# Patient Record
Sex: MALE | Race: WHITE | NOT HISPANIC OR LATINO | Employment: OTHER | URBAN - METROPOLITAN AREA
[De-identification: names, ages, dates, MRNs, and addresses within clinical notes are randomized per-mention and may not be internally consistent; named-entity substitution may affect disease eponyms.]

---

## 2017-01-16 ENCOUNTER — ALLSCRIPTS OFFICE VISIT (OUTPATIENT)
Dept: OTHER | Facility: OTHER | Age: 67
End: 2017-01-16

## 2017-01-20 ENCOUNTER — ALLSCRIPTS OFFICE VISIT (OUTPATIENT)
Dept: OTHER | Facility: OTHER | Age: 67
End: 2017-01-20

## 2017-02-16 DIAGNOSIS — E20.9 HYPOPARATHYROIDISM (HCC): ICD-10-CM

## 2017-02-16 DIAGNOSIS — E83.51 HYPOCALCEMIA: ICD-10-CM

## 2017-02-16 DIAGNOSIS — K63.5 POLYP OF COLON: ICD-10-CM

## 2017-02-16 DIAGNOSIS — E04.2 NONTOXIC MULTINODULAR GOITER: ICD-10-CM

## 2017-02-24 ENCOUNTER — APPOINTMENT (OUTPATIENT)
Dept: LAB | Facility: CLINIC | Age: 67
End: 2017-02-24
Payer: MEDICARE

## 2017-02-24 ENCOUNTER — TRANSCRIBE ORDERS (OUTPATIENT)
Dept: LAB | Facility: CLINIC | Age: 67
End: 2017-02-24

## 2017-02-24 DIAGNOSIS — E20.9 HYPOPARATHYROIDISM, UNSPECIFIED HYPOPARATHYROIDISM TYPE (HCC): ICD-10-CM

## 2017-02-24 DIAGNOSIS — E20.9 HYPOPARATHYROIDISM (HCC): ICD-10-CM

## 2017-02-24 DIAGNOSIS — E83.51 HYPOCALCEMIA: Primary | ICD-10-CM

## 2017-02-24 DIAGNOSIS — E83.51 HYPOCALCEMIA: ICD-10-CM

## 2017-02-24 DIAGNOSIS — K63.5 POLYP OF COLON: ICD-10-CM

## 2017-02-24 DIAGNOSIS — E04.2 NONTOXIC MULTINODULAR GOITER: ICD-10-CM

## 2017-02-24 LAB
25(OH)D3 SERPL-MCNC: 39.2 NG/ML (ref 30–100)
CALCIUM SERPL-MCNC: 8.5 MG/DL (ref 8.3–10.1)
FERRITIN SERPL-MCNC: 151 NG/ML (ref 8–388)
PHOSPHATE SERPL-MCNC: 3.6 MG/DL (ref 2.3–4.1)
PTH-INTACT SERPL-MCNC: <5.5 PG/ML (ref 14–72)
T4 FREE SERPL-MCNC: 1.06 NG/DL (ref 0.76–1.46)
TSH SERPL DL<=0.05 MIU/L-ACNC: 1.05 UIU/ML (ref 0.36–3.74)

## 2017-02-24 PROCEDURE — 84439 ASSAY OF FREE THYROXINE: CPT

## 2017-02-24 PROCEDURE — 82310 ASSAY OF CALCIUM: CPT

## 2017-02-24 PROCEDURE — 82570 ASSAY OF URINE CREATININE: CPT

## 2017-02-24 PROCEDURE — 82728 ASSAY OF FERRITIN: CPT

## 2017-02-24 PROCEDURE — 83970 ASSAY OF PARATHORMONE: CPT

## 2017-02-24 PROCEDURE — 82306 VITAMIN D 25 HYDROXY: CPT

## 2017-02-24 PROCEDURE — 84100 ASSAY OF PHOSPHORUS: CPT

## 2017-02-24 PROCEDURE — 82340 ASSAY OF CALCIUM IN URINE: CPT

## 2017-02-24 PROCEDURE — 36415 COLL VENOUS BLD VENIPUNCTURE: CPT

## 2017-02-24 PROCEDURE — 84443 ASSAY THYROID STIM HORMONE: CPT

## 2017-02-25 LAB
CALCIUM 24H UR-MCNC: 224.4 MG/24 HRS (ref 42–353)
CREAT 24H UR-MRATE: 1.5 G/24HR (ref 0.8–1.8)
SPECIMEN VOL UR: 2550 ML
SPECIMEN VOL UR: 2550 ML

## 2017-02-26 ENCOUNTER — GENERIC CONVERSION - ENCOUNTER (OUTPATIENT)
Dept: OTHER | Facility: OTHER | Age: 67
End: 2017-02-26

## 2017-04-01 ENCOUNTER — OFFICE VISIT (OUTPATIENT)
Dept: URGENT CARE | Facility: CLINIC | Age: 67
End: 2017-04-01
Payer: MEDICARE

## 2017-04-01 PROCEDURE — 99203 OFFICE O/P NEW LOW 30 MIN: CPT

## 2017-04-01 PROCEDURE — G0463 HOSPITAL OUTPT CLINIC VISIT: HCPCS

## 2017-04-19 ENCOUNTER — TRANSCRIBE ORDERS (OUTPATIENT)
Dept: LAB | Facility: CLINIC | Age: 67
End: 2017-04-19

## 2017-04-19 ENCOUNTER — APPOINTMENT (OUTPATIENT)
Dept: LAB | Facility: CLINIC | Age: 67
End: 2017-04-19
Payer: MEDICARE

## 2017-04-19 ENCOUNTER — ALLSCRIPTS OFFICE VISIT (OUTPATIENT)
Dept: OTHER | Facility: OTHER | Age: 67
End: 2017-04-19

## 2017-04-19 DIAGNOSIS — R10.84 GENERALIZED ABDOMINAL PAIN: ICD-10-CM

## 2017-04-19 DIAGNOSIS — R53.83 OTHER FATIGUE: ICD-10-CM

## 2017-04-19 DIAGNOSIS — R53.81 OTHER MALAISE: ICD-10-CM

## 2017-04-19 DIAGNOSIS — R11.0 NAUSEA: ICD-10-CM

## 2017-04-19 DIAGNOSIS — R11.2 NAUSEA WITH VOMITING: ICD-10-CM

## 2017-04-19 DIAGNOSIS — R19.7 DIARRHEA: ICD-10-CM

## 2017-04-19 LAB
ALBUMIN SERPL BCP-MCNC: 3.1 G/DL (ref 3.5–5)
ALP SERPL-CCNC: 69 U/L (ref 46–116)
ALT SERPL W P-5'-P-CCNC: 23 U/L (ref 12–78)
AMYLASE SERPL-CCNC: 88 IU/L (ref 25–115)
ANION GAP SERPL CALCULATED.3IONS-SCNC: 7 MMOL/L (ref 4–13)
AST SERPL W P-5'-P-CCNC: 17 U/L (ref 5–45)
BACTERIA UR QL AUTO: ABNORMAL /HPF
BASOPHILS # BLD AUTO: 0.01 THOUSANDS/ΜL (ref 0–0.1)
BASOPHILS NFR BLD AUTO: 0 % (ref 0–1)
BILIRUB SERPL-MCNC: 0.38 MG/DL (ref 0.2–1)
BILIRUB UR QL STRIP: NEGATIVE
BUN SERPL-MCNC: 17 MG/DL (ref 5–25)
CALCIUM SERPL-MCNC: 7.4 MG/DL (ref 8.3–10.1)
CHLORIDE SERPL-SCNC: 100 MMOL/L (ref 100–108)
CLARITY UR: ABNORMAL
CO2 SERPL-SCNC: 29 MMOL/L (ref 21–32)
COLOR UR: YELLOW
CREAT SERPL-MCNC: 1.11 MG/DL (ref 0.6–1.3)
EOSINOPHIL # BLD AUTO: 0.13 THOUSAND/ΜL (ref 0–0.61)
EOSINOPHIL NFR BLD AUTO: 2 % (ref 0–6)
ERYTHROCYTE [DISTWIDTH] IN BLOOD BY AUTOMATED COUNT: 13.7 % (ref 11.6–15.1)
ERYTHROCYTE [SEDIMENTATION RATE] IN BLOOD: 7 MM/HOUR (ref 0–10)
GFR SERPL CREATININE-BSD FRML MDRD: >60 ML/MIN/1.73SQ M
GLUCOSE SERPL-MCNC: 96 MG/DL (ref 65–140)
GLUCOSE UR STRIP-MCNC: NEGATIVE MG/DL
HCT VFR BLD AUTO: 48.5 % (ref 36.5–49.3)
HGB BLD-MCNC: 16.4 G/DL (ref 12–17)
HGB UR QL STRIP.AUTO: NEGATIVE
HYALINE CASTS #/AREA URNS LPF: ABNORMAL /LPF
KETONES UR STRIP-MCNC: NEGATIVE MG/DL
LEUKOCYTE ESTERASE UR QL STRIP: NEGATIVE
LIPASE SERPL-CCNC: 132 U/L (ref 73–393)
LYMPHOCYTES # BLD AUTO: 0.94 THOUSANDS/ΜL (ref 0.6–4.47)
LYMPHOCYTES NFR BLD AUTO: 14 % (ref 14–44)
MCH RBC QN AUTO: 30 PG (ref 26.8–34.3)
MCHC RBC AUTO-ENTMCNC: 33.8 G/DL (ref 31.4–37.4)
MCV RBC AUTO: 89 FL (ref 82–98)
MONOCYTES # BLD AUTO: 0.53 THOUSAND/ΜL (ref 0.17–1.22)
MONOCYTES NFR BLD AUTO: 8 % (ref 4–12)
NEUTROPHILS # BLD AUTO: 5.27 THOUSANDS/ΜL (ref 1.85–7.62)
NEUTS SEG NFR BLD AUTO: 76 % (ref 43–75)
NITRITE UR QL STRIP: NEGATIVE
NON-SQ EPI CELLS URNS QL MICRO: ABNORMAL /HPF
NRBC BLD AUTO-RTO: 0 /100 WBCS
PH UR STRIP.AUTO: 6 [PH] (ref 4.5–8)
PLATELET # BLD AUTO: 225 THOUSANDS/UL (ref 149–390)
PMV BLD AUTO: 10.1 FL (ref 8.9–12.7)
POTASSIUM SERPL-SCNC: 4.1 MMOL/L (ref 3.5–5.3)
PROT SERPL-MCNC: 7.1 G/DL (ref 6.4–8.2)
PROT UR STRIP-MCNC: ABNORMAL MG/DL
RBC # BLD AUTO: 5.46 MILLION/UL (ref 3.88–5.62)
RBC #/AREA URNS AUTO: ABNORMAL /HPF
SODIUM SERPL-SCNC: 136 MMOL/L (ref 136–145)
SP GR UR STRIP.AUTO: 1.02 (ref 1–1.03)
UROBILINOGEN UR QL STRIP.AUTO: 0.2 E.U./DL
WBC # BLD AUTO: 6.89 THOUSAND/UL (ref 4.31–10.16)
WBC #/AREA URNS AUTO: ABNORMAL /HPF

## 2017-04-19 PROCEDURE — 80053 COMPREHEN METABOLIC PANEL: CPT

## 2017-04-19 PROCEDURE — 36415 COLL VENOUS BLD VENIPUNCTURE: CPT

## 2017-04-19 PROCEDURE — 82150 ASSAY OF AMYLASE: CPT

## 2017-04-19 PROCEDURE — 85025 COMPLETE CBC W/AUTO DIFF WBC: CPT

## 2017-04-19 PROCEDURE — 81001 URINALYSIS AUTO W/SCOPE: CPT

## 2017-04-19 PROCEDURE — 85652 RBC SED RATE AUTOMATED: CPT

## 2017-04-19 PROCEDURE — 83690 ASSAY OF LIPASE: CPT

## 2017-04-20 ENCOUNTER — GENERIC CONVERSION - ENCOUNTER (OUTPATIENT)
Dept: OTHER | Facility: OTHER | Age: 67
End: 2017-04-20

## 2017-04-21 ENCOUNTER — ALLSCRIPTS OFFICE VISIT (OUTPATIENT)
Dept: OTHER | Facility: OTHER | Age: 67
End: 2017-04-21

## 2017-05-02 ENCOUNTER — APPOINTMENT (OUTPATIENT)
Dept: LAB | Facility: CLINIC | Age: 67
End: 2017-05-02
Payer: MEDICARE

## 2017-05-02 ENCOUNTER — TRANSCRIBE ORDERS (OUTPATIENT)
Dept: LAB | Facility: CLINIC | Age: 67
End: 2017-05-02

## 2017-05-02 DIAGNOSIS — R10.84 ABDOMINAL PAIN, GENERALIZED: ICD-10-CM

## 2017-05-02 DIAGNOSIS — R80.9 PROTEINURIA, UNSPECIFIED TYPE: ICD-10-CM

## 2017-05-02 DIAGNOSIS — E87.1 HYPOSMOLALITY AND/OR HYPONATREMIA: ICD-10-CM

## 2017-05-02 DIAGNOSIS — R19.7 DIARRHEA, UNSPECIFIED TYPE: ICD-10-CM

## 2017-05-02 DIAGNOSIS — N18.30 CHRONIC KIDNEY DISEASE, STAGE III (MODERATE) (HCC): ICD-10-CM

## 2017-05-02 DIAGNOSIS — R10.84 ABDOMINAL PAIN, GENERALIZED: Primary | ICD-10-CM

## 2017-05-02 DIAGNOSIS — E83.51 HYPOCALCEMIA: ICD-10-CM

## 2017-05-02 LAB
ALBUMIN SERPL BCP-MCNC: 3.2 G/DL (ref 3.5–5)
ANION GAP SERPL CALCULATED.3IONS-SCNC: 8 MMOL/L (ref 4–13)
BUN SERPL-MCNC: 17 MG/DL (ref 5–25)
CALCIUM SERPL-MCNC: 8 MG/DL (ref 8.3–10.1)
CHLORIDE SERPL-SCNC: 102 MMOL/L (ref 100–108)
CO2 SERPL-SCNC: 29 MMOL/L (ref 21–32)
CREAT SERPL-MCNC: 1.17 MG/DL (ref 0.6–1.3)
GFR SERPL CREATININE-BSD FRML MDRD: >60 ML/MIN/1.73SQ M
GLUCOSE SERPL-MCNC: 109 MG/DL (ref 65–140)
PHOSPHATE SERPL-MCNC: 3.1 MG/DL (ref 2.3–4.1)
POTASSIUM SERPL-SCNC: 4 MMOL/L (ref 3.5–5.3)
SODIUM SERPL-SCNC: 139 MMOL/L (ref 136–145)

## 2017-05-02 PROCEDURE — 80069 RENAL FUNCTION PANEL: CPT

## 2017-05-02 PROCEDURE — 36415 COLL VENOUS BLD VENIPUNCTURE: CPT

## 2017-05-03 ENCOUNTER — GENERIC CONVERSION - ENCOUNTER (OUTPATIENT)
Dept: OTHER | Facility: OTHER | Age: 67
End: 2017-05-03

## 2017-05-08 ENCOUNTER — APPOINTMENT (OUTPATIENT)
Dept: LAB | Facility: CLINIC | Age: 67
End: 2017-05-08
Payer: MEDICARE

## 2017-05-08 DIAGNOSIS — R19.7 DIARRHEA, UNSPECIFIED TYPE: ICD-10-CM

## 2017-05-08 DIAGNOSIS — E87.1 HYPOSMOLALITY AND/OR HYPONATREMIA: ICD-10-CM

## 2017-05-08 DIAGNOSIS — N18.30 CHRONIC KIDNEY DISEASE, STAGE III (MODERATE) (HCC): ICD-10-CM

## 2017-05-08 DIAGNOSIS — R80.9 PROTEINURIA, UNSPECIFIED TYPE: ICD-10-CM

## 2017-05-08 DIAGNOSIS — E83.51 HYPOCALCEMIA: ICD-10-CM

## 2017-05-08 DIAGNOSIS — R10.84 ABDOMINAL PAIN, GENERALIZED: ICD-10-CM

## 2017-05-08 LAB
ANION GAP SERPL CALCULATED.3IONS-SCNC: 4 MMOL/L (ref 4–13)
BUN SERPL-MCNC: 18 MG/DL (ref 5–25)
CALCIUM SERPL-MCNC: 8.7 MG/DL (ref 8.3–10.1)
CHLORIDE SERPL-SCNC: 101 MMOL/L (ref 100–108)
CO2 SERPL-SCNC: 32 MMOL/L (ref 21–32)
CREAT SERPL-MCNC: 1.25 MG/DL (ref 0.6–1.3)
CREAT UR-MCNC: 120 MG/DL
GFR SERPL CREATININE-BSD FRML MDRD: 57.6 ML/MIN/1.73SQ M
GLUCOSE SERPL-MCNC: 88 MG/DL (ref 65–140)
MAGNESIUM SERPL-MCNC: 2 MG/DL (ref 1.6–2.6)
MICROALBUMIN UR-MCNC: 184 MG/L (ref 0–20)
MICROALBUMIN/CREAT 24H UR: 153 MG/G CREATININE (ref 0–30)
OSMOLALITY UR: 506 MMOL/KG
POTASSIUM SERPL-SCNC: 4.7 MMOL/L (ref 3.5–5.3)
SODIUM SERPL-SCNC: 137 MMOL/L (ref 136–145)

## 2017-05-08 PROCEDURE — 36415 COLL VENOUS BLD VENIPUNCTURE: CPT

## 2017-05-08 PROCEDURE — 80048 BASIC METABOLIC PNL TOTAL CA: CPT

## 2017-05-08 PROCEDURE — 82570 ASSAY OF URINE CREATININE: CPT

## 2017-05-08 PROCEDURE — 83735 ASSAY OF MAGNESIUM: CPT

## 2017-05-08 PROCEDURE — 83935 ASSAY OF URINE OSMOLALITY: CPT

## 2017-05-08 PROCEDURE — 82043 UR ALBUMIN QUANTITATIVE: CPT

## 2017-05-31 ENCOUNTER — ALLSCRIPTS OFFICE VISIT (OUTPATIENT)
Dept: OTHER | Facility: OTHER | Age: 67
End: 2017-05-31

## 2017-05-31 DIAGNOSIS — E78.5 HYPERLIPIDEMIA: ICD-10-CM

## 2017-06-06 ENCOUNTER — TRANSCRIBE ORDERS (OUTPATIENT)
Dept: LAB | Facility: CLINIC | Age: 67
End: 2017-06-06

## 2017-06-06 ENCOUNTER — GENERIC CONVERSION - ENCOUNTER (OUTPATIENT)
Dept: OTHER | Facility: OTHER | Age: 67
End: 2017-06-06

## 2017-06-06 ENCOUNTER — APPOINTMENT (OUTPATIENT)
Dept: LAB | Facility: CLINIC | Age: 67
End: 2017-06-06
Payer: MEDICARE

## 2017-06-06 DIAGNOSIS — E78.5 HYPERLIPIDEMIA: ICD-10-CM

## 2017-06-06 LAB
LDLC SERPL DIRECT ASSAY-MCNC: 117 MG/DL (ref 0–100)
TRIGL SERPL-MCNC: 107 MG/DL

## 2017-06-06 PROCEDURE — 83721 ASSAY OF BLOOD LIPOPROTEIN: CPT

## 2017-06-06 PROCEDURE — 84478 ASSAY OF TRIGLYCERIDES: CPT

## 2017-06-06 PROCEDURE — 36415 COLL VENOUS BLD VENIPUNCTURE: CPT

## 2017-08-18 ENCOUNTER — ALLSCRIPTS OFFICE VISIT (OUTPATIENT)
Dept: OTHER | Facility: OTHER | Age: 67
End: 2017-08-18

## 2017-08-18 DIAGNOSIS — E20.9 HYPOPARATHYROIDISM (HCC): ICD-10-CM

## 2017-08-18 DIAGNOSIS — E04.2 NONTOXIC MULTINODULAR GOITER: ICD-10-CM

## 2017-10-04 ENCOUNTER — ALLSCRIPTS OFFICE VISIT (OUTPATIENT)
Dept: OTHER | Facility: OTHER | Age: 67
End: 2017-10-04

## 2017-10-04 DIAGNOSIS — I10 ESSENTIAL (PRIMARY) HYPERTENSION: ICD-10-CM

## 2017-10-04 DIAGNOSIS — E05.90 THYROTOXICOSIS WITHOUT THYROID STORM: ICD-10-CM

## 2017-10-04 DIAGNOSIS — R79.82 ELEVATED C-REACTIVE PROTEIN (CRP): ICD-10-CM

## 2017-10-04 DIAGNOSIS — Z12.5 ENCOUNTER FOR SCREENING FOR MALIGNANT NEOPLASM OF PROSTATE: ICD-10-CM

## 2017-10-04 DIAGNOSIS — Z11.59 ENCOUNTER FOR SCREENING FOR OTHER VIRAL DISEASES (CODE): ICD-10-CM

## 2017-10-05 ENCOUNTER — APPOINTMENT (OUTPATIENT)
Dept: LAB | Facility: CLINIC | Age: 67
End: 2017-10-05
Payer: MEDICARE

## 2017-10-05 ENCOUNTER — TRANSCRIBE ORDERS (OUTPATIENT)
Dept: LAB | Facility: CLINIC | Age: 67
End: 2017-10-05

## 2017-10-05 DIAGNOSIS — Z11.59 ENCOUNTER FOR SCREENING FOR OTHER VIRAL DISEASES (CODE): ICD-10-CM

## 2017-10-05 DIAGNOSIS — I10 ESSENTIAL (PRIMARY) HYPERTENSION: ICD-10-CM

## 2017-10-05 DIAGNOSIS — E04.2 NONTOXIC MULTINODULAR GOITER: ICD-10-CM

## 2017-10-05 DIAGNOSIS — E20.9 HYPOPARATHYROIDISM (HCC): ICD-10-CM

## 2017-10-05 DIAGNOSIS — Z12.5 ENCOUNTER FOR SCREENING FOR MALIGNANT NEOPLASM OF PROSTATE: ICD-10-CM

## 2017-10-05 DIAGNOSIS — N18.30 CHRONIC KIDNEY DISEASE, STAGE III (MODERATE) (HCC): ICD-10-CM

## 2017-10-05 DIAGNOSIS — E83.51 HYPOCALCEMIA: ICD-10-CM

## 2017-10-05 DIAGNOSIS — N18.30 CHRONIC KIDNEY DISEASE, STAGE III (MODERATE) (HCC): Primary | ICD-10-CM

## 2017-10-05 DIAGNOSIS — E87.1 HYPOSMOLALITY SYNDROME: ICD-10-CM

## 2017-10-05 DIAGNOSIS — E05.90 THYROTOXICOSIS WITHOUT THYROID STORM: ICD-10-CM

## 2017-10-05 DIAGNOSIS — R79.82 ELEVATED C-REACTIVE PROTEIN (CRP): ICD-10-CM

## 2017-10-05 LAB
25(OH)D3 SERPL-MCNC: 55.8 NG/ML (ref 30–100)
ALBUMIN SERPL BCP-MCNC: 3.4 G/DL (ref 3.5–5)
ALP SERPL-CCNC: 75 U/L (ref 46–116)
ALT SERPL W P-5'-P-CCNC: 20 U/L (ref 12–78)
ANION GAP SERPL CALCULATED.3IONS-SCNC: 6 MMOL/L (ref 4–13)
AST SERPL W P-5'-P-CCNC: 23 U/L (ref 5–45)
BACTERIA UR QL AUTO: ABNORMAL /HPF
BILIRUB SERPL-MCNC: 0.47 MG/DL (ref 0.2–1)
BILIRUB UR QL STRIP: NEGATIVE
BUN SERPL-MCNC: 15 MG/DL (ref 5–25)
CALCIUM SERPL-MCNC: 8.4 MG/DL (ref 8.3–10.1)
CHLORIDE SERPL-SCNC: 102 MMOL/L (ref 100–108)
CLARITY UR: CLEAR
CO2 SERPL-SCNC: 29 MMOL/L (ref 21–32)
COLOR UR: YELLOW
CREAT SERPL-MCNC: 1.14 MG/DL (ref 0.6–1.3)
CREAT UR-MCNC: 192 MG/DL
CRP SERPL HS-MCNC: 0.96 MG/L
GFR SERPL CREATININE-BSD FRML MDRD: 66 ML/MIN/1.73SQ M
GLUCOSE P FAST SERPL-MCNC: 87 MG/DL (ref 65–99)
GLUCOSE UR STRIP-MCNC: NEGATIVE MG/DL
HGB UR QL STRIP.AUTO: NEGATIVE
HYALINE CASTS #/AREA URNS LPF: ABNORMAL /LPF
KETONES UR STRIP-MCNC: NEGATIVE MG/DL
LEUKOCYTE ESTERASE UR QL STRIP: NEGATIVE
MICROALBUMIN UR-MCNC: 466 MG/L (ref 0–20)
MICROALBUMIN/CREAT 24H UR: 243 MG/G CREATININE (ref 0–30)
NITRITE UR QL STRIP: NEGATIVE
NON-SQ EPI CELLS URNS QL MICRO: ABNORMAL /HPF
PH UR STRIP.AUTO: 6 [PH] (ref 4.5–8)
PHOSPHATE SERPL-MCNC: 3.7 MG/DL (ref 2.3–4.1)
POTASSIUM SERPL-SCNC: 4.3 MMOL/L (ref 3.5–5.3)
PROT SERPL-MCNC: 7.6 G/DL (ref 6.4–8.2)
PROT UR STRIP-MCNC: ABNORMAL MG/DL
PSA SERPL-MCNC: 1.5 NG/ML (ref 0–4)
PTH-INTACT SERPL-MCNC: <5.5 PG/ML (ref 14–72)
RBC #/AREA URNS AUTO: ABNORMAL /HPF
SODIUM SERPL-SCNC: 137 MMOL/L (ref 136–145)
SP GR UR STRIP.AUTO: 1.02 (ref 1–1.03)
T3 SERPL-MCNC: 1 NG/ML (ref 0.6–1.8)
T4 FREE SERPL-MCNC: 1.09 NG/DL (ref 0.76–1.46)
TSH SERPL DL<=0.05 MIU/L-ACNC: 1.18 UIU/ML (ref 0.36–3.74)
UROBILINOGEN UR QL STRIP.AUTO: 0.2 E.U./DL
WBC #/AREA URNS AUTO: ABNORMAL /HPF

## 2017-10-05 PROCEDURE — 86141 C-REACTIVE PROTEIN HS: CPT

## 2017-10-05 PROCEDURE — 84480 ASSAY TRIIODOTHYRONINE (T3): CPT

## 2017-10-05 PROCEDURE — 83970 ASSAY OF PARATHORMONE: CPT

## 2017-10-05 PROCEDURE — 84443 ASSAY THYROID STIM HORMONE: CPT

## 2017-10-05 PROCEDURE — 86803 HEPATITIS C AB TEST: CPT

## 2017-10-05 PROCEDURE — 80053 COMPREHEN METABOLIC PANEL: CPT

## 2017-10-05 PROCEDURE — 82306 VITAMIN D 25 HYDROXY: CPT

## 2017-10-05 PROCEDURE — 82570 ASSAY OF URINE CREATININE: CPT | Performed by: INTERNAL MEDICINE

## 2017-10-05 PROCEDURE — 84100 ASSAY OF PHOSPHORUS: CPT

## 2017-10-05 PROCEDURE — 82043 UR ALBUMIN QUANTITATIVE: CPT | Performed by: INTERNAL MEDICINE

## 2017-10-05 PROCEDURE — 84439 ASSAY OF FREE THYROXINE: CPT

## 2017-10-05 PROCEDURE — 36415 COLL VENOUS BLD VENIPUNCTURE: CPT

## 2017-10-05 PROCEDURE — 81001 URINALYSIS AUTO W/SCOPE: CPT | Performed by: INTERNAL MEDICINE

## 2017-10-05 PROCEDURE — G0103 PSA SCREENING: HCPCS

## 2017-10-05 NOTE — PROGRESS NOTES
Assessment  1  Hypertension (401 9) (I10)   2  Generalized anxiety disorder (300 02) (F41 1)   3  GERD without esophagitis (530 81) (K21 9)   4  Hyperlipidemia (272 4) (E78 5)   5  Hyperthyroidism (242 90) (E05 90)   6  Hypocalcemia (275 41) (E83 51)   7  Hypoparathyroidism (252 1) (E20 9)   8  Need for hepatitis C screening test (V73 89) (Z11 59)   9  Elevated C-reactive protein (CRP) (790 95) (R79 82)    Plan  Elevated C-reactive protein (CRP)    · (1) C-REACTIVE PROTEIN, HIGH SENSITIVITY; Status:Active; Requested ABU:44MXG2495;   Hypertension    · (1) COMPREHENSIVE METABOLIC PANEL; Status:Active; Requested for:04Oct2017;    · (1) MICROALBUMIN CREATININE RATIO, RANDOM URINE; Status:Active; Requested for:04Oct2017;    · Follow-up visit in 4 Months Evaluation and Treatment  Follow-up  Status: Hold For - Scheduling   Requested for: 59FNC1335   · A diet low in sodium and high in potassium, magnesium, and calcium can help your blood pressure ;  Status:Complete;   Done: 27AWU9479   · Begin a limited exercise program ; Status:Complete;   Done: 97SYX6511   · Begin or continue regular aerobic exercise  Gradually work up to at least {count1} sessions of  {dur1} of exercise a week ; Status:Complete;   Done: 21CCO4059  Hyperthyroidism    · (1) T3 TOTAL; Status:Active; Requested for:04Oct2017;    · (1) T4, FREE; Status:Active; Requested for:04Oct2017;    · (1) TSH; Status:Active; Requested for:04Oct2017;   Need for hepatitis C screening test    · (1) HEP C ANTIBODY; Status:Active; Requested SONALI:10CEN5355;   Special screening examination for neoplasm of prostate    · (1) PSA (SCREEN) (Dx V76 44 Screen for Prostate Cancer); Status:Active; Requested  BZT:50SNQ4679;     Discussion/Summary  Discussion Summary:   Doing well and will check labs  Continue current treatment  Declines the flu vaccine  Discussed the elevated CRP and will recheck, but discussed it being acute phase reactant and causes   In regards to cardiac, treatment continues to modify risks  Keep f/u with endo and renal    Medication SE Review and Pt Understands Tx: Possible side effects of new medications were reviewed with the patient/guardian today  The treatment plan was reviewed with the patient/guardian  The patient/guardian understands and agrees with the treatment plan      Chief Complaint  Chief Complaint Free Text Note Form: RTN - Hypercholesterol, HTN  Sherre Soulier Sherre Soulier No new complaints  History of Present Illness  HPI: WM RTC with his wife for f/u htn, hyperlipidemia, etc  Doing well and no c/o's, but had a recent Life Screen and results were good except for slightly elevated CRP  Due for labs  Just seen by endo and things are going good and needs labs and a thyroid US beginning of next year  Anxiety Disorder (Follow-Up): The patient is being seen for follow-up of generalized anxiety disorder  The patient reports doing well  He has no comorbid illnesses  He has had no significant interval events  Interval symptoms:  improved anxiety,-denies sleep disruption-and-denies depression  Medication(s): beta blockers  Medications:  the patient is adherent to his medication regimen, but-he denies medication side effects  Disease management:  the patient is doing well with his goals  Gastroesophageal Reflux Disease (Follow-Up): The patient is being seen for follow-up of gastroesophageal reflux disease  The patient reports doing well  There are no comorbid illnesses  He has had no significant interval events  The patient is currently asymptomatic  No associated symptoms are reported  The patient is not currently on medication for this problem  Disease management:  the patient is doing well with his goals  Electrolyte Imbalance (Brief): The patient is being seen for a routine clinic follow-up of electrolyte imbalance  The patient has hypocalcemia  The patient is currently asymptomatic  Current treatment includes oral calcium supplements   By report, there is good compliance with treatment, good tolerance of treatment and good symptom control  Hyperlipidemia (Follow-Up): The patient states his hyperlipidemia has been under good control since the last visit  Comorbid Illnesses: hypertension  He has no significant interval events  Symptoms: The patient is currently asymptomatic  Associated symptoms include no focal neurologic deficits-and-no memory loss  Medications: The patient is not currently on any medications for his hyperlipidemia  The patient is doing well with his hyperlipidemia goals  The patient is due for a lipid panel  Hypertension (Follow-Up): The patient presents for follow-up of essential hypertension  The patient states he has been doing well with his blood pressure control since the last visit  He has no comorbid illnesses  He has no significant interval events  Symptoms: The patient is currently asymptomatic  Associated symptoms include no headache-and-no focal neurologic deficits  Home monitoring: The patient checks his blood pressure sporadically  Blood pressure control has been good  Medications: the patient is adherent with his medication regimen -He denies medication side effects  Medication(s): a beta blocking agent-and-an angiotensin receptor blocker  Disease Management: the patient is doing well with his blood pressure goals  The patient is due for a lipid panel,-a serum creatinine-and-a urine microalbumin  Review of Systems  Complete-Male:   Constitutional: no fever,-not feeling poorly,-no chills-and-not feeling tired  Cardiovascular: no chest pain,-no intermittent leg claudication,-no palpitations-and-no extremity edema  Respiratory: no shortness of breath,-no cough,-no orthopnea,-no wheezing,-no shortness of breath during exertion-and-no PND  Gastrointestinal: no abdominal pain,-no nausea,-no constipation-and-no diarrhea     Genitourinary: No complaints of dysuria, no incontinence, no hesitancy, no nocturia, no genital lesion, no testicular pain  Musculoskeletal: No complaints of arthralgia, no myalgias, no joint swelling or stiffness, no limb pain or swelling  Integumentary: No complaints of skin rash or skin lesions, no itching, no skin wound, no dry skin  Neurological: no headache,-no confusion-and-no convulsions  Psychiatric: anxiety, but-no depression  Endocrine: No complaints of proptosis, no hot flashes, no muscle weakness, no erectile dysfunction, no deepening of the voice, no feelings of weakness  Hematologic/Lymphatic: No complaints of swollen glands, no swollen glands in the neck, does not bleed easily, no easy bruising  Active Problems  1  Allergic rhinitis (477 9) (J30 9)   2  Asymptomatic proteinuria (791 0) (R80 9)   3  Basal cell tumor (238 2) (D48 5)   4  Benign colon polyp (211 3) (K63 5)   5  Chronic cough (786 2) (R05)   6  Generalized anxiety disorder (300 02) (F41 1)   7  GERD without esophagitis (530 81) (K21 9)   8  Hyperlipidemia (272 4) (E78 5)   9  Hypertension (401 9) (I10)   10  Hyperthyroidism (242 90) (E05 90)   11  Hypocalcemia (275 41) (E83 51)   12  Hypoparathyroidism (252 1) (E20 9)   13  Medicare annual wellness visit, subsequent (V70 0) (Z00 00)   14  Migraine, unspecified, not intractable, without status migrainosus (346 90) (G43 909)   15  Multiple thyroid nodules (241 1) (E04 2)   16  Need for hepatitis C screening test (V73 89) (Z11 59)   17  Need for influenza vaccination (V04 81) (Z23)   18  Palpitations (785 1) (R00 2)   19  Screening for genitourinary condition (V81 6) (Z13 89)   20  Screening for neurological condition (V80 09) (Z13 89)   21  Special screening examination for neoplasm of prostate (V76 44) (Z12 5)    Past Medical History  1  History of Abdominal pain, acute, generalized (789 07,338 19) (R10 84)   2  History of Acute frontal sinusitis (461 1) (J01 10)   3  History of Acute serous otitis media of left ear (381 01) (H65 02)   4   Benign colon polyp (211 3) (K63 5) 5  History of Dysfunction of both eustachian tubes (381 81) (H69 83)   6  History of Encounter for therapeutic drug monitoring (V58 83) (Z51 81)   7  History of chest pain (V13 89) (Z87 898)   8  History of diarrhea (V12 79) (Z87 898)   9  History of fatigue (V13 89) (Z87 898)   10  History of hematuria (V13 09) (Z87 448)   11  History of nausea (V12 79) (Z87 898)   12  History of nausea (V12 79) (Z87 898)   13  History of nausea and vomiting (V12 79) (Z87 898)   14  History of shortness of breath (V13 89) (Z87 898)   15  History of tinea corporis (V12 09) (Z86 19)   16  History of Hoarseness (784 42)   17  History of Lightheadedness (780 4) (R42)   18  History of Lump in neck (784 2) (R22 1)   19  History of Malaise and fatigue (780 79) (R53 81,R53 83)   20  History of Medicare annual wellness visit, initial (V70 0) (Z00 00)   21  History of Microscopic hematuria (599 72) (R31 29)   22  History of Nephropathy (583 9) (N28 9)   23  Personal history of actinic keratosis (V13 3) (Z87 2)   24  History of Skin lesion (709 9) (L98 9)   25  History of Tick bite of knee (916 4,E906 4) (Q47 516B,S54  Dayanara Jerome)  Active Problems And Past Medical History Reviewed: The active problems and past medical history were reviewed and updated today  Surgical History  1  History of Biopsy Skin Each Additional Lesion   2  History of Biopsy Thyroid Using Percutaneous Core Needle   3  History of Complete Colonoscopy For Polyp Removal   4  History of Excision Of Lesion Scalp Malignant   5  History of Tonsillectomy  Surgical History Reviewed: The surgical history was reviewed and updated today  Family History  Mother    1  Family history of Hypertension (V17 49)  Father    2  Family history of Hypertension (V17 49)  Brother    3  Family history of Schwannomatosis  Family History Reviewed: The family history was reviewed and updated today         Social History   · Marital History - Currently    · Never A Smoker  Social History Reviewed: The social history was reviewed and updated today  The social history was reviewed and is unchanged  Current Meds   1  Aspirin EC Lo-Dose 81 MG TBEC; Take 1 tablet daily; Therapy: 19Tfj7304 to (Last Rx:25Qze3803)  Requested for: 89Zud3794 Ordered   2  BL Vitamin C 500 MG TABS; Take 1 tablet daily Recorded   3  Calcitriol 0 25 MCG Oral Capsule; TAKE ONE CAPSULE BY MOUTH ONCE DAILY; Therapy: 82FES9906 to (SXPUWYTK:89XLB1117)  Requested for: 00Gld6940; Last Rx:38Xpv1908   Ordered   4  Calcium 600 MG Oral Tablet; 1 tab twice a day with meals; Therapy: 68EBY0635 to (Last Rx:16Jan2017) Ordered   5  Fish Oil 1200 MG Oral Capsule; TAKE 2 CAPSULE Daily; Therapy: (Recorded:34Kae3649) to Recorded   6  Losartan Potassium 50 MG Oral Tablet; TAKE 1 TABLET BY MOUTH ONCE DAILY; Therapy: 52LMV6699 to (Piotr Wills)  Requested for: 67Ezx5030; Last Rx:13Cgl5153;   Status: 1554 Surgeons Dr gilda Regalado Verification Ordered   7  Metoprolol Tartrate 25 MG Oral Tablet; TAKE 1 TABLET DAILY; Therapy: 75Ljn4043 to (Evaluate:27Jan2018)  Requested for: 41Ztt1001; Last Rx:32Vtp2656   Ordered   8  Multi-Vitamin Oral Tablet; Take 1 daily Recorded   9  Vitamin D 1000 UNIT CAPS; Therapy: (Recorded:14Apr2015) to Recorded  Medication List Reviewed: The medication list was reviewed and updated today  Allergies  1  No Known Drug Allergies    Vitals  Vital Signs    Recorded: 07AMB0214 09:35AM   Temperature 98 F   Heart Rate 80   Respiration 18   Systolic 770   Diastolic 64   Height 6 ft 3 in   Weight 211 lb    BMI Calculated 26 37   BSA Calculated 2 24     Physical Exam    Constitutional   General appearance: No acute distress, well appearing and well nourished  Eyes   Conjunctiva and lids: No swelling, erythema, or discharge  Pupils and irises: Equal, round and reactive to light  Ears, Nose, Mouth, and Throat   Oropharynx: Normal with no erythema, edema, exudate or lesions  Pulmonary   Respiratory effort: No increased work of breathing or signs of respiratory distress  Auscultation of lungs: Clear to auscultation, equal breath sounds bilaterally, no wheezes, no rales, no rhonci  Cardiovascular   Auscultation of heart: Normal rate and rhythm, normal S1 and S2, without murmurs  Examination of extremities for edema and/or varicosities: Normal     Carotid pulses: Normal     Abdomen   Abdomen: Non-tender, no masses  Musculoskeletal   Gait and station: Normal     Psychiatric   Orientation to person, place and time: Normal     Mood and affect: Normal          Health Management  Special screening examination for neoplasm of prostate   (1) PSA (SCREEN) (Dx V76 44 Screen for Prostate Cancer); every 1 year; Last 43CHL2456; Next  Due: 00HXT2012; Overdue  Health Maintenance   (1) PSA (SCREEN) (Dx V76 44 Screen for Prostate Cancer); every 1 year; Last 39PHP4451; Next  Due: 91IJL9054; Overdue    Future Appointments    Date/Time Provider Specialty Site   02/22/2018 10:20 AM Pricilla Sandifer, M D  Endocrinology [facility] ENDOCRINOLOGY   02/19/2018 09:00 AM JOSEP Gonzáles   Internal Medicine [facility] [id number]     Signatures   Electronically signed by : JOSEP Hussein ; Oct  4 2017 10:03AM EST                       (Author)

## 2017-10-06 ENCOUNTER — GENERIC CONVERSION - ENCOUNTER (OUTPATIENT)
Dept: OTHER | Facility: OTHER | Age: 67
End: 2017-10-06

## 2017-10-06 LAB — HCV AB SER QL: NORMAL

## 2017-10-07 ENCOUNTER — GENERIC CONVERSION - ENCOUNTER (OUTPATIENT)
Dept: OTHER | Facility: OTHER | Age: 67
End: 2017-10-07

## 2017-10-12 ENCOUNTER — OFFICE VISIT (OUTPATIENT)
Dept: URGENT CARE | Facility: CLINIC | Age: 67
End: 2017-10-12
Payer: MEDICARE

## 2017-10-12 PROCEDURE — 12032 INTMD RPR S/A/T/EXT 2.6-7.5: CPT

## 2017-10-12 PROCEDURE — 99213 OFFICE O/P EST LOW 20 MIN: CPT

## 2017-10-12 PROCEDURE — G0463 HOSPITAL OUTPT CLINIC VISIT: HCPCS

## 2017-10-14 NOTE — PROGRESS NOTES
Assessment  1  Laceration of forearm, left (881 00) (S56 812A)    Plan  Laceration of forearm, left    · Cephalexin 500 MG Oral Capsule; TAKE 1 CAPSULE 3 TIMES DAILY    Discussion/Summary  Discussion Summary:   Watch for signs of infection  Return here if any occur despite taking Keflex  Sutures need to be taken out in 7-10 days  Medication Side Effects Reviewed: Possible side effects of new medications were reviewed with the patient/guardian today  Understands and agrees with treatment plan: The treatment plan was reviewed with the patient/guardian  The patient/guardian understands and agrees with the treatment plan   Counseling Documentation With Imm: The patient was counseled regarding instructions for management  Chief Complaint  1  Skin Wound  Chief Complaint Free Text Note Form: Pt cut his left forearm on a piece of steel earlier today  History of Present Illness  HPI: Sustained a laceration to left forearm, just prior to arrival, on a piece of corazon metal  Bleeding controlled   Skin Wound: 2057 JDLab presents with complaints of skin wound  Review of Systems  Focused-Male:   Constitutional: no fever-- and-- no chills  ENT: no sore throat,-- no nasal discharge-- and-- no hoarseness  Cardiovascular: no chest pain  Respiratory: no cough  Gastrointestinal: no nausea-- and-- no vomiting  Musculoskeletal: no arthralgias-- and-- no myalgias  Integumentary: no rashes  Neurological: no numbness,-- no tingling-- and-- no dizziness  ROS Reviewed:   ROS reviewed  Active Problems  1  Allergic rhinitis (477 9) (J30 9)   2  Asymptomatic proteinuria (791 0) (R80 9)   3  Basal cell tumor (238 2) (D48 5)   4  Benign colon polyp (211 3) (K63 5)   5  Chronic cough (786 2) (R05)   6  Elevated C-reactive protein (CRP) (790 95) (R79 82)   7  Generalized anxiety disorder (300 02) (F41 1)   8  GERD without esophagitis (530 81) (K21 9)   9  Hyperlipidemia (272 4) (E78 5)   10   Hypertension (401  9) (I10)   11  Hyperthyroidism (242 90) (E05 90)   12  Hypocalcemia (275 41) (E83 51)   13  Hypoparathyroidism (252 1) (E20 9)   14  Medicare annual wellness visit, subsequent (V70 0) (Z00 00)   15  Migraine, unspecified, not intractable, without status migrainosus (346 90) (G43 909)   16  Multiple thyroid nodules (241 1) (E04 2)   17  Need for hepatitis C screening test (V73 89) (Z11 59)   18  Need for influenza vaccination (V04 81) (Z23)   19  Palpitations (785 1) (R00 2)   20  Screening for genitourinary condition (V81 6) (Z13 89)   21  Screening for neurological condition (V80 09) (Z13 89)   22  Special screening examination for neoplasm of prostate (V76 44) (Z12 5)    Past Medical History  1  History of Abdominal pain, acute, generalized (789 07,338 19) (R10 84)   2  History of Acute frontal sinusitis (461 1) (J01 10)   3  History of Acute serous otitis media of left ear (381 01) (H65 02)   4  Benign colon polyp (211 3) (K63 5)   5  History of Dysfunction of both eustachian tubes (381 81) (H69 83)   6  History of Encounter for therapeutic drug monitoring (V58 83) (Z51 81)   7  History of chest pain (V13 89) (Z87 898)   8  History of diarrhea (V12 79) (Z87 898)   9  History of fatigue (V13 89) (Z87 898)   10  History of hematuria (V13 09) (Z87 448)   11  History of nausea (V12 79) (Z87 898)   12  History of nausea (V12 79) (Z87 898)   13  History of nausea and vomiting (V12 79) (Z87 898)   14  History of shortness of breath (V13 89) (Z87 898)   15  History of tinea corporis (V12 09) (Z86 19)   16  History of Hoarseness (784 42)   17  History of Lightheadedness (780 4) (R42)   18  History of Lump in neck (784 2) (R22 1)   19  History of Malaise and fatigue (780 79) (R53 81,R53 83)   20  History of Medicare annual wellness visit, initial (V70 0) (Z00 00)   21  History of Microscopic hematuria (599 72) (R31 29)   22  History of Nephropathy (583 9) (N28 9)   23   Personal history of actinic keratosis (V13 3) (Z87 2)   24  History of Skin lesion (709 9) (L98 9)   25  History of Tick bite of knee (916 4,E906 4) (X66 849Q,M96  Lilian Yepez)  Active Problems And Past Medical History Reviewed: The active problems and past medical history were reviewed and updated today  Family History  Mother    1  Family history of Hypertension (V17 49)  Father    2  Family history of Hypertension (V17 49)  Brother    3  Family history of Schwannomatosis  Family History Reviewed: The family history was reviewed and updated today  Social History   · Marital History - Currently    · Never A Smoker  Social History Reviewed: The social history was reviewed and updated today  Surgical History  1  History of Biopsy Skin Each Additional Lesion   2  History of Biopsy Thyroid Using Percutaneous Core Needle   3  History of Complete Colonoscopy For Polyp Removal   4  History of Excision Of Lesion Scalp Malignant   5  History of Tonsillectomy  Surgical History Reviewed: The surgical history was reviewed and updated today  Current Meds   1  Aspirin EC Lo-Dose 81 MG TBEC; Take 1 tablet daily; Therapy: 21Apr2011 to (Last Rx:92Tek7314)  Requested for: 68Zrt1951 Ordered   2  BL Vitamin C 500 MG TABS; Take 1 tablet daily Recorded   3  Calcitriol 0 25 MCG Oral Capsule; TAKE ONE CAPSULE BY MOUTH ONCE DAILY; Therapy: 00MLC3403 to (QRGSORDP:87EGC0540)  Requested for: 53Hkb3559; Last   Rx:75Vsl5671 Ordered   4  Calcium 600 MG Oral Tablet; 1 tab twice a day with meals; Therapy: 25LUV4177 to (Last Rx:16Jan2017) Ordered   5  Fish Oil 1200 MG Oral Capsule; TAKE 2 CAPSULE Daily; Therapy: (Recorded:09Yqo9275) to Recorded   6  Losartan Potassium 50 MG Oral Tablet; TAKE 1 TABLET BY MOUTH ONCE DAILY; Therapy: 23KML3746 to (Felicitas Mohamud)  Requested for: 69Ney2433; Last   Rx:37Ura5569; Status: 1554 Surgeons Dr gilda Aponte Ordered   7  Metoprolol Tartrate 25 MG Oral Tablet; TAKE 1 TABLET DAILY;    Therapy: 41Pkj0068 to (Evaluate:27Jan2018)  Requested for: 27Jqi9343; Last   Rx:14Hzq6260 Ordered   8  Multi-Vitamin Oral Tablet; Take 1 daily Recorded   9  Vitamin D 1000 UNIT CAPS; Therapy: (Recorded:14Apr2015) to Recorded  Medication List Reviewed: The medication list was reviewed and updated today  Allergies  1  No Known Drug Allergies    Vitals  Signs   Recorded: 99ZRA8726 02:40PM   Temperature: 98 F  Heart Rate: 74  Respiration: 18  Systolic: 586  Diastolic: 76  Height: 6 ft 3 in  Weight: 213 lb 9 98 oz  BMI Calculated: 26 7  BSA Calculated: 2 26  O2 Saturation: 98    Physical Exam    Constitutional   General appearance: No acute distress, well appearing and well nourished  Eyes   Conjunctiva and lids: No swelling, erythema, or discharge  Ears, Nose, Mouth, and Throat   External inspection of ears and nose: Normal     Pulmonary   Respiratory effort: No increased work of breathing or signs of respiratory distress  Musculoskeletal   Gait and station: Normal     Skin left forearm laceration on volar aspect; neuro/vasc intact  Psychiatric   Mood and affect: Normal        Procedure    Procedure: wound repair  The wound was located on the left forearm,-- and was 3 cm in length  The wound was simple  The wound involved the subcutaneous tissue-- and-- was irregular  The wound was clean  the neurovascular exam was normal  there was no tendon injury  The site was prepped with Betadine, Shur-Clens and irrigated extensively  Anesthesia: Local anesthetic was administered  Lidocaine 2 ml, 1%, without epinephrine was injected  Closure: The cutaneous layer was closed with 6 sutures of 5-0 nylon--   simple interrupted sutures were used for the skin closure  Dressing: a sterile dressing was placed-- and-- an antibiotic ointment was applied  Patient Status:  the patient tolerated the procedure well  Complications:  excessive bleeding was noted        Future Appointments    Date/Time Provider Specialty Site 02/22/2018 10:20 AM JOSEP Naylor  Endocrinology [facility] ENDOCRINOLOGY   02/19/2018 09:00 AM JOSEP Herbert   Internal Medicine [address] [facility]   10/13/2017 09:00 AM Kaykay Benito, Nurse Schedule  [facility]     Signatures   Electronically signed by : VIDYA Gould; Oct 12 2017  3:13PM EST                       (Author)    Electronically signed by : HEATH Ramos ; Oct 13 2017  1:22PM EST                       (Co-author)

## 2017-10-20 ENCOUNTER — GENERIC CONVERSION - ENCOUNTER (OUTPATIENT)
Dept: OTHER | Facility: OTHER | Age: 67
End: 2017-10-20

## 2017-12-15 DIAGNOSIS — E04.2 NONTOXIC MULTINODULAR GOITER: ICD-10-CM

## 2018-01-09 NOTE — RESULT NOTES
Message   thyroid function tests within normal range      Verified Results  (1) TSH 85Hgj9499 10:08AM Stephanie Aguayo Order Number: [id number]     Test Name Result Flag Reference   TSH 1 110 uIU/mL  0 358-3 740   - Patient Instructions: This is a fasting blood test  Water, black tea or black coffee only after 9:00pm the night before test Drink 2 glasses of water the morning of test   Patients undergoing fluorescein dye angiography may retain small amounts of fluorescein in the body for 48-72 hours post procedure  Samples containing fluorescein can produce falsely depressed TSH values  If the patient had this procedure,a specimen should be resubmitted post fluorescein clearance  (1) T4, FREE 89OEA2147 10:08AM Siddhartha [facility] Order Number: [id number]     Test Name Result Flag Reference   T4,FREE 1 07 ng/dL  0 76-1 46   - Patient Instructions:  This is a fasting blood test  Water, black tea or black coffee only after 9:00pm the night before test Drink 2 glasses of water the morning of test

## 2018-01-09 NOTE — RESULT NOTES
Message   thyorid function test ok, calium , vitamin d and urine calcium ok, continue calcium , vitamin d and calcitriol at current dose      Verified Results  (1) PHOSPHORUS [id number] 09:00AM Stephanie Jewel Order Number: [id number]     Test Name Result Flag Reference   PHOSPHORUS 3 6 mg/dL  2 3-4 1   - Patient Instructions: This bloodwork is non-fasting  Please drink two glasses of water morning of bloodwork  (1) VITAMIN D 25-HYDROXY [id number] 09:00AM [facility] Order Number: [id number]     Test Name Result Flag Reference   VIT D 25-HYDROX 39 2 ng/mL  30 0-100 0   This assay is a certified procedure of the CDC Vitamin D Standardization Certification Program (VDSCP)     Deficiency <20ng/ml   Insufficiency 20-30ng/ml   Sufficient  ng/ml     *Patients undergoing fluorescein dye angiography may retain small amounts of fluorescein in the body for 48-72 hours post procedure  Samples containing fluorescein can produce falsely elevated Vitamin D values  If the patient had this procedure, a specimen should be resubmitted post fluorescein clearance  (1) TSH [id number] 09:00AM Stephanie Jewel Order Number: [id number]     Test Name Result Flag Reference   TSH 1 050 uIU/mL  0 358-3 740   - Patient Instructions: This bloodwork is non-fasting  Please drink two glasses of water morning of bloodwork  - Patient Instructions: This bloodwork is non-fasting  Please drink two glasses of water morning of bloodwork  Patients undergoing fluorescein dye angiography may retain small amounts of fluorescein in the body for 48-72 hours post procedure  Samples containing fluorescein can produce falsely depressed TSH values  If the patient had this procedure,a specimen should be resubmitted post fluorescein clearance       (1) T4, FREE [id number] 09:00AM [facility] Order Number: [id number]     Test Name Result Flag Reference   T4,FREE 1 06 ng/dL  0 76-1 46   - Patient Instructions: This bloodwork is non-fasting  Please drink two glasses of water morning of bloodwork  (1) FERRITIN [id number] 09:00AM Pasha Calix Order Number: [id number]     Test Name Result Flag Reference   FERRITIN 151 ng/mL  8-388   - Patient Instructions: This bloodwork is non-fasting  Please drink two glasses of water morning of bloodwork       (1) CALCIUM [id number] 09:00AM Rajan [place]     Test Name Result Flag Reference   CALCIUM 8 5 mg/dL  8 3-10 1     (1) PTH N-TERMINAL (INTACT) [id number] 09:00AM Rajan [place]     Test Name Result Flag Reference   PARATHYROID HORMONE INTACT <5 5 pg/mL L 14 0-72 0     (1) CALCIUM, URINE 24HR [id number] 09:00AM Rajan [place]     Test Name Result Flag Reference   24 HR URINE VOLUME 2550 mL     CALCIUM 24 HOUR URINE 224 4 mg/24 hrs       (1) CREATININE, URINE 24HR [id number] 09:00AM Rajan [place]     Test Name Result Flag Reference   CREATININE 24 HOUR UR 1 5 g/24Hr  0 8-1 8   TOTAL URINE VOLUME 2550 ml

## 2018-01-10 NOTE — RESULT NOTES
Message   calcium improved, continue calcium, calcitriol and vitamin d - he is also supposed to do 24 hours urine collection for calcium , please check      Verified Results  (1) CALCIUM [id number] 08:57AM [employer] [facility] Order Number: [id number]     Test Name Result Flag Reference   CALCIUM 8 0 mg/dL L 8 3-10 1     (1) ALBUMIN [id number] 08:57AM [employer] Order Number: [id number]     Test Name Result Flag Reference   ALBUMIN 3 4 g/dL L 3 5-5 0     (1) VITAMIN D 25-HYDROXY [id number] 08:57AM [employer] [facility] Order Number: [id number]     Order Number: [id number]     Test Name Result Flag Reference   VIT D 25-HYDROX 35 8 ng/mL  30 0-100 0

## 2018-01-12 VITALS
RESPIRATION RATE: 16 BRPM | WEIGHT: 213 LBS | HEART RATE: 84 BPM | HEIGHT: 75 IN | SYSTOLIC BLOOD PRESSURE: 122 MMHG | DIASTOLIC BLOOD PRESSURE: 76 MMHG | TEMPERATURE: 100.5 F | BODY MASS INDEX: 26.49 KG/M2

## 2018-01-12 VITALS
DIASTOLIC BLOOD PRESSURE: 76 MMHG | HEIGHT: 75 IN | BODY MASS INDEX: 26.43 KG/M2 | WEIGHT: 212.56 LBS | HEART RATE: 84 BPM | SYSTOLIC BLOOD PRESSURE: 134 MMHG | TEMPERATURE: 98.1 F

## 2018-01-12 VITALS
BODY MASS INDEX: 27.42 KG/M2 | HEIGHT: 75 IN | WEIGHT: 220.56 LBS | DIASTOLIC BLOOD PRESSURE: 72 MMHG | HEART RATE: 74 BPM | SYSTOLIC BLOOD PRESSURE: 128 MMHG

## 2018-01-12 VITALS
WEIGHT: 210 LBS | RESPIRATION RATE: 18 BRPM | TEMPERATURE: 97.3 F | HEART RATE: 76 BPM | DIASTOLIC BLOOD PRESSURE: 90 MMHG | SYSTOLIC BLOOD PRESSURE: 144 MMHG | HEIGHT: 75 IN | BODY MASS INDEX: 26.11 KG/M2

## 2018-01-12 VITALS
RESPIRATION RATE: 20 BRPM | DIASTOLIC BLOOD PRESSURE: 98 MMHG | WEIGHT: 216 LBS | HEIGHT: 75 IN | HEART RATE: 86 BPM | TEMPERATURE: 98.7 F | SYSTOLIC BLOOD PRESSURE: 150 MMHG | BODY MASS INDEX: 26.86 KG/M2

## 2018-01-12 NOTE — RESULT NOTES
Verified Results  (1) LDL,DIRECT [id number] 09:16AM Art Zhanna Order Number: [id number]     Test Name Result Flag Reference   LDL, DIRECT 117 mg/dl H 0-100   LDL Cholesterol:        Optimal          <100 mg/dl        Near Optimal     100-129 mg/dl        Above Optimal          Borderline High   130-159 mg/dl          High              160-189 mg/dl          Very High        >189 mg/dl     (1) TRIGLYCERIDE [id number] 09:16AM Art Zhanna Order Number: [id number]     Test Name Result Flag Reference   TRIGLYCERIDES 107 mg/dL  <=150   Specimen collection should occur prior to N-Acetylcysteine or Metamizole administration due to the potential for falsely depressed results        Triglyceride:         Normal              <150 mg/dl       Borderline High    150-199 mg/dl       High               200-499 mg/dl       Very High          >499 mg/dl

## 2018-01-12 NOTE — RESULT NOTES
Verified Results  (1) COMPREHENSIVE METABOLIC PANEL [id number] 26:99[facility] Number: [id number]      National Kidney Disease Education Program recommendations are as follows:  GFR calculation is accurate only with a steady state creatinine  Chronic Kidney disease less than 60 ml/min/1 73 sq  meters  Kidney failure less than 15 ml/min/1 73 sq  meters  Test Name Result Flag Reference   GLUCOSE,RANDM 89 mg/dL     If the patient is fasting, the ADA then defines impaired fasting glucose as > 100 mg/dL and diabetes as > or equal to 123 mg/dL     SODIUM 141 mmol/L  136-145   POTASSIUM 4 3 mmol/L  3 5-5 3   CHLORIDE 102 mmol/L  100-108   CARBON DIOXIDE 30 mmol/L  21-32   ANION GAP (CALC) 9 mmol/L  4-13   BLOOD UREA NITROGEN 20 mg/dL  5-25   CREATININE 1 28 mg/dL  0 60-1 30   Standardized to IDMS reference method   CALCIUM 8 0 mg/dL L 8 3-10 1   BILI, TOTAL 0 62 mg/dL  0 20-1 00   ALK PHOSPHATAS 67 U/L     ALT (SGPT) 22 U/L  12-78   AST(SGOT) 18 U/L  5-45   ALBUMIN 3 4 g/dL L 3 5-5 0   TOTAL PROTEIN 7 0 g/dL  6 4-8 2   eGFR Non-African American 56 2 ml/min/1 73sq m       (1) LDL,DIRECT [id number] 08:57AM Rounds Margie Order Number: [id number]      LDL Cholesterol:        Optimal          <100 mg/dl         Near Optimal     100-129 mg/dl        Above Optimal          Borderline High   130-159 mg/dl          High              160-189 mg/dl          Very High        >189 mg/dl     Test Name Result Flag Reference   LDL, DIRECT 125 mg/dl H 0-100     (1) TRIGLYCERIDE [id number] 08:57AM [facility] Number: [id number]      Triglyceride:         Normal              <150 mg/dl       Borderline High    150-199 mg/dl       High               200-499 mg/dl       Very High          >499 mg/dl     Test Name Result Flag Reference   TRIGLYCERIDES 155 mg/dL H <=150

## 2018-01-13 VITALS
RESPIRATION RATE: 18 BRPM | WEIGHT: 211 LBS | TEMPERATURE: 98 F | HEIGHT: 75 IN | SYSTOLIC BLOOD PRESSURE: 122 MMHG | DIASTOLIC BLOOD PRESSURE: 64 MMHG | BODY MASS INDEX: 26.24 KG/M2 | HEART RATE: 80 BPM

## 2018-01-13 NOTE — RESULT NOTES
Verified Results  (1) PTH N-TERMINAL (INTACT) [id number] 08:37AM Natalie Lizama Order Number: [id number]     Test Name Result Flag Reference   PARATHYROID HORMONE INTACT <5 5 pg/mL L 14 0-72 0     (1) PHOSPHORUS [id number] 08:37AM Natalie Lizama Order Number: [id number]     Test Name Result Flag Reference   PHOSPHORUS 3 7 mg/dL  2 3-4 1     (1) COMPREHENSIVE METABOLIC PANEL [id number] 34:12NZ Natalie Lizama Order Number: [id number]     Test Name Result Flag Reference   SODIUM 137 mmol/L  136-145   POTASSIUM 4 3 mmol/L  3 5-5 3   CHLORIDE 102 mmol/L  100-108   CARBON DIOXIDE 29 mmol/L  21-32   ANION GAP (CALC) 6 mmol/L  4-13   BLOOD UREA NITROGEN 15 mg/dL  5-25   CREATININE 1 14 mg/dL  0 60-1 30   Standardized to IDMS reference method   CALCIUM 8 4 mg/dL  8 3-10 1   BILI, TOTAL 0 47 mg/dL  0 20-1 00   ALK PHOSPHATAS 75 U/L     ALT (SGPT) 20 U/L  12-78   Specimen collection should occur prior to Sulfasalazine and/or Sulfapyridine administration due to the potential for falsely depressed results  AST(SGOT) 23 U/L  5-45   Specimen collection should occur prior to Sulfasalazine administration due to the potential for falsely depressed results  ALBUMIN 3 4 g/dL L 3 5-5 0   TOTAL PROTEIN 7 6 g/dL  6 4-8 2   eGFR 66 ml/min/1 73sq m     National Kidney Disease Education Program recommendations are as follows:  GFR calculation is accurate only with a steady state creatinine  Chronic Kidney disease less than 60 ml/min/1 73 sq  meters  Kidney failure less than 15 ml/min/1 73 sq  meters  GLUCOSE FASTING 87 mg/dL  65-99   Specimen collection should occur prior to Sulfasalazine administration due to the potential for falsely depressed results  Specimen collection should occur prior to Sulfapyridine administration due to the potential for falsely elevated results       (1) VITAMIN D 25-HYDROXY [id number] 08:37AM Natalie Lizama Order Number: [id number]     Test Name Result Flag Reference   VIT D 25-HYDROX 55 8 ng/mL  30 0-100 0   This assay is a certified procedure of the CDC Vitamin D Standardization Certification Program (VDSCP)     Deficiency <20ng/ml   Insufficiency 20-30ng/ml   Sufficient  ng/ml     *Patients undergoing fluorescein dye angiography may retain small amounts of fluorescein in the body for 48-72 hours post procedure  Samples containing fluorescein can produce falsely elevated Vitamin D values  If the patient had this procedure, a specimen should be resubmitted post fluorescein clearance  (1) TSH 05Oct2017 08:37AM Soren Lee Order Number: [id number]     Test Name Result Flag Reference   TSH 1 180 uIU/mL  0 358-3 740   Patients undergoing fluorescein dye angiography may retain small amounts of fluorescein in the body for 48-72 hours post procedure  Samples containing fluorescein can produce falsely depressed TSH values  If the patient had this procedure,a specimen should be resubmitted post fluorescein clearance  (1) T4, FREE [id number] 08:37AM Soren Lee Order Number: [id number]     Test Name Result Flag Reference   T4,FREE 1 09 ng/dL  0 76-1 46   Specimen collection should occur prior to Sulfasalazine administration due to the potential for falsely elevated results

## 2018-01-13 NOTE — RESULT NOTES
Verified Results  (1) T3 TOTAL [id number] 08:37AM WHILL     Test Name Result Flag Reference   T3 1 00 ng/mL  0 60-1 80     (1) PSA (SCREEN) (Dx V76 44 Screen for Prostate Cancer) [id number] 08:37AM [facility]     Test Name Result Flag Reference   PROSTATE SPECIFIC ANTIGEN 1 5 ng/mL  0 0-4 0   American Urological Association Guidelines define biochemical recurrence of prostate cancer as a detectable or rising PSA value post-radical prostatectomy that is greater than or equal to 0 2 ng/mL with a second confirmatory level of greater than or equal to 0 2 ng/mL  (1) HEP C ANTIBODY [id number] 08:37AM [facility]     Test Name Result Flag Reference   HEPATITIS C ANTIBODY Non-reactive  Non-reactive     (1) C-REACTIVE PROTEIN, HIGH SENSITIVITY [id number] 08:37AM [facility]     Test Name Result Flag Reference   C-REACTIVE PROTEIN, HIGH SENSITIV 0 96 mg/L     HsCRP Level       Relative Risk           <1 0 mg/L          Low           1 0 to 3 0 mg/L    Average           >3 0 mg/L          High       Plan  Health Maintenance, Special screening examination for neoplasm of prostate    · (1) PSA (SCREEN) (Dx V76 44 Screen for Prostate Cancer) ; every 1 year;  Last  [id number]; Status:Active

## 2018-01-13 NOTE — RESULT NOTES
Verified Results  (1) CBC/PLT/DIFF [id number] 10:08AM Penningtonleodan Higuera Order Number: [id number]     Test Name Result Flag Reference   WBC COUNT 7 71 Thousand/uL  4 31-10 16   RBC COUNT 5 25 Million/uL  3 88-5 62   HEMOGLOBIN 15 9 g/dL  12 0-17 0   HEMATOCRIT 46 2 %  36 5-49 3   MCV 88 fL  82-98   MCH 30 3 pg  26 8-34 3   MCHC 34 4 g/dL  31 4-37 4   RDW 13 2 %  11 6-15 1   MPV 9 9 fL  8 9-12 7   PLATELET COUNT 822 Thousands/uL  149-390   nRBC AUTOMATED 0 /100 WBCs     NEUTROPHILS RELATIVE PERCENT 57 %  43-75   LYMPHOCYTES RELATIVE PERCENT 27 %  14-44   MONOCYTES RELATIVE PERCENT 10 %  4-12   EOSINOPHILS RELATIVE PERCENT 5 %  0-6   BASOPHILS RELATIVE PERCENT 1 %  0-1   NEUTROPHILS ABSOLUTE COUNT 4 36 Thousands/?L  1 85-7 62   LYMPHOCYTES ABSOLUTE COUNT 2 09 Thousands/?L  0 60-4 47   MONOCYTES ABSOLUTE COUNT 0 77 Thousand/?L  0 17-1 22   EOSINOPHILS ABSOLUTE COUNT 0 39 Thousand/?L  0 00-0 61   BASOPHILS ABSOLUTE COUNT 0 08 Thousands/?L  0 00-0 10   - Patient Instructions: This bloodwork is non-fasting  Please drink two glasses of water morning of bloodwork  - Patient Instructions: This bloodwork is non-fasting  Please drink two glasses of water morning of bloodwork  (1) COMPREHENSIVE METABOLIC PANEL [id number] 11:35QW Josiahleodan Higuera Order Number: [id number]     Test Name Result Flag Reference   GLUCOSE,RANDM 93 mg/dL     If the patient is fasting, the ADA then defines impaired fasting glucose as > 100 mg/dL and diabetes as > or equal to 123 mg/dL     SODIUM 134 mmol/L L 136-145   POTASSIUM 4 6 mmol/L  3 5-5 3   CHLORIDE 99 mmol/L L 100-108   CARBON DIOXIDE 30 mmol/L  21-32   ANION GAP (CALC) 5 mmol/L  4-13   BLOOD UREA NITROGEN 15 mg/dL  5-25   CREATININE 1 21 mg/dL  0 60-1 30   Standardized to IDMS reference method   CALCIUM 8 7 mg/dL  8 3-10 1   BILI, TOTAL 0 52 mg/dL  0 20-1 00   ALK PHOSPHATAS 79 U/L     ALT (SGPT) 20 U/L  12-78   AST(SGOT) 15 U/L  5-45   ALBUMIN 3 5 g/dL  3 5-5 0   TOTAL PROTEIN 7 6 g/dL  6 4-8 2   eGFR Non-African American 59 8 ml/min/1 73sq m     - Patient Instructions: This is a fasting blood test  Water, black tea or black coffee only after 9:00pm the night before test Drink 2 glasses of water the morning of test   National Kidney Disease Education Program recommendations are as follows:  GFR calculation is accurate only with a steady state creatinine  Chronic Kidney disease less than 60 ml/min/1 73 sq  meters  Kidney failure less than 15 ml/min/1 73 sq  meters  (1) MICROALBUMIN CREATININE RATIO, RANDOM URINE [id number] 10:08AM Jacob Dixon Order Number: [id number]     Test Name Result Flag Reference   MICROALBUMIN/ CREAT R 178 mg/g creatinine H 0-30   MICROALBUMIN,URINE 245 0 mg/L H 0 0-20 0   CREATININE URINE 138 0 mg/dL       (1) LIPID PANEL FASTING W DIRECT LDL REFLEX [id number] 10:08AM Jacob Dixon Order Number: [id number]     Test Name Result Flag Reference   CHOLESTEROL 191 mg/dL     LDL CHOLESTEROL CALCULATED 99 mg/dL  0-100   - Patient Instructions: This is a fasting blood test  Water, black tea or black coffee only after 9:00pm the night before test   Drink 2 glasses of water the morning of test     - Patient Instructions:  This is a fasting blood test  Water, black tea or black coffee only after 9:00pm the night before test Drink 2 glasses of water the morning of test   Triglyceride:         Normal              <150 mg/dl       Borderline High    150-199 mg/dl       High               200-499 mg/dl       Very High          >499 mg/dl  Cholesterol:         Desirable        <200 mg/dl      Borderline High  200-239 mg/dl      High             >239 mg/dl  HDL Cholesterol:        High    >59 mg/dL      Low     <41 mg/dL  LDL Cholesterol:        Optimal          <100 mg/dl        Near Optimal     100-129 mg/dl        Above Optimal          Borderline High   130-159 mg/dl          High              160-189 mg/dl          Very High        >189 mg/dl  LDL CALCULATED:    This screening LDL is a calculated result  It does not have the accuracy of the Direct Measured LDL in the monitoring of patients with hyperlipidemia and/or statin therapy  Direct Measure LDL (EKL265) must be ordered separately in these patients  TRIGLYCERIDES 264 mg/dL H <=150   Specimen collection should occur prior to N-Acetylcysteine or Metamizole administration due to the potential for falsely depressed results  HDL,DIRECT 39 mg/dL L 40-60   Specimen collection should occur prior to Metamizole administration due to the potential for falsely depressed results  (1) PSA (SCREEN) (Dx V76 44 Screen for Prostate Cancer) [id number] 10:08AM Claudene Grippe Order Number: [id number]     Test Name Result Flag Reference   PROSTATE SPECIFIC ANTIGEN 1 4 ng/mL  0 0-4 0   - Patient Instructions: This test is non-fasting  Please drink two glasses of water morning of bloodwork  - Patient Instructions: This test is non-fasting  Please drink two glasses of water morning of bloodwork  Plan  Health Maintenance, Special screening examination for neoplasm of prostate    · (1) PSA (SCREEN) (Dx V76 44 Screen for Prostate Cancer) ; every 1 year;  Last  [id number]; Status:Active

## 2018-01-14 VITALS
DIASTOLIC BLOOD PRESSURE: 80 MMHG | HEIGHT: 75 IN | HEART RATE: 82 BPM | SYSTOLIC BLOOD PRESSURE: 128 MMHG | WEIGHT: 215 LBS | RESPIRATION RATE: 18 BRPM | TEMPERATURE: 97.6 F | BODY MASS INDEX: 26.73 KG/M2

## 2018-01-14 NOTE — RESULT NOTES
Verified Results  (1) [facility] 19Apr2017 10:36AM [facility] Number: [id number]     Test Name Result Flag Reference   AMYLASE 88 IU/L       (1) CBC/PLT/DIFF [id number] 10:36AM [facility] Order Number: [id number]     Test Name Result Flag Reference   WBC COUNT 6 89 Thousand/uL  4 31-10 16   RBC COUNT 5 46 Million/uL  3 88-5 62   HEMOGLOBIN 16 4 g/dL  12 0-17 0   HEMATOCRIT 48 5 %  36 5-49 3   MCV 89 fL  82-98   MCH 30 0 pg  26 8-34 3   MCHC 33 8 g/dL  31 4-37 4   RDW 13 7 %  11 6-15 1   MPV 10 1 fL  8 9-12 7   PLATELET COUNT 134 Thousands/uL  149-390   nRBC AUTOMATED 0 /100 WBCs     NEUTROPHILS RELATIVE PERCENT 76 % H 43-75   LYMPHOCYTES RELATIVE PERCENT 14 %  14-44   MONOCYTES RELATIVE PERCENT 8 %  4-12   EOSINOPHILS RELATIVE PERCENT 2 %  0-6   BASOPHILS RELATIVE PERCENT 0 %  0-1   NEUTROPHILS ABSOLUTE COUNT 5 27 Thousands/? ??L  1 85-7 62   LYMPHOCYTES ABSOLUTE COUNT 0 94 Thousands/? ??L  0 60-4 47   MONOCYTES ABSOLUTE COUNT 0 53 Thousand/? ??L  0 17-1 22   EOSINOPHILS ABSOLUTE COUNT 0 13 Thousand/? ??L  0 00-0 61   BASOPHILS ABSOLUTE COUNT 0 01 Thousands/? ??L  0 00-0 10     (1) COMPREHENSIVE METABOLIC PANEL [id number] 19:56DE Betty [employer] Order Number: [id number]     Test Name Result Flag Reference   GLUCOSE,RANDM 96 mg/dL     If the patient is fasting, the ADA then defines impaired fasting glucose as > 100 mg/dL and diabetes as > or equal to 123 mg/dL     SODIUM 136 mmol/L  136-145   POTASSIUM 4 1 mmol/L  3 5-5 3   CHLORIDE 100 mmol/L  100-108   CARBON DIOXIDE 29 mmol/L  21-32   ANION GAP (CALC) 7 mmol/L  4-13   BLOOD UREA NITROGEN 17 mg/dL  5-25   CREATININE 1 11 mg/dL  0 60-1 30   Standardized to IDMS reference method   CALCIUM 7 4 mg/dL L 8 3-10 1   BILI, TOTAL 0 38 mg/dL  0 20-1 00   ALK PHOSPHATAS 69 U/L     ALT (SGPT) 23 U/L  12-78   AST(SGOT) 17 U/L  5-45   ALBUMIN 3 1 g/dL L 3 5-5 0   TOTAL PROTEIN 7 1 g/dL  6 4-8 2   eGFR Non- American      >60 0 ml/min/1 73sq m   [facility] Disease Education Program recommendations are as follows:  GFR calculation is accurate only with a steady state creatinine  Chronic Kidney disease less than 60 ml/min/1 73 sq  meters  Kidney failure less than 15 ml/min/1 73 sq  meters       (1) LIPASE 19Apr2017 10:36AM Marii Parhames Order Number: [id number]     Test Name Result Flag Reference   LIPASE 132 u/L       (1) URINALYSIS (will reflex a microscopy if leukocytes, occult blood, protein or nitrites are not within normal limits) 19Apr2017 10:36AM Marii Arriaga Order Number: [id number]     Test Name Result Flag Reference   COLOR Yellow     CLARITY Turbid     SPECIFIC GRAVITY UA 1 023  1 003-1 030   PH UA 6 0  4 5-8 0   LEUKOCYTE ESTERASE UA Negative  Negative   NITRITE UA Negative  Negative   PROTEIN UA 30 (1+) mg/dl A Negative   GLUCOSE UA Negative mg/dl  Negative   KETONES UA Negative mg/dl  Negative   UROBILINOGEN UA 0 2 E U /dl  0 2, 1 0 E U /dl   BILIRUBIN UA Negative  Negative   BLOOD UA Negative  Negative   BACTERIA None Seen /hpf  None Seen, Occasional   EPITHELIAL CELLS None Seen /hpf  None Seen, Occasional   HYALINE CASTS None Seen /lpf  None Seen   RBC UA 2-4 /hpf A None Seen   WBC UA None Seen /hpf  None Seen     (1) SED RATE 19Apr2017 10:36AM Marii Arriaga Order Number: [id number]     Test Name Result Flag Reference   SED RATE 7 mm/hour  0-10

## 2018-01-15 NOTE — RESULT NOTES
Verified Results  (1) RENAL FUNCTION PANEL [id number] 09:43AM Chris Elise     Test Name Result Flag Reference   ANION GAP (CALC) 8 mmol/L  4-13   ALBUMIN 3 2 g/dL L 3 5-5 0   BLOOD UREA NITROGEN 17 mg/dL  5-25   CALCIUM 8 0 mg/dL L 8 3-10 1   CHLORIDE 102 mmol/L  100-108   CARBON DIOXIDE 29 mmol/L  21-32   CREATININE 1 17 mg/dL  0 60-1 30   Standardized to IDMS reference method   GLUCOSE,RANDM 109 mg/dL     POTASSIUM 4 0 mmol/L  3 5-5 3   eGFR Non-African American      >60 0 ml/min/1 73sq [facility] Disease Education Program recommendations are as follows:  GFR calculation is accurate only with a steady state creatinine  Chronic Kidney disease less than 60 ml/min/1 73 sq  meters  Kidney failure less than 15 ml/min/1 73 sq  meters     SODIUM 139 mmol/L  136-145   PHOSPHORUS 3 1 mg/dL  2 3-4 1

## 2018-01-17 NOTE — RESULT NOTES
Message   urine calcium ok, continue current meds      Verified Results  (1) CALCIUM, URINE 24HR [id number] 08:57AM Dariusz Connelly    Order Number: [id number]     Test Name Result Flag Reference   24 HR URINE VOLUME 3600 mL     CALCIUM 24 HOUR URINE 223 2 mg/24 hrs       (1) CREATININE, URINE 24HR [id number] 08:57AM [facility] Order Number: [id number]     Test Name Result Flag Reference   CREATININE 24 HOUR UR 1 8 g/24Hr  0 8-1 8   TOTAL URINE VOLUME 3600 ml

## 2018-01-22 VITALS
RESPIRATION RATE: 18 BRPM | BODY MASS INDEX: 26.11 KG/M2 | HEART RATE: 78 BPM | SYSTOLIC BLOOD PRESSURE: 118 MMHG | HEIGHT: 75 IN | WEIGHT: 210 LBS | TEMPERATURE: 98.1 F | DIASTOLIC BLOOD PRESSURE: 72 MMHG

## 2018-02-16 RX ORDER — BIOTIN 1 MG
1 TABLET ORAL DAILY
COMMUNITY

## 2018-02-16 RX ORDER — LOSARTAN POTASSIUM 50 MG/1
1 TABLET ORAL DAILY
COMMUNITY
Start: 2016-01-20 | End: 2018-06-20

## 2018-02-16 RX ORDER — AMOXICILLIN 500 MG
2 CAPSULE ORAL DAILY
COMMUNITY
End: 2019-12-11

## 2018-02-16 RX ORDER — OYSTER SHELL CALCIUM WITH VITAMIN D 500; 200 MG/1; [IU]/1
1 TABLET, FILM COATED ORAL DAILY
COMMUNITY
End: 2018-06-20

## 2018-02-16 RX ORDER — CALCITRIOL 0.25 UG/1
1 CAPSULE, LIQUID FILLED ORAL DAILY
COMMUNITY
Start: 2016-01-11 | End: 2018-07-16 | Stop reason: SDUPTHER

## 2018-02-16 RX ORDER — ASPIRIN 81 MG/1
1 TABLET ORAL DAILY
COMMUNITY
Start: 2011-04-21 | End: 2019-11-22 | Stop reason: ALTCHOICE

## 2018-02-18 PROBLEM — D48.5 BASAL CELL TUMOR: Status: ACTIVE | Noted: 2017-05-31

## 2018-02-19 ENCOUNTER — APPOINTMENT (OUTPATIENT)
Dept: LAB | Facility: CLINIC | Age: 68
End: 2018-02-19
Payer: MEDICARE

## 2018-02-19 ENCOUNTER — OFFICE VISIT (OUTPATIENT)
Dept: INTERNAL MEDICINE CLINIC | Facility: CLINIC | Age: 68
End: 2018-02-19
Payer: MEDICARE

## 2018-02-19 VITALS
WEIGHT: 216 LBS | DIASTOLIC BLOOD PRESSURE: 78 MMHG | BODY MASS INDEX: 26.86 KG/M2 | RESPIRATION RATE: 18 BRPM | SYSTOLIC BLOOD PRESSURE: 138 MMHG | TEMPERATURE: 97.7 F | HEIGHT: 75 IN | OXYGEN SATURATION: 93 % | HEART RATE: 62 BPM

## 2018-02-19 DIAGNOSIS — E78.2 MIXED HYPERLIPIDEMIA: ICD-10-CM

## 2018-02-19 DIAGNOSIS — I10 ESSENTIAL HYPERTENSION: Primary | ICD-10-CM

## 2018-02-19 DIAGNOSIS — E20.9 HYPOPARATHYROIDISM, UNSPECIFIED HYPOPARATHYROIDISM TYPE (HCC): ICD-10-CM

## 2018-02-19 DIAGNOSIS — K21.9 GERD WITHOUT ESOPHAGITIS: ICD-10-CM

## 2018-02-19 DIAGNOSIS — E05.90 HYPERTHYROIDISM: ICD-10-CM

## 2018-02-19 DIAGNOSIS — N28.9 ABNORMAL RENAL FUNCTION: Primary | ICD-10-CM

## 2018-02-19 DIAGNOSIS — E83.51 HYPOCALCEMIA: ICD-10-CM

## 2018-02-19 DIAGNOSIS — I10 ESSENTIAL HYPERTENSION: ICD-10-CM

## 2018-02-19 DIAGNOSIS — G43.909 MIGRAINE WITHOUT STATUS MIGRAINOSUS, NOT INTRACTABLE, UNSPECIFIED MIGRAINE TYPE: ICD-10-CM

## 2018-02-19 LAB
ALBUMIN SERPL BCP-MCNC: 3.5 G/DL (ref 3.5–5)
ALP SERPL-CCNC: 75 U/L (ref 46–116)
ALT SERPL W P-5'-P-CCNC: 16 U/L (ref 12–78)
ANION GAP SERPL CALCULATED.3IONS-SCNC: 3 MMOL/L (ref 4–13)
AST SERPL W P-5'-P-CCNC: 15 U/L (ref 5–45)
BASOPHILS # BLD AUTO: 0.02 THOUSANDS/ΜL (ref 0–0.1)
BASOPHILS NFR BLD AUTO: 0 % (ref 0–1)
BILIRUB SERPL-MCNC: 0.74 MG/DL (ref 0.2–1)
BUN SERPL-MCNC: 20 MG/DL (ref 5–25)
CALCIUM SERPL-MCNC: 8.5 MG/DL (ref 8.3–10.1)
CHLORIDE SERPL-SCNC: 101 MMOL/L (ref 100–108)
CHOLEST SERPL-MCNC: 171 MG/DL (ref 50–200)
CO2 SERPL-SCNC: 33 MMOL/L (ref 21–32)
CREAT SERPL-MCNC: 1.31 MG/DL (ref 0.6–1.3)
EOSINOPHIL # BLD AUTO: 0.17 THOUSAND/ΜL (ref 0–0.61)
EOSINOPHIL NFR BLD AUTO: 2 % (ref 0–6)
ERYTHROCYTE [DISTWIDTH] IN BLOOD BY AUTOMATED COUNT: 13.1 % (ref 11.6–15.1)
GFR SERPL CREATININE-BSD FRML MDRD: 56 ML/MIN/1.73SQ M
GLUCOSE P FAST SERPL-MCNC: 90 MG/DL (ref 65–99)
HCT VFR BLD AUTO: 45.8 % (ref 36.5–49.3)
HDLC SERPL-MCNC: 43 MG/DL (ref 40–60)
HGB BLD-MCNC: 15.7 G/DL (ref 12–17)
LDLC SERPL CALC-MCNC: 107 MG/DL (ref 0–100)
LYMPHOCYTES # BLD AUTO: 1.77 THOUSANDS/ΜL (ref 0.6–4.47)
LYMPHOCYTES NFR BLD AUTO: 22 % (ref 14–44)
MCH RBC QN AUTO: 29.9 PG (ref 26.8–34.3)
MCHC RBC AUTO-ENTMCNC: 34.3 G/DL (ref 31.4–37.4)
MCV RBC AUTO: 87 FL (ref 82–98)
MONOCYTES # BLD AUTO: 0.48 THOUSAND/ΜL (ref 0.17–1.22)
MONOCYTES NFR BLD AUTO: 6 % (ref 4–12)
NEUTROPHILS # BLD AUTO: 5.58 THOUSANDS/ΜL (ref 1.85–7.62)
NEUTS SEG NFR BLD AUTO: 70 % (ref 43–75)
NRBC BLD AUTO-RTO: 0 /100 WBCS
PHOSPHATE SERPL-MCNC: 4.2 MG/DL (ref 2.3–4.1)
PLATELET # BLD AUTO: 266 THOUSANDS/UL (ref 149–390)
PMV BLD AUTO: 9.5 FL (ref 8.9–12.7)
POTASSIUM SERPL-SCNC: 4.6 MMOL/L (ref 3.5–5.3)
PROT SERPL-MCNC: 7.5 G/DL (ref 6.4–8.2)
PTH-INTACT SERPL-MCNC: <5.5 PG/ML (ref 14–72)
RBC # BLD AUTO: 5.25 MILLION/UL (ref 3.88–5.62)
SODIUM SERPL-SCNC: 137 MMOL/L (ref 136–145)
T3 SERPL-MCNC: 1 NG/ML (ref 0.6–1.8)
T4 FREE SERPL-MCNC: 1.07 NG/DL (ref 0.76–1.46)
TRIGL SERPL-MCNC: 104 MG/DL
TSH SERPL DL<=0.05 MIU/L-ACNC: 0.96 UIU/ML (ref 0.36–3.74)
WBC # BLD AUTO: 8.04 THOUSAND/UL (ref 4.31–10.16)

## 2018-02-19 PROCEDURE — 99214 OFFICE O/P EST MOD 30 MIN: CPT | Performed by: INTERNAL MEDICINE

## 2018-02-19 PROCEDURE — 84439 ASSAY OF FREE THYROXINE: CPT

## 2018-02-19 PROCEDURE — 36415 COLL VENOUS BLD VENIPUNCTURE: CPT

## 2018-02-19 PROCEDURE — 83970 ASSAY OF PARATHORMONE: CPT

## 2018-02-19 PROCEDURE — 84443 ASSAY THYROID STIM HORMONE: CPT

## 2018-02-19 PROCEDURE — 84480 ASSAY TRIIODOTHYRONINE (T3): CPT

## 2018-02-19 PROCEDURE — 85025 COMPLETE CBC W/AUTO DIFF WBC: CPT

## 2018-02-19 PROCEDURE — 80053 COMPREHEN METABOLIC PANEL: CPT

## 2018-02-19 PROCEDURE — 84100 ASSAY OF PHOSPHORUS: CPT

## 2018-02-19 PROCEDURE — 80061 LIPID PANEL: CPT

## 2018-02-19 RX ORDER — MULTIVITAMIN WITH IRON
TABLET ORAL
COMMUNITY
End: 2020-05-28

## 2018-02-19 NOTE — PROGRESS NOTES
Assessment/Plan:    No problem-specific Assessment & Plan notes found for this encounter  Diagnoses and all orders for this visit:    Essential hypertension  -     CBC and differential; Future  -     Comprehensive metabolic panel; Future    Mixed hyperlipidemia  -     Lipid Panel with Direct LDL reflex; Future    Hypocalcemia    GERD without esophagitis    Hyperthyroidism  -     T3; Future  -     T4, free; Future  -     TSH, 3rd generation; Future    Hypoparathyroidism, unspecified hypoparathyroidism type ([facility] [address] )  -     PTH, intact; Future  -     Phosphorus; Future    Migraine without status migrainosus, not intractable, unspecified migraine type    Other orders  -     aspirin (ASPIRIN LOW DOSE) 81 mg EC tablet; Take 1 tablet by mouth daily  -     calcium-vitamin D (OSCAL 500 + D) 500 mg-200 units per tablet; Take 1 tablet by mouth daily  -     calcitriol (ROCALTROL) 0 25 mcg capsule; Take 1 capsule by mouth daily  -     Calcium Carbonate (CALCIUM 600) 1500 (600 Ca) MG TABS; Take by mouth  -     Omega-3 Fatty Acids (FISH OIL) 1200 MG CAPS; Take 2 capsules by mouth daily  -     losartan (COZAAR) 50 mg tablet; Take 1 tablet by mouth daily  -     metoprolol tartrate (LOPRESSOR) 25 mg tablet; Take 1 tablet by mouth daily  -     Multiple Vitamin (MULTI-VITAMIN DAILY PO); Take by mouth daily  -     Cholecalciferol (VITAMIN D3) 1000 units CAPS; Take by mouth  -     Magnesium 250 MG TABS; Take by mouth      A/P: Doing well and will check labs  Await f/u thyroid US  Keep f/u with nephro, derm, and endo  Continue current treatment  No open wounds so hold on Td booster  Discussed the flu vaccine and they defer it at this time due to ineffectiveness this year, timing at this point, and lack of exposure to ill individuals  RTC four months for routine  Subjective:      Patient ID: Mitzi Cintron is a 75 y o  male  WM RTC with his wife for f/u Htn, hyperthyroidism, etc  Doing ok and no new issues   Remains active w/o difficulty and no falls reported  Wife reports one of his thyroid nodules was "acting up" and is scheduled for repeat thyroid US soon  Due for labs  Colon cancer screen is up to date, but due for flu and Td vaccines  No heartburn or recent migraines  The following portions of the patient's history were reviewed and updated as appropriate:   He  has a past medical history of Abdominal pain, acute, generalized; Actinic keratosis; Benign colon polyp; Chest pain; Disease of thyroid gland; Dysfunction of both eustachian tubes; Fatigue; GERD (gastroesophageal reflux disease); Hematuria; Hypertension; Lightheadedness; Lump in neck; Malaise and fatigue; Nephropathy; Shortness of breath; Skin lesion; and Tinea corporis  He  does not have any pertinent problems on file  He  has a past surgical history that includes Skin biopsy; Thyroid surgery; Colonoscopy w/ polypectomy; Head & neck skin lesion excisional biopsy; and Tonsillectomy  His family history includes Hypertension in his father and mother; Other in his brother  He  reports that he has never smoked  He has never used smokeless tobacco  He reports that he does not drink alcohol or use drugs    Current Outpatient Prescriptions   Medication Sig Dispense Refill    aspirin (ASPIRIN LOW DOSE) 81 mg EC tablet Take 1 tablet by mouth daily      calcitriol (ROCALTROL) 0 25 mcg capsule Take 1 capsule by mouth daily      Calcium Carbonate (CALCIUM 600) 1500 (600 Ca) MG TABS Take by mouth      calcium-vitamin D (OSCAL 500 + D) 500 mg-200 units per tablet Take 1 tablet by mouth daily      Cholecalciferol (VITAMIN D3) 1000 units CAPS Take by mouth      losartan (COZAAR) 50 mg tablet Take 1 tablet by mouth daily      Magnesium 250 MG TABS Take by mouth      metoprolol tartrate (LOPRESSOR) 25 mg tablet Take 1 tablet by mouth daily      Multiple Vitamin (MULTI-VITAMIN DAILY PO) Take by mouth daily      Omega-3 Fatty Acids (FISH OIL) 1200 MG CAPS Take 2 capsules by mouth daily       No current facility-administered medications for this visit  No current outpatient prescriptions on file prior to visit  No current facility-administered medications on file prior to visit  He has No Known Allergies       Review of Systems   Constitutional: Negative for activity change, chills, diaphoresis, fatigue and fever  Respiratory: Negative for cough, chest tightness, shortness of breath and wheezing  Cardiovascular: Negative for chest pain, palpitations and leg swelling  Gastrointestinal: Negative for abdominal pain, constipation, diarrhea, nausea and vomiting  Genitourinary: Negative for difficulty urinating, dysuria and frequency  Musculoskeletal: Negative for arthralgias, gait problem and myalgias  Neurological: Negative for light-headedness and headaches  Psychiatric/Behavioral: Negative for confusion  The patient is not nervous/anxious  Objective:      /78 (BP Location: Left arm, Patient Position: Sitting, Cuff Size: Adult)   Pulse 62   Temp 97 7 °F (36 5 °C) (Tympanic)   Resp 18   Ht 6' 3" (1 905 m)   Wt 98 kg (216 lb)   SpO2 93%   BMI 27 00 kg/m²          Physical Exam   Constitutional: He is oriented to person, place, and time  He appears well-developed and well-nourished  No distress  HENT:   Head: Normocephalic and atraumatic  Mouth/Throat: Oropharynx is clear and moist    Eyes: Conjunctivae and EOM are normal  Pupils are equal, round, and reactive to light  Neck: Neck supple  No JVD present  No thyromegaly present  Cardiovascular: Normal rate, regular rhythm and normal heart sounds  Pulmonary/Chest: Effort normal and breath sounds normal  No respiratory distress  He has no wheezes  Abdominal: Soft  Bowel sounds are normal  There is no tenderness  Musculoskeletal: He exhibits no edema  Neurological: He is alert and oriented to person, place, and time  Psychiatric: He has a normal mood and affect   His behavior is normal  Judgment and thought content normal    Nursing note and vitals reviewed

## 2018-02-19 NOTE — PATIENT INSTRUCTIONS

## 2018-02-22 ENCOUNTER — OFFICE VISIT (OUTPATIENT)
Dept: ENDOCRINOLOGY | Facility: CLINIC | Age: 68
End: 2018-02-22
Payer: MEDICARE

## 2018-02-22 VITALS
BODY MASS INDEX: 27.95 KG/M2 | SYSTOLIC BLOOD PRESSURE: 130 MMHG | HEIGHT: 74 IN | DIASTOLIC BLOOD PRESSURE: 80 MMHG | WEIGHT: 217.8 LBS

## 2018-02-22 DIAGNOSIS — E04.2 MULTIPLE THYROID NODULES: ICD-10-CM

## 2018-02-22 DIAGNOSIS — E20.9 HYPOPARATHYROIDISM, UNSPECIFIED HYPOPARATHYROIDISM TYPE (HCC): Primary | ICD-10-CM

## 2018-02-22 PROCEDURE — 99214 OFFICE O/P EST MOD 30 MIN: CPT | Performed by: INTERNAL MEDICINE

## 2018-02-22 NOTE — LETTER
February 22, 2018     Maxineartemio DeutschDO  [facility] [address] [facility]    Patient: Steve Cintron   YOB: 1950   Date of Visit: 2/22/2018       Dear Dr Rosita Chahal: Thank you for referring Charli Galvan to me for evaluation  Below are my notes for this consultation  If you have questions, please do not hesitate to call me  I look forward to following your patient along with you  Sincerely,        Herminia Manjarrez MD        CC: No Recipients  Herminia Manjarrez MD  2/22/2018 10:36 AM  Sign at close encounter   Steve Cintron 75 y o  male MRN: [id number]    Encounter: [id number]      Assessment/Plan     Problem List Items Addressed This Visit     Hypoparathyroidism [facility]) - Primary    Relevant Orders    Basic metabolic panel Lab Collect    Phosphorus Lab Collect    Vitamin D 25 hydroxy Lab Collect    Multiple thyroid nodules    Relevant Orders     thyroid        CC:   ***    History of Present Illness     HPI:  ***    Review of Systems    Historical Information   Past Medical History:   Diagnosis Date    Abdominal pain, acute, generalized     last assessed - 21Apr2017    Actinic keratosis     last assessed - 12Jun2013    Benign colon polyp     last assessed - 20Jan2017    Chest pain     last assessed - [id number]    Disease of thyroid gland     Dysfunction of both eustachian tubes     suspect due to ETD  Recommend otc allergy meds and if fails then add flonase; last assessed - [id number]    Fatigue     last assessed - [id number]    GERD (gastroesophageal reflux disease)     Hematuria     last assessed - [id number]    Hypertension     Lightheadedness     last assessed - [id number]    Lump in neck     ? cause  Maybe a localized allergic reaction, dental issue, doubt sialdenitis, ect  Empiric abx and one dose steroids  Continue benadryl  If worsens, to er or ent      Malaise and fatigue     last assessed - 21Apr2017    Nephropathy     last assessed - [id number]    Shortness of breath     last assessed - 29Nov2012    Skin lesion     Resolved - [id number]    Tinea corporis     last assessed - [id number]     Past Surgical History:   Procedure Laterality Date    COLONOSCOPY W/ POLYPECTOMY      last assessed - 20Jan2017    HEAD & NECK SKIN LESION EXCISIONAL BIOPSY      excision of lesion scalp malignant - BCCA; last assessed - [id number]    SKIN BIOPSY      last assessed - 09Sep2014   [facility] TH[facility]      Biopsy thyroid using percutaneous core needle; last assessed - 09Sep2014    TONSILLECTOMY       Social History   History   Alcohol Use No     History   Drug Use No     History   Smoking Status    Never Smoker   Smokeless Tobacco    Never Used     Family History:   Family History   Problem Relation Age of Onset    Hypertension Mother     Hypertension Father     Other Brother      Schwannomatosis       Meds/Allergies   Current Outpatient Prescriptions   Medication Sig Dispense Refill    aspirin (ASPIRIN LOW DOSE) 81 mg EC tablet Take 1 tablet by mouth daily      calcitriol (ROCALTROL) 0 25 mcg capsule Take 1 capsule by mouth daily      Calcium Carbonate (CALCIUM 600) 1500 (600 Ca) MG TABS Take by mouth      calcium-vitamin D (OSCAL 500 + D) 500 mg-200 units per tablet Take 1 tablet by mouth daily      Cholecalciferol (VITAMIN D3) 1000 units CAPS Take by mouth      losartan (COZAAR) 50 mg tablet Take 1 tablet by mouth daily      Magnesium 250 MG TABS Take by mouth      Multiple Vitamin (MULTI-VITAMIN DAILY PO) Take by mouth daily      Omega-3 Fatty Acids (FISH OIL) 1200 MG CAPS Take 2 capsules by mouth daily      Turmeric Curcumin 500 MG CAPS Take 1 capsule by mouth      metoprolol tartrate (LOPRESSOR) 25 mg tablet Take 1 tablet by mouth daily       No current facility-administered medications for this visit  No Known Allergies    Objective   Vitals: Blood pressure 130/80, height 6' 2" (1 88 m), weight 98 8 kg (217 lb 12 8 oz)      Physical Exam    The history was obtained from the review of the chart, {HISTORY OBTAINED FROM:2658692100}  Lab Results:   Lab Results   Component Value Date/Time    TSH 3RD GENERATON 0 961 02/19/2018 09:25 AM    TSH 3RD GENERATON 1 180 10/05/2017 08:37 AM    TSH 3RD GENERATON 1 050 02/24/2017 09:00 AM    Free T4 1 07 02/19/2018 09:25 AM    Free T4 1 09 10/05/2017 08:37 AM    Free T4 1 06 02/24/2017 09:00 AM       Imaging Studies:        {Results Review Statement:70581}    Portions of the record may have been created with voice recognition software  Occasional wrong word or "sound a like" substitutions may have occurred due to the inherent limitations of voice recognition software  Read the chart carefully and recognize, using context, where substitutions have occurred

## 2018-02-22 NOTE — ASSESSMENT & PLAN NOTE
Fairly stable, with 1 new left-sided thyroid nodule - will monitor and repeat a thyroid ultrasound in 6 months

## 2018-02-22 NOTE — PROGRESS NOTES
Steve Lira [place] 75 y o  male MRN: [id number]    Encounter: [id number]      Assessment/Plan     Problem List Items Addressed This Visit     Hypoparathyroidism ([address] ) - Primary     On calcitriol calcium and vitamin-D supplementations  Calcium has been stable, phosphorus mildly elevated  For now continue calcium, calcitriol and vitamin-D  Continue to monitor         Relevant Orders    Basic metabolic panel Lab Collect    Phosphorus Lab Collect    Vitamin D 25 hydroxy Lab Collect    Multiple thyroid nodules     Fairly stable, with 1 new left-sided thyroid nodule - will monitor and repeat a thyroid ultrasound in 6 months         Relevant Orders    US thyroid        CC:   Hyperparathyroidism and thyroid nodules    History of Present Illness     HPI: 76 yo male presents for f/u hypoparathyroid of unknown etiology  Generally feels well and  is asymptomatic, denying oral numbness or fatigue  He takes 600 mg calcium PO bid, Vit D 1000 IU in AM and 2000 in PM along with calcitrol in AM    Pt also has thyroid nodules stable on US from last month  Normal thyroid labs  Denies palpitations or hot/cold flashes  Denies any family history of thyroid cancer    Review of Systems   Constitutional: Positive for fatigue  Negative for fever and unexpected weight change  Eyes: Negative for visual disturbance  Respiratory: Negative for cough and shortness of breath  Cardiovascular: Negative for chest pain, palpitations and leg swelling  Gastrointestinal: Negative for abdominal distention, constipation, diarrhea, nausea and vomiting  Musculoskeletal: Negative for gait problem  Skin: Negative for color change, pallor and rash  Neurological: Negative for numbness  Psychiatric/Behavioral: Negative for agitation, behavioral problems, confusion and sleep disturbance  The patient is not nervous/anxious  All other systems reviewed and are negative        Historical Information   Past Medical History:   Diagnosis Date    Abdominal pain, acute, generalized     last assessed - 21Apr2017    Actinic keratosis     last assessed - [id number]    Benign colon polyp     last assessed - 20Jan2017    Chest pain     last assessed - [id number]    Disease of thyroid gland     Dysfunction of both eustachian tubes     suspect due to ETD  Recommend otc allergy meds and if fails then add flonase; last assessed - [id number]    Fatigue     last assessed - [id number]    GERD (gastroesophageal reflux disease)     Hematuria     last assessed - [id number]    Hypertension     Lightheadedness     last assessed - [id number]    Lump in neck     ? cause  Maybe a localized allergic reaction, dental issue, doubt sialdenitis, ect  Empiric abx and one dose steroids  Continue benadryl  If worsens, to er or ent      Malaise and fatigue     last assessed - 21Apr2017    Nephropathy     last assessed - 16Sep2015    Shortness of breath     last assessed - [id number]    Skin lesion     Resolved - [id number]    Tinea corporis     last assessed - 72MJW4351     Past Surgical History:   Procedure Laterality Date    COLONOSCOPY W/ POLYPECTOMY      last assessed - 20Jan2017    HEAD & NECK SKIN LESION EXCISIONAL BIOPSY      excision of lesion scalp malignant - BCCA; last assessed - [id number]    SKIN BIOPSY      last assessed - 09Sep2014   [facility]      Biopsy thyroid using percutaneous core needle; last assessed - 09Sep2014    TONSILLECTOMY       Social History   History   Alcohol Use No     History   Drug Use No     History   Smoking Status    Never Smoker   Smokeless Tobacco    Never Used     Family History:   Family History   Problem Relation Age of Onset    Hypertension Mother     Hypertension Father     Other Brother      Schwannomatosis       Meds/Allergies   Current Outpatient Prescriptions   Medication Sig Dispense Refill    aspirin (ASPIRIN LOW DOSE) 81 mg EC tablet Take 1 tablet by mouth daily      calcitriol (ROCALTROL) 0 25 mcg capsule Take 1 capsule by mouth daily      Calcium Carbonate (CALCIUM 600) 1500 (600 Ca) MG TABS Take by mouth      calcium-vitamin D (OSCAL 500 + D) 500 mg-200 units per tablet Take 1 tablet by mouth daily      Cholecalciferol (VITAMIN D3) 1000 units CAPS Take by mouth      losartan (COZAAR) 50 mg tablet Take 1 tablet by mouth daily      Magnesium 250 MG TABS Take by mouth      Multiple Vitamin (MULTI-VITAMIN DAILY PO) Take by mouth daily      Omega-3 Fatty Acids (FISH OIL) 1200 MG CAPS Take 2 capsules by mouth daily      Turmeric Curcumin 500 MG CAPS Take 1 capsule by mouth      metoprolol tartrate (LOPRESSOR) 25 mg tablet Take 1 tablet by mouth daily       No current facility-administered medications for this visit  No Known Allergies    Objective   Vitals: Blood pressure 130/80, height 6' 2" (1 88 m), weight 98 8 kg (217 lb 12 8 oz)  Physical Exam   Constitutional: He is oriented to person, place, and time  He appears well-developed and well-nourished  No distress  HENT:   Head: Normocephalic  Mouth/Throat: No oropharyngeal exudate  Eyes: Conjunctivae are normal    Neck: Neck supple  No thyromegaly present  Cardiovascular: Normal rate, regular rhythm and normal heart sounds  No murmur heard  Pulmonary/Chest: Effort normal and breath sounds normal  No respiratory distress  He has no wheezes  He has no rales  Abdominal: Soft  Bowel sounds are normal  He exhibits no distension  There is no tenderness  There is no rebound  Musculoskeletal: Normal range of motion  He exhibits no edema or deformity  Lymphadenopathy:     He has no cervical adenopathy  Neurological: He is alert and oriented to person, place, and time  Skin: Skin is dry  No rash noted  No erythema  No pallor  Psychiatric: He has a normal mood and affect  His behavior is normal  Judgment and thought content normal        The history was obtained from the review of the chart, patient and family      Lab Results:   Lab Results Component Value Date/Time    TSH 3RD JENIFER 0 961 02/19/2018 09:25 AM    TSH 3RD JENIFER 1 180 10/05/2017 08:37 AM    TSH 3RD BURGESS 1 050 02/24/2017 09:00 AM    Free T4 1 07 02/19/2018 09:25 AM    Free T4 1 09 10/05/2017 08:37 AM    Free T4 1 06 02/24/2017 09:00 AM       Imaging Studies:   Thyroid ultrasound    December 2017-multiple thyroid nodules-largest left lower pole nodule  is 1 1 x 0 8 x 0 7 cm-new since the last study    I have personally reviewed pertinent reports  Portions of the record may have been created with voice recognition software  Occasional wrong word or "sound a like" substitutions may have occurred due to the inherent limitations of voice recognition software  Read the chart carefully and recognize, using context, where substitutions have occurred

## 2018-02-22 NOTE — LETTER
February 22, 2018     Jamie Bunch DO  [facility] [address] [facility]    Patient: Rola Cintron   YOB: 1950   Date of Visit: 2/22/2018       Dear Dr Aislinn Guerrero: Thank you for referring Antonio Madario to me for evaluation  Below are my notes for this consultation  If you have questions, please do not hesitate to call me  I look forward to following your patient along with you  Sincerely,        Mikayla Negron MD        CC: No Recipients  Mikayla Negron MD  2/22/2018 11:33 AM  Sign at close encounter   Rola Cintron 75 y o  male MRN: [id number]    Encounter: [id number]      Assessment/Plan     Problem List Items Addressed This Visit     Hypoparathyroidism ([address] ) - Primary     On calcitriol calcium and vitamin-D supplementations  Calcium has been stable, phosphorus mildly elevated  For now continue calcium, calcitriol and vitamin-D  Continue to monitor         Relevant Orders    Basic metabolic panel Lab Collect    Phosphorus Lab Collect    Vitamin D 25 hydroxy Lab Collect    Multiple thyroid nodules     Fairly stable, with 1 new left-sided thyroid nodule - will monitor and repeat a thyroid ultrasound in 6 months         Relevant Orders    US thyroid        CC:   Hyperparathyroidism and thyroid nodules    History of Present Illness     HPI: 76 yo male presents for f/u hypoparathyroid of unknown etiology  Generally feels well and  is asymptomatic, denying oral numbness or fatigue  He takes 600 mg calcium PO bid, Vit D 1000 IU in AM and 2000 in PM along with calcitrol in AM    Pt also has thyroid nodules stable on US from last month  Normal thyroid labs  Denies palpitations or hot/cold flashes  Denies any family history of thyroid cancer    Review of Systems   Constitutional: Positive for fatigue  Negative for fever and unexpected weight change  Eyes: Negative for visual disturbance  Respiratory: Negative for cough and shortness of breath      Cardiovascular: Negative for chest pain, palpitations and leg swelling  Gastrointestinal: Negative for abdominal distention, constipation, diarrhea, nausea and vomiting  Musculoskeletal: Negative for gait problem  Skin: Negative for color change, pallor and rash  Neurological: Negative for numbness  Psychiatric/Behavioral: Negative for agitation, behavioral problems, confusion and sleep disturbance  The patient is not nervous/anxious  All other systems reviewed and are negative  Historical Information   Past Medical History:   Diagnosis Date    Abdominal pain, acute, generalized     last assessed - 21Apr2017    Actinic keratosis     last assessed - 12Jun2013    Benign colon polyp     last assessed - 20Jan2017    Chest pain     last assessed - [id number]    Disease of thyroid gland     Dysfunction of both eustachian tubes     suspect due to ETD  Recommend otc allergy meds and if fails then add flonase; last assessed - 06Aug2012    Fatigue     last assessed - [id number]    GERD (gastroesophageal reflux disease)     Hematuria     last assessed - 02Aug2012    Hypertension     Lightheadedness     last assessed - [id number]    Lump in neck     ? cause  Maybe a localized allergic reaction, dental issue, doubt sialdenitis, ect  Empiric abx and one dose steroids  Continue benadryl  If worsens, to er or ent      Malaise and fatigue     last assessed - 21Apr2017    Nephropathy     last assessed - [id number]    Shortness of breath     last assessed - 21AKB5149    Skin lesion     Resolved - [id number]    Tinea corporis     last assessed - 12DPN1189     Past Surgical History:   Procedure Laterality Date    COLONOSCOPY W/ POLYPECTOMY      last assessed - 20Jan2017    HEAD & NECK SKIN LESION EXCISIONAL BIOPSY      excision of lesion scalp malignant - BCCA; last assessed - [id number]    SKIN BIOPSY      last assessed - [id number]   [facility]      Biopsy thyroid using percutaneous core needle; last assessed - [id number]    TONSILLECTOMY       Social History   History   Alcohol Use No     History   Drug Use No     History   Smoking Status    Never Smoker   Smokeless Tobacco    Never Used     Family History:   Family History   Problem Relation Age of Onset    Hypertension Mother     Hypertension Father     Other Brother      Schwannomatosis       Meds/Allergies   Current Outpatient Prescriptions   Medication Sig Dispense Refill    aspirin (ASPIRIN LOW DOSE) 81 mg EC tablet Take 1 tablet by mouth daily      calcitriol (ROCALTROL) 0 25 mcg capsule Take 1 capsule by mouth daily      Calcium Carbonate (CALCIUM 600) 1500 (600 Ca) MG TABS Take by mouth      calcium-vitamin D (OSCAL 500 + D) 500 mg-200 units per tablet Take 1 tablet by mouth daily      Cholecalciferol (VITAMIN D3) 1000 units CAPS Take by mouth      losartan (COZAAR) 50 mg tablet Take 1 tablet by mouth daily      Magnesium 250 MG TABS Take by mouth      Multiple Vitamin (MULTI-VITAMIN DAILY PO) Take by mouth daily      Omega-3 Fatty Acids (FISH OIL) 1200 MG CAPS Take 2 capsules by mouth daily      Turmeric Curcumin 500 MG CAPS Take 1 capsule by mouth      metoprolol tartrate (LOPRESSOR) 25 mg tablet Take 1 tablet by mouth daily       No current facility-administered medications for this visit  No Known Allergies    Objective   Vitals: Blood pressure 130/80, height 6' 2" (1 88 m), weight 98 8 kg (217 lb 12 8 oz)  Physical Exam   Constitutional: He is oriented to person, place, and time  He appears well-developed and well-nourished  No distress  HENT:   Head: Normocephalic  Mouth/Throat: No oropharyngeal exudate  Eyes: Conjunctivae are normal    Neck: Neck supple  No thyromegaly present  Cardiovascular: Normal rate, regular rhythm and normal heart sounds  No murmur heard  Pulmonary/Chest: Effort normal and breath sounds normal  No respiratory distress  He has no wheezes  He has no rales  Abdominal: Soft   Bowel sounds are normal  He exhibits no distension  There is no tenderness  There is no rebound  Musculoskeletal: Normal range of motion  He exhibits no edema or deformity  Lymphadenopathy:     He has no cervical adenopathy  Neurological: He is alert and oriented to person, place, and time  Skin: Skin is dry  No rash noted  No erythema  No pallor  Psychiatric: He has a normal mood and affect  His behavior is normal  Judgment and thought content normal        The history was obtained from the review of the chart, patient and family  Lab Results:   Lab Results   Component Value Date/Time    TSH 3RD GENERATON 0 961 02/19/2018 09:25 AM    TSH 3RD GENERATON 1 180 10/05/2017 08:37 AM    TSH 3RD GENERATON 1 050 02/24/2017 09:00 AM    Free T4 1 07 02/19/2018 09:25 AM    Free T4 1 09 10/05/2017 08:37 AM    Free T4 1 06 02/24/2017 09:00 AM       Imaging Studies:   Thyroid ultrasound    December 2017-multiple thyroid nodules-largest left lower pole nodule  is 1 1 x 0 8 x 0 7 cm-new since the last study    I have personally reviewed pertinent reports  Portions of the record may have been created with voice recognition software  Occasional wrong word or "sound a like" substitutions may have occurred due to the inherent limitations of voice recognition software  Read the chart carefully and recognize, using context, where substitutions have occurred

## 2018-02-22 NOTE — ASSESSMENT & PLAN NOTE
On calcitriol calcium and vitamin-D supplementations  Calcium has been stable, phosphorus mildly elevated  For now continue calcium, calcitriol and vitamin-D    Continue to monitor

## 2018-03-12 ENCOUNTER — LAB (OUTPATIENT)
Dept: LAB | Facility: CLINIC | Age: 68
End: 2018-03-12
Payer: MEDICARE

## 2018-03-12 DIAGNOSIS — E20.9 HYPOPARATHYROIDISM, UNSPECIFIED HYPOPARATHYROIDISM TYPE (HCC): ICD-10-CM

## 2018-03-12 LAB
ANION GAP SERPL CALCULATED.3IONS-SCNC: 7 MMOL/L (ref 4–13)
BUN SERPL-MCNC: 21 MG/DL (ref 5–25)
CALCIUM SERPL-MCNC: 8.3 MG/DL (ref 8.3–10.1)
CHLORIDE SERPL-SCNC: 101 MMOL/L (ref 100–108)
CO2 SERPL-SCNC: 30 MMOL/L (ref 21–32)
CREAT SERPL-MCNC: 1.26 MG/DL (ref 0.6–1.3)
GFR SERPL CREATININE-BSD FRML MDRD: 58 ML/MIN/1.73SQ M
GLUCOSE P FAST SERPL-MCNC: 94 MG/DL (ref 65–99)
PHOSPHATE SERPL-MCNC: 3.6 MG/DL (ref 2.3–4.1)
POTASSIUM SERPL-SCNC: 4.2 MMOL/L (ref 3.5–5.3)
SODIUM SERPL-SCNC: 138 MMOL/L (ref 136–145)

## 2018-03-12 PROCEDURE — 80048 BASIC METABOLIC PNL TOTAL CA: CPT

## 2018-03-12 PROCEDURE — 84100 ASSAY OF PHOSPHORUS: CPT

## 2018-03-12 PROCEDURE — 36415 COLL VENOUS BLD VENIPUNCTURE: CPT

## 2018-05-15 ENCOUNTER — TRANSCRIBE ORDERS (OUTPATIENT)
Dept: LAB | Facility: CLINIC | Age: 68
End: 2018-05-15

## 2018-05-15 ENCOUNTER — APPOINTMENT (OUTPATIENT)
Dept: LAB | Facility: CLINIC | Age: 68
End: 2018-05-15
Payer: MEDICARE

## 2018-05-15 DIAGNOSIS — N18.30 CHRONIC KIDNEY DISEASE, STAGE III (MODERATE) (HCC): Primary | ICD-10-CM

## 2018-05-15 DIAGNOSIS — N18.30 CHRONIC KIDNEY DISEASE, STAGE III (MODERATE) (HCC): ICD-10-CM

## 2018-05-15 DIAGNOSIS — E83.51 HYPOCALCEMIA: ICD-10-CM

## 2018-05-15 DIAGNOSIS — E87.1 HYPOSMOLALITY SYNDROME: ICD-10-CM

## 2018-05-15 LAB
ANION GAP SERPL CALCULATED.3IONS-SCNC: 6 MMOL/L (ref 4–13)
BACTERIA UR QL AUTO: NORMAL /HPF
BILIRUB UR QL STRIP: NEGATIVE
BUN SERPL-MCNC: 21 MG/DL (ref 5–25)
CALCIUM SERPL-MCNC: 8.3 MG/DL (ref 8.3–10.1)
CHLORIDE SERPL-SCNC: 102 MMOL/L (ref 100–108)
CLARITY UR: CLEAR
CO2 SERPL-SCNC: 29 MMOL/L (ref 21–32)
COLOR UR: YELLOW
CREAT SERPL-MCNC: 1.32 MG/DL (ref 0.6–1.3)
CREAT UR-MCNC: 73 MG/DL
GFR SERPL CREATININE-BSD FRML MDRD: 55 ML/MIN/1.73SQ M
GLUCOSE SERPL-MCNC: 92 MG/DL (ref 65–140)
GLUCOSE UR STRIP-MCNC: NEGATIVE MG/DL
HGB UR QL STRIP.AUTO: NEGATIVE
HYALINE CASTS #/AREA URNS LPF: NORMAL /LPF
KETONES UR STRIP-MCNC: NEGATIVE MG/DL
LEUKOCYTE ESTERASE UR QL STRIP: ABNORMAL
MICROALBUMIN UR-MCNC: 209 MG/L (ref 0–20)
MICROALBUMIN/CREAT 24H UR: 286 MG/G CREATININE (ref 0–30)
NITRITE UR QL STRIP: NEGATIVE
NON-SQ EPI CELLS URNS QL MICRO: NORMAL /HPF
PH UR STRIP.AUTO: 6 [PH] (ref 4.5–8)
POTASSIUM SERPL-SCNC: 4.2 MMOL/L (ref 3.5–5.3)
PROT UR STRIP-MCNC: ABNORMAL MG/DL
RBC #/AREA URNS AUTO: NORMAL /HPF
SODIUM SERPL-SCNC: 137 MMOL/L (ref 136–145)
SP GR UR STRIP.AUTO: 1.01 (ref 1–1.03)
UROBILINOGEN UR QL STRIP.AUTO: 0.2 E.U./DL
WBC #/AREA URNS AUTO: NORMAL /HPF

## 2018-05-15 PROCEDURE — 82570 ASSAY OF URINE CREATININE: CPT | Performed by: INTERNAL MEDICINE

## 2018-05-15 PROCEDURE — 82043 UR ALBUMIN QUANTITATIVE: CPT | Performed by: INTERNAL MEDICINE

## 2018-05-15 PROCEDURE — 80048 BASIC METABOLIC PNL TOTAL CA: CPT

## 2018-05-15 PROCEDURE — 36415 COLL VENOUS BLD VENIPUNCTURE: CPT

## 2018-05-15 PROCEDURE — 81001 URINALYSIS AUTO W/SCOPE: CPT | Performed by: INTERNAL MEDICINE

## 2018-06-18 DIAGNOSIS — I10 ESSENTIAL HYPERTENSION: Primary | ICD-10-CM

## 2018-06-20 ENCOUNTER — OFFICE VISIT (OUTPATIENT)
Dept: INTERNAL MEDICINE CLINIC | Facility: CLINIC | Age: 68
End: 2018-06-20
Payer: MEDICARE

## 2018-06-20 ENCOUNTER — APPOINTMENT (OUTPATIENT)
Dept: LAB | Facility: CLINIC | Age: 68
End: 2018-06-20
Payer: MEDICARE

## 2018-06-20 VITALS
DIASTOLIC BLOOD PRESSURE: 82 MMHG | RESPIRATION RATE: 16 BRPM | WEIGHT: 212 LBS | HEART RATE: 70 BPM | TEMPERATURE: 98.4 F | HEIGHT: 74 IN | BODY MASS INDEX: 27.21 KG/M2 | SYSTOLIC BLOOD PRESSURE: 140 MMHG

## 2018-06-20 DIAGNOSIS — E83.51 HYPOCALCEMIA: ICD-10-CM

## 2018-06-20 DIAGNOSIS — I10 ESSENTIAL HYPERTENSION: Primary | ICD-10-CM

## 2018-06-20 DIAGNOSIS — Z00.00 MEDICARE ANNUAL WELLNESS VISIT, SUBSEQUENT: ICD-10-CM

## 2018-06-20 DIAGNOSIS — E78.2 MIXED HYPERLIPIDEMIA: ICD-10-CM

## 2018-06-20 DIAGNOSIS — K21.9 GERD WITHOUT ESOPHAGITIS: ICD-10-CM

## 2018-06-20 DIAGNOSIS — N28.9 ACUTE RENAL INSUFFICIENCY: ICD-10-CM

## 2018-06-20 DIAGNOSIS — E20.9 HYPOPARATHYROIDISM, UNSPECIFIED HYPOPARATHYROIDISM TYPE (HCC): ICD-10-CM

## 2018-06-20 LAB
ANION GAP SERPL CALCULATED.3IONS-SCNC: 6 MMOL/L (ref 4–13)
BUN SERPL-MCNC: 18 MG/DL (ref 5–25)
CALCIUM SERPL-MCNC: 8.7 MG/DL (ref 8.3–10.1)
CHLORIDE SERPL-SCNC: 101 MMOL/L (ref 100–108)
CO2 SERPL-SCNC: 30 MMOL/L (ref 21–32)
CREAT SERPL-MCNC: 1.37 MG/DL (ref 0.6–1.3)
GFR SERPL CREATININE-BSD FRML MDRD: 52 ML/MIN/1.73SQ M
GLUCOSE P FAST SERPL-MCNC: 90 MG/DL (ref 65–99)
POTASSIUM SERPL-SCNC: 4.5 MMOL/L (ref 3.5–5.3)
SODIUM SERPL-SCNC: 137 MMOL/L (ref 136–145)

## 2018-06-20 PROCEDURE — 36415 COLL VENOUS BLD VENIPUNCTURE: CPT

## 2018-06-20 PROCEDURE — 99214 OFFICE O/P EST MOD 30 MIN: CPT | Performed by: INTERNAL MEDICINE

## 2018-06-20 PROCEDURE — G0439 PPPS, SUBSEQ VISIT: HCPCS | Performed by: INTERNAL MEDICINE

## 2018-06-20 PROCEDURE — 80048 BASIC METABOLIC PNL TOTAL CA: CPT

## 2018-06-20 NOTE — PROGRESS NOTES
Assessment and Plan:    Problem List Items Addressed This Visit        Digestive    GERD without esophagitis       Endocrine    Hypoparathyroidism ([facility] [address] )       Cardiovascular and Mediastinum    Hypertension - Primary       Other    Hyperlipidemia    Hypocalcemia      Other Visit Diagnoses     Acute renal insufficiency        Relevant Orders    Basic metabolic panel    Medicare annual wellness visit, subsequent            Health Maintenance Due   Topic Date Due    GLAUCOMA SCREENING 67+ YR  05/09/2016         HPI:  Anand Dumont is a 70 y o  male here for his Subsequent Wellness Visit  Patient Active Problem List   Diagnosis    Allergic rhinitis    Asymptomatic proteinuria    Basal cell tumor    Benign colon polyp    Generalized anxiety disorder    GERD without esophagitis    Hyperlipidemia    Hypertension    Hypocalcemia    Hypoparathyroidism ([facility] [address] )    Migraine, unspecified, not intractable, without status migrainosus    Multiple thyroid nodules    Palpitations     Past Medical History:   Diagnosis Date    Abdominal pain, acute, generalized     last assessed - 21Apr2017    Actinic keratosis     last assessed - 12Jun2013    Benign colon polyp     last assessed - 20Jan2017    Chest pain     last assessed - [id number]    Disease of thyroid gland     Dysfunction of both eustachian tubes     suspect due to ETD  Recommend otc allergy meds and if fails then add flonase; last assessed - 06Aug2012    Fatigue     last assessed - 34HVW9240    GERD (gastroesophageal reflux disease)     Hematuria     last assessed - 89Umz8504    Hypertension     Lightheadedness     last assessed - 93YWR8033    Lump in neck     ? cause  Maybe a localized allergic reaction, dental issue, doubt sialdenitis, ect  Empiric abx and one dose steroids  Continue benadryl  If worsens, to er or ent      Malaise and fatigue     last assessed - 21Apr2017    Nephropathy     last assessed - 94Vih7877    Shortness of breath     last assessed - 25WOP6706    Skin lesion     Resolved - 92KRV7368    Tinea corporis     last assessed - 21ARJ5675     Past Surgical History:   Procedure Laterality Date    COLONOSCOPY W/ POLYPECTOMY      last assessed - 20Jan2017    HEAD & NECK SKIN LESION EXCISIONAL BIOPSY      excision of lesion scalp malignant - BCCA; last assessed - 26JIW4834    SKIN BIOPSY      last assessed - 09Sep2014   [facility] THYROID SURGERY      Biopsy thyroid using percutaneous core needle; last assessed - 09Sep2014    TONSILLECTOMY       Family History   Problem Relation Age of Onset    Hypertension Mother     Hypertension Father     Other Brother         Schwannomatosis     History   Smoking Status    Never Smoker   Smokeless Tobacco    Never Used     History   Alcohol Use No      History   Drug Use No       Current Outpatient Prescriptions   Medication Sig Dispense Refill    aspirin (ASPIRIN LOW DOSE) 81 mg EC tablet Take 1 tablet by mouth daily      calcitriol (ROCALTROL) 0 25 mcg capsule Take 1 capsule by mouth daily      Cholecalciferol (VITAMIN D3) 1000 units CAPS Take by mouth      Magnesium 250 MG TABS Take by mouth      metoprolol tartrate (LOPRESSOR) 25 mg tablet Take 1 tablet (25 mg total) by mouth daily 90 tablet 3    Multiple Vitamin (MULTI-VITAMIN DAILY PO) Take by mouth daily      Omega-3 Fatty Acids (FISH OIL) 1200 MG CAPS Take 2 capsules by mouth daily      Turmeric Curcumin 500 MG CAPS Take 1 capsule by mouth       No current facility-administered medications for this visit        No Known Allergies  Immunization History   Administered Date(s) Administered    Pneumococcal Conjugate 13-Valent 05/31/2017    Pneumococcal Polysaccharide PPV23 07/08/2014    Td (adult), adsorbed 09/08/1999    Tdap 01/01/2009    Zoster 04/16/2012       Patient Care Team:  Mitzy Crowe DO as PCP - General  Asya Sands MD      Medicare Screening Tests and Risk Assessments:  AWV Clinical     ISAR:   Previous hospitalizations?: No       Once in a Lifetime Medicare Screening:   EKG performed?:  No    AAA screening performed? (if performed, please add date to Health Maintenance):  No       Medicare Screening Tests and Risk Assessment:   AAA Risk Assessment    None Indicated:  Yes    Osteoporosis Risk Assessment    None indicated:  Yes    HIV Risk Assessment    None indicated:  Yes        Drug and Alcohol Use:   Tobacco use    Cigarettes:  never smoker    Tobacco use duration    Tobacco Cessation Readiness    Alcohol use    Alcohol use:  rare use    Alcohol Treatment Readiness   Illicit Drug Use    Drug use:  never    Drug type:  no sedative use       Diet & Exercise:   Diet   What is your diet?:  Regular   How many servings a day of the following:   Fruits and Vegetables:  3-4 Meat:  1-2   Whole Grains:  2 Simple Carbs:  1   Dairy:  2 Soda:  0    Tea:  2   Exercise    Do you currently exercise?:  yes    Frequency:  daily    Type of exercise:  walking       Cognitive Impairment Screening:   Depression screening preformed:  No    Cognitive Impairment Screening    Do you have difficulty learning or retaining new information?:  No Do you have difficulty handling new tasks?:  No   Do you have difficulty with reasoning?:  No Do you have difficulty with spatial ability and orientation?:  No   Do you have difficulty with language?:  No Do you have difficulty with behavior?:  No       Functional Ability/Level of Safety:   Hearing    Hearing difficulties:  Yes Bilateral:  slightly decreased   Left:  slightly decreased Right:  slightly decreased   Hearing aid:  No    Hearing Impairment Assessment    Current Activities    Status:  unlimited IADL's, unlimited social activities, unlimited driving, unlimited ADL's   Help needed with the folllowing:    Using the phone:  No Transportation:  No   Shopping:  No Preparing Meals:  No   Doing Housework:  No Doing Laundry:  No   Managing Medications:  No Managing Money:  No   ADL    Fall Risk   Have you fallen in the last 12 months?:  No    Injury History   Polypharmacy:  No Antidepressant Use:  No   Sedative Use:  No Antihypertensive Use:  Yes   Previous Fall:  No Alcohol Use:  Yes   Deconditioning:  No Visual Impairment:  No   Cogitive Impairment:  No Mmobility Impairment:  No   Postural Hypotension:  No Urinary Incontinence:  No       Home Safety:   Are there hazards in your environment?:  No   If you fell, would you need help to get back up from the ground?:  Yes Do you have problems or concerns getting in/out of a bed, chair, tub, or toilet?:  No   Do you feel unsteady when walking?:  No Is your activity limited by pain?:  No   Do you have handrails and grab-bars in the home?:  No Are emergency numbers kept by the phone and regularly updated?:  Yes   Are you and/or family members aware of the dangers of smoking in bed?:  Yes Are firearms stored securely?:  No   Do you have working smoke alarms and fire extinguisher?:  Yes Do all household members know how to use them?:  No   Have you left the stove on unsupervised?:  No    Home Safety Risk Factors   Unfamilar with surroundings:  No Uneven floors:  No   Stairs or handrail saftey risk:  No Loose rugs:  Yes   Household clutter:  No Poor household lighting:  No   No grab bars in bathroom:  No Further evaluation needed:  No       Advanced Directives:   Advanced Directives    Living Will:  No Durable POA for healthcare:  No   Advanced directive:  Yes    Patient's End of Life Decisions        Urinary Incontinence:   Do you have urinary incontinence?:  No        Glaucoma:            Provider Screening    No data filed        A/P: Doing well and no falls  Denies depression and feels safe at home  Diverse diet  No problems operating a motor vehicle and uses his seatbelt  Living will is done  No DME or referrals needed today  RTC one year for medicare wellness

## 2018-06-20 NOTE — PATIENT INSTRUCTIONS

## 2018-06-20 NOTE — PROGRESS NOTES
Assessment/Plan:    No problem-specific Assessment & Plan notes found for this encounter  Diagnoses and all orders for this visit:    Essential hypertension    Mixed hyperlipidemia    Hypocalcemia    Hypoparathyroidism, unspecified hypoparathyroidism type ([address] )    GERD without esophagitis    Acute renal insufficiency  -     Basic metabolic panel; Future    Medicare annual wellness visit, subsequent      A/P: Doing well and BP is ok off the ARB  Check BMP  Increase po fluids  Will needs a Td booster next years  Continue current treatment and RTC for routine in four months  Subjective:      Patient ID: Makayla Cintron is a 70 y o  male  WM RTC with his wife for f/u htn, GERD, etc  Doing well and no c/o's  Seen by nephro and now off the ARB  Last labs with slight bump in his renal function  GERD s/s  Remains active w/o difficulty and no falls  The following portions of the patient's history were reviewed and updated as appropriate:   He  has a past medical history of Abdominal pain, acute, generalized; Actinic keratosis; Benign colon polyp; Chest pain; Disease of thyroid gland; Dysfunction of both eustachian tubes; Fatigue; GERD (gastroesophageal reflux disease); Hematuria; Hypertension; Lightheadedness; Lump in neck; Malaise and fatigue; Nephropathy; Shortness of breath; Skin lesion; and Tinea corporis    He   Patient Active Problem List    Diagnosis Date Noted    Basal cell tumor 05/31/2017    Multiple thyroid nodules 07/11/2016    Asymptomatic proteinuria 10/05/2015    Palpitations 08/06/2013    Hypoparathyroidism ([address] ) 12/12/2012    Allergic rhinitis 08/02/2012    Benign colon polyp 08/02/2012    Generalized anxiety disorder 08/02/2012    GERD without esophagitis 08/02/2012    Hyperlipidemia 08/02/2012    Hypertension 08/02/2012    Hypocalcemia 08/02/2012    Migraine, unspecified, not intractable, without status migrainosus 08/02/2012     He  has a past surgical history that includes Skin biopsy; Thyroid surgery; Colonoscopy w/ polypectomy; Head & neck skin lesion excisional biopsy; and Tonsillectomy  His family history includes Hypertension in his father and mother; Other in his brother  He  reports that he has never smoked  He has never used smokeless tobacco  He reports that he does not drink alcohol or use drugs  Current Outpatient Prescriptions   Medication Sig Dispense Refill    aspirin (ASPIRIN LOW DOSE) 81 mg EC tablet Take 1 tablet by mouth daily      calcitriol (ROCALTROL) 0 25 mcg capsule Take 1 capsule by mouth daily      Cholecalciferol (VITAMIN D3) 1000 units CAPS Take by mouth      Magnesium 250 MG TABS Take by mouth      metoprolol tartrate (LOPRESSOR) 25 mg tablet Take 1 tablet (25 mg total) by mouth daily 90 tablet 3    Multiple Vitamin (MULTI-VITAMIN DAILY PO) Take by mouth daily      Omega-3 Fatty Acids (FISH OIL) 1200 MG CAPS Take 2 capsules by mouth daily      Turmeric Curcumin 500 MG CAPS Take 1 capsule by mouth       No current facility-administered medications for this visit        Current Outpatient Prescriptions on File Prior to Visit   Medication Sig    aspirin (ASPIRIN LOW DOSE) 81 mg EC tablet Take 1 tablet by mouth daily    calcitriol (ROCALTROL) 0 25 mcg capsule Take 1 capsule by mouth daily    Cholecalciferol (VITAMIN D3) 1000 units CAPS Take by mouth    Magnesium 250 MG TABS Take by mouth    metoprolol tartrate (LOPRESSOR) 25 mg tablet Take 1 tablet (25 mg total) by mouth daily    Multiple Vitamin (MULTI-VITAMIN DAILY PO) Take by mouth daily    Omega-3 Fatty Acids (FISH OIL) 1200 MG CAPS Take 2 capsules by mouth daily    Turmeric Curcumin 500 MG CAPS Take 1 capsule by mouth    [DISCONTINUED] Calcium Carbonate (CALCIUM 600) 1500 (600 Ca) MG TABS Take by mouth    [DISCONTINUED] calcium-vitamin D (OSCAL 500 + D) 500 mg-200 units per tablet Take 1 tablet by mouth daily    [DISCONTINUED] losartan (COZAAR) 50 mg tablet Take 1 tablet by mouth daily     No current facility-administered medications on file prior to visit  He has No Known Allergies       Review of Systems   Constitutional: Negative for activity change, chills, diaphoresis, fatigue and fever  HENT: Negative  Eyes: Negative for visual disturbance  Respiratory: Negative for cough, chest tightness, shortness of breath and wheezing  Cardiovascular: Negative for chest pain, palpitations and leg swelling  Gastrointestinal: Negative for abdominal pain, constipation, diarrhea, nausea and vomiting  Endocrine: Negative for cold intolerance and heat intolerance  Genitourinary: Negative for difficulty urinating, dysuria and frequency  Musculoskeletal: Negative for arthralgias, gait problem and myalgias  Allergic/Immunologic: Positive for environmental allergies  Neurological: Negative for dizziness, weakness, light-headedness, numbness and headaches  Psychiatric/Behavioral: Negative for confusion and decreased concentration  The patient is not nervous/anxious  Objective:      /82   Pulse 70   Temp 98 4 °F (36 9 °C) (Tympanic)   Resp 16   Ht 6' 2" (1 88 m)   Wt 96 2 kg (212 lb)   BMI 27 22 kg/m²          Physical Exam   Constitutional: He is oriented to person, place, and time  He appears well-developed and well-nourished  No distress  HENT:   Head: Normocephalic and atraumatic  Mouth/Throat: Oropharynx is clear and moist  No oropharyngeal exudate  Eyes: Conjunctivae and EOM are normal  Pupils are equal, round, and reactive to light  Neck: Neck supple  No JVD present  Cardiovascular: Normal rate, regular rhythm and normal heart sounds  No murmur heard  Pulmonary/Chest: Effort normal and breath sounds normal  No respiratory distress  He has no wheezes  Abdominal: Soft  Bowel sounds are normal    Musculoskeletal: He exhibits no edema  Neurological: He is alert and oriented to person, place, and time     Psychiatric: He has a normal mood and affect  His behavior is normal  Judgment and thought content normal    Nursing note and vitals reviewed

## 2018-06-21 DIAGNOSIS — R79.9 ABNORMAL BLOOD CHEMISTRY: Primary | ICD-10-CM

## 2018-07-16 DIAGNOSIS — E83.51 HYPOCALCEMIA: Primary | ICD-10-CM

## 2018-07-16 RX ORDER — CALCITRIOL 0.25 UG/1
0.25 CAPSULE, LIQUID FILLED ORAL DAILY
Qty: 30 CAPSULE | Refills: 0 | Status: SHIPPED | OUTPATIENT
Start: 2018-07-16 | End: 2018-08-20 | Stop reason: SDUPTHER

## 2018-07-20 ENCOUNTER — APPOINTMENT (OUTPATIENT)
Dept: LAB | Facility: CLINIC | Age: 68
End: 2018-07-20
Payer: MEDICARE

## 2018-07-20 DIAGNOSIS — R79.9 ABNORMAL BLOOD CHEMISTRY: ICD-10-CM

## 2018-07-20 LAB
ANION GAP SERPL CALCULATED.3IONS-SCNC: 5 MMOL/L (ref 4–13)
BUN SERPL-MCNC: 17 MG/DL (ref 5–25)
CALCIUM SERPL-MCNC: 8.7 MG/DL (ref 8.3–10.1)
CHLORIDE SERPL-SCNC: 101 MMOL/L (ref 100–108)
CO2 SERPL-SCNC: 31 MMOL/L (ref 21–32)
CREAT SERPL-MCNC: 1.26 MG/DL (ref 0.6–1.3)
GFR SERPL CREATININE-BSD FRML MDRD: 58 ML/MIN/1.73SQ M
GLUCOSE P FAST SERPL-MCNC: 82 MG/DL (ref 65–99)
POTASSIUM SERPL-SCNC: 4 MMOL/L (ref 3.5–5.3)
SODIUM SERPL-SCNC: 137 MMOL/L (ref 136–145)

## 2018-07-20 PROCEDURE — 36415 COLL VENOUS BLD VENIPUNCTURE: CPT

## 2018-07-20 PROCEDURE — 80048 BASIC METABOLIC PNL TOTAL CA: CPT

## 2018-08-20 DIAGNOSIS — E83.51 HYPOCALCEMIA: ICD-10-CM

## 2018-08-20 RX ORDER — CALCITRIOL 0.25 UG/1
0.25 CAPSULE, LIQUID FILLED ORAL DAILY
Qty: 30 CAPSULE | Refills: 5 | Status: SHIPPED | OUTPATIENT
Start: 2018-08-20 | End: 2019-04-24 | Stop reason: SDUPTHER

## 2018-10-24 ENCOUNTER — OFFICE VISIT (OUTPATIENT)
Dept: INTERNAL MEDICINE CLINIC | Facility: CLINIC | Age: 68
End: 2018-10-24
Payer: MEDICARE

## 2018-10-24 ENCOUNTER — TRANSCRIBE ORDERS (OUTPATIENT)
Dept: LAB | Facility: CLINIC | Age: 68
End: 2018-10-24

## 2018-10-24 ENCOUNTER — APPOINTMENT (OUTPATIENT)
Dept: LAB | Facility: CLINIC | Age: 68
End: 2018-10-24
Payer: MEDICARE

## 2018-10-24 VITALS
BODY MASS INDEX: 27.59 KG/M2 | TEMPERATURE: 98.9 F | RESPIRATION RATE: 14 BRPM | DIASTOLIC BLOOD PRESSURE: 78 MMHG | SYSTOLIC BLOOD PRESSURE: 140 MMHG | HEIGHT: 74 IN | WEIGHT: 215 LBS | HEART RATE: 60 BPM

## 2018-10-24 DIAGNOSIS — E87.1 HYPOSMOLALITY SYNDROME: ICD-10-CM

## 2018-10-24 DIAGNOSIS — Z13.1 SCREENING FOR DIABETES MELLITUS (DM): ICD-10-CM

## 2018-10-24 DIAGNOSIS — E78.2 MIXED HYPERLIPIDEMIA: ICD-10-CM

## 2018-10-24 DIAGNOSIS — I10 ESSENTIAL HYPERTENSION: ICD-10-CM

## 2018-10-24 DIAGNOSIS — Z12.5 SCREENING FOR PROSTATE CANCER: ICD-10-CM

## 2018-10-24 DIAGNOSIS — E83.51 HYPOCALCEMIA: ICD-10-CM

## 2018-10-24 DIAGNOSIS — N18.30 CHRONIC KIDNEY DISEASE, STAGE III (MODERATE) (HCC): Primary | ICD-10-CM

## 2018-10-24 DIAGNOSIS — G43.909 MIGRAINE WITHOUT STATUS MIGRAINOSUS, NOT INTRACTABLE, UNSPECIFIED MIGRAINE TYPE: ICD-10-CM

## 2018-10-24 DIAGNOSIS — E20.9 HYPOPARATHYROIDISM, UNSPECIFIED HYPOPARATHYROIDISM TYPE (HCC): ICD-10-CM

## 2018-10-24 DIAGNOSIS — I10 ESSENTIAL HYPERTENSION: Primary | ICD-10-CM

## 2018-10-24 DIAGNOSIS — Z23 ENCOUNTER FOR VACCINATION: ICD-10-CM

## 2018-10-24 DIAGNOSIS — K21.9 GERD WITHOUT ESOPHAGITIS: ICD-10-CM

## 2018-10-24 DIAGNOSIS — N18.30 CHRONIC KIDNEY DISEASE, STAGE III (MODERATE) (HCC): ICD-10-CM

## 2018-10-24 LAB
25(OH)D3 SERPL-MCNC: 55.2 NG/ML (ref 30–100)
ALBUMIN SERPL BCP-MCNC: 3.6 G/DL (ref 3.5–5)
ALP SERPL-CCNC: 72 U/L (ref 46–116)
ALT SERPL W P-5'-P-CCNC: 22 U/L (ref 12–78)
ANION GAP SERPL CALCULATED.3IONS-SCNC: 5 MMOL/L (ref 4–13)
AST SERPL W P-5'-P-CCNC: 20 U/L (ref 5–45)
BACTERIA UR QL AUTO: NORMAL /HPF
BILIRUB SERPL-MCNC: 0.58 MG/DL (ref 0.2–1)
BILIRUB UR QL STRIP: NEGATIVE
BUN SERPL-MCNC: 19 MG/DL (ref 5–25)
CALCIUM SERPL-MCNC: 8.9 MG/DL (ref 8.3–10.1)
CHLORIDE SERPL-SCNC: 99 MMOL/L (ref 100–108)
CLARITY UR: CLEAR
CO2 SERPL-SCNC: 31 MMOL/L (ref 21–32)
COLOR UR: YELLOW
CREAT SERPL-MCNC: 1.29 MG/DL (ref 0.6–1.3)
CREAT UR-MCNC: 217 MG/DL
EST. AVERAGE GLUCOSE BLD GHB EST-MCNC: 105 MG/DL
GFR SERPL CREATININE-BSD FRML MDRD: 56 ML/MIN/1.73SQ M
GLUCOSE P FAST SERPL-MCNC: 89 MG/DL (ref 65–99)
GLUCOSE UR STRIP-MCNC: NEGATIVE MG/DL
HBA1C MFR BLD: 5.3 % (ref 4.2–6.3)
HGB UR QL STRIP.AUTO: NEGATIVE
HYALINE CASTS #/AREA URNS LPF: NORMAL /LPF
KETONES UR STRIP-MCNC: NEGATIVE MG/DL
LDLC SERPL DIRECT ASSAY-MCNC: 125 MG/DL (ref 0–100)
LEUKOCYTE ESTERASE UR QL STRIP: NEGATIVE
MICROALBUMIN UR-MCNC: 939 MG/L (ref 0–20)
MICROALBUMIN/CREAT 24H UR: 433 MG/G CREATININE (ref 0–30)
NITRITE UR QL STRIP: NEGATIVE
NON-SQ EPI CELLS URNS QL MICRO: NORMAL /HPF
PH UR STRIP.AUTO: 5.5 [PH] (ref 4.5–8)
PHOSPHATE SERPL-MCNC: 4.1 MG/DL (ref 2.3–4.1)
POTASSIUM SERPL-SCNC: 4.7 MMOL/L (ref 3.5–5.3)
PROT SERPL-MCNC: 7.7 G/DL (ref 6.4–8.2)
PROT UR STRIP-MCNC: ABNORMAL MG/DL
PSA SERPL-MCNC: 1.5 NG/ML (ref 0–4)
PTH-INTACT SERPL-MCNC: <6.3 PG/ML (ref 18.4–80.1)
RBC #/AREA URNS AUTO: NORMAL /HPF
SODIUM SERPL-SCNC: 135 MMOL/L (ref 136–145)
SP GR UR STRIP.AUTO: 1.02 (ref 1–1.03)
TRIGL SERPL-MCNC: 208 MG/DL
UROBILINOGEN UR QL STRIP.AUTO: 0.2 E.U./DL
WBC #/AREA URNS AUTO: NORMAL /HPF

## 2018-10-24 PROCEDURE — 99214 OFFICE O/P EST MOD 30 MIN: CPT | Performed by: INTERNAL MEDICINE

## 2018-10-24 PROCEDURE — G0103 PSA SCREENING: HCPCS

## 2018-10-24 PROCEDURE — 82570 ASSAY OF URINE CREATININE: CPT | Performed by: INTERNAL MEDICINE

## 2018-10-24 PROCEDURE — 82043 UR ALBUMIN QUANTITATIVE: CPT | Performed by: INTERNAL MEDICINE

## 2018-10-24 PROCEDURE — G0008 ADMIN INFLUENZA VIRUS VAC: HCPCS | Performed by: INTERNAL MEDICINE

## 2018-10-24 PROCEDURE — 81001 URINALYSIS AUTO W/SCOPE: CPT | Performed by: INTERNAL MEDICINE

## 2018-10-24 PROCEDURE — 80053 COMPREHEN METABOLIC PANEL: CPT

## 2018-10-24 PROCEDURE — 82306 VITAMIN D 25 HYDROXY: CPT

## 2018-10-24 PROCEDURE — 84100 ASSAY OF PHOSPHORUS: CPT

## 2018-10-24 PROCEDURE — 84478 ASSAY OF TRIGLYCERIDES: CPT

## 2018-10-24 PROCEDURE — 83970 ASSAY OF PARATHORMONE: CPT

## 2018-10-24 PROCEDURE — 83721 ASSAY OF BLOOD LIPOPROTEIN: CPT

## 2018-10-24 PROCEDURE — 36415 COLL VENOUS BLD VENIPUNCTURE: CPT

## 2018-10-24 PROCEDURE — 90662 IIV NO PRSV INCREASED AG IM: CPT | Performed by: INTERNAL MEDICINE

## 2018-10-24 PROCEDURE — 83036 HEMOGLOBIN GLYCOSYLATED A1C: CPT

## 2018-10-24 NOTE — PATIENT INSTRUCTIONS

## 2018-10-24 NOTE — PROGRESS NOTES
Assessment/Plan:    No problem-specific Assessment & Plan notes found for this encounter  Diagnoses and all orders for this visit:    Essential hypertension  -     Comprehensive metabolic panel; Future    Mixed hyperlipidemia  -     LDL cholesterol, direct; Future  -     Triglycerides; Future    Hypocalcemia  -     Comprehensive metabolic panel; Future  -     PTH, intact; Future  -     Vitamin D 25 hydroxy; Future    Migraine without status migrainosus, not intractable, unspecified migraine type    Hypoparathyroidism, unspecified hypoparathyroidism type ([facility] [address] )    GERD without esophagitis    Encounter for vaccination    Screening for diabetes mellitus (DM)  -     Hemoglobin A1C; Future    Screening for prostate cancer  -     PSA, Total Screen; Future      A/P: Doing well and will check labs  Up date the flu vaccine  Will continue current treatment and keep f/u with nephro and endo in the next few weeks  RTC four months for routine  Subjective:      Patient ID: Ab Cintron is a 70 y o  male  WM RTC with his wife for f/u htn, hyperlipidemia, etc  Doing well and no c/o's  Remains active w/o difficulty and no falls  No migraines and allergies are good  No heartburn, or palpitations  Due for labs and vaccines  The following portions of the patient's history were reviewed and updated as appropriate:   He  has a past medical history of Abdominal pain, acute, generalized; Actinic keratosis; Benign colon polyp; Chest pain; Disease of thyroid gland; Dysfunction of both eustachian tubes; Fatigue; Generalized anxiety disorder; GERD (gastroesophageal reflux disease); Hematuria; Hypertension; Lightheadedness; Lump in neck; Malaise and fatigue; Nephropathy; Shortness of breath; Skin lesion; and Tinea corporis    He   Patient Active Problem List    Diagnosis Date Noted    Basal cell tumor 05/31/2017    Multiple thyroid nodules 07/11/2016    Asymptomatic proteinuria 10/05/2015    Palpitations 08/06/2013    Hypoparathyroidism ([facility] [address] ) 12/12/2012    Allergic rhinitis 08/02/2012    Benign colon polyp 08/02/2012    Generalized anxiety disorder 08/02/2012    GERD without esophagitis 08/02/2012    Hyperlipidemia 08/02/2012    Hypertension 08/02/2012    Hypocalcemia 08/02/2012    Migraine, unspecified, not intractable, without status migrainosus 08/02/2012     He  has a past surgical history that includes Skin biopsy; Thyroid surgery; Colonoscopy w/ polypectomy; Head & neck skin lesion excisional biopsy; and Tonsillectomy  His family history includes Hypertension in his father and mother; Other in his brother  He  reports that he has never smoked  He has never used smokeless tobacco  He reports that he does not drink alcohol or use drugs  Current Outpatient Prescriptions   Medication Sig Dispense Refill    aspirin (ASPIRIN LOW DOSE) 81 mg EC tablet Take 1 tablet by mouth daily      calcitriol (ROCALTROL) 0 25 mcg capsule Take 1 capsule (0 25 mcg total) by mouth daily 30 capsule 5    Cholecalciferol (VITAMIN D3) 1000 units CAPS Take by mouth      Magnesium 250 MG TABS Take by mouth      metoprolol tartrate (LOPRESSOR) 25 mg tablet Take 1 tablet (25 mg total) by mouth daily 90 tablet 3    Multiple Vitamin (MULTI-VITAMIN DAILY PO) Take by mouth daily      Omega-3 Fatty Acids (FISH OIL) 1200 MG CAPS Take 2 capsules by mouth daily      Turmeric Curcumin 500 MG CAPS Take 1 capsule by mouth       No current facility-administered medications for this visit        Current Outpatient Prescriptions on File Prior to Visit   Medication Sig    aspirin (ASPIRIN LOW DOSE) 81 mg EC tablet Take 1 tablet by mouth daily    calcitriol (ROCALTROL) 0 25 mcg capsule Take 1 capsule (0 25 mcg total) by mouth daily    Cholecalciferol (VITAMIN D3) 1000 units CAPS Take by mouth    Magnesium 250 MG TABS Take by mouth    metoprolol tartrate (LOPRESSOR) 25 mg tablet Take 1 tablet (25 mg total) by mouth daily    Multiple Vitamin (MULTI-VITAMIN DAILY PO) Take by mouth daily    Omega-3 Fatty Acids (FISH OIL) 1200 MG CAPS Take 2 capsules by mouth daily    Turmeric Curcumin 500 MG CAPS Take 1 capsule by mouth     No current facility-administered medications on file prior to visit  He has No Known Allergies       Review of Systems   Constitutional: Negative for activity change, chills, diaphoresis, fatigue and fever  HENT: Negative  Eyes: Negative for visual disturbance  Respiratory: Negative for cough, chest tightness, shortness of breath and wheezing  Cardiovascular: Negative for chest pain, palpitations and leg swelling  Gastrointestinal: Negative for abdominal pain, constipation, diarrhea, nausea and vomiting  Endocrine: Negative for cold intolerance and heat intolerance  Genitourinary: Negative for difficulty urinating, dysuria and frequency  Musculoskeletal: Negative for arthralgias, gait problem and myalgias  Allergic/Immunologic: Negative for environmental allergies  Neurological: Negative for dizziness, syncope, weakness, light-headedness and headaches  Psychiatric/Behavioral: Negative for confusion, dysphoric mood and sleep disturbance  The patient is not nervous/anxious  Objective:      /78   Pulse 60   Temp 98 9 °F (37 2 °C) (Tympanic)   Resp 14   Ht 6' 2" (1 88 m)   Wt 97 5 kg (215 lb)   BMI 27 60 kg/m²          Physical Exam   Constitutional: He is oriented to person, place, and time  He appears well-developed and well-nourished  No distress  HENT:   Head: Normocephalic and atraumatic  Mouth/Throat: Oropharynx is clear and moist    Eyes: Pupils are equal, round, and reactive to light  Conjunctivae and EOM are normal    Neck: Neck supple  No JVD present  Cardiovascular: Normal rate, regular rhythm and normal heart sounds  No murmur heard  Pulmonary/Chest: Effort normal and breath sounds normal  No respiratory distress  He has no wheezes  Abdominal: Soft   Bowel sounds are normal  He exhibits no distension  There is no tenderness  Musculoskeletal: He exhibits no edema  Neurological: He is alert and oriented to person, place, and time  Psychiatric: He has a normal mood and affect  His behavior is normal  Judgment and thought content normal    Nursing note and vitals reviewed

## 2018-10-25 ENCOUNTER — TELEPHONE (OUTPATIENT)
Dept: ENDOCRINOLOGY | Facility: CLINIC | Age: 68
End: 2018-10-25

## 2018-10-25 ENCOUNTER — HOSPITAL ENCOUNTER (OUTPATIENT)
Dept: ULTRASOUND IMAGING | Facility: HOSPITAL | Age: 68
Discharge: HOME | End: 2018-10-25
Payer: MEDICARE

## 2018-10-25 DIAGNOSIS — E04.2 MULTIPLE THYROID NODULES: ICD-10-CM

## 2018-10-25 PROBLEM — R80.9 MICROALBUMINURIA: Status: ACTIVE | Noted: 2018-10-25

## 2018-10-25 PROCEDURE — 76536 US EXAM OF HEAD AND NECK: CPT

## 2018-10-25 NOTE — TELEPHONE ENCOUNTER
----- Message from Pietro Tabor MD sent at 10/24/2018  8:20 PM EDT -----  Please call the patient regarding labs - calcium and vitamin D - continue current supplementations

## 2018-10-29 ENCOUNTER — TELEPHONE (OUTPATIENT)
Dept: ENDOCRINOLOGY | Facility: CLINIC | Age: 68
End: 2018-10-29

## 2018-10-29 NOTE — TELEPHONE ENCOUNTER
----- Message from Krystle Tomlinson MD sent at 10/29/2018  1:53 PM EDT -----  Please call the patient regarding ultrasound- stable thyroid nodule

## 2018-11-20 ENCOUNTER — EVALUATION (OUTPATIENT)
Dept: PHYSICAL THERAPY | Facility: CLINIC | Age: 68
End: 2018-11-20
Payer: MEDICARE

## 2018-11-20 DIAGNOSIS — M77.42 METATARSALGIA OF LEFT FOOT: Primary | ICD-10-CM

## 2018-11-20 DIAGNOSIS — M21.861 GASTROCNEMIUS EQUINUS OF RIGHT LOWER EXTREMITY: ICD-10-CM

## 2018-11-20 DIAGNOSIS — M21.862 GASTROCNEMIUS EQUINUS OF LEFT LOWER EXTREMITY: ICD-10-CM

## 2018-11-20 PROCEDURE — 97162 PT EVAL MOD COMPLEX 30 MIN: CPT | Performed by: PHYSICAL THERAPIST

## 2018-11-20 PROCEDURE — 97140 MANUAL THERAPY 1/> REGIONS: CPT | Performed by: PHYSICAL THERAPIST

## 2018-11-20 PROCEDURE — G8979 MOBILITY GOAL STATUS: HCPCS | Performed by: PHYSICAL THERAPIST

## 2018-11-20 PROCEDURE — 97535 SELF CARE MNGMENT TRAINING: CPT | Performed by: PHYSICAL THERAPIST

## 2018-11-20 PROCEDURE — G8978 MOBILITY CURRENT STATUS: HCPCS | Performed by: PHYSICAL THERAPIST

## 2018-11-20 NOTE — LETTER
2018    Jessica Montoya DPM  [address] [facility] [address]    Patient: Ady Cintron   YOB: 1950   Date of Visit: 2018     Encounter Diagnosis     ICD-10-CM    1  Metatarsalgia of left foot M77 42    2  Gastrocnemius equinus of left lower extremity M21 6X2    3  Gastrocnemius equinus of right lower extremity M21 6X1        Dear Dr Zaid Giordano:    Please review the attached Plan of Care from Janece Call recent visit  Please verify that you agree therapy should continue by signing the attached document and sending it back to our office  If you have any questions or concerns, please don't hesitate to call  Sincerely,    Olga Del Castillo      Referring Provider:      I certify that I have read the below Plan of Care and certify the need for these services furnished under this plan of treatment while under my care  Jessica Montoya DPM  [address] [facility] [address]: [phone]          PT Evaluation     Today's date: 2018  Patient name: Ady Cintron  : 1950  MRN: [id number]  Referring provider: Riky Evans DPM  Dx:   Encounter Diagnosis     ICD-10-CM    1  Metatarsalgia of left foot M77 42    2  Gastrocnemius equinus of left lower extremity M21 6X2    3  Gastrocnemius equinus of right lower extremity M21 6X1        Assessment  Assessment details: Latricia Flower is a 70 y o  male presenting to outpatient physical therapy with diagnosis of Metatarsalgia of left foot, gastrocnemius equinus of left lower extremity, gastrocnemius equinus of right lower extremity  Patient presents with pain, reduced range of motion, reduced strength, reduced postural awareness, and reduced functional activity tolerance   Patient to benefit from skilled outpatient physical therapy to reduce pain, maximize pain free range of motion, increase strength and stability, and improve functional mobility/functional activity in order to maximize return to prior level of function with reduced limitations  Thank you for your referral     Impairments: abnormal gait, abnormal muscle tone, abnormal or restricted ROM, activity intolerance, impaired physical strength, lacks appropriate home exercise program and pain with function  Barriers to therapy: Inconsistent presentation of pain  Understanding of Dx/Px/POC: good   Prognosis: good    Goals  STGs to be achieved in 4 weeks:  1  Pt to demonstrate reduced subjective pain rating "at worst" by at least 2-3 points from Initial Eval in order to allow for reduced pain with ADLs and improved functional activity tolerance  2  Pt to demonstrate increased AROM of bilateral ankles by at least 5-10 degrees in order to allow for greater ease and independence with ADLs and functional mobility  3  Pt to demonstrate full PROM of bilateral ankles in order to maximize joint mobility and function and allow for progression of exercise program and achievement of goals  4  Pt to demonstrate increased MMT of bilateral LEs by at least 1/2-1 grade in order to improve safety and stability with ADLs and functional mobility  LTGs to be achieved in 6-8 weeks:  1  Pt will be I with HEP in order to continue to improve quality of life and independence and reduce risk for re-injury  2  Pt to demonstrate return to full distance community walking without limitations or restrictions  3  Pt to demonstrate improved function as noted by achieving or exceeding predicted score on FOTO outcomes assessment tool     4  Pt to demonstrate good balance in single leg stance      Plan  Patient would benefit from: skilled physical therapy  Other planned modality interventions: Modalities prn for symptom management  Planned therapy interventions: manual therapy, neuromuscular re-education, therapeutic exercise and home exercise program  Frequency: 2x week  Duration in weeks: 4  Treatment plan discussed with: patient and caregiver        Subjective Evaluation    History of Present Illness  Mechanism of injury: Patient notes that he had an onset of pain a few years  He was a  and his left foot was his clutch foot  He did see a podiatrist and has had inserts added  He notes that he tried taping his arch  Got relief  Pain is not constant, it's more of a cumulative effect after increased use  Recurrent probem    Quality of life: good    Pain  No pain reported  Current pain ratin  At best pain ratin  At worst pain ratin  Location: Bilateral arch/foot pain  Quality: knife-like  Aggravating factors: walking, standing and stair climbing  Progression: no change    Social Support  Stairs in house: yes   12  Lives in: multiple-level home  Lives with: spouse    Employment status: working (Retired, but still works a few times a year)  Hand dominance: right    Treatments  Current treatment: immobilization  Patient Goals  Patient goals for therapy: decreased pain, improved balance, increased strength, independence with ADLs/IADLs, increased motion and return to sport/leisure activities          Objective     Observations     Additional Observation Details  Ambulates with toes toe, unable to perform heel strike with symmetry  Decreased toe off bilaterally  Navicular dropping bilaterally  Increased calcaneal eversion on stance leg      Tenderness     Additional Tenderness Details  Negative TTP    Neurological Testing     Sensation     Ankle/Foot   Left Ankle/Foot   Intact: light touch    Right Ankle/Foot   Intact: light touch     Strength/Myotome Testing     Left Ankle/Foot   Dorsiflexion: 4  Plantar flexion: 4  Inversion: 4  Eversion: 4-  Great toe flexion: 4-  Great toe extension: 4-    Right Ankle/Foot   Dorsiflexion: 4  Plantar flexion: 4  Inversion: 4  Eversion: 4-  Great toe flexion: 4-  Great toe extension: 4-      Flowsheet Rows      Most Recent Value   PT/OT G-Codes   Current Score  90   Projected Score  83 Precautions: Easily irritated  Re-eval Date: 12/19/18    Date 11/20       Visit Count 1       FOTO See IE       Pain In See IE       Pain Out See IE             Daily Treatment Diary     Manual  15 min       LE stretching                          GISTM - BL calf mm to pt tolerance with # 4 - sweeping/fanning  [facility] with pt educated potential bruising/mm soreness and encourage hydration 11/20/18 - PTA/CV      Date        [address] [facility]        Ankle AROM        Ankle ABCs        Ankle isometrics         Towel slides inv/ev        Towel curls         Calf stretch         Hamstring stretch         BAPS board         Romberg  EO/EC        Tandem EO/EC        SLS EO/EC          TR/HR          Mini squats         Step ups fwd/lateral                Modalities

## 2018-11-20 NOTE — PROGRESS NOTES
PT Evaluation     Today's date: 2018  Patient name: Dwyane Favre Trainer  : 1950  MRN: [id number]  Referring provider: Jose C Clayton DPM  Dx:   Encounter Diagnosis     ICD-10-CM    1  Metatarsalgia of left foot M77 42    2  Gastrocnemius equinus of left lower extremity M21 6X2    3  Gastrocnemius equinus of right lower extremity M21 6X1        Assessment  Assessment details: Felipe Rodriguez is a 70 y o  male presenting to outpatient physical therapy with diagnosis of Metatarsalgia of left foot, gastrocnemius equinus of left lower extremity, gastrocnemius equinus of right lower extremity  Patient presents with pain, reduced range of motion, reduced strength, reduced postural awareness, and reduced functional activity tolerance  Patient to benefit from skilled outpatient physical therapy to reduce pain, maximize pain free range of motion, increase strength and stability, and improve functional mobility/functional activity in order to maximize return to prior level of function with reduced limitations  Thank you for your referral     Impairments: abnormal gait, abnormal muscle tone, abnormal or restricted ROM, activity intolerance, impaired physical strength, lacks appropriate home exercise program and pain with function  Barriers to therapy: Inconsistent presentation of pain  Understanding of Dx/Px/POC: good   Prognosis: good    Goals  STGs to be achieved in 4 weeks:  1  Pt to demonstrate reduced subjective pain rating "at worst" by at least 2-3 points from Initial Eval in order to allow for reduced pain with ADLs and improved functional activity tolerance  2  Pt to demonstrate increased AROM of bilateral ankles by at least 5-10 degrees in order to allow for greater ease and independence with ADLs and functional mobility  3  Pt to demonstrate full PROM of bilateral ankles in order to maximize joint mobility and function and allow for progression of exercise program and achievement of goals     4  Pt to demonstrate increased MMT of bilateral LEs by at least 1/2-1 grade in order to improve safety and stability with ADLs and functional mobility  LTGs to be achieved in 6-8 weeks:  1  Pt will be I with HEP in order to continue to improve quality of life and independence and reduce risk for re-injury  2  Pt to demonstrate return to full distance community walking without limitations or restrictions  3  Pt to demonstrate improved function as noted by achieving or exceeding predicted score on FOTO outcomes assessment tool  4  Pt to demonstrate good balance in single leg stance      Plan  Patient would benefit from: skilled physical therapy  Other planned modality interventions: Modalities prn for symptom management  Planned therapy interventions: manual therapy, neuromuscular re-education, therapeutic exercise and home exercise program  Frequency: 2x week  Duration in weeks: 4  Treatment plan discussed with: patient and caregiver        Subjective Evaluation    History of Present Illness  Mechanism of injury: Patient notes that he had an onset of pain a few years  He was a  and his left foot was his clutch foot  He did see a podiatrist and has had inserts added  He notes that he tried taping his arch  Got relief  Pain is not constant, it's more of a cumulative effect after increased use    Recurrent probem    Quality of life: good    Pain  No pain reported  Current pain ratin  At best pain ratin  At worst pain ratin  Location: Bilateral arch/foot pain  Quality: knife-like  Aggravating factors: walking, standing and stair climbing  Progression: no change    Social Support  Stairs in house: yes   12  Lives in: multiple-level home  Lives with: spouse    Employment status: working (Retired, but still works a few times a year)  Hand dominance: right    Treatments  Current treatment: immobilization  Patient Goals  Patient goals for therapy: decreased pain, improved balance, increased strength, independence with ADLs/IADLs, increased motion and return to sport/leisure activities          Objective     Observations     Additional Observation Details  Ambulates with toes toe, unable to perform heel strike with symmetry  Decreased toe off bilaterally  Navicular dropping bilaterally  Increased calcaneal eversion on stance leg      Tenderness     Additional Tenderness Details  Negative TTP    Neurological Testing     Sensation     Ankle/Foot   Left Ankle/Foot   Intact: light touch    Right Ankle/Foot   Intact: light touch     Strength/Myotome Testing     Left Ankle/Foot   Dorsiflexion: 4  Plantar flexion: 4  Inversion: 4  Eversion: 4-  Great toe flexion: 4-  Great toe extension: 4-    Right Ankle/Foot   Dorsiflexion: 4  Plantar flexion: 4  Inversion: 4  Eversion: 4-  Great toe flexion: 4-  Great toe extension: 4-      Flowsheet Rows      Most Recent Value   PT/OT G-Codes   Current Score  90   Projected Score  83          Precautions: Easily irritated  Re-eval Date: 12/19/18    Date 11/20       Visit Count 1       FOTO See IE       Pain In See IE       Pain Out See IE             Daily Treatment Diary     Manual  15 min       LE stretching                          GISTM - BL calf mm to pt tolerance with # 4 - sweeping/fanning  Pt signed [facility] consent form with pt educated potential bruising/mm soreness and encourage hydration 11/20/18 - PTA/CV      Date        [address] [facility]        Ankle AROM        Ankle ABCs        Ankle isometrics         Towel slides inv/ev        Towel curls         Calf stretch         Hamstring stretch         BAPS board         Romberg  EO/EC        Tandem EO/EC        SLS EO/EC          TR/HR          Mini squats         Step ups fwd/lateral                Modalities

## 2018-11-21 ENCOUNTER — OFFICE VISIT (OUTPATIENT)
Dept: PHYSICAL THERAPY | Facility: CLINIC | Age: 68
End: 2018-11-21
Payer: MEDICARE

## 2018-11-21 DIAGNOSIS — M77.42 METATARSALGIA OF LEFT FOOT: Primary | ICD-10-CM

## 2018-11-21 PROCEDURE — 97112 NEUROMUSCULAR REEDUCATION: CPT

## 2018-11-21 PROCEDURE — 97140 MANUAL THERAPY 1/> REGIONS: CPT

## 2018-11-21 PROCEDURE — 97110 THERAPEUTIC EXERCISES: CPT

## 2018-11-21 NOTE — PROGRESS NOTES
Daily Note     Today's date: 2018  Patient name: Laura Marte Trainer  : 1950  MRN: [id number]  Referring provider: Chata Crane DPM  Dx:   Encounter Diagnosis     ICD-10-CM    1  Metatarsalgia of left foot M77 42                   Precautions: Easily irritated  Re-eval Date: 18    Date       Visit Count 1 2      FOTO See IE       Pain In See IE 0      Pain Out See IE 0        Subjective: I felt good after the GISTM at IE  No pain right now  Elevated pain when I first wake up in the morning when I go to get out of bed, take about 10 steps and then it is better  Objective: See treatment diary below    Pt provided 630 PF handout encourage carryover before getting out of bed in am      Assessment: Tolerated treatment well without co's of increase pain/sx's  Pt demonstrates instability BL ankles  Noted tightness b/l PF with good tolerance with trial GISTM b/l PF  Patient would benefit from continued PT      Plan: Continue per plan of care       Manual  15 min 10min      LE stretching                          GISTM - BL calf mm to pt tolerance with # 4 - sweeping/fanning- NP              - BL PF to pt tolerance with #4 - sweeping/fanning  Pt signed [facility] consent form with pt educated potential bruising/mm soreness and encourage hydration 18 - PTA/CV      TE  1:1 with co-tx PT/MW      [address] [facility]  Upright cycle  L1 13 min      Ankle AROM        Ankle ABCs  1x BL      Ankle isometrics         Towel slides inv/ev        Towel curls         Calf stretch         Hamstring stretch         BAPS board         Romberg  EO/EC  30 sec ea  foam      Tandem EO/EC  30 sec ea  foam      SLS EO/EC          TR/HR    Edge foam  3x10 ea      Mini squats   Firm  2x 10  0 HHA      Step ups fwd/lateral  Bottom step  15 fwd              Modalities   Pt def

## 2018-11-26 ENCOUNTER — OFFICE VISIT (OUTPATIENT)
Dept: PHYSICAL THERAPY | Facility: CLINIC | Age: 68
End: 2018-11-26
Payer: MEDICARE

## 2018-11-26 DIAGNOSIS — M77.42 METATARSALGIA OF LEFT FOOT: Primary | ICD-10-CM

## 2018-11-26 PROCEDURE — 97110 THERAPEUTIC EXERCISES: CPT

## 2018-11-26 PROCEDURE — 97140 MANUAL THERAPY 1/> REGIONS: CPT

## 2018-11-26 PROCEDURE — 97112 NEUROMUSCULAR REEDUCATION: CPT

## 2018-11-26 NOTE — PROGRESS NOTES
Daily Note     Today's date: 2018  Patient name: Roshan Villegas Trainer  : 1950  MRN: [id number]  Referring provider: Pepe Salmeron DPM  Dx:   Encounter Diagnosis     ICD-10-CM    1  Metatarsalgia of left foot M77 42                 Precautions: Easily irritated  Re-eval Date: 18    Date      Visit Count 1 2 3     FOTO See IE       Pain In See IE 0 0     Pain Out See IE 0 0       Subjective: 630 am Stretches have been helping     Objective: See treatment diary below      Assessment: Tolerated treatment well denied any increase pain BL Feet  Noted good tolerance with progression of TE  Demonstrates challenge with tandem stance with Left behind  CS/Gentry for balance recovery   Patient would benefit from continued PT to improve overall strength/stability and flexibility BL LE  Trial navicular sling L PF      Plan: Continue per plan of care       Manual  15 min 10min 15 min     LE stretching                          GISTM - BL calf mm to pt tolerance with # 4 - sweeping/fanning              - BL PF to pt tolerance with #4 - sweeping/fanning  Pt signed [facility] consent form with pt educated potential bruising/mm soreness and encourage hydration 18 - PTA/CV      TE  1:1 with co-tx PT/MW      [address] [facility]  Upright cycle  L1 13 min Upright cycle  L3 15 min     Ankle AROM        Ankle ABCs  1x BL 1x BL     Ankle isometrics         Towel slides inv/ev        Towel curls         Calf stretch    BL 2x 1' ea  Edge step     Hamstring stretch         BAPS board         Romberg  EO/EC  30 sec ea  foam 3x30 EO  Invert BOSU     Tandem EO/EC  30 sec ea  foam 30x EA  Foam  EO/EC     SLS EO/EC          TR/HR    Edge foam  3x10 ea Calf Press  75# 40x     Mini squats   Firm  2x 10  0 HHA Ball squats to pt leigh ann  5x 10"     Step ups fwd/lateral  Bottom step  15 fwd resume             Modalities   Pt def

## 2018-11-28 ENCOUNTER — OFFICE VISIT (OUTPATIENT)
Dept: PHYSICAL THERAPY | Facility: CLINIC | Age: 68
End: 2018-11-28
Payer: MEDICARE

## 2018-11-28 DIAGNOSIS — M77.42 METATARSALGIA OF LEFT FOOT: Primary | ICD-10-CM

## 2018-11-28 PROCEDURE — 97112 NEUROMUSCULAR REEDUCATION: CPT

## 2018-11-28 PROCEDURE — 97110 THERAPEUTIC EXERCISES: CPT

## 2018-11-28 NOTE — PROGRESS NOTES
Daily Note     Today's date: 2018  Patient name: Dwyane Favre Trainer  : 1950  MRN: [id number]  Referring provider: Jose C Clayton DPM  Dx:   Encounter Diagnosis     ICD-10-CM    1  Metatarsalgia of left foot M77 42                   Precautions: Easily irritated  Re-eval Date: 18    Date     Visit Count 1 2 3 4    FOTO See IE       Pain In See IE 0 0 0    Pain Out See IE 0 0 0      Subjective: My feet have been feel good  Yesterday however, I put in the dr's inserts all day long and end of day my feet hurt      Objective: See treatment diary below      Assessment: Tolerated treatment fairly well with pt demonstrating increase R knee valgus with step up activities  Pt displays decrease DF with advance phase of ambulation and BL EV weakness  Trial navicular sling R PF this date with pt indicating providing good arch support   Patient would benefit from continued PT to improve BL ankle stability / balance        Plan: Continue per plan of care       Manual  15 min 10min 15 min 5 min    LE stretching                          GISTM - BL calf mm to pt tolerance with # 4 - sweeping/fanning - Held              - BL PF to pt tolerance with #4 - sweeping/fanning - Held  Pt signed [facility] consent form with pt educated potential bruising/mm soreness and encourage hydration 18 - PTA/CV      TE  1:1 with co-tx PT/MW      [facility]  Upright cycle  L1 13 min Upright cycle  L3 15 min TM  1 8 mph  Cues heel/toe   10 min    Ankle AROM        Ankle ABCs  1x BL 1x BL     Ankle isometrics         Towel slides inv/ev    Pt educated  Encourage carrover    Towel curls         Calf stretch    BL 2x 1' ea  Edge step BL 2x 1' ea  Edge step    Hamstring stretch         BAPS board     Inverted  1x 60 sec  Feet apart  EO      Romberg  EO/EC  30 sec ea  foam 3x30 EO  Invert BOSU     Tandem EO/EC  30 sec ea  foam 30x EA  Foam  EO/EC     SLS EO/EC      Upcoming    TR/HR    Edge foam  3x10 ea Calf Press  75# 40x Edge foam  30x ea   0-1HHA    Mini squats   Firm  2x 10  0 HHA Ball squats to pt leigh ann  5x 10" resume    Step ups fwd/lateral  Bottom step  15 fwd resume 4" + blue foam  20x fwd BL  15x lat BL  0 HHA    [place]    Foam BL  2x10 ea dir  0-1 HHA    Heel/toe  Fwd/Retro    Fwd/retro  2x 10' EO  Fwd CS  1x10' EC  CS/CGA            Modalities   Pt def

## 2018-11-30 ENCOUNTER — OFFICE VISIT (OUTPATIENT)
Dept: PHYSICAL THERAPY | Facility: CLINIC | Age: 68
End: 2018-11-30
Payer: MEDICARE

## 2018-11-30 DIAGNOSIS — M21.862 GASTROCNEMIUS EQUINUS OF LEFT LOWER EXTREMITY: ICD-10-CM

## 2018-11-30 DIAGNOSIS — M21.861 GASTROCNEMIUS EQUINUS OF RIGHT LOWER EXTREMITY: ICD-10-CM

## 2018-11-30 DIAGNOSIS — M77.42 METATARSALGIA OF LEFT FOOT: Primary | ICD-10-CM

## 2018-11-30 PROCEDURE — 97140 MANUAL THERAPY 1/> REGIONS: CPT | Performed by: PHYSICAL THERAPIST

## 2018-11-30 PROCEDURE — 97112 NEUROMUSCULAR REEDUCATION: CPT | Performed by: PHYSICAL THERAPIST

## 2018-11-30 PROCEDURE — 97110 THERAPEUTIC EXERCISES: CPT | Performed by: PHYSICAL THERAPIST

## 2018-11-30 NOTE — PROGRESS NOTES
Daily Note     Today's date: 2018  Patient name: Dylon Patel [place]  : 1950  MRN: [id number]  Referring provider: Devaughn Godoy DPM  Dx:   Encounter Diagnosis     ICD-10-CM    1  Metatarsalgia of left foot M77 42    2  Gastrocnemius equinus of left lower extremity M21 6X2    3  Gastrocnemius equinus of right lower extremity M21 6X1         Precautions: Easily irritated  Re-eval Date: 18    Date    Visit Count 1 2 3 4 5   FOTO See IE       Pain In See IE 0 0 0 0   Pain Out See IE 0 0 0 0     Subjective: I'm doing pretty good  Got inserts in his shoes  Objective: See treatment diary below      Assessment: Tolerated treatment well  Patient demonstrated fatigue post treatment and would benefit from continued PT  Improved gait with decreased velocity and verbal cues for technique with emphasis of heel strike to foot flat  Plan: Continue per plan of care       Manual  15 min 10min 15 min 5 min 15 mins   LE stretching                          GISTM - BL calf mm to pt tolerance with # 4 - sweeping/fanning - Held              - BL PF to pt tolerance with #4 - sweeping/fanning - Held  [facility] with pt educated potential bruising/mm soreness and encourage hydration 18 - PTA/CV    AP mobs of talus, gastroc and soleus stretch manualy with subtalar neutral     TE  1:1 with co-tx PT/MW      [address] [facility]  Upright cycle  L1 13 min Upright cycle  L3 15 min TM  1 8 mph  Cues heel/toe   10 min    Ankle AROM     Self assisted  PF with toe flexion stretch  1 min x 2   Ankle ABCs  1x BL 1x BL     Towel slides inv/ev    Pt educated  Encourage carrover 1 min ea   Towel curls         Calf stretch    BL 2x 1' ea  Edge step BL 2x 1' ea  Edge step BL 2x 1' ea  Edge step  Gastroc  and  2x1' ea  Soleus   Hamstring stretch         BAPS board     Inverted  1x 60 sec  Feet apart  EO   Inverted  1x 60 sec  Feet apart  EO   Romberg  EO/EC  30 sec ea  foam 3x30 EO  Invert BOSU  3x30 EO  Invert BOSU   Tandem EO/EC  30 sec ea  foam 30x EA  Foam  EO/EC  30x EA  Foam  EO/EC   SLS EO/EC      Upcoming    TR/HR    Edge foam  3x10 ea Calf Press  75# 40x Edge foam  30x ea   0-1HHA Edge foam  30x ea   0-1HHA   Mini squats   Firm  2x 10  0 HHA Ball squats to pt leigh ann  5x 10" resume    Step ups fwd/lateral  Bottom step  15 fwd resume 4" + blue foam  20x fwd BL  15x lat BL  0 HHA 4" + blue foam  20x fwd BL  15x lat BL  0 HHA   [place]    Foam BL  2x10 ea dir  0-1 HHA    Heel/toe  Fwd/Retro    Fwd/retro  2x 10' EO  Fwd CS  1x10' EC  CS/CGA    Marbles     1 min ea           Modalities   Pt def

## 2018-12-04 ENCOUNTER — OFFICE VISIT (OUTPATIENT)
Dept: PHYSICAL THERAPY | Facility: CLINIC | Age: 68
End: 2018-12-04
Payer: MEDICARE

## 2018-12-04 DIAGNOSIS — M77.42 METATARSALGIA OF LEFT FOOT: Primary | ICD-10-CM

## 2018-12-04 PROCEDURE — 97110 THERAPEUTIC EXERCISES: CPT

## 2018-12-04 PROCEDURE — 97112 NEUROMUSCULAR REEDUCATION: CPT

## 2018-12-04 NOTE — PROGRESS NOTES
Daily Note     Today's date: 2018  Patient name: Raven Olson [place]  : 1950  MRN: [id number]  Referring provider: Rashard Cox DPM  Dx:   Encounter Diagnosis     ICD-10-CM    1  Metatarsalgia of left foot M77 42                 Precautions: Easily irritated  Re-eval Date: 18    Date        Visit Count 6       FOTO        Pain In 0       Pain Out 0         Subjective: I am feeling good no pain today in my feet      Objective: See treatment diary below      Assessment: Tolerated treatment well, denied any increase pain/sx's  Pt challenged with neuro/balance exercises with CS to Gentry for balance recovery  Patient would benefit from continued PT to improve BL ankle strength/stability      Plan: Continue per plan of care       Manual  15 min       LE stretching                          GISTM - BL calf mm to pt tolerance with # 4 - sweeping/fanning - Held              - BL PF to pt tolerance with #4 - sweeping/fanning - Held  Pt signed [facility] consent form with pt educated potential bruising/mm soreness and encourage hydration 18     AP mobs of talus, gastroc and soleus stretch manualy with subtalar neutral     TE        [facility] Upright cycle  15 min  L5       Ankle AROM Review  DC HEP    Self assisted  PF with toe flexion stretch  1 min x 2   Ankle ABCs DC HEP       Towel slides inv/ev Review DC HEP    1 min ea   Towel curls  Review DC HEP       Calf stretch  BL 1x 1' ea  Edge step  Gastroc  F/B  BL arch stretch Kneeling on heels  2x 1'    BL 2x 1' ea  Edge step  Gastroc  and  2x1' ea  Soleus   Hamstring stretch         BAPS board  Inverted w/ mini squa  2x 10 green ball extension  CS/Gentry    Inverted  1x 60 sec  Feet apart  EO   Romberg  EO/EC     3x30 EO  Invert BOSU   Tandem EO/EC EC  Firm  BL 1x30"  Foam  BL 3trial  2-10" ea    30x EA  Foam  EO/EC   SLS EO/EC          TR/HR   Toe Walk  3x 10 ft  1x 20 ft with functional carry of Red foam/mat  Heel walk  2x10 ft    Edge foam  30x ea   0-1HHA Mini squats         Step ups fwd/lateral Stairs with functional crate carry  10# 2x1 HHA  15# 2x0 HHA    4" + blue foam  20x fwd BL  15x lat BL  0 HHA   [place]        Heel/toe  Fwd/Retro Firm  Fwd/Retro  4x  Foam  Fwd 2x  12 feet       Marbles review    1 min ea   Tall Single LE kneel with dynamic reach 1 min ea direction  Yellow  Wt ball                       Modalities   Pt def

## 2018-12-05 ENCOUNTER — OFFICE VISIT (OUTPATIENT)
Dept: PHYSICAL THERAPY | Facility: CLINIC | Age: 68
End: 2018-12-05
Payer: MEDICARE

## 2018-12-05 DIAGNOSIS — M77.42 METATARSALGIA OF LEFT FOOT: Primary | ICD-10-CM

## 2018-12-05 PROCEDURE — 97112 NEUROMUSCULAR REEDUCATION: CPT

## 2018-12-05 PROCEDURE — 97110 THERAPEUTIC EXERCISES: CPT

## 2018-12-05 NOTE — PROGRESS NOTES
Daily Note     Today's date: 2018  Patient name: Caron Bryan Trainer  : 1950  MRN: [id number]  Referring provider: Ashley Fritz DPM  Dx:   Encounter Diagnosis     ICD-10-CM    1  Metatarsalgia of left foot M77 42                   Precautions: Easily irritated  Re-eval Date: 18    Date       Visit Count 6 7      FOTO        Pain In 0 0      Pain Out 0 0        Subjective: My feet have been feeling good      Objective: See treatment diary below      Assessment: Tolerated treatment well denied any increase pain/sx's BL feet  Pt demonstrates difficulty and challenged with neuro activities  Patient would benefit from continued PT to address instability BL ankles      Plan: Continue per plan of care       Manual  15 min Held      LE stretching                          GISTM - BL calf mm to pt tolerance with # 4 - sweeping/fanning - Held              - BL PF to pt tolerance with #4 - sweeping/fanning - Held  Pt signed [facility] consent form with pt educated potential bruising/mm soreness and encourage hydration 18     AP mobs of talus, gastroc and soleus stretch manualy with subtalar neutral     TE        [address] [facility] Upright cycle  15 min  L5 Upright cycle  15 min  L5      Ankle AROM Review  DC HEP       Ankle ABCs DC HEP       Towel slides inv/ev Review DC HEP       Towel curls  Review DC HEP       Calf stretch  BL 1x 1' ea  Edge step  Gastroc  F/B  BL arch stretch Kneeling on heels  2x 1' BL 2x 1' ea  Edge step  Gastroc      Hamstring stretch         BAPS board  Inverted w/ mini squa  2x 10 green ball extension  CS/Gentry Inverted  Mini squats  2x10, 5" hold      [place]  EO/EC        Tandem EO/EC EC  Firm  BL 1x30"  Foam  BL 3trial  2-10" ea EC/ foam  3x 30"  // bars         SLS EO/EC          TR/HR   Toe Walk  3x 10 ft  1x 20 ft with functional carry of Red foam/mat  Heel walk  2x10 ft HR SL  R/L 3x10 ea  2 HHA      Mini squats         Step ups fwd/lateral Stairs with functional crate carry  10# 2x1 HHA  15# 2x0 HHA       [place]        Heel/toe  Fwd/Retro Firm  Fwd/Retro  4x  Foam  Fwd 2x  12 feet Foam  5x   Fwd/Retro  // bars      Marbles review       Tall Single LE kneel with dynamic reach 1 min ea direction  Yellow  Wt ball       Natus  15 min*        * Natus - Adaptations and SLS EO/EC      Modalities   Pt def

## 2018-12-06 NOTE — PROGRESS NOTES
Daily Note     Today's date: 2018  Patient name: Jules Dugan Trainer  : 1950  MRN: [id number]  Referring provider: Alyssa Jones DPM  Dx:   Encounter Diagnosis     ICD-10-CM    1  Metatarsalgia of left foot M77 42    2  Gastrocnemius equinus of left lower extremity M21 6X2    3  Gastrocnemius equinus of right lower extremity M21 6X1         Precautions: Easily irritated  Re-eval Date: 18    Date      Visit Count 6 7 8     FOTO        Pain In 0 0 0     Pain Out 0 0 0       Subjective: I think I'm doing pretty good  I can't believe that I have such flat feet  I don't have to tape it anymore  Objective: See treatment diary below      Assessment: Tolerated treatment well  Patient demonstrated fatigue post treatment and would benefit from continued PT  Patient with ongoing difficulty with heel strike  Plan: Continue per plan of care       Manual  15 min Held      LE stretching                          GISTM - BL calf mm to pt tolerance with # 4 - sweeping/fanning - Held              - BL PF to pt tolerance with #4 - sweeping/fanning - Held  Pt signed [facility] consent form with pt educated potential bruising/mm soreness and encourage hydration 18     AP mobs of talus, gastroc and soleus stretch manualy with subtalar neutral     TE        [facility] Upright cycle  15 min  L5 Upright cycle  15 min  L5 TM  0%  13 mins  0 8     Ankle AROM Review  DC HEP       Ankle ABCs DC HEP       Towel slides inv/ev Review DC HEP       Towel curls  Review DC HEP       Calf stretch  BL 1x 1' ea  Edge step  Gastroc  F/B  BL arch stretch Kneeling on heels  2x 1' BL 2x 1' ea  Edge step  Gastroc BL 2x 1' ea  Edge step  Gastroc    BL 2x 1' ea  Wedge  Soleus     Hamstring stretch    60" x 2  strap     BAPS board  Inverted w/ mini squa  2x 10 green ball extension  CS/Gentry Inverted  Mini squats  2x10, 5" hold Inverted  Mini squats  2x10, 5" hold     Romberg  EO/EC   60" x 2 with red ball at rebounder  Hip width x 1  FT x 1     Tandem EO/EC EC  Firm  BL 1x30"  Foam  BL 3trial  2-10" ea EC/ foam  3x 30"  // bars    EO with rebounder  R - 1 min  L - 1 min     SLS EO/EC     Foam  1 min B/L  Firm  1 min B/L with alt UE     TR/HR   Toe Walk  3x 10 ft  1x 20 ft with functional carry of Red foam/mat  Heel walk  2x10 ft HR SL  R/L 3x10 ea  2 HHA HR SL  R/L 3x10 ea  1 HHA     Mini squats         Step ups fwd/lateral Stairs with functional crate carry  10# 2x1 HHA  15# 2x0 HHA       [place]        Heel/toe  Fwd/Retro Firm  Fwd/Retro  4x  Foam  Fwd 2x  12 feet Foam  5x   Fwd/Retro  // bars      Marbles review       Tall Single LE kneel with dynamic reach 1 min ea direction  Yellow  Wt ball       Natus  15 min*      Lateral Lunge   10x  B/L

## 2018-12-07 ENCOUNTER — OFFICE VISIT (OUTPATIENT)
Dept: PHYSICAL THERAPY | Facility: CLINIC | Age: 68
End: 2018-12-07
Payer: MEDICARE

## 2018-12-07 DIAGNOSIS — M77.42 METATARSALGIA OF LEFT FOOT: Primary | ICD-10-CM

## 2018-12-07 DIAGNOSIS — M21.861 GASTROCNEMIUS EQUINUS OF RIGHT LOWER EXTREMITY: ICD-10-CM

## 2018-12-07 DIAGNOSIS — M21.862 GASTROCNEMIUS EQUINUS OF LEFT LOWER EXTREMITY: ICD-10-CM

## 2018-12-07 PROCEDURE — 97110 THERAPEUTIC EXERCISES: CPT | Performed by: PHYSICAL THERAPIST

## 2018-12-07 PROCEDURE — 97112 NEUROMUSCULAR REEDUCATION: CPT | Performed by: PHYSICAL THERAPIST

## 2018-12-11 ENCOUNTER — OFFICE VISIT (OUTPATIENT)
Dept: PHYSICAL THERAPY | Facility: CLINIC | Age: 68
End: 2018-12-11
Payer: MEDICARE

## 2018-12-11 DIAGNOSIS — M77.42 METATARSALGIA OF LEFT FOOT: Primary | ICD-10-CM

## 2018-12-11 PROCEDURE — 97112 NEUROMUSCULAR REEDUCATION: CPT

## 2018-12-11 PROCEDURE — 97110 THERAPEUTIC EXERCISES: CPT

## 2018-12-11 NOTE — PROGRESS NOTES
Daily Note     Today's date: 2018  Patient name: Jos Chang Trainer  : 1950  MRN: [id number]  Referring provider: Kacey Calvillo DPM  Dx:   Encounter Diagnosis     ICD-10-CM    1  Metatarsalgia of left foot M77 42                   Precautions: Easily irritated  Re-eval Date: 18    Date     Visit Count 6 7 8 9    FOTO        Pain In 0 0 0 0    Pain Out 0 0 0       Subjective:  I had increase R groin discomfort as 4-5/10 intermittently past several days  Discomfort is gone now      Objective: See treatment diary below      Assessment: Tolerated treatment fairly well with pt indicating increase L groin discomfort after heel/toe activity  Pt educated self groin stretch with hold to tolerance 1x1 min BL  Pt demonstrates challenges with neuro activities with LOB self correction/CGA for balance recovery  Patient would benefit from continued PT to address instability BL LE  Pt inconsistent with carryover with HEP, educated benefits with compliancy  Plan: Continue per plan of care       Manual  15 min Held      LE stretching    R Add stretch to pt tolerance                      GISTM - BL calf mm to pt tolerance with # 4 - sweeping/fanning - Held              - BL PF to pt tolerance with #4 - sweeping/fanning - Held  Pt signed [facility] consent form with pt educated potential bruising/mm soreness and encourage hydration 18     AP mobs of talus, gastroc and soleus stretch manualy with subtalar neutral     TE        [address] [facility] Upright cycle  15 min  L5 Upright cycle  15 min  L5 TM  0%  13 mins  0 8 Upright cycle  15 min  L4    Ankle AROM Review  DC HEP       Ankle ABCs DC HEP       Towel slides inv/ev Review DC HEP       Towel curls  Review DC HEP       Calf stretch  BL 1x 1' ea  Edge step  Gastroc  F/B  BL arch stretch Kneeling on heels  2x 1' BL 2x 1' ea  Edge step  Gastroc BL 2x 1' ea  Edge step  Gastroc    BL 2x 1' ea  Wedge  Soleus BL 2x 1' ea  Edge step  Gastroc    Hamstring stretch    60" x 2  strap     BAPS board  Inverted w/ mini squa  2x 10 green ball extension  CS/Gentry Inverted  Mini squats  2x10, 5" hold Inverted  Mini squats  2x10, 5" hold Inverted   Squats  Dynamic punch   2# cuff wts  2x 1 min    Romberg  EO/EC   60" x 2 with red ball at rebounder  Hip width x 1  FT x 1     Tandem EO/EC EC  Firm  BL 1x30"  Foam  BL 3trial  2-10" ea EC/ foam  3x 30"  // bars    EO with rebounder  R - 1 min  L - 1 min     SLS EO/EC     Foam  1 min B/L  Firm  1 min B/L with alt UE     TR/HR   Toe Walk  3x 10 ft  1x 20 ft with functional carry of Red foam/mat  Heel walk  2x10 ft HR SL  R/L 3x10 ea  2 HHA HR SL  R/L 3x10 ea  1 HHA HR SL  R/L 3x10 ea  1 HHA    Mini squats         Step ups fwd/lateral Stairs with functional crate carry  10# 2x1 HHA  15# 2x0 HHA       [place]        Heel/toe  Fwd/Retro Firm  Fwd/Retro  4x  Foam  Fwd 2x  12 feet Foam  5x   Fwd/Retro  // bars  Firm  Fwd/retro  12 feet EO/EC  Foam  FWD  12 feet  EO 2x     Marbles review       Tall Single LE kneel with dynamic reach 1 min ea direction  Yellow  Wt ball       Natus  15 min*      Lateral Lunge   10x  B/L     Lunges    FWD lunges  firm  10x BL  5"  Extended UE  Red wt ball

## 2018-12-12 ENCOUNTER — OFFICE VISIT (OUTPATIENT)
Dept: PHYSICAL THERAPY | Facility: CLINIC | Age: 68
End: 2018-12-12
Payer: MEDICARE

## 2018-12-12 DIAGNOSIS — M77.42 METATARSALGIA OF LEFT FOOT: Primary | ICD-10-CM

## 2018-12-12 PROCEDURE — 97110 THERAPEUTIC EXERCISES: CPT

## 2018-12-12 PROCEDURE — 97112 NEUROMUSCULAR REEDUCATION: CPT

## 2018-12-12 NOTE — PROGRESS NOTES
Daily Note     Today's date: 2018  Patient name: Vikash Browning Trainer  : 1950  MRN: [id number]  Referring provider: Jenni Delvalle DPM  Dx:   Encounter Diagnosis     ICD-10-CM    1  Metatarsalgia of left foot M77 42                   Precautions: Easily irritated  Re-eval Date: 18    Date    Visit Count 6 7 8 9 10   FOTO        Pain In 0 0 0 0 0   Pain Out 0 0 0  0     Subjective: I have been trying to stretch my add mm  Feels good no pain in the mm  RTD       Objective: See treatment diary below      Assessment: Tolerated treatment fair ly well  Demonstrated increase left calf/achilles tightness  Challenged with neuro / balance exercises Patient would benefit from continued PT1-2 sessions with progression to DC HEP      Plan: Continue per plan of care       Manual  15 min Held      LE stretching    R Add stretch to pt tolerance                      GISTM - BL calf mm to pt tolerance with # 4 - sweeping/fanning - Held              - BL PF to pt tolerance with #4 - sweeping/fanning - Held  [facility] with pt educated potential bruising/mm soreness and encourage hydration 18     AP mobs of talus, gastroc and soleus stretch manualy with subtalar neutral     TE        [address] [facility] Upright cycle  15 min  L5 Upright cycle  15 min  L5 TM  0%  13 mins  0 8 Upright cycle  15 min  L4 TM   10 min  1 7-1 8 mph   Ankle AROM Review  DC HEP       Ankle ABCs DC HEP       Towel slides inv/ev Review DC HEP       Towel curls  Review DC HEP       Calf stretch  BL 1x 1' ea  Edge step  Gastroc  F/B  BL arch stretch Kneeling on heels  2x 1' BL 2x 1' ea  Edge step  Gastroc BL 2x 1' ea  Edge step  Gastroc    BL 2x 1' ea  Wedge  Soleus BL 2x 1' ea  Edge step  Gastroc BL 2x 1' ea  Edge step  Gastroc   Hamstring stretch    60" x 2  strap     BAPS board  Inverted w/ mini squa  2x 10 green ball extension  CS/Gentry Inverted  Mini squats  2x10, 5" hold Inverted  Mini squats  2x10, 5" hold Inverted   Squats  Dynamic punch   2# cuff wts  2x 1 min Inverted   MiniSquats  2x 1 min  Yellow wt ball toss    [place]  EO/EC   60" x 2 with red ball at rebounder  Hip width x 1  FT x 1     Tandem EO/EC EC  Firm  BL 1x30"  Foam  BL 3trial  2-10" ea EC/ foam  3x 30"  // bars    EO with rebounder  R - 1 min  L - 1 min  EO with rebounder  R - 1 min  L - 1 min   SLS EO/EC     Foam  1 min B/L  Firm  1 min B/L with alt UE     TR/HR   Toe Walk  3x 10 ft  1x 20 ft with functional carry of Red foam/mat  Heel walk  2x10 ft HR SL  R/L 3x10 ea  2 HHA HR SL  R/L 3x10 ea  1 HHA HR SL  R/L 3x10 ea  1 HHA HR SL foam  R/L 3x10 ea  1 HHA   Mini squats         Step ups fwd/lateral Stairs with functional crate carry  10# 2x1 HHA  15# 2x0 HHA       [place]        Heel/toe  Fwd/Retro Firm  Fwd/Retro  4x  Foam  Fwd 2x  12 feet Foam  5x   Fwd/Retro  // bars  Firm  Fwd/retro  12 feet EO/EC  Foam  FWD  12 feet  EO 2x  Firm  Fwd/retro  12 feet EO/EC  Foam  FWD  12 feet  EO 2x    Marbles review       Tall Single LE kneel with dynamic reach 1 min ea direction  Yellow  Wt ball       Natus  15 min*      Lateral Lunge   10x  B/L  Side step with ball pass  Yellow wt ball  6x 40'   Lunges    FWD lunges  firm  10x BL  5"  Extended UE  Red wt ball Walking lunges retro   Cues 2 x 40'   [place]ocker board     AP and SS  1 min ea

## 2018-12-13 NOTE — PROGRESS NOTES
Daily Note     Today's date: 2018  Patient name: Ashley  Trainer  : 1950  MRN: [id number]  Referring provider: Eliud Roa DPM  Dx:   Encounter Diagnosis     ICD-10-CM    1  Metatarsalgia of left foot M77 42    2  Gastrocnemius equinus of left lower extremity M21 6X2    3  Gastrocnemius equinus of right lower extremity M21 6X1         Precautions: Easily irritated  Re-eval Date: 18    Date        Visit Count 11       FOTO        Pain In 0       Pain Out 0         Subjective: Patient reports he is doing okay  He is trying to walk heel toe  Objective: See treatment diary below      Assessment: Tolerated treatment well  Patient demonstrated fatigue post treatment and would benefit from continued PT  Ongoing toe and ankle rigidity  Progressing steadily  Decreased pain overall  Continues to require cues for increased flexibility during heel strike to foot flat and toe off  Cont per POC  Plan: Continue per plan of care       Manual         LE stretching                          GISTM - BL calf mm to pt tolerance with # 4 - sweeping/fanning - Held              - BL PF to pt tolerance with #4 - sweeping/fanning - Held  Pt signed [facility] consent form with pt educated potential bruising/mm soreness and encourage hydration 18     AP mobs of talus, gastroc and soleus stretch manualy with subtalar neutral     TE        [facility] TM   10 min  1 7-1 8 mph       Towel slides inv/ev 1 min x 2       Towel curls  1 min x 2       Calf stretch  BL 2x 1' ea  Edge step  Gastroc       Hamstring stretch         BAPS board  Inverted Bosu  1 min  Green ball   Rebounder       Romberg  EO/EC Rhom EC with head turns  1 min up/down  1 min  R/L  Foam       Tandem EO/EC        SLS EO/EC          TR/HR   HR SL foam  R/L 3x10 ea  1 HHA       Mini squats  Inverted   MiniSquats  2x 1 min  Yellow wt ball toss        Step ups fwd/lateral        [place]        Heel/toe  Fwd/Retro        Marbles 1 min ea Tall Single LE kneel with dynamic reach        Natus        Lateral Lunge Side step with ball pass  Yellow wt ball  40" ea fwd/back/R/L       Lunges Walking lunges retro   Cues 2 x 40'       [employer]

## 2018-12-14 ENCOUNTER — OFFICE VISIT (OUTPATIENT)
Dept: PHYSICAL THERAPY | Facility: CLINIC | Age: 68
End: 2018-12-14
Payer: MEDICARE

## 2018-12-14 DIAGNOSIS — M21.861 GASTROCNEMIUS EQUINUS OF RIGHT LOWER EXTREMITY: ICD-10-CM

## 2018-12-14 DIAGNOSIS — M21.862 GASTROCNEMIUS EQUINUS OF LEFT LOWER EXTREMITY: ICD-10-CM

## 2018-12-14 DIAGNOSIS — M77.42 METATARSALGIA OF LEFT FOOT: Primary | ICD-10-CM

## 2018-12-14 PROCEDURE — 97112 NEUROMUSCULAR REEDUCATION: CPT | Performed by: PHYSICAL THERAPIST

## 2018-12-14 PROCEDURE — 97110 THERAPEUTIC EXERCISES: CPT | Performed by: PHYSICAL THERAPIST

## 2018-12-17 ENCOUNTER — APPOINTMENT (OUTPATIENT)
Dept: LAB | Facility: CLINIC | Age: 68
End: 2018-12-17
Payer: MEDICARE

## 2018-12-17 ENCOUNTER — TRANSCRIBE ORDERS (OUTPATIENT)
Dept: LAB | Facility: CLINIC | Age: 68
End: 2018-12-17

## 2018-12-17 DIAGNOSIS — N18.30 CHRONIC KIDNEY DISEASE, STAGE III (MODERATE) (HCC): Primary | ICD-10-CM

## 2018-12-17 DIAGNOSIS — N18.30 CHRONIC KIDNEY DISEASE, STAGE III (MODERATE) (HCC): ICD-10-CM

## 2018-12-17 LAB
ANION GAP SERPL CALCULATED.3IONS-SCNC: 5 MMOL/L (ref 4–13)
BUN SERPL-MCNC: 14 MG/DL (ref 5–25)
CALCIUM SERPL-MCNC: 7.9 MG/DL (ref 8.3–10.1)
CHLORIDE SERPL-SCNC: 100 MMOL/L (ref 100–108)
CO2 SERPL-SCNC: 31 MMOL/L (ref 21–32)
CREAT SERPL-MCNC: 1.26 MG/DL (ref 0.6–1.3)
GFR SERPL CREATININE-BSD FRML MDRD: 58 ML/MIN/1.73SQ M
GLUCOSE SERPL-MCNC: 83 MG/DL (ref 65–140)
POTASSIUM SERPL-SCNC: 4.2 MMOL/L (ref 3.5–5.3)
SODIUM SERPL-SCNC: 136 MMOL/L (ref 136–145)

## 2018-12-17 PROCEDURE — 36415 COLL VENOUS BLD VENIPUNCTURE: CPT

## 2018-12-17 PROCEDURE — 80048 BASIC METABOLIC PNL TOTAL CA: CPT

## 2019-02-13 NOTE — PROGRESS NOTES
Olive Lopez will be discharged from outpatient physical therapy care due to expiration of plan of care  No objective measures updated, Olive Lopez is not present at time of discharge

## 2019-02-25 ENCOUNTER — OFFICE VISIT (OUTPATIENT)
Dept: INTERNAL MEDICINE CLINIC | Facility: CLINIC | Age: 69
End: 2019-02-25
Payer: MEDICARE

## 2019-02-25 VITALS
OXYGEN SATURATION: 97 % | WEIGHT: 215 LBS | BODY MASS INDEX: 27.59 KG/M2 | RESPIRATION RATE: 18 BRPM | HEIGHT: 74 IN | HEART RATE: 85 BPM | SYSTOLIC BLOOD PRESSURE: 138 MMHG | DIASTOLIC BLOOD PRESSURE: 80 MMHG | TEMPERATURE: 98 F

## 2019-02-25 DIAGNOSIS — F41.1 GENERALIZED ANXIETY DISORDER: ICD-10-CM

## 2019-02-25 DIAGNOSIS — E20.9 HYPOPARATHYROIDISM, UNSPECIFIED HYPOPARATHYROIDISM TYPE (HCC): ICD-10-CM

## 2019-02-25 DIAGNOSIS — R35.1 NOCTURIA: ICD-10-CM

## 2019-02-25 DIAGNOSIS — Z12.11 SCREEN FOR COLON CANCER: ICD-10-CM

## 2019-02-25 DIAGNOSIS — I10 ESSENTIAL HYPERTENSION: ICD-10-CM

## 2019-02-25 DIAGNOSIS — Z12.11 SCREENING FOR COLON CANCER: Primary | ICD-10-CM

## 2019-02-25 DIAGNOSIS — E78.2 MIXED HYPERLIPIDEMIA: ICD-10-CM

## 2019-02-25 DIAGNOSIS — G43.909 MIGRAINE WITHOUT STATUS MIGRAINOSUS, NOT INTRACTABLE, UNSPECIFIED MIGRAINE TYPE: ICD-10-CM

## 2019-02-25 DIAGNOSIS — K21.9 GERD WITHOUT ESOPHAGITIS: ICD-10-CM

## 2019-02-25 PROCEDURE — 99214 OFFICE O/P EST MOD 30 MIN: CPT | Performed by: INTERNAL MEDICINE

## 2019-02-25 NOTE — PATIENT INSTRUCTIONS
Chronic Hypertension   AMBULATORY CARE:   Hypertension  is high blood pressure (BP)  Your BP is the force of your blood moving against the walls of your arteries  Normal BP is less than 120/80  Prehypertension is between 120/80 and 139/89  Hypertension is 140/90 or higher  Hypertension causes your BP to get so high that your heart has to work much harder than normal  This can damage your heart  Chronic hypertension is a long-term condition that you can control with a healthy lifestyle or medicines  A controlled blood pressure helps protect your organs, such as your heart, lungs, brain, and kidneys  Common symptoms include the following:   · Headache     · Blurred vision    · Chest pain     · Dizziness or weakness     · Trouble breathing     · Nosebleeds  Call 911 for any of the following:   · You have discomfort in your chest that feels like squeezing, pressure, fullness, or pain  · You become confused or have difficulty speaking  · You suddenly feel lightheaded or have trouble breathing  · You have pain or discomfort in your back, neck, jaw, stomach, or arm  Seek care immediately if:   · You have a severe headache or vision loss  · You have weakness in an arm or leg  Contact your healthcare provider if:   · You feel faint, dizzy, confused, or drowsy  · You have been taking your BP medicine and your BP is still higher than your healthcare provider says it should be  · You have questions or concerns about your condition or care  Treatment for chronic hypertension  may include medicine to lower your BP and lower your cholesterol level  A low cholesterol level helps prevent heart disease and makes it easier to control your blood pressure  Heart disease can make your blood pressure harder to control  You may also need to make lifestyle changes  Take your medicine exactly as directed    Manage chronic hypertension:  Talk with your healthcare provider about these and other ways to manage hypertension:  · Take your BP at home  Sit and rest for 5 minutes before you take your BP  Extend your arm and support it on a flat surface  Your arm should be at the same level as your heart  Follow the directions that came with your BP monitor  If possible, take at least 2 BP readings each time  Take your BP at least twice a day at the same times each day, such as morning and evening  Keep a record of your BP readings and bring it to your follow-up visits  Ask your healthcare provider what your blood pressure should be  · Limit sodium (salt) as directed  Too much sodium can affect your fluid balance  Check labels to find low-sodium or no-salt-added foods  Some low-sodium foods use potassium salts for flavor  Too much potassium can also cause health problems  Your healthcare provider will tell you how much sodium and potassium are safe for you to have in a day  He or she may recommend that you limit sodium to 2,300 mg a day  · Follow the meal plan recommended by your healthcare provider  A dietitian or your provider can give you more information on low-sodium plans or the DASH (Dietary Approaches to Stop Hypertension) eating plan  The DASH plan is low in sodium, unhealthy fats, and total fat  It is high in potassium, calcium, and fiber  · Exercise to maintain a healthy weight  Exercise at least 30 minutes per day, on most days of the week  This will help decrease your blood pressure  Ask about the best exercise plan for you  · Decrease stress  This may help lower your BP  Learn ways to relax, such as deep breathing or listening to music  · Limit alcohol  Women should limit alcohol to 1 drink a day  Men should limit alcohol to 2 drinks a day  A drink of alcohol is 12 ounces of beer, 5 ounces of wine, or 1½ ounces of liquor  · Do not smoke  Nicotine and other chemicals in cigarettes and cigars can increase your BP and also cause lung damage   Ask your healthcare provider for information if you currently smoke and need help to quit  E-cigarettes or smokeless tobacco still contain nicotine  Talk to your healthcare provider before you use these products  Follow up with your healthcare provider as directed: You will need to return to have your BP checked and to have other lab tests done  Write down your questions so you remember to ask them during your visits  © 2017 [employer] Information is for End User's use only and may not be sold, redistributed or otherwise used for commercial purposes  All illustrations and images included in CareNotes® are the copyrighted property of [employer]  or Fr[employer]  The above information is an  only  It is not intended as medical advice for individual conditions or treatments  Talk to your doctor, nurse or pharmacist before following any medical regimen to see if it is safe and effective for you  Weight Management   AMBULATORY CARE:   Why it is important to manage your weight:  Being overweight increases your risk of health conditions such as heart disease, high blood pressure, type 2 diabetes, and certain types of cancer  It can also increase your risk for osteoarthritis, sleep apnea, and other respiratory problems  Aim for a slow, steady weight loss  Even a small amount of weight loss can lower your risk of health problems  How to lose weight safely:  A safe and healthy way to lose weight is to eat fewer calories and get regular exercise  You can lose up about 1 pound a week by decreasing the number of calories you eat by 500 calories each day  You can decrease calories by eating smaller portion sizes or by cutting out high-calorie foods  Read labels to find out how many calories are in the foods you eat  You can also burn calories with exercise such as walking, swimming, or biking  You will be more likely to keep weight off if you make these changes part of your lifestyle     Healthy meal plan for weight management:  A healthy meal plan includes a variety of foods, contains fewer calories, and helps you stay healthy  A healthy meal plan includes the following:  · Eat whole-grain foods more often  A healthy meal plan should contain fiber  Fiber is the part of grains, fruits, and vegetables that is not broken down by your body  Whole-grain foods are healthy and provide extra fiber in your diet  Some examples of whole-grain foods are whole-wheat breads and pastas, oatmeal, brown rice, and bulgur  · Eat a variety of vegetables every day  Include dark, leafy greens such as spinach, kale, [place] greens, and mustard greens  Eat yellow and orange vegetables such as carrots, sweet potatoes, and winter squash  · Eat a variety of fruits every day  Choose fresh or canned fruit (canned in its own juice or light syrup) instead of juice  Fruit juice has very little or no fiber  · Eat low-fat dairy foods  Drink fat-free (skim) milk or 1% milk  Eat fat-free yogurt and low-fat cottage cheese  Try low-fat cheeses such as mozzarella and other reduced-fat cheeses  · Choose meat and other protein foods that are low in fat  Choose beans or other legumes such as split peas or lentils  Choose fish, skinless poultry (chicken or turkey), or lean cuts of red meat (beef or pork)  Before you cook meat or poultry, cut off any visible fat  · Use less fat and oil  Try baking foods instead of frying them  Add less fat, such as margarine, sour cream, regular salad dressing and mayonnaise to foods  Eat fewer high-fat foods  Some examples of high-fat foods include french fries, doughnuts, ice cream, and cakes  · Eat fewer sweets  Limit foods and drinks that are high in sugar  This includes candy, cookies, regular soda, and sweetened drinks  Ways to decrease calories:   · Eat smaller portions  ¨ Use a small plate with smaller servings  ¨ Do not eat second helpings      ¨ When you eat at a restaurant, ask for a box and place half of your meal in the box before you eat  ¨ Share an entrée with someone else  · Replace high-calorie snacks with healthy, low-calorie snacks  ¨ Choose fresh fruit, vegetables, fat-free rice cakes, or air-popped popcorn instead of potato chips, nuts, or chocolate  ¨ Choose water or calorie-free drinks instead of soda or sweetened drinks  · Eat regular meals  Skipping meals can lead to overeating later in the day  Eat a healthy snack in place of a meal if you do not have time to eat a regular meal      · Do not shop for groceries when you are hungry  You may be more likely to make unhealthy food choices  Take a grocery list of healthy foods and shop after you have eaten  Exercise:  Exercise at least 30 minutes per day on most days of the week  Some examples of exercise include walking, biking, dancing, and swimming  You can also fit in more physical activity by taking the stairs instead of the elevator or parking farther away from stores  Ask your healthcare provider about the best exercise plan for you  Other things to consider as you try to lose weight:   · Be aware of situations that may give you the urge to overeat, such as eating while watching television  Find ways to avoid these situations  For example, read a book, go for a walk, or do crafts  · Meet with a weight loss support group or friends who are also trying to lose weight  This may help you stay motivated to continue working on your weight loss goals  © 2017 [employer] Information is for End User's use only and may not be sold, redistributed or otherwise used for commercial purposes  All illustrations and images included in CareNotes® are the copyrighted property of [employer]  or Farn[employer]  The above information is an  only  It is not intended as medical advice for individual conditions or treatments   Talk to your doctor, nurse or pharmacist before following any medical regimen to see if it is safe and effective for you

## 2019-02-25 NOTE — PROGRESS NOTES
Assessment/Plan:    No problem-specific Assessment & Plan notes found for this encounter  Diagnoses and all orders for this visit:    Screening for colon cancer  -     Cancel: Ambulatory referral to Gastroenterology; Future  -     Ambulatory referral to Gastroenterology; Future    Essential hypertension    Hypoparathyroidism, unspecified hypoparathyroidism type ([facility] [address] )    GERD without esophagitis  -     Ambulatory referral to Gastroenterology; Future    Migraine without status migrainosus, not intractable, unspecified migraine type    Generalized anxiety disorder    Mixed hyperlipidemia    BMI 27 0-27 9,adult    Screen for colon cancer    Nocturia      A/P: Doing well and labs up to date  ? ?GERD due to increase oatmeal since no s/s otherwise  Will hold on imaging and med trial and have pt stop the oatmeal  ??lactose intolerance, gastroparesis, etc  May need an EGD or further testing  Will refer to GI for colonoscopy  Has upcoming appt later this week with endo for the hypoparathyroidism  No cuts to justify his Td booster  Discussed the wt and will give information for diet and exercise  Continue current treatment and RTC four months for routine  Subjective:      Patient ID: Jules Cintron is a 70 y o  male  WM RTC with his wife for f/u htn, hypoparathyroidism, etc  Doing well and only new issue is that he been getting heartburn and belching in the AM after eating breakfast  Mainly since he started eating a lot of oatmeal  No changes in stools  No s/s the rest of the day  H/O GERD in the past  Not using ETOH or NSAID's  Reports nocturia is actually better  Remains active w/o difficulty and no falls  STAR and migraines are good  Labs up to date  Due for vaccines and CRC         The following portions of the patient's history were reviewed and updated as appropriate:   He  has a past medical history of Abdominal pain, acute, generalized, Actinic keratosis, Benign colon polyp, Chest pain, Disease of thyroid gland, Dysfunction of both eustachian tubes, Fatigue, Generalized anxiety disorder, GERD (gastroesophageal reflux disease), Hematuria, Hypertension, Lightheadedness, Lump in neck, Malaise and fatigue, Nephropathy, Shortness of breath, Skin lesion, and Tinea corporis  He   Patient Active Problem List    Diagnosis Date Noted    BMI 27 0-27 9,adult 02/25/2019    Microalbuminuria 10/25/2018    Basal cell tumor 05/31/2017    Multiple thyroid nodules 07/11/2016    Asymptomatic proteinuria 10/05/2015    Palpitations 08/06/2013    Hypoparathyroidism ([facility] [address] ) 12/12/2012    Allergic rhinitis 08/02/2012    Benign colon polyp 08/02/2012    Generalized anxiety disorder 08/02/2012    GERD without esophagitis 08/02/2012    Hyperlipidemia 08/02/2012    Hypertension 08/02/2012    Hypocalcemia 08/02/2012    Migraine, unspecified, not intractable, without status migrainosus 08/02/2012     He  has a past surgical history that includes Skin biopsy; Thyroid surgery; Colonoscopy w/ polypectomy; Head & neck skin lesion excisional biopsy; and Tonsillectomy  His family history includes Hypertension in his father and mother; Other in his brother  He  reports that he has never smoked  He has never used smokeless tobacco  He reports that he does not drink alcohol or use drugs    Current Outpatient Medications   Medication Sig Dispense Refill    aspirin (ASPIRIN LOW DOSE) 81 mg EC tablet Take 1 tablet by mouth daily      calcitriol (ROCALTROL) 0 25 mcg capsule Take 1 capsule (0 25 mcg total) by mouth daily 30 capsule 5    Cholecalciferol (VITAMIN D3) 1000 units CAPS Take by mouth      Magnesium 250 MG TABS Take by mouth      metoprolol tartrate (LOPRESSOR) 25 mg tablet Take 1 tablet (25 mg total) by mouth daily 90 tablet 3    Multiple Vitamin (MULTI-VITAMIN DAILY PO) Take by mouth daily      Omega-3 Fatty Acids (FISH OIL) 1200 MG CAPS Take 2 capsules by mouth daily      Turmeric Curcumin 500 MG CAPS Take 1 capsule by mouth No current facility-administered medications for this visit  Current Outpatient Medications on File Prior to Visit   Medication Sig    aspirin (ASPIRIN LOW DOSE) 81 mg EC tablet Take 1 tablet by mouth daily    calcitriol (ROCALTROL) 0 25 mcg capsule Take 1 capsule (0 25 mcg total) by mouth daily    Cholecalciferol (VITAMIN D3) 1000 units CAPS Take by mouth    Magnesium 250 MG TABS Take by mouth    metoprolol tartrate (LOPRESSOR) 25 mg tablet Take 1 tablet (25 mg total) by mouth daily    Multiple Vitamin (MULTI-VITAMIN DAILY PO) Take by mouth daily    Omega-3 Fatty Acids (FISH OIL) 1200 MG CAPS Take 2 capsules by mouth daily    Turmeric Curcumin 500 MG CAPS Take 1 capsule by mouth     No current facility-administered medications on file prior to visit  He has No Known Allergies       Review of Systems   Constitutional: Negative for activity change, chills, diaphoresis, fatigue and fever  HENT: Negative  Eyes: Negative for visual disturbance  Respiratory: Negative for cough, chest tightness, shortness of breath and wheezing  Cardiovascular: Negative for chest pain, palpitations and leg swelling  Gastrointestinal: Negative for abdominal distention, abdominal pain, anal bleeding, blood in stool, constipation, diarrhea, nausea and vomiting  Bloating and heartburn  Genitourinary: Negative for difficulty urinating, dysuria and frequency  Musculoskeletal: Negative for arthralgias, gait problem and myalgias  Neurological: Negative for dizziness, seizures, syncope, weakness, light-headedness and headaches  Psychiatric/Behavioral: Negative for confusion, dysphoric mood and sleep disturbance  The patient is not nervous/anxious            Objective:      /80 (BP Location: Left arm, Patient Position: Sitting, Cuff Size: Large)   Pulse 85   Temp 98 °F (36 7 °C) (Tympanic)   Resp 18   Ht 6' 2" (1 88 m)   Wt 97 5 kg (215 lb)   SpO2 97%   BMI 27 60 kg/m²          Physical Exam   Constitutional: He is oriented to person, place, and time  He appears well-developed and well-nourished  No distress  HENT:   Head: Normocephalic and atraumatic  Mouth/Throat: Oropharynx is clear and moist    Eyes: Pupils are equal, round, and reactive to light  Conjunctivae and EOM are normal    Neck: Neck supple  No JVD present  Cardiovascular: Normal rate, regular rhythm and normal heart sounds  Pulmonary/Chest: Effort normal and breath sounds normal  No respiratory distress  He has no wheezes  He has no rales  Abdominal: Soft  Bowel sounds are normal  He exhibits no distension and no mass  There is no tenderness  There is no rebound and no guarding  Musculoskeletal: He exhibits no edema  Neurological: He is alert and oriented to person, place, and time  Psychiatric: He has a normal mood and affect  His behavior is normal  Judgment and thought content normal    Nursing note and vitals reviewed  BMI Counseling: Body mass index is 27 6 kg/m²  Discussed the patient's BMI with him  The BMI is above average  BMI counseling and education was provided to the patient  Nutrition recommendations include reducing portion sizes and decreasing overall calorie intake  Exercise recommendations include moderate aerobic physical activity for 150 minutes/week

## 2019-02-28 ENCOUNTER — OFFICE VISIT (OUTPATIENT)
Dept: ENDOCRINOLOGY | Facility: CLINIC | Age: 69
End: 2019-02-28
Payer: MEDICARE

## 2019-02-28 ENCOUNTER — APPOINTMENT (OUTPATIENT)
Dept: LAB | Facility: CLINIC | Age: 69
End: 2019-02-28
Payer: MEDICARE

## 2019-02-28 VITALS
HEART RATE: 70 BPM | SYSTOLIC BLOOD PRESSURE: 118 MMHG | BODY MASS INDEX: 27.98 KG/M2 | DIASTOLIC BLOOD PRESSURE: 90 MMHG | WEIGHT: 218 LBS | HEIGHT: 74 IN

## 2019-02-28 DIAGNOSIS — E20.9 HYPOPARATHYROIDISM, UNSPECIFIED HYPOPARATHYROIDISM TYPE (HCC): Primary | ICD-10-CM

## 2019-02-28 DIAGNOSIS — E04.2 MULTIPLE THYROID NODULES: ICD-10-CM

## 2019-02-28 DIAGNOSIS — E20.9 HYPOPARATHYROIDISM, UNSPECIFIED HYPOPARATHYROIDISM TYPE (HCC): ICD-10-CM

## 2019-02-28 DIAGNOSIS — E83.51 HYPOCALCEMIA: ICD-10-CM

## 2019-02-28 LAB
ALBUMIN SERPL BCP-MCNC: 3.6 G/DL (ref 3.5–5)
CALCIUM SERPL-MCNC: 8.5 MG/DL (ref 8.3–10.1)
PHOSPHATE SERPL-MCNC: 3.5 MG/DL (ref 2.3–4.1)

## 2019-02-28 PROCEDURE — 99214 OFFICE O/P EST MOD 30 MIN: CPT | Performed by: PHYSICIAN ASSISTANT

## 2019-02-28 PROCEDURE — 84100 ASSAY OF PHOSPHORUS: CPT

## 2019-02-28 PROCEDURE — 82310 ASSAY OF CALCIUM: CPT

## 2019-02-28 PROCEDURE — 82040 ASSAY OF SERUM ALBUMIN: CPT

## 2019-02-28 PROCEDURE — 36415 COLL VENOUS BLD VENIPUNCTURE: CPT

## 2019-02-28 NOTE — ASSESSMENT & PLAN NOTE
He reports that he has been told to reduce The calcitriol to 3x per week a few months ago though no records of this in our notes, PCP, or nephrology note  Calcium level was low on last lab test in December  Will repeat lab testing now and will likely need to resume daily dosing of the calcitriol

## 2019-02-28 NOTE — LETTER
February 28, 2019     Nita KristinDO  [facility] [address] [facility]    Patient: Nazia Flores Trainer   YOB: 1950   Date of Visit: 2/28/2019       Dear Dr Willi Avalos: Thank you for referring Hillaryevert Mery to me for evaluation  Below are my notes for this consultation  If you have questions, please do not hesitate to call me  I look forward to following your patient along with you  Sincerely,        [employer]        CC: Kassie Altamirano,   [facility]  2/28/2019 11:18 AM  Sign at close encounter    Established Patient Progress Note       Chief Complaint   Patient presents with    Thyroid Problem        History of Present Illness:     Hubert Noguera is a 70 y o  male with a history of hypoparathyroidism of unknown etitiology, hypocalcemia, and Vitamin D Deficiency  He is also following with nephrology for CKD3/proteinuria  Currently he is taking Calcium, Calcitriol, and Vitamin D   He says he has changed Calcitriol to 3x per week a few months ago, but not sure who told him to make this change  He is also taking Calcium 600mg twice per day (with D)  Also Vitamin D 1,000 units daily  Overall, feeling well with no symptoms of hypocalcemia  No kidney stones  For thyroid nodules, he denies dysphagia  Ultrasound stable 10/2018           Patient Active Problem List   Diagnosis    Allergic rhinitis    Asymptomatic proteinuria    Basal cell tumor    Benign colon polyp    Generalized anxiety disorder    GERD without esophagitis    Hyperlipidemia    Hypertension    Hypocalcemia    Hypoparathyroidism ([address] )    Migraine, unspecified, not intractable, without status migrainosus    Multiple thyroid nodules    Palpitations    Microalbuminuria    BMI 27 0-27 9,adult      Past Medical History:   Diagnosis Date    Abdominal pain, acute, generalized     last assessed - 21Apr2017    Actinic keratosis     last assessed - 12Jun2013   Sheila Escobar Benign colon polyp     last assessed - 20Jan2017    Chest pain     last assessed - [id number]    Disease of thyroid gland     Dysfunction of both eustachian tubes     suspect due to ETD  Recommend otc allergy meds and if fails then add flonase; last assessed - 06Aug2012    Fatigue     last assessed - [id number]    Generalized anxiety disorder     GERD (gastroesophageal reflux disease)     Hematuria     last assessed - [id number]    Hypertension     Lightheadedness     last assessed - [id number]    Lump in neck     ? cause  Maybe a localized allergic reaction, dental issue, doubt sialdenitis, ect  Empiric abx and one dose steroids  Continue benadryl  If worsens, to er or ent      Malaise and fatigue     last assessed - 21Apr2017    Nephropathy     last assessed - [id number]    Shortness of breath     last assessed - [id number]    Skin lesion     Resolved - [id number]    Tinea corporis     last assessed - [id number]      Past Surgical History:   Procedure Laterality Date    COLONOSCOPY W/ POLYPECTOMY      last assessed - 20Jan2017    HEAD & NECK SKIN LESION EXCISIONAL BIOPSY      excision of lesion scalp malignant - BCCA; last assessed - [id number]    SKIN BIOPSY      last assessed - 09Sep2014   [facility] THYROID SURGERY      Biopsy thyroid using percutaneous core needle; last assessed - 09Sep2014    TONSILLECTOMY        Family History   Problem Relation Age of Onset    Hypertension Mother     Hypertension Father     Other Brother         Schwannomatosis     Social History     Tobacco Use    Smoking status: Never Smoker    Smokeless tobacco: Never Used   Substance Use Topics    Alcohol use: No     No Known Allergies    Current Outpatient Medications:     aspirin (ASPIRIN LOW DOSE) 81 mg EC tablet, Take 1 tablet by mouth daily, Disp: , Rfl:     calcitriol (ROCALTROL) 0 25 mcg capsule, Take 1 capsule (0 25 mcg total) by mouth daily (Patient taking differently: Take 0 25 mcg by mouth 3 (three) times a week ), Disp: 30 capsule, Rfl: 5    Calcium Carbonate (CALCIUM 600 PO), Take by mouth Twice per day, Disp: , Rfl:     Cholecalciferol (VITAMIN D3) 1000 units CAPS, Take by mouth, Disp: , Rfl:     Magnesium 250 MG TABS, Take by mouth, Disp: , Rfl:     metoprolol tartrate (LOPRESSOR) 25 mg tablet, Take 1 tablet (25 mg total) by mouth daily, Disp: 90 tablet, Rfl: 3    Multiple Vitamin (MULTI-VITAMIN DAILY PO), Take by mouth daily, Disp: , Rfl:     Omega-3 Fatty Acids (FISH OIL) 1200 MG CAPS, Take 2 capsules by mouth daily, Disp: , Rfl:     Turmeric Curcumin 500 MG CAPS, Take 1 capsule by mouth, Disp: , Rfl:     Review of Systems   Constitutional: Negative for activity change, appetite change and fatigue  HENT: Negative for sore throat, trouble swallowing and voice change  Eyes: Negative for visual disturbance  Respiratory: Negative for choking, chest tightness and shortness of breath  Cardiovascular: Negative for chest pain, palpitations and leg swelling  Gastrointestinal: Negative for abdominal pain, constipation and diarrhea  Reflux   Endocrine: Negative for cold intolerance, heat intolerance, polydipsia, polyphagia and polyuria  Genitourinary: Negative for frequency  Musculoskeletal: Negative for arthralgias and myalgias  Skin: Negative for rash  Neurological: Negative for dizziness and syncope  Hematological: Negative for adenopathy  Psychiatric/Behavioral: Negative for sleep disturbance  All other systems reviewed and are negative  Physical Exam:  Body mass index is 27 99 kg/m²  /90   Pulse 70   Ht 6' 2" (1 88 m)   Wt 98 9 kg (218 lb)   BMI 27 99 kg/m²     Wt Readings from Last 3 Encounters:   02/28/19 98 9 kg (218 lb)   02/25/19 97 5 kg (215 lb)   10/24/18 97 5 kg (215 lb)       Physical Exam   Constitutional: He is oriented to person, place, and time  He appears well-developed and well-nourished  No distress  HENT:   Head: Normocephalic and atraumatic  Mouth/Throat: Oropharynx is clear and moist    Eyes: Pupils are equal, round, and reactive to light  Conjunctivae and EOM are normal    Neck: Normal range of motion  Neck supple  No thyromegaly present  Cardiovascular: Normal rate, regular rhythm and normal heart sounds  No murmur heard  Pulmonary/Chest: Effort normal and breath sounds normal  No respiratory distress  He has no wheezes  He has no rales  Abdominal: Soft  Bowel sounds are normal  He exhibits no distension  There is no tenderness  Musculoskeletal: Normal range of motion  He exhibits no edema  Lymphadenopathy:     He has no cervical adenopathy  Neurological: He is alert and oriented to person, place, and time  Skin: Skin is warm and dry  Psychiatric: He has a normal mood and affect  Vitals reviewed        Labs:     Component      Latest Ref Rng & Units 2/19/2018 10/24/2018 12/17/2018   Sodium      136 - 145 mmol/L  135 (L) 136   Potassium      3 5 - 5 3 mmol/L  4 7 4 2   Chloride      100 - 108 mmol/L  99 (L) 100   CO2      21 - 32 mmol/L  31 31   Anion Gap      4 - 13 mmol/L  5 5   BUN      5 - 25 mg/dL  19 14   Creatinine      0 60 - 1 30 mg/dL  1 29 1 26   GLUCOSE FASTING      65 - 99 mg/dL  89    Calcium      8 3 - 10 1 mg/dL  8 9 7 9 (L)   AST      5 - 45 U/L  20    ALT      12 - 78 U/L  22    Alkaline Phosphatase      46 - 116 U/L  72    Total Protein      6 4 - 8 2 g/dL  7 7    Albumin      3 5 - 5 0 g/dL  3 6    TOTAL BILIRUBIN      0 20 - 1 00 mg/dL  0 58    eGFR      ml/min/1 73sq m  56 58   Glucose, Random      65 - 140 mg/dL   83   RBC, UA      None Seen, 0-5 /hpf  None Seen    WBC, UA      None Seen, 0-5, 5-55, 5-65 /hpf  None Seen    Epithelial Cells      None Seen, Occasional /hpf  None Seen    Bacteria, UA      None Seen, Occasional /hpf  None Seen    Hyaline Casts, UA      None Seen /lpf  None Seen    Hemoglobin A1C      4 2 - 6 3 %  5 3    EAG      mg/dl  105    T3, Total      0 60 - 1 80 ng/mL 1 00     Free T4 0 76 - 1 46 ng/dL 1 07     TSH 3RD GENERATON      0 358 - 3 740 uIU/mL 0 961     PARATHYROID HORMONE      18 4 - 80 1 pg/mL <5 5 (L) <6 3 (L)    Phosphorus      2 3 - 4 1 mg/dL  4 1    Vit D, 25-Hydroxy      30 0 - 100 0 ng/mL  55 2    EXT LDL, Direct       0 - 100 mg/dl  125 (H)    Triglycerides      <=150 mg/dL  208 (H)    PSA, Total      0 0 - 4 0 ng/mL  1 5          Impression & Plan:    Problem List Items Addressed This Visit        Endocrine    Hypoparathyroidism ([facility] [address] ) - Primary     He reports that he has been told to reduce The calcitriol to 3x per week a few months ago though no records of this in our notes, PCP, or nephrology note  Calcium level was low on last lab test in December  Will repeat lab testing now and will likely need to resume daily dosing of the calcitriol  Relevant Orders    Calcium Lab Collect    Albumin Lab Collect    Phosphorus Lab Collect    Multiple thyroid nodules     Stable on ultrasound 10/2018  Other    Hypocalcemia     Repeat lab testing and will increase calcitriol back to daily dosing depending on results  Orders Placed This Encounter   Procedures    Calcium Lab Collect     Standing Status:   Future     Standing Expiration Date:   2/28/2020    Albumin Lab Collect     Standing Status:   Future     Standing Expiration Date:   2/28/2020    Phosphorus Lab Collect     Standing Status:   Future     Standing Expiration Date:   2/28/2020       Patient Instructions   Do lab testing today  Continue Current doses of calcium, Vitamin D, Calcitriol  We will call you with instructions on med changes/repeat labs  Discussed with the patient and all questioned fully answered  He will call me if any problems arise  Follow-up appointment in 6 months       Counseled patient on diagnostic results, prognosis, risk and benefit of treatment options, instruction for management, importance of treatment compliance, Risk  factor reduction and impressions      Richmond Guillaume PA-C

## 2019-02-28 NOTE — PATIENT INSTRUCTIONS
Do lab testing today  Continue Current doses of calcium, Vitamin D, Calcitriol  We will call you with instructions on med changes/repeat labs

## 2019-02-28 NOTE — PROGRESS NOTES
Established Patient Progress Note       Chief Complaint   Patient presents with    Thyroid Problem        History of Present Illness:     Juan Pino is a 70 y o  male with a history of hypoparathyroidism of unknown etitiology, hypocalcemia, and Vitamin D Deficiency  He is also following with nephrology for CKD3/proteinuria  Currently he is taking Calcium, Calcitriol, and Vitamin D   He says he has changed Calcitriol to 3x per week a few months ago, but not sure who told him to make this change  He is also taking Calcium 600mg twice per day (with D)  Also Vitamin D 1,000 units daily  Overall, feeling well with no symptoms of hypocalcemia  No kidney stones  For thyroid nodules, he denies dysphagia  Ultrasound stable 10/2018  Patient Active Problem List   Diagnosis    Allergic rhinitis    Asymptomatic proteinuria    Basal cell tumor    Benign colon polyp    Generalized anxiety disorder    GERD without esophagitis    Hyperlipidemia    Hypertension    Hypocalcemia    [address]opar[address] ([facility] [address]    Migraine, unspecified, not intractable, without status migrainosus    Multiple thyroid nodules    Palpitations    Microalbuminuria    BMI 27 0-27 9,adult      Past Medical History:   Diagnosis Date    Abdominal pain, acute, generalized     last assessed - 21Apr2017    Actinic keratosis     last assessed - 12Jun2013    Benign colon polyp     last assessed - 20Jan2017    Chest pain     last assessed - [id number]    Disease of thyroid gland     Dysfunction of both eustachian tubes     suspect due to ETD  Recommend otc allergy meds and if fails then add flonase; last assessed - [id number]    Fatigue     last assessed - [id number]    Generalized anxiety disorder     GERD (gastroesophageal reflux disease)     Hematuria     last assessed - [id number]    Hypertension     Lightheadedness     last assessed - [id number]    Lump in neck     ? cause   Maybe a localized allergic reaction, dental issue, doubt sialdenitis, ect  Empiric abx and one dose steroids  Continue benadryl  If worsens, to er or ent      Malaise and fatigue     last assessed - 11Vjg3724    Nephropathy     last assessed - 43Ysj9491    Shortness of breath     last assessed - 78AKK7031    Skin lesion     Resolved - [id number]    Tinea corporis     last assessed - [id number]      Past Surgical History:   Procedure Laterality Date    COLONOSCOPY W/ POLYPECTOMY      last assessed - 20Jan2017    HEAD & NECK SKIN LESION EXCISIONAL BIOPSY      excision of lesion scalp malignant - BCCA; last assessed - [id number]    SKIN BIOPSY      last assessed - 09Sep2014   [facility] THYROID SURGERY      Biopsy thyroid using percutaneous core needle; last assessed - 09Sep2014    TONSILLECTOMY        Family History   Problem Relation Age of Onset    Hypertension Mother     Hypertension Father     Other Brother         Schwannomatosis     Social History     Tobacco Use    Smoking status: Never Smoker    Smokeless tobacco: Never Used   Substance Use Topics    Alcohol use: No     No Known Allergies    Current Outpatient Medications:     aspirin (ASPIRIN LOW DOSE) 81 mg EC tablet, Take 1 tablet by mouth daily, Disp: , Rfl:     calcitriol (ROCALTROL) 0 25 mcg capsule, Take 1 capsule (0 25 mcg total) by mouth daily (Patient taking differently: Take 0 25 mcg by mouth 3 (three) times a week ), Disp: 30 capsule, Rfl: 5    Calcium Carbonate (CALCIUM 600 PO), Take by mouth Twice per day, Disp: , Rfl:     Cholecalciferol (VITAMIN D3) 1000 units CAPS, Take by mouth, Disp: , Rfl:     Magnesium 250 MG TABS, Take by mouth, Disp: , Rfl:     metoprolol tartrate (LOPRESSOR) 25 mg tablet, Take 1 tablet (25 mg total) by mouth daily, Disp: 90 tablet, Rfl: 3    Multiple Vitamin (MULTI-VITAMIN DAILY PO), Take by mouth daily, Disp: , Rfl:     Omega-3 Fatty Acids (FISH OIL) 1200 MG CAPS, Take 2 capsules by mouth daily, Disp: , Rfl:     Turmeric Curcumin 500 MG CAPS, Take 1 capsule by mouth, Disp: , Rfl:     Review of Systems   Constitutional: Negative for activity change, appetite change and fatigue  HENT: Negative for sore throat, trouble swallowing and voice change  Eyes: Negative for visual disturbance  Respiratory: Negative for choking, chest tightness and shortness of breath  Cardiovascular: Negative for chest pain, palpitations and leg swelling  Gastrointestinal: Negative for abdominal pain, constipation and diarrhea  Reflux   Endocrine: Negative for cold intolerance, heat intolerance, polydipsia, polyphagia and polyuria  Genitourinary: Negative for frequency  Musculoskeletal: Negative for arthralgias and myalgias  Skin: Negative for rash  Neurological: Negative for dizziness and syncope  Hematological: Negative for adenopathy  Psychiatric/Behavioral: Negative for sleep disturbance  All other systems reviewed and are negative  Physical Exam:  Body mass index is 27 99 kg/m²  /90   Pulse 70   Ht 6' 2" (1 88 m)   Wt 98 9 kg (218 lb)   BMI 27 99 kg/m²    Wt Readings from Last 3 Encounters:   02/28/19 98 9 kg (218 lb)   02/25/19 97 5 kg (215 lb)   10/24/18 97 5 kg (215 lb)       Physical Exam   Constitutional: He is oriented to person, place, and time  He appears well-developed and well-nourished  No distress  HENT:   Head: Normocephalic and atraumatic  Mouth/Throat: Oropharynx is clear and moist    Eyes: Pupils are equal, round, and reactive to light  Conjunctivae and EOM are normal    Neck: Normal range of motion  Neck supple  No thyromegaly present  Cardiovascular: Normal rate, regular rhythm and normal heart sounds  No murmur heard  Pulmonary/Chest: Effort normal and breath sounds normal  No respiratory distress  He has no wheezes  He has no rales  Abdominal: Soft  Bowel sounds are normal  He exhibits no distension  There is no tenderness  Musculoskeletal: Normal range of motion  He exhibits no edema  Lymphadenopathy:     He has no cervical adenopathy  Neurological: He is alert and oriented to person, place, and time  Skin: Skin is warm and dry  Psychiatric: He has a normal mood and affect  Vitals reviewed        Labs:     Component      Latest Ref Rng & Units 2/19/2018 10/24/2018 12/17/2018   Sodium      136 - 145 mmol/L  135 (L) 136   Potassium      3 5 - 5 3 mmol/L  4 7 4 2   Chloride      100 - 108 mmol/L  99 (L) 100   CO2      21 - 32 mmol/L  31 31   Anion Gap      4 - 13 mmol/L  5 5   BUN      5 - 25 mg/dL  19 14   Creatinine      0 60 - 1 30 mg/dL  1 29 1 26   GLUCOSE FASTING      65 - 99 mg/dL  89    Calcium      8 3 - 10 1 mg/dL  8 9 7 9 (L)   AST      5 - 45 U/L  20    ALT      12 - 78 U/L  22    Alkaline Phosphatase      46 - 116 U/L  72    Total Protein      6 4 - 8 2 g/dL  7 7    Albumin      3 5 - 5 0 g/dL  3 6    TOTAL BILIRUBIN      0 20 - 1 00 mg/dL  0 58    eGFR      ml/min/1 73sq m  56 58   Glucose, Random      65 - 140 mg/dL   83   RBC, UA      None Seen, 0-5 /hpf  None Seen    WBC, UA      None Seen, 0-5, 5-55, 5-65 /hpf  None Seen    Epithelial Cells      None Seen, Occasional /hpf  None Seen    Bacteria, UA      None Seen, Occasional /hpf  None Seen    Hyaline Casts, UA      None Seen /lpf  None Seen    Hemoglobin A1C      4 2 - 6 3 %  5 3    EAG      mg/dl  105    T3, Total      0 60 - 1 80 ng/mL 1 00     Free T4      0 76 - 1 46 ng/dL 1 07     TSH 3RD GENERATON      0 358 - 3 740 uIU/mL 0 961     PARATHYROID HORMONE      18 4 - 80 1 pg/mL <5 5 (L) <6 3 (L)    Phosphorus      2 3 - 4 1 mg/dL  4 1    Vit D, 25-Hydroxy      30 0 - 100 0 ng/mL  55 2    EXT LDL, Direct       0 - 100 mg/dl  125 (H)    Triglycerides      <=150 mg/dL  208 (H)    PSA, Total      0 0 - 4 0 ng/mL  1 5          Impression & Plan:    Problem List Items Addressed This Visit        Endocrine    Hypoparathyroidism ([address] ) - Primary     He reports that he has been told to reduce The calcitriol to 3x per week a few months ago though no records of this in our notes, PCP, or nephrology note  Calcium level was low on last lab test in December  Will repeat lab testing now and will likely need to resume daily dosing of the calcitriol  Relevant Orders    Calcium Lab Collect    Albumin Lab Collect    Phosphorus Lab Collect    Multiple thyroid nodules     Stable on ultrasound 10/2018  Other    Hypocalcemia     Repeat lab testing and will increase calcitriol back to daily dosing depending on results  Orders Placed This Encounter   Procedures    Calcium Lab Collect     Standing Status:   Future     Standing Expiration Date:   2/28/2020    Albumin Lab Collect     Standing Status:   Future     Standing Expiration Date:   2/28/2020    Phosphorus Lab Collect     Standing Status:   Future     Standing Expiration Date:   2/28/2020       Patient Instructions   Do lab testing today  Continue Current doses of calcium, Vitamin D, Calcitriol  We will call you with instructions on med changes/repeat labs  Discussed with the patient and all questioned fully answered  He will call me if any problems arise  Follow-up appointment in 6 months       Counseled patient on diagnostic results, prognosis, risk and benefit of treatment options, instruction for management, importance of treatment compliance, Risk  factor reduction and impressions      Abhi Alvarez PA-C

## 2019-03-04 ENCOUNTER — TELEPHONE (OUTPATIENT)
Dept: ENDOCRINOLOGY | Facility: CLINIC | Age: 69
End: 2019-03-04

## 2019-03-04 DIAGNOSIS — E20.9 HYPOPARATHYROIDISM, UNSPECIFIED HYPOPARATHYROIDISM TYPE (HCC): Primary | ICD-10-CM

## 2019-03-04 NOTE — TELEPHONE ENCOUNTER
----- Message from Amairani Vazquez PA-C sent at 3/4/2019  8:37 AM EST -----  The calcium level is normal  Can continue current dosing of Calcitriol, Calcium, Vit D  Repeat lab testing in 6 months to monitor for stability

## 2019-04-24 DIAGNOSIS — E83.51 HYPOCALCEMIA: ICD-10-CM

## 2019-04-24 RX ORDER — CALCITRIOL 0.25 UG/1
0.25 CAPSULE, LIQUID FILLED ORAL 3 TIMES WEEKLY
Qty: 90 CAPSULE | Refills: 1 | Status: SHIPPED | OUTPATIENT
Start: 2019-04-24 | End: 2019-08-28 | Stop reason: SDUPTHER

## 2019-05-15 ENCOUNTER — TRANSCRIBE ORDERS (OUTPATIENT)
Dept: ADMINISTRATIVE | Facility: HOSPITAL | Age: 69
End: 2019-05-15

## 2019-05-15 ENCOUNTER — APPOINTMENT (OUTPATIENT)
Dept: LAB | Facility: CLINIC | Age: 69
End: 2019-05-15
Payer: MEDICARE

## 2019-05-15 DIAGNOSIS — E87.1 NA DEFICIENCY: ICD-10-CM

## 2019-05-15 DIAGNOSIS — N18.30 CHRONIC KIDNEY DISEASE, STAGE III (MODERATE) (HCC): ICD-10-CM

## 2019-05-15 DIAGNOSIS — N18.30 CHRONIC KIDNEY DISEASE, STAGE III (MODERATE) (HCC): Primary | ICD-10-CM

## 2019-05-15 LAB
ALBUMIN SERPL BCP-MCNC: 3.5 G/DL (ref 3.5–5)
ALP SERPL-CCNC: 76 U/L (ref 46–116)
ALT SERPL W P-5'-P-CCNC: 20 U/L (ref 12–78)
ANION GAP SERPL CALCULATED.3IONS-SCNC: 6 MMOL/L (ref 4–13)
AST SERPL W P-5'-P-CCNC: 20 U/L (ref 5–45)
BILIRUB SERPL-MCNC: 0.45 MG/DL (ref 0.2–1)
BUN SERPL-MCNC: 15 MG/DL (ref 5–25)
CALCIUM SERPL-MCNC: 8.7 MG/DL (ref 8.3–10.1)
CHLORIDE SERPL-SCNC: 102 MMOL/L (ref 100–108)
CO2 SERPL-SCNC: 30 MMOL/L (ref 21–32)
CREAT SERPL-MCNC: 1.29 MG/DL (ref 0.6–1.3)
CREAT UR-MCNC: 145 MG/DL
GFR SERPL CREATININE-BSD FRML MDRD: 56 ML/MIN/1.73SQ M
GLUCOSE SERPL-MCNC: 94 MG/DL (ref 65–140)
MICROALBUMIN UR-MCNC: 210 MG/L (ref 0–20)
MICROALBUMIN/CREAT 24H UR: 145 MG/G CREATININE (ref 0–30)
POTASSIUM SERPL-SCNC: 4.1 MMOL/L (ref 3.5–5.3)
PROT SERPL-MCNC: 7.4 G/DL (ref 6.4–8.2)
SODIUM SERPL-SCNC: 138 MMOL/L (ref 136–145)

## 2019-05-15 PROCEDURE — 36415 COLL VENOUS BLD VENIPUNCTURE: CPT

## 2019-05-15 PROCEDURE — 80053 COMPREHEN METABOLIC PANEL: CPT

## 2019-05-15 PROCEDURE — 82043 UR ALBUMIN QUANTITATIVE: CPT | Performed by: INTERNAL MEDICINE

## 2019-05-15 PROCEDURE — 82570 ASSAY OF URINE CREATININE: CPT | Performed by: INTERNAL MEDICINE

## 2019-06-14 RX ORDER — LISINOPRIL 20 MG/1
TABLET ORAL
Refills: 1 | COMMUNITY
Start: 2019-03-25 | End: 2019-09-19 | Stop reason: SDUPTHER

## 2019-06-19 ENCOUNTER — CONSULT (OUTPATIENT)
Dept: GASTROENTEROLOGY | Facility: HOSPITAL | Age: 69
End: 2019-06-19
Payer: MEDICARE

## 2019-06-19 ENCOUNTER — TELEPHONE (OUTPATIENT)
Dept: GASTROENTEROLOGY | Facility: AMBULARY SURGERY CENTER | Age: 69
End: 2019-06-19

## 2019-06-19 VITALS
WEIGHT: 211.8 LBS | BODY MASS INDEX: 27.18 KG/M2 | DIASTOLIC BLOOD PRESSURE: 88 MMHG | HEART RATE: 76 BPM | HEIGHT: 74 IN | SYSTOLIC BLOOD PRESSURE: 139 MMHG | TEMPERATURE: 98.6 F

## 2019-06-19 DIAGNOSIS — K21.9 GASTROESOPHAGEAL REFLUX DISEASE, ESOPHAGITIS PRESENCE NOT SPECIFIED: ICD-10-CM

## 2019-06-19 DIAGNOSIS — Z12.11 ENCOUNTER FOR COLONOSCOPY DUE TO HISTORY OF ADENOMATOUS COLONIC POLYPS: Primary | ICD-10-CM

## 2019-06-19 DIAGNOSIS — Z12.11 SCREENING FOR COLON CANCER: Primary | ICD-10-CM

## 2019-06-19 DIAGNOSIS — Z86.010 ENCOUNTER FOR COLONOSCOPY DUE TO HISTORY OF ADENOMATOUS COLONIC POLYPS: Primary | ICD-10-CM

## 2019-06-19 PROCEDURE — 99203 OFFICE O/P NEW LOW 30 MIN: CPT | Performed by: INTERNAL MEDICINE

## 2019-06-19 RX ORDER — SODIUM, POTASSIUM,MAG SULFATES 17.5-3.13G
180 SOLUTION, RECONSTITUTED, ORAL ORAL ONCE
Qty: 180 ML | Refills: 0 | Status: SHIPPED | OUTPATIENT
Start: 2019-06-19 | End: 2019-06-19

## 2019-07-17 ENCOUNTER — OFFICE VISIT (OUTPATIENT)
Dept: INTERNAL MEDICINE CLINIC | Facility: CLINIC | Age: 69
End: 2019-07-17
Payer: MEDICARE

## 2019-07-17 VITALS
SYSTOLIC BLOOD PRESSURE: 134 MMHG | HEART RATE: 68 BPM | RESPIRATION RATE: 18 BRPM | HEIGHT: 74 IN | DIASTOLIC BLOOD PRESSURE: 80 MMHG | OXYGEN SATURATION: 94 % | WEIGHT: 208.2 LBS | BODY MASS INDEX: 26.72 KG/M2 | TEMPERATURE: 98.4 F

## 2019-07-17 DIAGNOSIS — I10 ESSENTIAL HYPERTENSION: Primary | ICD-10-CM

## 2019-07-17 DIAGNOSIS — G43.909 MIGRAINE WITHOUT STATUS MIGRAINOSUS, NOT INTRACTABLE, UNSPECIFIED MIGRAINE TYPE: ICD-10-CM

## 2019-07-17 DIAGNOSIS — E20.9 HYPOPARATHYROIDISM, UNSPECIFIED HYPOPARATHYROIDISM TYPE (HCC): ICD-10-CM

## 2019-07-17 DIAGNOSIS — Z00.00 MEDICARE ANNUAL WELLNESS VISIT, SUBSEQUENT: ICD-10-CM

## 2019-07-17 DIAGNOSIS — R80.9 MICROALBUMINURIA: ICD-10-CM

## 2019-07-17 DIAGNOSIS — K21.9 GERD WITHOUT ESOPHAGITIS: ICD-10-CM

## 2019-07-17 DIAGNOSIS — E78.2 MIXED HYPERLIPIDEMIA: ICD-10-CM

## 2019-07-17 DIAGNOSIS — R05.3 CHRONIC COUGH: ICD-10-CM

## 2019-07-17 DIAGNOSIS — F41.1 GENERALIZED ANXIETY DISORDER: ICD-10-CM

## 2019-07-17 PROCEDURE — G0439 PPPS, SUBSEQ VISIT: HCPCS | Performed by: INTERNAL MEDICINE

## 2019-07-17 PROCEDURE — 99214 OFFICE O/P EST MOD 30 MIN: CPT | Performed by: INTERNAL MEDICINE

## 2019-07-17 RX ORDER — SODIUM, POTASSIUM,MAG SULFATES 17.5-3.13G
SOLUTION, RECONSTITUTED, ORAL ORAL
Refills: 0 | COMMUNITY
Start: 2019-06-19 | End: 2019-11-22 | Stop reason: ALTCHOICE

## 2019-07-17 NOTE — PROGRESS NOTES
Assessment and Plan:     Problem List Items Addressed This Visit        Digestive    GERD without esophagitis       Endocrine    Hypoparathyroidism ([facility] [address] )    Relevant Orders    Calcium, ionized    PTH, intact       Cardiovascular and Mediastinum    Hypertension - Primary    Relevant Orders    CBC and differential    Comprehensive metabolic panel    Migraine, unspecified, not intractable, without status migrainosus       Other    Generalized anxiety disorder    Hyperlipidemia    Relevant Orders    Lipid Panel with Direct LDL reflex    Microalbuminuria      Other Visit Diagnoses     Medicare annual wellness visit, subsequent        Chronic cough             History of Present Illness:     Patient presents for Medicare Annual Wellness visit    Patient Care Team:  Ladonna Burch DO as PCP - General (Internal Medicine)  Narendra Lazaro MD as PCP - Endocrinology (Endocrinology)  Narendra Lazaro MD     Problem List:     Patient Active Problem List   Diagnosis    Allergic rhinitis    Asymptomatic proteinuria    Basal cell tumor    Benign colon polyp    Generalized anxiety disorder    GERD without esophagitis    Hyperlipidemia    Hypertension    Hypocalcemia    Hypoparathyroidism ([facility] [address] )    Migraine, unspecified, not intractable, without status migrainosus    Multiple thyroid nodules    Palpitations    Microalbuminuria    BMI 27 0-27 9,adult      Past Medical and Surgical History:     Past Medical History:   Diagnosis Date    Abdominal pain, acute, generalized     last assessed - 21Apr2017    Actinic keratosis     last assessed - 12Jun2013    Benign colon polyp     last assessed - 20Jan2017    Chest pain     last assessed - [id number]    Disease of thyroid gland     Dysfunction of both eustachian tubes     suspect due to ETD   Recommend otc allergy meds and if fails then add flonase; last assessed - [id number]    Fatigue     last assessed - [id number]    Generalized anxiety disorder     GERD (gastroesophageal reflux disease)     Hematuria     last assessed - [id number]    Hypertension     Lightheadedness     last assessed - [id number]    Lump in neck     ? cause  Maybe a localized allergic reaction, dental issue, doubt sialdenitis, ect  Empiric abx and one dose steroids  Continue benadryl  If worsens, to er or ent      Malaise and fatigue     last assessed - 21Apr2017    Nephropathy     last assessed - [id number]    Shortness of breath     last assessed - [id number]    Skin lesion     Resolved - [id number]    Tinea corporis     last assessed - [id number]     Past Surgical History:   Procedure Laterality Date    COLONOSCOPY W/ POLYPECTOMY      last assessed - 20Jan2017    HEAD & NECK SKIN LESION EXCISIONAL BIOPSY      excision of lesion scalp malignant - [facility]; last assessed - [id number]    SKIN BIOPSY      last assessed - 09Sep2014   [facility]      Biopsy thyroid using percutaneous core needle; last assessed - 09Sep2014    TONSILLECTOMY        Family History:     Family History   Problem Relation Age of Onset    Hypertension Mother     Hypertension Father     Other Brother         Schwannomatosis      Social History:     Social History     Tobacco Use   Smoking Status Never Smoker   Smokeless Tobacco Never Used     Social History     Substance and Sexual Activity   Alcohol Use No     Social History     Substance and Sexual Activity   Drug Use No      Medications and Allergies:     Current Outpatient Medications   Medication Sig Dispense Refill    aspirin (ASPIRIN LOW DOSE) 81 mg EC tablet Take 1 tablet by mouth daily      calcitriol (ROCALTROL) 0 25 mcg capsule Take 1 capsule (0 25 mcg total) by mouth 3 (three) times a week 90 capsule 1    Calcium Carbonate (CALCIUM 600 PO) Take by mouth Twice per day      Cholecalciferol (VITAMIN D3) 1000 units CAPS Take by mouth      lisinopril (ZESTRIL) 20 mg tablet   1    Magnesium 250 MG TABS Take by mouth      metoprolol tartrate (LOPRESSOR) 25 mg tablet Take 1 tablet (25 mg total) by mouth daily 90 tablet 3    Multiple Vitamin (MULTI-VITAMIN DAILY PO) Take by mouth daily      Omega-3 Fatty Acids (FISH OIL) 1200 MG CAPS Take 2 capsules by mouth daily      Turmeric Curcumin 500 MG CAPS Take 1 capsule by mouth      Na Sulfate-K Sulfate-Mg Sulf (SUPREP BOWEL PREP KIT) 17 5-3 13-1 6 GM/177ML SOLN Take 1 kit by mouth once for 1 dose Per office instructions  2 Bottle 0    SUPREP BOWEL PREP KIT 17 5-3 13-1 6 GM/177ML SOLN USE AS DIRECTED PER OFFICE INSTRUCTIONS  0     No current facility-administered medications for this visit  No Known Allergies   Immunizations:     Immunization History   Administered Date(s) Administered    INFLUENZA 10/24/2018    Influenza Split High Dose Preservative Free IM 10/24/2018    Pneumococcal Conjugate 13-Valent 05/31/2017    Pneumococcal Polysaccharide PPV23 07/08/2014    Td (adult), adsorbed 09/08/1999    Tdap 01/01/2009    Zoster 04/16/2012      Medicare Screening Tests and Risk Assessments:     Ramona Doss is here for his Subsequent Wellness visit  Last Medicare Wellness visit information reviewed, patient interviewed and updates made to the record today  Health Risk Assessment:  Patient rates overall health as very good  Patient feels that their physical health rating is Same  Eyesight was rated as Same  Hearing was rated as Same  Patient feels that their emotional and mental health rating is Same  Pain experienced by patient in the last 7 days has been None  Patient states that he has experienced no weight loss or gain in last 6 months  Emotional/Mental Health:  Patient has not been feeling nervous/anxious  PHQ-9 Depression Screening:    Frequency of the following problems over the past two weeks:      1  Little interest or pleasure in doing things: 0 - not at all      2  Feeling down, depressed, or hopeless: 0 - not at all  PHQ-2 Score: 0          Broken Bones/Falls:     Fall Risk Assessment:    In the past year, patient has experienced: No history of falling in past year          Bladder/Bowel:  Patient has not leaked urine accidently in the last six months  Patient reports no loss of bowel control  Immunizations:  Patient has had a flu vaccination within the last year  Patient has received a pneumonia shot  Patient has received a shingles shot  Patient has received tetanus/diphtheria shot  Home Safety:  Patient does not have trouble with stairs inside or outside of their home  Patient currently reports that there are no safety hazards present in home, working smoke alarms, working carbon monoxide detectors  Preventative Screenings:   prostate cancer screen performed, colon cancer screen completed, cholesterol screen completed, glaucoma eye exam completed,     Nutrition:  Current diet: Regular and Limited junk food with servings of the following:    Medications:  Patient is currently taking over-the-counter supplements  List of OTC medications includes: vitamins  Patient is able to manage medications  Lifestyle Choices:  Patient reports no tobacco use  Patient has not smoked or used tobacco in the past   Patient reports alcohol use  Alcohol use per week: rarely  Patient drives a vehicle  Patient wears seat belt  Current level of exercise of physical activity described by patient as: very active  daily  Activities of Daily Living:  Can get out of bed by his or her self, able to dress self, unable to make own meals, able to do own shopping, able to bathe self, unable to do laundry/housekeeping, can manage own money, pay bills and track expenses    Previous Hospitalizations:  No hospitalization or ED visit in past 12 months        Advanced Directives:  Patient has decided on a power of   Patient has spoken to designated power of   Patient has not completed advanced directive          Preventative Screening/Counseling:      Cardiovascular:      General: Screening Current Counseling: Healthy Diet, Healthy Weight, Improve Cholesterol, Improve Blood Pressure and Improve Exercise Tolerance     Due for Labs/Analytes/Optional EKG: Lipid Panel          Diabetes:      General: Screening Current      Counseling: Healthy Diet, Healthy Weight and Improve Physical Activity      Due for labs: Blood Glucose          Colorectal Cancer:      General: Risks and Benefits Discussed      Counseling: high fiber diet      Due for studies: Colonoscopy - Low Risk          Prostate Cancer:      General: Screening Current          Osteoporosis:      General: Screening Not Indicated      Counseling: Calcium and Vitamin D Intake and Regular Weightbearing Exercise          AAA:      General: Screening Not Indicated          Glaucoma:      General: Screening Current          HIV:      General: Screening Not Indicated          Hepatitis C:      General: Screening Current        Advanced Directives:   Patient has no living will for healthcare, does not have durable POA for healthcare, patient does not have an advanced directive  Information on ACP and/or AD not provided  5 wishes given  End of life assessment reviewed with patient  Provider agrees with end of life decisions   Immunizations:      Influenza: Influenza UTD This Year      Pneumococcal: Lifetime Vaccine Completed      TDAP: Risks & Benefits Discussed and Tdap Vaccine Needed Today  Additional Comments: No wounds for Td    Other Preventative Counseling (Non-Medicare): Fall Prevention, Increase physical activity, Nutrition Counseling, Car/seat belt/driving safety reviewed, Skin self-exam, Sunscreen use and Weight reduction discussed        A/P: Doing well and no falls  Denies depression and feels safe at home  Diverse diet  No problems operating a MV and uses seat belts  No living will or POA  Information give for pt to review  No DME or referrals needed today  RTC in one year for medicare wellness

## 2019-07-17 NOTE — PATIENT INSTRUCTIONS
Obesity   AMBULATORY CARE:   Obesity  is when your body mass index (BMI) is greater than 30  Your healthcare provider will use your height and weight to measure your BMI  The risks of obesity include  many health problems, such as injuries or physical disability  You may need tests to check for the following:  · Diabetes     · High blood pressure or high cholesterol     · Heart disease     · Gallbladder or liver disease     · Cancer of the colon, breast, prostate, liver, or kidney     · Sleep apnea     · Arthritis or gout  Seek care immediately if:   · You have a severe headache, confusion, or difficulty speaking  · You have weakness on one side of your body  · You have chest pain, sweating, or shortness of breath  Contact your healthcare provider if:   · You have symptoms of gallbladder or liver disease, such as pain in your upper abdomen  · You have knee or hip pain and discomfort while walking  · You have symptoms of diabetes, such as intense hunger and thirst, and frequent urination  · You have symptoms of sleep apnea, such as snoring or daytime sleepiness  · You have questions or concerns about your condition or care  Treatment for obesity  focuses on helping you lose weight to improve your health  Even a small decrease in BMI can reduce the risk for many health problems  Your healthcare provider will help you set a weight-loss goal   · Lifestyle changes  are the first step in treating obesity  These include making healthy food choices and getting regular physical activity  Your healthcare provider may suggest a weight-loss program that involves coaching, education, and therapy  · Medicine  may help you lose weight when it is used with a healthy diet and physical activity  · Surgery  can help you lose weight if you are very obese and have other health problems  There are several types of weight-loss surgery  Ask your healthcare provider for more information    Be successful losing weight:   · Set small, realistic goals  An example of a small goal is to walk for 20 minutes 5 days a week  Anther goal is to lose 5% of your body weight  · Tell friends, family members, and coworkers about your goals  and ask for their support  Ask a friend to lose weight with you, or join a weight-loss support group  · Identify foods or triggers that may cause you to overeat , and find ways to avoid them  Remove tempting high-calorie foods from your home and workplace  Place a bowl of fresh fruit on your kitchen counter  If stress causes you to eat, then find other ways to cope with stress  · Keep a diary to track what you eat and drink  Also write down how many minutes of physical activity you do each day  Weigh yourself once a week and record it in your diary  Eating changes: You will need to eat 500 to 1,000 fewer calories each day than you currently eat to lose 1 to 2 pounds a week  The following changes will help you cut calories:  · Eat smaller portions  Use small plates, no larger than 9 inches in diameter  Fill your plate half full of fruits and vegetables  Measure your food using measuring cups until you know what a serving size looks like  · Eat 3 meals and 1 or 2 snacks each day  Plan your meals in advance  Mary Larson and eat at home most of the time  Eat slowly  · Eat fruits and vegetables at every meal   They are low in calories and high in fiber, which makes you feel full  Do not add butter, margarine, or cream sauce to vegetables  Use herbs to season steamed vegetables  · Eat less fat and fewer fried foods  Eat more baked or grilled chicken and fish  These protein sources are lower in calories and fat than red meat  Limit fast food  Dress your salads with olive oil and vinegar instead of bottled dressing  · Limit the amount of sugar you eat  Do not drink sugary beverages  Limit alcohol  Activity changes:  Physical activity is good for your body in many ways   It helps you burn calories and build strong muscles  It decreases stress and depression, and improves your mood  It can also help you sleep better  Talk to your healthcare provider before you begin an exercise program   · Exercise for at least 30 minutes 5 days a week  Start slowly  Set aside time each day for physical activity that you enjoy and that is convenient for you  It is best to do both weight training and an activity that increases your heart rate, such as walking, bicycling, or swimming  · Find ways to be more active  Do yard work and housecleaning  Walk up the stairs instead of using elevators  Spend your leisure time going to events that require walking, such as outdoor festivals or fairs  This extra physical activity can help you lose weight and keep it off  Follow up with your healthcare provider as directed: You may need to meet with a dietitian  Write down your questions so you remember to ask them during your visits  © 2017 [address] [employer] Information is for End User's use only and may not be sold, redistributed or otherwise used for commercial purposes  All illustrations and images included in CareNotes® are the copyrighted property of [employer]  or Fransisco Flores  The above information is an  only  It is not intended as medical advice for individual conditions or treatments  Talk to your doctor, nurse or pharmacist before following any medical regimen to see if it is safe and effective for you  Urinary Incontinence   WHAT YOU NEED TO KNOW:   What is urinary incontinence? Urinary incontinence (UI) is when you lose control of your bladder  What causes UI? UI occurs because your bladder cannot store or empty urine properly  The following are the most common types of UI:  · Stress incontinence  is when you leak urine due to increased bladder pressure  This may happen when you cough, sneeze, or exercise       · Urge incontinence  is when you feel the need to urinate right away and leak urine accidentally  · Mixed incontinence  is when you have both stress and urge UI  What are the signs and symptoms of UI?   · You feel like your bladder does not empty completely when you urinate  · You urinate often and need to urinate immediately  · You leak urine when you sleep, or you wake up with the urge to urinate  · You leak urine when you cough, sneeze, exercise, or laugh  How is UI diagnosed? Your healthcare provider will ask how often you leak urine and whether you have stress or urge symptoms  Tell him which medicines you take, how often you urinate, and how much liquid you drink each day  You may need any of the following tests:  · Urine tests  may show infection or kidney function  · A pelvic exam  may be done to check for blockages  A pelvic exam will also show if your bladder, uterus, or other organs have moved out of place  · An x-ray, ultrasound, or CT  may show problems with parts of your urinary system  You may be given contrast liquid to help your organs show up better in the pictures  Tell the healthcare provider if you have ever had an allergic reaction to contrast liquid  Do not enter the MRI room with anything metal  Metal can cause serious injury  Tell the healthcare provider if you have any metal in or on your body  · A bladder scan  will show how much urine is left in your bladder after you urinate  You will be asked to urinate and then healthcare providers will use a small ultrasound machine to check the urine left in your bladder  · Cystometry  is used to check the function of your urinary system  Your healthcare provider checks the pressure in your bladder while filling it with fluid  Your bladder pressure may also be tested when your bladder is full and while you urinate  How is UI treated? · Medicines  can help strengthen your bladder control      · Electrical stimulation  is used to send a small amount of electrical energy to your pelvic floor muscles  This helps control your bladder function  Electrodes may be placed outside your body or in your rectum  For women, the electrodes may be placed in the vagina  · A bulking agent  may be injected into the wall of your urethra to make it thicker  This helps keep your urethra closed and decreases urine leakage  · Devices  such as a clamp, pessary, or tampon may help stop urine leaks  Ask your healthcare provider for more information about these and other devices  · Surgery  may be needed if other treatments do not work  Several types of surgery can help improve your bladder control  Ask your healthcare provider for more information about the surgery you may need  How can I manage my symptoms? · Do pelvic muscle exercises often  Your pelvic muscles help you stop urinating  Squeeze these muscles tight for 5 seconds, then relax for 5 seconds  Gradually work up to squeezing for 10 seconds  Do 3 sets of 15 repetitions a day, or as directed  This will help strengthen your pelvic muscles and improve bladder control  · A catheter  may be used to help empty your bladder  A catheter is a tiny, plastic tube that is put into your bladder to drain your urine  Your healthcare provider may tell you to use a catheter to prevent your bladder from getting too full and leaking urine  · Keep a UI record  Write down how often you leak urine and how much you leak  Make a note of what you were doing when you leaked urine  · Train your bladder  Go to the bathroom at set times, such as every 2 hours, even if you do not feel the urge to go  You can also try to hold your urine when you feel the urge to go  For example, hold your urine for 5 minutes when you feel the urge to go  As that becomes easier, hold your urine for 10 minutes  · Drink liquids as directed  Ask your healthcare provider how much liquid to drink each day and which liquids are best for you   You may need to limit the amount of liquid you drink to help control your urine leakage  Limit or do not have drinks that contain caffeine or alcohol  Do not drink any liquid right before you go to bed  · Prevent constipation  Eat a variety of high-fiber foods  Good examples are high-fiber cereals, beans, vegetables, and whole-grain breads  Prune juice may help make your bowel movement softer  Walking is the best way to trigger your intestines to have a bowel movement  · Exercise regularly and maintain a healthy weight  Ask your healthcare provider how much you should weigh and about the best exercise plan for you  Weight loss and exercise will decrease pressure on your bladder and help you control your leakage  Ask him to help you create a weight loss plan if you are overweight  When should I seek immediate care? · You have severe pain  · You are confused or cannot think clearly  When should I contact my healthcare provider? · You have a fever  · You see blood in your urine  · You have pain when you urinate  · You have new or worse pain, even after treatment  · Your mouth feels dry or you have vision changes  · Your urine is cloudy or smells bad  · You have questions or concerns about your condition or care  CARE AGREEMENT:   You have the right to help plan your care  Learn about your health condition and how it may be treated  Discuss treatment options with your caregivers to decide what care you want to receive  You always have the right to refuse treatment  The above information is an  only  It is not intended as medical advice for individual conditions or treatments  Talk to your doctor, nurse or pharmacist before following any medical regimen to see if it is safe and effective for you  © 2017 [address] [employer] Information is for End User's use only and may not be sold, redistributed or otherwise used for commercial purposes   All illustrations and images included in CareNotes® are the copyrighted property of A D A M , Inc  or Fransisco Florse  Cigarette Smoking and Your Health   AMBULATORY CARE:   Risks to your health if you smoke:  Nicotine and other chemicals found in tobacco damage every cell in your body  Even if you are a light smoker, you have an increased risk for cancer, heart disease, and lung disease  If you are pregnant or have diabetes, smoking increases your risk for complications  Benefits to your health if you stop smoking:   · You decrease respiratory symptoms such as coughing, wheezing, and shortness of breath  · You reduce your risk for cancers of the lung, mouth, throat, kidney, bladder, pancreas, stomach, and cervix  If you already have cancer, you increase the benefits of chemotherapy  You also reduce your risk for cancer returning or a second cancer from developing  · You reduce your risk for heart disease, blood clots, heart attack, and stroke  · You reduce your risk for lung infections, and diseases such as pneumonia, asthma, chronic bronchitis, and emphysema  · Your circulation improves  More oxygen can be delivered to your body  If you have diabetes, you lower your risk for complications, such as kidney, artery, and eye diseases  You also lower your risk for nerve damage  Nerve damage can lead to amputations, poor vision, and blindness  · You improve your body's ability to heal and to fight infections  Benefits to the health of others if you stop smoking:  Tobacco is harmful to nonsmokers who breathe in your secondhand smoke  The following are ways the health of others around you may improve when you stop smoking:  · You lower the risks for lung cancer and heart disease in nonsmoking adults  · If you are pregnant, you lower the risk for miscarriage, early delivery, low birth weight, and stillbirth  You also lower your baby's risk for SIDS, obesity, developmental delay, and neurobehavioral problems, such as ADHD  · If you have children, you lower their risk for ear infections, colds, pneumonia, bronchitis, and asthma  For more information and support to stop smoking:   · Smokefree  gov  Phone: 4- 447 - 105-4892  Web Address: www [employer]fr[employer]  Follow up with your healthcare provider as directed:  Write down your questions so you remember to ask them during your visits  © 2017 [address] [employer] Information is for End User's use only and may not be sold, redistributed or otherwise used for commercial purposes  All illustrations and images included in CareNotes® are the copyrighted property of [employer] M [employer]  or Fransisco Flores  The above information is an  only  It is not intended as medical advice for individual conditions or treatments  Talk to your doctor, nurse or pharmacist before following any medical regimen to see if it is safe and effective for you  Fall Prevention   WHAT YOU NEED TO KNOW:   What is fall prevention? Fall prevention includes ways to make your home and other areas safer  It also includes ways you can move more carefully to prevent a fall  What increases my risk for falls? · Lack of vitamin D    · Not getting enough sleep each night    · Trouble walking or keeping your balance, or foot problems    · Health conditions that cause changes in your blood pressure, vision, or muscle strength and coordination    · Medicines that make you dizzy, weak, or sleepy    · Problems seeing clearly    · Shoes that have high heels or are not supportive    · Tripping hazards, such as items left on the floor, no handrails on the stairs, or broken steps  How can I help protect myself from falls? · Stand or sit up slowly  This may help you keep your balance and prevent falls  If you need to get up during the night, sit up first  Be sure you are fully awake before you stand  Turn on the light before you start walking  Go slowly in case you are still sleepy   Make sure you will not trip over any pets sleeping in the bedroom  · Use assistive devices as directed  Your healthcare provider may suggest that you use a cane or walker to help you keep your balance  You may need to have grab bars put in your bathroom near the toilet or in the shower  · Wear shoes that fit well and have soles that   Wear shoes both inside and outside  Use slippers with good   Do not wear shoes with high heels  · Wear a personal alarm  This is a device that allows you to call 911 if you fall and need help  Ask your healthcare provider for more information  · Stay active  Exercise can help strengthen your muscles and improve your balance  Your healthcare provider may recommend water aerobics or walking  He or she may also recommend physical therapy to improve your coordination  Never start an exercise program without talking to your healthcare provider first      · Manage medical conditions  Keep all appointments with your healthcare providers  Visit your eye doctor as directed  How can I make my home safer? · Add items to prevent falls in the bathroom  Put nonslip strips on your bath or shower floor to prevent you from slipping  Use a bath mat if you do not have carpet in the bathroom  This will prevent you from falling when you step out of the bath or shower  Use a shower seat so you do not need to stand while you shower  Sit on the toilet or a chair in your bathroom to dry yourself and put on clothing  This will prevent you from losing your balance from drying or dressing yourself while you are standing  · Keep paths clear  Remove books, shoes, and other objects from walkways and stairs  Place cords for telephones and lamps out of the way so that you do not need to walk over them  Tape them down if you cannot move them  Remove small rugs  If you cannot remove a rug, secure it with double-sided tape  This will prevent you from tripping  · Install bright lights in your home  Use night lights to help light paths to the bathroom or kitchen  Always turn on the light before you start walking  · Keep items you use often on shelves within reach  Do not use a step stool to help you reach an item  · Paint or place reflective tape on the edges of your stairs  This will help you see the stairs better  Call 911 or have someone else call if:   · You have fallen and are unconscious  · You have fallen and cannot move part of your body  Contact your healthcare provider if:   · You have fallen and have pain or a headache  · You have questions or concerns about your condition or care  CARE AGREEMENT:   You have the right to help plan your care  Learn about your health condition and how it may be treated  Discuss treatment options with your caregivers to decide what care you want to receive  You always have the right to refuse treatment  The above information is an  only  It is not intended as medical advice for individual conditions or treatments  Talk to your doctor, nurse or pharmacist before following any medical regimen to see if it is safe and effective for you  © 2017 [address] [facility] Information is for End User's use only and may not be sold, redistributed or otherwise used for commercial purposes  All illustrations and images included in CareNotes® are the copyrighted property of [employer]  or Fransisco Flores  Advance Directives   WHAT YOU NEED TO KNOW:   What are advance directives? Advance directives are legal documents that state your wishes and plans for medical care  These plans are made ahead of time in case you lose your ability to make decisions for yourself  Advance directives can apply to any medical decision, such as the treatments you want, and if you want to donate organs  What are the types of advance directives? There are many types of advance directives, and each state has rules about how to use them   You may choose a combination of any of the following:  · Living will: This is a written record of the treatment you want  You can also choose which treatments you do not want, which to limit, and which to stop at a certain time  This includes surgery, medicine, IV fluid, and tube feedings  · Durable power of  for healthcare [facility]): This is a written record that states who you want to make healthcare choices for you when you are unable to make them for yourself  This person, called a proxy, is usually a family member or a friend  You may choose more than 1 proxy  · Do not resuscitate (DNR) order:  A DNR order is used in case your heart stops beating or you stop breathing  It is a request not to have certain forms of treatment, such as CPR  A DNR order may be included in other types of advance directives  · Medical directive: This covers the care that you want if you are in a coma, near death, or unable to make decisions for yourself  You can list the treatments you want for each condition  Treatment may include pain medicine, surgery, blood transfusions, dialysis, IV or tube feedings, and a ventilator (breathing machine)  · Values history: This document has questions about your views, beliefs, and how you feel and think about life  This information can help others choose the care that you would choose  Why are advance directives important? An advance directive helps you control your care  Although spoken wishes may be used, it is better to have your wishes written down  Spoken wishes can be misunderstood, or not followed  Treatments may be given even if you do not want them  An advance directive may make it easier for your family to make difficult choices about your care  How do I decide what to put in my advance directives? · Make informed decisions:  Make sure you fully understand treatments or care you may receive   Think about the benefits and problems your decisions could cause for you or your family  Talk to healthcare providers if you have concerns or questions before you write down your wishes  You may also want to talk with your Holiness or , or a   Check your state laws to make sure that what you put in your advance directive is legal      · Sign all forms:  Sign and date your advance directive when you have finished  You may also need 2 witnesses to sign the forms  Witnesses cannot be your doctor or his staff, your spouse, heirs or beneficiaries, people you owe money to, or your chosen proxy  Talk to your family, proxy, and healthcare providers about your advance directive  Give each person a copy, and keep one for yourself in a place you can get to easily  Do not keep it hidden or locked away  · Review and revise your plans: You can revise your advance directive at any time, as long as you are able to make decisions  Review your plan every year, and when there are changes in your life, or your health  When you make changes, let your family, proxy, and healthcare providers know  Give each a new copy  Where can I find more information? · [employer]  [address] [place] , [address]  Phone: 9- [phone]  Phone: 2- [phone] - [phone]  Web Address: http://www  aafp org  · [address] [facility]  [address] [facility], [address] [facility] , [address] [place] [address]  Phone: [phone]  Phone: [phone]  Web Address: Shaw jones  [facility] AGREEMENT:   You have the right to help plan your care  To help with this plan, you must learn about your health condition and treatment options  You must also learn about advance directives and how they are used  Work with your healthcare providers to decide what care will be used to treat you  You always have the right to refuse treatment  The above information is an  only   It is not intended as medical advice for individual conditions or treatments  Talk to your doctor, nurse or pharmacist before following any medical regimen to see if it is safe and effective for you  © 2017 [address] [employer] Information is for End User's use only and may not be sold, redistributed or otherwise used for commercial purposes  All illustrations and images included in CareNotes® are the copyrighted property of [employer]  or Fransisco Flores  Chronic Hypertension   AMBULATORY CARE:   Hypertension  is high blood pressure (BP)  Your BP is the force of your blood moving against the walls of your arteries  Normal BP is less than 120/80  Prehypertension is between 120/80 and 139/89  Hypertension is 140/90 or higher  Hypertension causes your BP to get so high that your heart has to work much harder than normal  This can damage your heart  Chronic hypertension is a long-term condition that you can control with a healthy lifestyle or medicines  A controlled blood pressure helps protect your organs, such as your heart, lungs, brain, and kidneys  Common symptoms include the following:   · Headache     · Blurred vision    · Chest pain     · Dizziness or weakness     · Trouble breathing     · Nosebleeds  Call 911 for any of the following:   · You have discomfort in your chest that feels like squeezing, pressure, fullness, or pain  · You become confused or have difficulty speaking  · You suddenly feel lightheaded or have trouble breathing  · You have pain or discomfort in your back, neck, jaw, stomach, or arm  Seek care immediately if:   · You have a severe headache or vision loss  · You have weakness in an arm or leg  Contact your healthcare provider if:   · You feel faint, dizzy, confused, or drowsy  · You have been taking your BP medicine and your BP is still higher than your healthcare provider says it should be  · You have questions or concerns about your condition or care    Treatment for chronic hypertension  may include medicine to lower your BP and lower your cholesterol level  A low cholesterol level helps prevent heart disease and makes it easier to control your blood pressure  Heart disease can make your blood pressure harder to control  You may also need to make lifestyle changes  Take your medicine exactly as directed  Manage chronic hypertension:  Talk with your healthcare provider about these and other ways to manage hypertension:  · Take your BP at home  Sit and rest for 5 minutes before you take your BP  Extend your arm and support it on a flat surface  Your arm should be at the same level as your heart  Follow the directions that came with your BP monitor  If possible, take at least 2 BP readings each time  Take your BP at least twice a day at the same times each day, such as morning and evening  Keep a record of your BP readings and bring it to your follow-up visits  Ask your healthcare provider what your blood pressure should be  · Limit sodium (salt) as directed  Too much sodium can affect your fluid balance  Check labels to find low-sodium or no-salt-added foods  Some low-sodium foods use potassium salts for flavor  Too much potassium can also cause health problems  Your healthcare provider will tell you how much sodium and potassium are safe for you to have in a day  He or she may recommend that you limit sodium to 2,300 mg a day  · Follow the meal plan recommended by your healthcare provider  A dietitian or your provider can give you more information on low-sodium plans or the DASH (Dietary Approaches to Stop Hypertension) eating plan  The DASH plan is low in sodium, unhealthy fats, and total fat  It is high in potassium, calcium, and fiber  · Exercise to maintain a healthy weight  Exercise at least 30 minutes per day, on most days of the week  This will help decrease your blood pressure  Ask about the best exercise plan for you  · Decrease stress  This may help lower your BP   Learn ways to relax, such as deep breathing or listening to music  · Limit alcohol  Women should limit alcohol to 1 drink a day  Men should limit alcohol to 2 drinks a day  A drink of alcohol is 12 ounces of beer, 5 ounces of wine, or 1½ ounces of liquor  · Do not smoke  Nicotine and other chemicals in cigarettes and cigars can increase your BP and also cause lung damage  Ask your healthcare provider for information if you currently smoke and need help to quit  E-cigarettes or smokeless tobacco still contain nicotine  Talk to your healthcare provider before you use these products  Follow up with your healthcare provider as directed: You will need to return to have your BP checked and to have other lab tests done  Write down your questions so you remember to ask them during your visits  © 2017 [address] [employer] Information is for End User's use only and may not be sold, redistributed or otherwise used for commercial purposes  All illustrations and images included in CareNotes® are the copyrighted property of [employer] M [employer]  or Fransisco Flores  The above information is an  only  It is not intended as medical advice for individual conditions or treatments  Talk to your doctor, nurse or pharmacist before following any medical regimen to see if it is safe and effective for you  Obesity   AMBULATORY CARE:   Obesity  is when your body mass index (BMI) is greater than 30  Your healthcare provider will use your height and weight to measure your BMI  The risks of obesity include  many health problems, such as injuries or physical disability   You may need tests to check for the following:  · Diabetes     · High blood pressure or high cholesterol     · Heart disease     · Gallbladder or liver disease     · Cancer of the colon, breast, prostate, liver, or kidney     · Sleep apnea     · Arthritis or gout  Seek care immediately if:   · You have a severe headache, confusion, or difficulty speaking  · You have weakness on one side of your body  · You have chest pain, sweating, or shortness of breath  Contact your healthcare provider if:   · You have symptoms of gallbladder or liver disease, such as pain in your upper abdomen  · You have knee or hip pain and discomfort while walking  · You have symptoms of diabetes, such as intense hunger and thirst, and frequent urination  · You have symptoms of sleep apnea, such as snoring or daytime sleepiness  · You have questions or concerns about your condition or care  Treatment for obesity  focuses on helping you lose weight to improve your health  Even a small decrease in BMI can reduce the risk for many health problems  Your healthcare provider will help you set a weight-loss goal   · Lifestyle changes  are the first step in treating obesity  These include making healthy food choices and getting regular physical activity  Your healthcare provider may suggest a weight-loss program that involves coaching, education, and therapy  · Medicine  may help you lose weight when it is used with a healthy diet and physical activity  · Surgery  can help you lose weight if you are very obese and have other health problems  There are several types of weight-loss surgery  Ask your healthcare provider for more information  Be successful losing weight:   · Set small, realistic goals  An example of a small goal is to walk for 20 minutes 5 days a week  Anther goal is to lose 5% of your body weight  · Tell friends, family members, and coworkers about your goals  and ask for their support  Ask a friend to lose weight with you, or join a weight-loss support group  · Identify foods or triggers that may cause you to overeat , and find ways to avoid them  Remove tempting high-calorie foods from your home and workplace  Place a bowl of fresh fruit on your kitchen counter  If stress causes you to eat, then find other ways to cope with stress  · Keep a diary to track what you eat and drink  Also write down how many minutes of physical activity you do each day  Weigh yourself once a week and record it in your diary  Eating changes: You will need to eat 500 to 1,000 fewer calories each day than you currently eat to lose 1 to 2 pounds a week  The following changes will help you cut calories:  · Eat smaller portions  Use small plates, no larger than 9 inches in diameter  Fill your plate half full of fruits and vegetables  Measure your food using measuring cups until you know what a serving size looks like  · Eat 3 meals and 1 or 2 snacks each day  Plan your meals in advance  Leonor Bilberry and eat at home most of the time  Eat slowly  · Eat fruits and vegetables at every meal   They are low in calories and high in fiber, which makes you feel full  Do not add butter, margarine, or cream sauce to vegetables  Use herbs to season steamed vegetables  · Eat less fat and fewer fried foods  Eat more baked or grilled chicken and fish  These protein sources are lower in calories and fat than red meat  Limit fast food  Dress your salads with olive oil and vinegar instead of bottled dressing  · Limit the amount of sugar you eat  Do not drink sugary beverages  Limit alcohol  Activity changes:  Physical activity is good for your body in many ways  It helps you burn calories and build strong muscles  It decreases stress and depression, and improves your mood  It can also help you sleep better  Talk to your healthcare provider before you begin an exercise program   · Exercise for at least 30 minutes 5 days a week  Start slowly  Set aside time each day for physical activity that you enjoy and that is convenient for you  It is best to do both weight training and an activity that increases your heart rate, such as walking, bicycling, or swimming  · Find ways to be more active  Do yard work and housecleaning  Walk up the stairs instead of using elevators  Spend your leisure time going to events that require walking, such as outdoor festivals or fairs  This extra physical activity can help you lose weight and keep it off  Follow up with your healthcare provider as directed: You may need to meet with a dietitian  Write down your questions so you remember to ask them during your visits  © 2017 [address] [employer] Information is for End User's use only and may not be sold, redistributed or otherwise used for commercial purposes  All illustrations and images included in CareNotes® are the copyrighted property of [employer]  or Fransisco Flores  The above information is an  only  It is not intended as medical advice for individual conditions or treatments  Talk to your doctor, nurse or pharmacist before following any medical regimen to see if it is safe and effective for you  Urinary Incontinence   WHAT YOU NEED TO KNOW:   What is urinary incontinence? Urinary incontinence (UI) is when you lose control of your bladder  What causes UI? UI occurs because your bladder cannot store or empty urine properly  The following are the most common types of UI:  · Stress incontinence  is when you leak urine due to increased bladder pressure  This may happen when you cough, sneeze, or exercise  · Urge incontinence  is when you feel the need to urinate right away and leak urine accidentally  · Mixed incontinence  is when you have both stress and urge UI  What are the signs and symptoms of UI?   · You feel like your bladder does not empty completely when you urinate  · You urinate often and need to urinate immediately  · You leak urine when you sleep, or you wake up with the urge to urinate  · You leak urine when you cough, sneeze, exercise, or laugh  How is UI diagnosed? Your healthcare provider will ask how often you leak urine and whether you have stress or urge symptoms   Tell him which medicines you take, how often you urinate, and how much liquid you drink each day  You may need any of the following tests:  · Urine tests  may show infection or kidney function  · A pelvic exam  may be done to check for blockages  A pelvic exam will also show if your bladder, uterus, or other organs have moved out of place  · An x-ray, ultrasound, or CT  may show problems with parts of your urinary system  You may be given contrast liquid to help your organs show up better in the pictures  Tell the healthcare provider if you have ever had an allergic reaction to contrast liquid  Do not enter the MRI room with anything metal  Metal can cause serious injury  Tell the healthcare provider if you have any metal in or on your body  · A bladder scan  will show how much urine is left in your bladder after you urinate  You will be asked to urinate and then healthcare providers will use a small ultrasound machine to check the urine left in your bladder  · Cystometry  is used to check the function of your urinary system  Your healthcare provider checks the pressure in your bladder while filling it with fluid  Your bladder pressure may also be tested when your bladder is full and while you urinate  How is UI treated? · Medicines  can help strengthen your bladder control  · Electrical stimulation  is used to send a small amount of electrical energy to your pelvic floor muscles  This helps control your bladder function  Electrodes may be placed outside your body or in your rectum  For women, the electrodes may be placed in the vagina  · A bulking agent  may be injected into the wall of your urethra to make it thicker  This helps keep your urethra closed and decreases urine leakage  · Devices  such as a clamp, pessary, or tampon may help stop urine leaks  Ask your healthcare provider for more information about these and other devices  · Surgery  may be needed if other treatments do not work   Several types of surgery can help improve your bladder control  Ask your healthcare provider for more information about the surgery you may need  How can I manage my symptoms? · Do pelvic muscle exercises often  Your pelvic muscles help you stop urinating  Squeeze these muscles tight for 5 seconds, then relax for 5 seconds  Gradually work up to squeezing for 10 seconds  Do 3 sets of 15 repetitions a day, or as directed  This will help strengthen your pelvic muscles and improve bladder control  · A catheter  may be used to help empty your bladder  A catheter is a tiny, plastic tube that is put into your bladder to drain your urine  Your healthcare provider may tell you to use a catheter to prevent your bladder from getting too full and leaking urine  · Keep a UI record  Write down how often you leak urine and how much you leak  Make a note of what you were doing when you leaked urine  · Train your bladder  Go to the bathroom at set times, such as every 2 hours, even if you do not feel the urge to go  You can also try to hold your urine when you feel the urge to go  For example, hold your urine for 5 minutes when you feel the urge to go  As that becomes easier, hold your urine for 10 minutes  · Drink liquids as directed  Ask your healthcare provider how much liquid to drink each day and which liquids are best for you  You may need to limit the amount of liquid you drink to help control your urine leakage  Limit or do not have drinks that contain caffeine or alcohol  Do not drink any liquid right before you go to bed  · Prevent constipation  Eat a variety of high-fiber foods  Good examples are high-fiber cereals, beans, vegetables, and whole-grain breads  Prune juice may help make your bowel movement softer  Walking is the best way to trigger your intestines to have a bowel movement  · Exercise regularly and maintain a healthy weight  Ask your healthcare provider how much you should weigh and about the best exercise plan for you  Weight loss and exercise will decrease pressure on your bladder and help you control your leakage  Ask him to help you create a weight loss plan if you are overweight  When should I seek immediate care? · You have severe pain  · You are confused or cannot think clearly  When should I contact my healthcare provider? · You have a fever  · You see blood in your urine  · You have pain when you urinate  · You have new or worse pain, even after treatment  · Your mouth feels dry or you have vision changes  · Your urine is cloudy or smells bad  · You have questions or concerns about your condition or care  CARE AGREEMENT:   You have the right to help plan your care  Learn about your health condition and how it may be treated  Discuss treatment options with your caregivers to decide what care you want to receive  You always have the right to refuse treatment  The above information is an  only  It is not intended as medical advice for individual conditions or treatments  Talk to your doctor, nurse or pharmacist before following any medical regimen to see if it is safe and effective for you  © 2017 [address] [facility] Information is for End User's use only and may not be sold, redistributed or otherwise used for commercial purposes  All illustrations and images included in CareNotes® are the copyrighted property of [employer]  or [employer]  Cigarette Smoking and Your Health   AMBULATORY CARE:   Risks to your health if you smoke:  Nicotine and other chemicals found in tobacco damage every cell in your body  Even if you are a light smoker, you have an increased risk for cancer, heart disease, and lung disease  If you are pregnant or have diabetes, smoking increases your risk for complications  Benefits to your health if you stop smoking:   · You decrease respiratory symptoms such as coughing, wheezing, and shortness of breath       · You reduce your risk for cancers of the lung, mouth, throat, kidney, bladder, pancreas, stomach, and cervix  If you already have cancer, you increase the benefits of chemotherapy  You also reduce your risk for cancer returning or a second cancer from developing  · You reduce your risk for heart disease, blood clots, heart attack, and stroke  · You reduce your risk for lung infections, and diseases such as pneumonia, asthma, chronic bronchitis, and emphysema  · Your circulation improves  More oxygen can be delivered to your body  If you have diabetes, you lower your risk for complications, such as kidney, artery, and eye diseases  You also lower your risk for nerve damage  Nerve damage can lead to amputations, poor vision, and blindness  · You improve your body's ability to heal and to fight infections  Benefits to the health of others if you stop smoking:  Tobacco is harmful to nonsmokers who breathe in your secondhand smoke  The following are ways the health of others around you may improve when you stop smoking:  · You lower the risks for lung cancer and heart disease in nonsmoking adults  · If you are pregnant, you lower the risk for miscarriage, early delivery, low birth weight, and stillbirth  You also lower your baby's risk for SIDS, obesity, developmental delay, and neurobehavioral problems, such as ADHD  · If you have children, you lower their risk for ear infections, colds, pneumonia, bronchitis, and asthma  For more information and support to stop smoking:   · Smokefree  gov  Phone: 4- 408 - 830-5140  Web Address: www smokefrRock[employer]  Follow up with your healthcare provider as directed:  Write down your questions so you remember to ask them during your visits  © 2017 [address] [employer] Information is for End User's use only and may not be sold, redistributed or otherwise used for commercial purposes   All illustrations and images included in CareNotes® are the copyrighted property of MAKAYLA CAR Jin  or Frans[employer]  The above information is an  only  It is not intended as medical advice for individual conditions or treatments  Talk to your doctor, nurse or pharmacist before following any medical regimen to see if it is safe and effective for you  Fall Prevention   WHAT YOU NEED TO KNOW:   What is fall prevention? Fall prevention includes ways to make your home and other areas safer  It also includes ways you can move more carefully to prevent a fall  What increases my risk for falls? · Lack of vitamin D    · Not getting enough sleep each night    · Trouble walking or keeping your balance, or foot problems    · Health conditions that cause changes in your blood pressure, vision, or muscle strength and coordination    · Medicines that make you dizzy, weak, or sleepy    · Problems seeing clearly    · Shoes that have high heels or are not supportive    · Tripping hazards, such as items left on the floor, no handrails on the stairs, or broken steps  How can I help protect myself from falls? · Stand or sit up slowly  This may help you keep your balance and prevent falls  If you need to get up during the night, sit up first  Be sure you are fully awake before you stand  Turn on the light before you start walking  Go slowly in case you are still sleepy  Make sure you will not trip over any pets sleeping in the bedroom  · Use assistive devices as directed  Your healthcare provider may suggest that you use a cane or walker to help you keep your balance  You may need to have grab bars put in your bathroom near the toilet or in the shower  · Wear shoes that fit well and have soles that   Wear shoes both inside and outside  Use slippers with good   Do not wear shoes with high heels  · Wear a personal alarm  This is a device that allows you to call 911 if you fall and need help  Ask your healthcare provider for more information  · Stay active    Exercise can help strengthen your muscles and improve your balance  Your healthcare provider may recommend water aerobics or walking  He or she may also recommend physical therapy to improve your coordination  Never start an exercise program without talking to your healthcare provider first      · Manage medical conditions  Keep all appointments with your healthcare providers  Visit your eye doctor as directed  How can I make my home safer? · Add items to prevent falls in the bathroom  Put nonslip strips on your bath or shower floor to prevent you from slipping  Use a bath mat if you do not have carpet in the bathroom  This will prevent you from falling when you step out of the bath or shower  Use a shower seat so you do not need to stand while you shower  Sit on the toilet or a chair in your bathroom to dry yourself and put on clothing  This will prevent you from losing your balance from drying or dressing yourself while you are standing  · Keep paths clear  Remove books, shoes, and other objects from walkways and stairs  Place cords for telephones and lamps out of the way so that you do not need to walk over them  Tape them down if you cannot move them  Remove small rugs  If you cannot remove a rug, secure it with double-sided tape  This will prevent you from tripping  · Install bright lights in your home  Use night lights to help light paths to the bathroom or kitchen  Always turn on the light before you start walking  · Keep items you use often on shelves within reach  Do not use a step stool to help you reach an item  · Paint or place reflective tape on the edges of your stairs  This will help you see the stairs better  Call 911 or have someone else call if:   · You have fallen and are unconscious  · You have fallen and cannot move part of your body  Contact your healthcare provider if:   · You have fallen and have pain or a headache      · You have questions or concerns about your condition or care   CARE AGREEMENT:   You have the right to help plan your care  Learn about your health condition and how it may be treated  Discuss treatment options with your caregivers to decide what care you want to receive  You always have the right to refuse treatment  The above information is an  only  It is not intended as medical advice for individual conditions or treatments  Talk to your doctor, nurse or pharmacist before following any medical regimen to see if it is safe and effective for you  © 2017 [address] [facility] Information is for End User's use only and may not be sold, redistributed or otherwise used for commercial purposes  All illustrations and images included in CareNotes® are the copyrighted property of [employer] M [employer] Inc  or [employer]  Advance Directives   WHAT YOU NEED TO KNOW:   What are advance directives? Advance directives are legal documents that state your wishes and plans for medical care  These plans are made ahead of time in case you lose your ability to make decisions for yourself  Advance directives can apply to any medical decision, such as the treatments you want, and if you want to donate organs  What are the types of advance directives? There are many types of advance directives, and each state has rules about how to use them  You may choose a combination of any of the following:  · Living will: This is a written record of the treatment you want  You can also choose which treatments you do not want, which to limit, and which to stop at a certain time  This includes surgery, medicine, IV fluid, and tube feedings  · Durable power of  for healthcare [place] [facility]): This is a written record that states who you want to make healthcare choices for you when you are unable to make them for yourself  This person, called a proxy, is usually a family member or a friend  You may choose more than 1 proxy      · Do not resuscitate (DNR) order:  A DNR order is used in case your heart stops beating or you stop breathing  It is a request not to have certain forms of treatment, such as CPR  A DNR order may be included in other types of advance directives  · Medical directive: This covers the care that you want if you are in a coma, near death, or unable to make decisions for yourself  You can list the treatments you want for each condition  Treatment may include pain medicine, surgery, blood transfusions, dialysis, IV or tube feedings, and a ventilator (breathing machine)  · Values history: This document has questions about your views, beliefs, and how you feel and think about life  This information can help others choose the care that you would choose  Why are advance directives important? An advance directive helps you control your care  Although spoken wishes may be used, it is better to have your wishes written down  Spoken wishes can be misunderstood, or not followed  Treatments may be given even if you do not want them  An advance directive may make it easier for your family to make difficult choices about your care  How do I decide what to put in my advance directives? · Make informed decisions:  Make sure you fully understand treatments or care you may receive  Think about the benefits and problems your decisions could cause for you or your family  Talk to healthcare providers if you have concerns or questions before you write down your wishes  You may also want to talk with your Christian or , or a   Check your state laws to make sure that what you put in your advance directive is legal      · Sign all forms:  Sign and date your advance directive when you have finished  You may also need 2 witnesses to sign the forms  Witnesses cannot be your doctor or his staff, your spouse, heirs or beneficiaries, people you owe money to, or your chosen proxy   Talk to your family, proxy, and healthcare providers about your advance directive  Give each person a copy, and keep one for yourself in a place you can get to easily  Do not keep it hidden or locked away  · Review and revise your plans: You can revise your advance directive at any time, as long as you are able to make decisions  Review your plan every year, and when there are changes in your life, or your health  When you make changes, let your family, proxy, and healthcare providers know  Give each a new copy  Where can I find more information? · [employer]  [address] , [place] [address]  Phone: [phone]  Phone: [phone]  Web Address: http://www  aafp org  · [address] [facility])  [address] [address] [facility], [address] [facility] , [address] [place] [address]  Phone: [phone]  Phone: [phone]  Web Address: Shaw jones  [facility] AGREEMENT:   You have the right to help plan your care  To help with this plan, you must learn about your health condition and treatment options  You must also learn about advance directives and how they are used  Work with your healthcare providers to decide what care will be used to treat you  You always have the right to refuse treatment  The above information is an  only  It is not intended as medical advice for individual conditions or treatments  Talk to your doctor, nurse or pharmacist before following any medical regimen to see if it is safe and effective for you  © 2017 [address]  Information is for End User's use only and may not be sold, redistributed or otherwise used for commercial purposes  All illustrations and images included in CareNotes® are the copyrighted property of [employer]  or Fransisco Flores

## 2019-07-17 NOTE — PROGRESS NOTES
Assessment/Plan:  Problem List Items Addressed This Visit        Digestive    GERD without esophagitis       Endocrine    Hypoparathyroidism ([facility] [address] )    Relevant Orders    Calcium, ionized    PTH, intact       Cardiovascular and Mediastinum    Hypertension - Primary    Relevant Orders    CBC and differential    Comprehensive metabolic panel    Migraine, unspecified, not intractable, without status migrainosus       Other    Generalized anxiety disorder    Hyperlipidemia    Relevant Orders    Lipid Panel with Direct LDL reflex    Microalbuminuria      Other Visit Diagnoses     Medicare annual wellness visit, subsequent        Chronic cough               Diagnoses and all orders for this visit:    Essential hypertension  -     CBC and differential; Future  -     Comprehensive metabolic panel; Future    Mixed hyperlipidemia  -     Lipid Panel with Direct LDL reflex; Future    GERD without esophagitis    Migraine without status migrainosus, not intractable, unspecified migraine type    Generalized anxiety disorder    Microalbuminuria    Hypoparathyroidism, unspecified hypoparathyroidism type ([facility])  -     Calcium, ionized; Future  -     PTH, intact; Future    Medicare annual wellness visit, subsequent    Chronic cough    Other orders  -     SUPREP BOWEL PREP KIT 17 5-3 13-1 6 GM/177ML SOLN; USE AS DIRECTED PER OFFICE INSTRUCTIONS        No problem-specific Assessment & Plan notes found for this encounter  A/P: Doing well and will check labs  Will see if another ARB is covered and change to this to see if the cough stops  If not, will need to be re-eval  Due for Td, but no wounds  Await colonoscopy results  Continue current treatment otherwise  RTC four months four routine  Subjective:      Patient ID: Donn Cintron is a 78 y o  male   RTC with his wife for f/u Htn, hyperlipidemia, etc  Doing well and only new c/o is a dry cough for several months since his ARB was changed to an ACEI   Similar event in the distant past  Was changed due to the ARB recall  Remains active other wise and no falls  Has a colonoscopy in the near future  No GERD  Migraines are good  STAR is good  Due for labs and vaccines  The following portions of the patient's history were reviewed and updated as appropriate:   He has a past medical history of Abdominal pain, acute, generalized, Actinic keratosis, Benign colon polyp, Chest pain, Disease of thyroid gland, Dysfunction of both eustachian tubes, Fatigue, Generalized anxiety disorder, GERD (gastroesophageal reflux disease), Hematuria, Hypertension, Lightheadedness, Lump in neck, Malaise and fatigue, Nephropathy, Shortness of breath, Skin lesion, and Tinea corporis  ,  does not have any pertinent problems on file  ,   has a past surgical history that includes Skin biopsy; Thyroid surgery; Colonoscopy w/ polypectomy; Head & neck skin lesion excisional biopsy; and Tonsillectomy  ,  family history includes Hypertension in his father and mother; Other in his brother  ,   reports that he has never smoked  He has never used smokeless tobacco  He reports that he does not drink alcohol or use drugs  ,  has No Known Allergies     Current Outpatient Medications   Medication Sig Dispense Refill    aspirin (ASPIRIN LOW DOSE) 81 mg EC tablet Take 1 tablet by mouth daily      calcitriol (ROCALTROL) 0 25 mcg capsule Take 1 capsule (0 25 mcg total) by mouth 3 (three) times a week 90 capsule 1    Calcium Carbonate (CALCIUM 600 PO) Take by mouth Twice per day      Cholecalciferol (VITAMIN D3) 1000 units CAPS Take by mouth      lisinopril (ZESTRIL) 20 mg tablet   1    Magnesium 250 MG TABS Take by mouth      metoprolol tartrate (LOPRESSOR) 25 mg tablet Take 1 tablet (25 mg total) by mouth daily 90 tablet 3    Multiple Vitamin (MULTI-VITAMIN DAILY PO) Take by mouth daily      Omega-3 Fatty Acids (FISH OIL) 1200 MG CAPS Take 2 capsules by mouth daily      Turmeric Curcumin 500 MG CAPS Take 1 capsule by mouth      Na Sulfate-K Sulfate-Mg Sulf (SUPREP BOWEL PREP KIT) 17 5-3 13-1 6 GM/177ML SOLN Take 1 kit by mouth once for 1 dose Per office instructions  2 Bottle 0    SUPREP BOWEL PREP KIT 17 5-3 13-1 6 GM/177ML SOLN USE AS DIRECTED PER OFFICE INSTRUCTIONS  0     No current facility-administered medications for this visit  Review of Systems   Constitutional: Negative for activity change, chills, diaphoresis, fatigue and fever  HENT: Negative  Eyes: Negative for visual disturbance  Respiratory: Positive for cough  Negative for chest tightness, shortness of breath and wheezing  Cardiovascular: Negative for chest pain, palpitations and leg swelling  Gastrointestinal: Negative for abdominal pain, constipation, diarrhea, nausea and vomiting  Endocrine: Negative for cold intolerance and heat intolerance  Genitourinary: Negative for difficulty urinating, dysuria and frequency  Musculoskeletal: Negative for arthralgias, gait problem and myalgias  Neurological: Negative for dizziness, seizures, syncope, weakness, light-headedness and headaches  Psychiatric/Behavioral: Negative for confusion, dysphoric mood and sleep disturbance  The patient is not nervous/anxious  Objective:  Vitals:    07/17/19 1316   BP: 134/80   BP Location: Left arm   Patient Position: Sitting   Cuff Size: Adult   Pulse: 68   Resp: 18   Temp: 98 4 °F (36 9 °C)   TempSrc: Tympanic   SpO2: 94%   Weight: 94 4 kg (208 lb 3 2 oz)   Height: 6' 2" (1 88 m)     Body mass index is 26 73 kg/m²  Physical Exam   Constitutional: He is oriented to person, place, and time  He appears well-developed and well-nourished  No distress  HENT:   Head: Normocephalic and atraumatic  Mouth/Throat: Oropharynx is clear and moist    Eyes: Pupils are equal, round, and reactive to light  Conjunctivae and EOM are normal    Neck: Neck supple  No JVD present  Cardiovascular: Normal rate, regular rhythm and normal heart sounds  Pulmonary/Chest: Effort normal and breath sounds normal  No respiratory distress  He has no wheezes  He has no rales  Abdominal: Soft  Bowel sounds are normal  He exhibits no distension  There is no tenderness  Musculoskeletal: He exhibits no edema  Neurological: He is alert and oriented to person, place, and time  Psychiatric: He has a normal mood and affect  His behavior is normal  Judgment and thought content normal    Nursing note and vitals reviewed

## 2019-07-18 ENCOUNTER — APPOINTMENT (OUTPATIENT)
Dept: LAB | Facility: CLINIC | Age: 69
End: 2019-07-18
Payer: MEDICARE

## 2019-07-18 DIAGNOSIS — E78.2 MIXED HYPERLIPIDEMIA: ICD-10-CM

## 2019-07-18 DIAGNOSIS — I10 ESSENTIAL HYPERTENSION: ICD-10-CM

## 2019-07-18 DIAGNOSIS — E20.9 HYPOPARATHYROIDISM, UNSPECIFIED HYPOPARATHYROIDISM TYPE (HCC): ICD-10-CM

## 2019-07-18 LAB
ALBUMIN SERPL BCP-MCNC: 3.6 G/DL (ref 3.5–5)
ALP SERPL-CCNC: 62 U/L (ref 46–116)
ALT SERPL W P-5'-P-CCNC: 23 U/L (ref 12–78)
ANION GAP SERPL CALCULATED.3IONS-SCNC: 7 MMOL/L (ref 4–13)
AST SERPL W P-5'-P-CCNC: 19 U/L (ref 5–45)
BASOPHILS # BLD AUTO: 0.09 THOUSANDS/ΜL (ref 0–0.1)
BASOPHILS NFR BLD AUTO: 1 % (ref 0–1)
BILIRUB SERPL-MCNC: 0.78 MG/DL (ref 0.2–1)
BUN SERPL-MCNC: 20 MG/DL (ref 5–25)
CA-I BLD-SCNC: 1.12 MMOL/L (ref 1.12–1.32)
CALCIUM SERPL-MCNC: 8.7 MG/DL (ref 8.3–10.1)
CHLORIDE SERPL-SCNC: 102 MMOL/L (ref 100–108)
CHOLEST SERPL-MCNC: 198 MG/DL (ref 50–200)
CO2 SERPL-SCNC: 28 MMOL/L (ref 21–32)
CREAT SERPL-MCNC: 1.21 MG/DL (ref 0.6–1.3)
EOSINOPHIL # BLD AUTO: 0.16 THOUSAND/ΜL (ref 0–0.61)
EOSINOPHIL NFR BLD AUTO: 2 % (ref 0–6)
ERYTHROCYTE [DISTWIDTH] IN BLOOD BY AUTOMATED COUNT: 12.9 % (ref 11.6–15.1)
GFR SERPL CREATININE-BSD FRML MDRD: 60 ML/MIN/1.73SQ M
GLUCOSE P FAST SERPL-MCNC: 94 MG/DL (ref 65–99)
HCT VFR BLD AUTO: 48 % (ref 36.5–49.3)
HDLC SERPL-MCNC: 44 MG/DL (ref 40–60)
HGB BLD-MCNC: 15.9 G/DL (ref 12–17)
IMM GRANULOCYTES # BLD AUTO: 0.01 THOUSAND/UL (ref 0–0.2)
IMM GRANULOCYTES NFR BLD AUTO: 0 % (ref 0–2)
LDLC SERPL CALC-MCNC: 126 MG/DL (ref 0–100)
LYMPHOCYTES # BLD AUTO: 1.97 THOUSANDS/ΜL (ref 0.6–4.47)
LYMPHOCYTES NFR BLD AUTO: 29 % (ref 14–44)
MCH RBC QN AUTO: 30 PG (ref 26.8–34.3)
MCHC RBC AUTO-ENTMCNC: 33.1 G/DL (ref 31.4–37.4)
MCV RBC AUTO: 91 FL (ref 82–98)
MONOCYTES # BLD AUTO: 0.55 THOUSAND/ΜL (ref 0.17–1.22)
MONOCYTES NFR BLD AUTO: 8 % (ref 4–12)
NEUTROPHILS # BLD AUTO: 4.14 THOUSANDS/ΜL (ref 1.85–7.62)
NEUTS SEG NFR BLD AUTO: 60 % (ref 43–75)
NRBC BLD AUTO-RTO: 0 /100 WBCS
PLATELET # BLD AUTO: 245 THOUSANDS/UL (ref 149–390)
PMV BLD AUTO: 9.9 FL (ref 8.9–12.7)
POTASSIUM SERPL-SCNC: 4.5 MMOL/L (ref 3.5–5.3)
PROT SERPL-MCNC: 7.4 G/DL (ref 6.4–8.2)
PTH-INTACT SERPL-MCNC: <6.3 PG/ML (ref 18.4–80.1)
RBC # BLD AUTO: 5.3 MILLION/UL (ref 3.88–5.62)
SODIUM SERPL-SCNC: 137 MMOL/L (ref 136–145)
TRIGL SERPL-MCNC: 139 MG/DL
WBC # BLD AUTO: 6.92 THOUSAND/UL (ref 4.31–10.16)

## 2019-07-18 PROCEDURE — 80053 COMPREHEN METABOLIC PANEL: CPT

## 2019-07-18 PROCEDURE — 85025 COMPLETE CBC W/AUTO DIFF WBC: CPT

## 2019-07-18 PROCEDURE — 82330 ASSAY OF CALCIUM: CPT

## 2019-07-18 PROCEDURE — 36415 COLL VENOUS BLD VENIPUNCTURE: CPT

## 2019-07-18 PROCEDURE — 80061 LIPID PANEL: CPT

## 2019-07-18 PROCEDURE — 83970 ASSAY OF PARATHORMONE: CPT

## 2019-07-19 ENCOUNTER — ANESTHESIA EVENT (OUTPATIENT)
Dept: PERIOP | Facility: HOSPITAL | Age: 69
End: 2019-07-19

## 2019-07-19 ENCOUNTER — HOSPITAL ENCOUNTER (OUTPATIENT)
Dept: PERIOP | Facility: HOSPITAL | Age: 69
Discharge: HOME | End: 2019-07-19
Attending: INTERNAL MEDICINE | Admitting: INTERNAL MEDICINE
Payer: MEDICARE

## 2019-07-19 ENCOUNTER — ANESTHESIA (OUTPATIENT)
Dept: PERIOP | Facility: HOSPITAL | Age: 69
End: 2019-07-19

## 2019-07-19 VITALS
WEIGHT: 198 LBS | SYSTOLIC BLOOD PRESSURE: 128 MMHG | TEMPERATURE: 97.2 F | BODY MASS INDEX: 25.41 KG/M2 | OXYGEN SATURATION: 97 % | DIASTOLIC BLOOD PRESSURE: 69 MMHG | HEART RATE: 61 BPM | HEIGHT: 74 IN | RESPIRATION RATE: 18 BRPM

## 2019-07-19 DIAGNOSIS — Z12.11 ENCOUNTER FOR COLONOSCOPY DUE TO HISTORY OF ADENOMATOUS COLONIC POLYPS: ICD-10-CM

## 2019-07-19 DIAGNOSIS — K22.719 BARRETT'S ESOPHAGUS WITH DYSPLASIA: Primary | ICD-10-CM

## 2019-07-19 DIAGNOSIS — K21.9 GASTROESOPHAGEAL REFLUX DISEASE, ESOPHAGITIS PRESENCE NOT SPECIFIED: ICD-10-CM

## 2019-07-19 DIAGNOSIS — Z86.010 ENCOUNTER FOR COLONOSCOPY DUE TO HISTORY OF ADENOMATOUS COLONIC POLYPS: ICD-10-CM

## 2019-07-19 PROCEDURE — 88305 TISSUE EXAM BY PATHOLOGIST: CPT | Performed by: PATHOLOGY

## 2019-07-19 PROCEDURE — 45385 COLONOSCOPY W/LESION REMOVAL: CPT | Performed by: INTERNAL MEDICINE

## 2019-07-19 PROCEDURE — 43239 EGD BIOPSY SINGLE/MULTIPLE: CPT | Performed by: INTERNAL MEDICINE

## 2019-07-19 PROCEDURE — 99024 POSTOP FOLLOW-UP VISIT: CPT | Performed by: INTERNAL MEDICINE

## 2019-07-19 RX ORDER — SODIUM CHLORIDE, SODIUM LACTATE, POTASSIUM CHLORIDE, CALCIUM CHLORIDE 600; 310; 30; 20 MG/100ML; MG/100ML; MG/100ML; MG/100ML
125 INJECTION, SOLUTION INTRAVENOUS CONTINUOUS
Status: DISCONTINUED | OUTPATIENT
Start: 2019-07-19 | End: 2019-07-19

## 2019-07-19 RX ORDER — PROPOFOL 10 MG/ML
INJECTION, EMULSION INTRAVENOUS AS NEEDED
Status: DISCONTINUED | OUTPATIENT
Start: 2019-07-19 | End: 2019-07-19 | Stop reason: SURG

## 2019-07-19 RX ORDER — LIDOCAINE HYDROCHLORIDE 10 MG/ML
INJECTION, SOLUTION INFILTRATION; PERINEURAL AS NEEDED
Status: DISCONTINUED | OUTPATIENT
Start: 2019-07-19 | End: 2019-07-19 | Stop reason: SURG

## 2019-07-19 RX ORDER — PANTOPRAZOLE SODIUM 40 MG/1
40 TABLET, DELAYED RELEASE ORAL DAILY
Qty: 90 TABLET | Refills: 3 | Status: SHIPPED | OUTPATIENT
Start: 2019-07-19 | End: 2019-11-22 | Stop reason: ALTCHOICE

## 2019-07-19 RX ADMIN — PROPOFOL 50 MG: 10 INJECTION, EMULSION INTRAVENOUS at 12:58

## 2019-07-19 RX ADMIN — PROPOFOL 50 MG: 10 INJECTION, EMULSION INTRAVENOUS at 12:48

## 2019-07-19 RX ADMIN — PROPOFOL 50 MG: 10 INJECTION, EMULSION INTRAVENOUS at 12:38

## 2019-07-19 RX ADMIN — PROPOFOL 50 MG: 10 INJECTION, EMULSION INTRAVENOUS at 12:44

## 2019-07-19 RX ADMIN — PROPOFOL 50 MG: 10 INJECTION, EMULSION INTRAVENOUS at 12:40

## 2019-07-19 RX ADMIN — PROPOFOL 100 MG: 10 INJECTION, EMULSION INTRAVENOUS at 12:33

## 2019-07-19 RX ADMIN — SODIUM CHLORIDE, SODIUM LACTATE, POTASSIUM CHLORIDE, AND CALCIUM CHLORIDE 125 ML/HR: .6; .31; .03; .02 INJECTION, SOLUTION INTRAVENOUS at 11:07

## 2019-07-19 RX ADMIN — PROPOFOL 50 MG: 10 INJECTION, EMULSION INTRAVENOUS at 12:53

## 2019-07-19 RX ADMIN — LIDOCAINE HYDROCHLORIDE 100 MG: 10 INJECTION, SOLUTION INFILTRATION; PERINEURAL at 12:33

## 2019-07-19 RX ADMIN — PROPOFOL 50 MG: 10 INJECTION, EMULSION INTRAVENOUS at 12:36

## 2019-07-19 RX ADMIN — SODIUM CHLORIDE, SODIUM LACTATE, POTASSIUM CHLORIDE, AND CALCIUM CHLORIDE: .6; .31; .03; .02 INJECTION, SOLUTION INTRAVENOUS at 13:07

## 2019-07-19 NOTE — ANESTHESIA PREPROCEDURE EVALUATION
Review of Systems/Medical History  Patient summary reviewed  Chart reviewed  No history of anesthetic complications     Cardiovascular  Exercise tolerance (METS): >4,  Hyperlipidemia, Hypertension on > 1 medication,    Pulmonary  No shortness of breath,        GI/Hepatic    GERD well controlled,             Endo/Other  History of thyroid disease , hypothyroidism,      GYN       Hematology   Musculoskeletal       Neurology    Headaches,    Psychology   Anxiety,              Physical Exam    Airway    Mallampati score: I  TM Distance: >3 FB  Neck ROM: full     Dental   No notable dental hx     Cardiovascular      Pulmonary      Other Findings        Anesthesia Plan  ASA Score- 2     Anesthesia Type- IV sedation with anesthesia with ASA Monitors  Additional Monitors:   Airway Plan:         Plan Factors-    Induction- intravenous  Postoperative Plan-     Informed Consent- Anesthetic plan and risks discussed with patient  I personally reviewed this patient with the CRNA  Discussed and agreed on the Anesthesia Plan with the CRNA  Stephen Abebe

## 2019-07-19 NOTE — ANESTHESIA POSTPROCEDURE EVALUATION
Post-Op Assessment Note    CV Status:  Stable       Mental Status:  Sleepy   Hydration Status:  Stable   PONV Controlled:  None   Airway Patency:  Patent   Post Op Vitals Reviewed: Yes      Staff: CRNA           BP      Temp      Pulse  67   Resp 16   SpO2   99

## 2019-07-19 NOTE — H&P
History and Physical -  [facility]  Ismael Abebe Lynd 78 y o  male MRN: [id number]                  HPI: Alex Pastor is a 78y o  year old male who presents for EGD/colonoscopy      REVIEW OF SYSTEMS: Per the HPI, and otherwise unremarkable  Historical Information   Past Medical History:   Diagnosis Date    Abdominal pain, acute, generalized     last assessed - 21Apr2017    Actinic keratosis     last assessed - 12Jun2013    Chest pain     last assessed - [id number]    Colon polyp     Disease of thyroid gland     Dysfunction of both eustachian tubes     suspect due to ETD  Recommend otc allergy meds and if fails then add flonase; last assessed - 06Aug2012    Fatigue     last assessed - [id number]    Generalized anxiety disorder     GERD (gastroesophageal reflux disease)     Hematuria     last assessed - [id number]    Hypertension     Lightheadedness     last assessed - [id number]    Lump in neck     ? cause  Maybe a localized allergic reaction, dental issue, doubt sialdenitis, ect  Empiric abx and one dose steroids  Continue benadryl  If worsens, to er or ent      Malaise and fatigue     last assessed - 21Apr2017    Nephropathy     last assessed - [id number]    Shortness of breath     last assessed - [id number]    Skin lesion     Resolved - [id number]    Tinea corporis     last assessed - [id number]     Past Surgical History:   Procedure Laterality Date    COLONOSCOPY W/ POLYPECTOMY      last assessed - 20Jan2017    HEAD & NECK SKIN LESION EXCISIONAL BIOPSY      excision of lesion scalp malignant - BCCA; last assessed - [id number]    SKIN BIOPSY      last assessed - 09Sep2014   [facility] THYROID SURGERY      Biopsy thyroid using percutaneous core needle; last assessed - 09Sep2014    TONSILLECTOMY       Social History   Social History     Substance and Sexual Activity   Alcohol Use Yes    Comment: rarely     Social History     Substance and Sexual Activity   Drug Use No     Social History Tobacco Use   Smoking Status Never Smoker   Smokeless Tobacco Never Used     Family History   Problem Relation Age of Onset    Hypertension Mother     Hypertension Father     Other Brother         Schwannomatosis       Meds/Allergies       (Not in a hospital admission)    No Known Allergies    Objective     Blood pressure 124/64, pulse 66, temperature 98 1 °F (36 7 °C), temperature source Tympanic, resp  rate 18, height 6' 2" (1 88 m), weight 89 8 kg (198 lb), SpO2 98 %  PHYSICAL EXAM    Gen: NAD  CV: RRR  CHEST: Clear  ABD: soft, NT/ND  EXT: no edema      ASSESSMENT/PLAN:  This is a 78y o  year old male here for EGD/colonoscopy, and he is stable and optimized for his procedure

## 2019-07-23 DIAGNOSIS — I10 ESSENTIAL HYPERTENSION: ICD-10-CM

## 2019-08-14 ENCOUNTER — OFFICE VISIT (OUTPATIENT)
Dept: GASTROENTEROLOGY | Facility: HOSPITAL | Age: 69
End: 2019-08-14
Payer: MEDICARE

## 2019-08-14 VITALS
WEIGHT: 211.2 LBS | SYSTOLIC BLOOD PRESSURE: 131 MMHG | HEART RATE: 73 BPM | DIASTOLIC BLOOD PRESSURE: 77 MMHG | BODY MASS INDEX: 27.11 KG/M2 | HEIGHT: 74 IN | TEMPERATURE: 97.6 F

## 2019-08-14 DIAGNOSIS — K21.9 GERD WITHOUT ESOPHAGITIS: ICD-10-CM

## 2019-08-14 DIAGNOSIS — K63.5 BENIGN COLON POLYP: Primary | ICD-10-CM

## 2019-08-14 PROCEDURE — 99214 OFFICE O/P EST MOD 30 MIN: CPT | Performed by: PHYSICIAN ASSISTANT

## 2019-08-14 NOTE — PROGRESS NOTES
Assessment/Plan:     Diagnoses and all orders for this visit:    Benign colon polyp  Patient was due for screening colonoscopy  Hyperplastic polyps were found  It was recommended that he have this repeated in 5 years time or sooner if necessary  GERD without esophagitis  He also has a history of heartburn  He underwent upper endoscopy which appeared to look like Smith's however biopsies did not confirm this  He is currently on pantoprazole with good control of his symptoms  Can considered discontinuing in the future  Will see him back in 6 months or sooner if necessary  Subjective:      Patient ID: Donn Cintron is a 78 y o  male  HPI     This is a follow-up for GERD and to discuss his endoscopy and colonoscopy  He was started on pantoprazole and states since then he has not had any symptoms of heartburn or reflux  He denies any difficulties with his bowels  Endoscopy showed what appeared to be Smith's however biopsies did not confirm this  Colonoscopy showed a few diverticuli, small internal hemorrhoids, several polyps in the rectosigmoid  These were hyperplastic  It was recommended that he have repeat colonoscopy in 5 years time      Patient Active Problem List   Diagnosis    Allergic rhinitis    Asymptomatic proteinuria    Basal cell tumor    Benign colon polyp    Generalized anxiety disorder    GERD without esophagitis    Hyperlipidemia    Hypertension    Hypocalcemia    Hypoparathyroidism (HCC)    Migraine, unspecified, not intractable, without status migrainosus    Multiple thyroid nodules    Palpitations    Microalbuminuria    BMI 27 0-27 9,adult     No Known Allergies  Current Outpatient Medications on File Prior to Visit   Medication Sig    aspirin (ASPIRIN LOW DOSE) 81 mg EC tablet Take 1 tablet by mouth daily    calcitriol (ROCALTROL) 0 25 mcg capsule Take 1 capsule (0 25 mcg total) by mouth 3 (three) times a week    Calcium Carbonate (CALCIUM 600 PO) Take by mouth Twice per day    Cholecalciferol (VITAMIN D3) 1000 units CAPS Take by mouth    lisinopril (ZESTRIL) 20 mg tablet     Magnesium 250 MG TABS Take by mouth    metoprolol tartrate (LOPRESSOR) 25 mg tablet Take 1 tablet (25 mg total) by mouth daily    Multiple Vitamin (MULTI-VITAMIN DAILY PO) Take by mouth daily    Omega-3 Fatty Acids (FISH OIL) 1200 MG CAPS Take 2 capsules by mouth daily    pantoprazole (PROTONIX) 40 mg tablet Take 1 tablet (40 mg total) by mouth daily    SUPREP BOWEL PREP KIT 17 5-3 13-1 6 GM/177ML SOLN USE AS DIRECTED PER OFFICE INSTRUCTIONS    Turmeric Curcumin 500 MG CAPS Take 1 capsule by mouth    Na Sulfate-K Sulfate-Mg Sulf (SUPREP BOWEL PREP KIT) 17 5-3 13-1 6 GM/177ML SOLN Take 1 kit by mouth once for 1 dose Per office instructions  No current facility-administered medications on file prior to visit  Family History   Problem Relation Age of Onset    Hypertension Mother     Hypertension Father     Other Brother         Schwannomatosis     Past Medical History:   Diagnosis Date    Abdominal pain, acute, generalized     last assessed - 21Apr2017    Actinic keratosis     last assessed - 12Jun2013    Chest pain     last assessed - [id number]    Colon polyp     Disease of thyroid gland     Dysfunction of both eustachian tubes     suspect due to ETD  Recommend otc allergy meds and if fails then add flonase; last assessed - 06Aug2012    Fatigue     last assessed - 20HHP0244    Generalized anxiety disorder     GERD (gastroesophageal reflux disease)     Hematuria     last assessed - 57Ewy4436    Hypertension     Lightheadedness     last assessed - [id number]    Lump in neck     ? cause  Maybe a localized allergic reaction, dental issue, doubt sialdenitis, ect  Empiric abx and one dose steroids  Continue benadryl  If worsens, to er or ent      Malaise and fatigue     last assessed - 21Apr2017    Nephropathy     last assessed - 28Tyk7595    Shortness of breath     last assessed - 89CAL8512    Skin lesion     Resolved - 38PVZ5152    Tinea corporis     last assessed - 83QJL9215     Social History     Socioeconomic History    Marital status: /Civil Union     Spouse name: None    Number of children: None    Years of education: None    Highest education level: None   Occupational History    Occupation: Retired   Social Needs    Financial resource strain: None    Food insecurity:     Worry: None     Inability: None    Transportation needs:     Medical: None     Non-medical: None   Tobacco Use    Smoking status: Never Smoker    Smokeless tobacco: Never Used   Substance and Sexual Activity    Alcohol use: Yes     Comment: rarely    Drug use: No    Sexual activity: None   Lifestyle    Physical activity:     Days per week: None     Minutes per session: None    Stress: None   Relationships    Social connections:     Talks on phone: None     Gets together: None     Attends Voodoo service: None     Active member of club or organization: None     Attends meetings of clubs or organizations: None     Relationship status: None    Intimate partner violence:     Fear of current or ex partner: None     Emotionally abused: None     Physically abused: None     Forced sexual activity: None   Other Topics Concern    None   Social History Narrative    None     Past Surgical History:   Procedure Laterality Date    COLONOSCOPY W/ POLYPECTOMY      last assessed - 20Jan2017    HEAD & NECK SKIN LESION EXCISIONAL BIOPSY      excision of lesion scalp malignant - BCCA; last assessed - [id number]    SKIN BIOPSY      last assessed - [id number]    THYROID SURGERY      Biopsy thyroid using percutaneous core needle; last assessed - 09Sep2014    TONSILLECTOMY           Review of Systems   All other systems reviewed and are negative          Objective:      /77 (BP Location: Right arm, Patient Position: Sitting, Cuff Size: Extra-Large)   Pulse 73   Temp 97 6 °F (36 4 °C) (Tympanic)   Ht 6' 2" (1 88 m)   Wt 95 8 kg (211 lb 3 2 oz)   BMI 27 12 kg/m²          Physical Exam   Constitutional: He is oriented to person, place, and time  He appears well-developed and well-nourished  HENT:   Head: Normocephalic  Eyes: Conjunctivae and EOM are normal    Neck: Normal range of motion  Cardiovascular: Normal rate and regular rhythm  Pulmonary/Chest: Effort normal and breath sounds normal    Abdominal: Soft  Bowel sounds are normal    Musculoskeletal: Normal range of motion  Neurological: He is alert and oriented to person, place, and time  Skin: Skin is warm and dry  Psychiatric: He has a normal mood and affect   His behavior is normal

## 2019-08-20 ENCOUNTER — TRANSCRIBE ORDERS (OUTPATIENT)
Dept: LAB | Facility: CLINIC | Age: 69
End: 2019-08-20

## 2019-08-20 ENCOUNTER — APPOINTMENT (OUTPATIENT)
Dept: LAB | Facility: CLINIC | Age: 69
End: 2019-08-20
Payer: MEDICARE

## 2019-08-20 DIAGNOSIS — N18.30 CHRONIC KIDNEY DISEASE, STAGE III (MODERATE) (HCC): ICD-10-CM

## 2019-08-20 DIAGNOSIS — N18.30 CHRONIC KIDNEY DISEASE, STAGE III (MODERATE) (HCC): Primary | ICD-10-CM

## 2019-08-20 DIAGNOSIS — E20.9 HYPOPARATHYROIDISM, UNSPECIFIED HYPOPARATHYROIDISM TYPE (HCC): ICD-10-CM

## 2019-08-20 DIAGNOSIS — E87.1 HYPOSMOLALITY SYNDROME: ICD-10-CM

## 2019-08-20 LAB
ALBUMIN SERPL BCP-MCNC: 3.9 G/DL (ref 3.5–5)
ALP SERPL-CCNC: 70 U/L (ref 46–116)
ALT SERPL W P-5'-P-CCNC: 18 U/L (ref 12–78)
ANION GAP SERPL CALCULATED.3IONS-SCNC: 8 MMOL/L (ref 4–13)
AST SERPL W P-5'-P-CCNC: 17 U/L (ref 5–45)
BILIRUB SERPL-MCNC: 0.55 MG/DL (ref 0.2–1)
BUN SERPL-MCNC: 21 MG/DL (ref 5–25)
CALCIUM SERPL-MCNC: 8.7 MG/DL (ref 8.3–10.1)
CHLORIDE SERPL-SCNC: 103 MMOL/L (ref 100–108)
CO2 SERPL-SCNC: 30 MMOL/L (ref 21–32)
CREAT SERPL-MCNC: 1.25 MG/DL (ref 0.6–1.3)
CREAT UR-MCNC: 53.7 MG/DL
GFR SERPL CREATININE-BSD FRML MDRD: 58 ML/MIN/1.73SQ M
GLUCOSE SERPL-MCNC: 92 MG/DL (ref 65–140)
MICROALBUMIN UR-MCNC: 62.1 MG/L (ref 0–20)
MICROALBUMIN/CREAT 24H UR: 116 MG/G CREATININE (ref 0–30)
PHOSPHATE SERPL-MCNC: 3.3 MG/DL (ref 2.3–4.1)
POTASSIUM SERPL-SCNC: 4.4 MMOL/L (ref 3.5–5.3)
PROT SERPL-MCNC: 7.2 G/DL (ref 6.4–8.2)
SODIUM SERPL-SCNC: 141 MMOL/L (ref 136–145)

## 2019-08-20 PROCEDURE — 82043 UR ALBUMIN QUANTITATIVE: CPT | Performed by: INTERNAL MEDICINE

## 2019-08-20 PROCEDURE — 82570 ASSAY OF URINE CREATININE: CPT | Performed by: INTERNAL MEDICINE

## 2019-08-20 PROCEDURE — 80053 COMPREHEN METABOLIC PANEL: CPT

## 2019-08-20 PROCEDURE — 84100 ASSAY OF PHOSPHORUS: CPT

## 2019-08-20 PROCEDURE — 36415 COLL VENOUS BLD VENIPUNCTURE: CPT

## 2019-08-28 ENCOUNTER — OFFICE VISIT (OUTPATIENT)
Dept: ENDOCRINOLOGY | Facility: CLINIC | Age: 69
End: 2019-08-28
Payer: MEDICARE

## 2019-08-28 VITALS
WEIGHT: 213.6 LBS | HEART RATE: 80 BPM | DIASTOLIC BLOOD PRESSURE: 76 MMHG | SYSTOLIC BLOOD PRESSURE: 126 MMHG | HEIGHT: 74 IN | BODY MASS INDEX: 27.41 KG/M2

## 2019-08-28 DIAGNOSIS — E04.2 MULTIPLE THYROID NODULES: ICD-10-CM

## 2019-08-28 DIAGNOSIS — E83.51 HYPOCALCEMIA: ICD-10-CM

## 2019-08-28 DIAGNOSIS — E20.9 HYPOPARATHYROIDISM, UNSPECIFIED HYPOPARATHYROIDISM TYPE (HCC): Primary | ICD-10-CM

## 2019-08-28 PROCEDURE — 99214 OFFICE O/P EST MOD 30 MIN: CPT | Performed by: PHYSICIAN ASSISTANT

## 2019-08-28 RX ORDER — CALCITRIOL 0.25 UG/1
0.25 CAPSULE, LIQUID FILLED ORAL DAILY
Qty: 90 CAPSULE | Refills: 1
Start: 2019-08-28 | End: 2019-10-31 | Stop reason: SDUPTHER

## 2019-08-28 NOTE — ASSESSMENT & PLAN NOTE
Calcium remains stable in the low normal range  Continue current dose of calcium/calcitriol    Check 24-hour urine calcium

## 2019-08-28 NOTE — PROGRESS NOTES
Established Patient Progress Note       Chief Complaint   Patient presents with    Thyroid Problem        History of Present Illness:     Marlen Philippe is a 78 y o  male with a history of hypoparathyroidism of unknown etitiology, hypocalcemia, and Vitamin D Deficiency  He is also following with nephrology for CKD3/proteinuria       Currently he is taking Calcium, Calcitriol, and Vitamin D  He is taking calcitriol 0 25mcg daily   He is also taking Calcium 600mg twice per day (with D)  Also Vitamin D 1,000 units daily  Overall, feeling well with no symptoms of hypocalcemia  No kidney stones       For thyroid nodules, he denies dysphagia  Ultrasound stable 10/2018  Thyroid function has been normal in the past          Patient Active Problem List   Diagnosis    Allergic rhinitis    Asymptomatic proteinuria    Basal cell tumor    Benign colon polyp    Generalized anxiety disorder    GERD without esophagitis    Hyperlipidemia    Hypertension    Hypocalcemia    Hypoparathyroidism ([facility] [address] )    Migraine, unspecified, not intractable, without status migrainosus    Multiple thyroid nodules    Palpitations    Microalbuminuria    BMI 27 0-27 9,adult      Past Medical History:   Diagnosis Date    Abdominal pain, acute, generalized     last assessed - [id number]    Actinic keratosis     last assessed - 12Jun2013    Chest pain     last assessed - [id number]    Colon polyp     Disease of thyroid gland     Dysfunction of both eustachian tubes     suspect due to ETD  Recommend otc allergy meds and if fails then add flonase; last assessed - [id number]    Fatigue     last assessed - [id number]    Generalized anxiety disorder     GERD (gastroesophageal reflux disease)     Hematuria     last assessed - [id number]    Hypertension     Lightheadedness     last assessed - [id number]    Lump in neck     ? cause  Maybe a localized allergic reaction, dental issue, doubt sialdenitis, ect   Empiric abx and one dose steroids  Continue benadryl  If worsens, to er or ent      Malaise and fatigue     last assessed - 21Apr2017    Nephropathy     last assessed - [id number]    Shortness of breath     last assessed - [id number]    Skin lesion     Resolved - [id number]    Tinea corporis     last assessed - [id number]      Past Surgical History:   Procedure Laterality Date    COLONOSCOPY W/ POLYPECTOMY      last assessed - 20Jan2017    HEAD & NECK SKIN LESION EXCISIONAL BIOPSY      excision of lesion scalp malignant - [facility]; last assessed - [id number]    SKIN BIOPSY      last assessed - 09Sep2014   [facility] THYROID SURGERY      Biopsy thyroid using percutaneous core needle; last assessed - 09Sep2014    TONSILLECTOMY        Family History   Problem Relation Age of Onset    Hypertension Mother     Hypertension Father     Other Brother         Schwannomatosis     Social History     Tobacco Use    Smoking status: Never Smoker    Smokeless tobacco: Never Used   Substance Use Topics    Alcohol use: Yes     Comment: rarely     No Known Allergies    Current Outpatient Medications:     calcitriol (ROCALTROL) 0 25 mcg capsule, Take 1 capsule (0 25 mcg total) by mouth daily for 90 days, Disp: 90 capsule, Rfl: 1    Calcium Carbonate (CALCIUM 600 PO), Take by mouth Twice per day, Disp: , Rfl:     Cholecalciferol (VITAMIN D3) 1000 units CAPS, Take by mouth, Disp: , Rfl:     lisinopril (ZESTRIL) 20 mg tablet, , Disp: , Rfl: 1    Magnesium 250 MG TABS, Take by mouth, Disp: , Rfl:     metoprolol tartrate (LOPRESSOR) 25 mg tablet, Take 1 tablet (25 mg total) by mouth daily, Disp: 90 tablet, Rfl: 3    Multiple Vitamin (MULTI-VITAMIN DAILY PO), Take by mouth daily, Disp: , Rfl:     Omega-3 Fatty Acids (FISH OIL) 1200 MG CAPS, Take 2 capsules by mouth daily, Disp: , Rfl:     pantoprazole (PROTONIX) 40 mg tablet, Take 1 tablet (40 mg total) by mouth daily, Disp: 90 tablet, Rfl: 3    Turmeric Curcumin 500 MG CAPS, Take 1 capsule by mouth, Disp: , Rfl:     aspirin (ASPIRIN LOW DOSE) 81 mg EC tablet, Take 1 tablet by mouth daily, Disp: , Rfl:     Na Sulfate-K Sulfate-Mg Sulf (SUPREP BOWEL PREP KIT) 17 5-3 13-1 6 GM/177ML SOLN, Take 1 kit by mouth once for 1 dose Per office instructions  (Patient not taking: Reported on 8/28/2019), Disp: 2 Bottle, Rfl: 0    SUPREP BOWEL PREP KIT 17 5-3 13-1 6 GM/177ML SOLN, USE AS DIRECTED PER OFFICE INSTRUCTIONS, Disp: , Rfl: 0    Review of Systems   Constitutional: Negative for activity change, appetite change and fatigue  HENT: Negative for sore throat, trouble swallowing and voice change  Eyes: Negative for visual disturbance  Respiratory: Negative for choking, chest tightness and shortness of breath  Cardiovascular: Negative for chest pain, palpitations and leg swelling  Gastrointestinal: Negative for abdominal pain, constipation and diarrhea  Endocrine: Negative for cold intolerance, heat intolerance, polydipsia, polyphagia and polyuria  Genitourinary: Negative for frequency  Musculoskeletal: Negative for arthralgias and myalgias  Skin: Negative for rash  Neurological: Negative for dizziness and syncope  Hematological: Negative for adenopathy  Psychiatric/Behavioral: Negative for sleep disturbance  All other systems reviewed and are negative  Physical Exam:  Body mass index is 27 42 kg/m²  /76   Pulse 80   Ht 6' 2" (1 88 m)   Wt 96 9 kg (213 lb 9 6 oz)   BMI 27 42 kg/m²    Wt Readings from Last 3 Encounters:   08/28/19 96 9 kg (213 lb 9 6 oz)   08/14/19 95 8 kg (211 lb 3 2 oz)   07/19/19 89 8 kg (198 lb)       Physical Exam   Constitutional: He is oriented to person, place, and time  He appears well-developed and well-nourished  No distress  HENT:   Head: Normocephalic and atraumatic  Mouth/Throat: Oropharynx is clear and moist    Eyes: Pupils are equal, round, and reactive to light  Conjunctivae and EOM are normal    Neck: Normal range of motion  Neck supple   No thyromegaly present  Cardiovascular: Normal rate, regular rhythm and normal heart sounds  No murmur heard  Pulmonary/Chest: Effort normal and breath sounds normal  No respiratory distress  He has no wheezes  He has no rales  Abdominal: Soft  Bowel sounds are normal  He exhibits no distension  There is no tenderness  Musculoskeletal: Normal range of motion  He exhibits no edema  Lymphadenopathy:     He has no cervical adenopathy  Neurological: He is alert and oriented to person, place, and time  Skin: Skin is warm and dry  Psychiatric: He has a normal mood and affect  Vitals reviewed  Labs:   Component      Latest Ref Rng & Units 7/18/2019 8/20/2019   Sodium      136 - 145 mmol/L 137 141   Potassium      3 5 - 5 3 mmol/L 4 5 4 4   Chloride      100 - 108 mmol/L 102 103   CO2      21 - 32 mmol/L 28 30   Anion Gap      4 - 13 mmol/L 7 8   BUN      5 - 25 mg/dL 20 21   Creatinine      0 60 - 1 30 mg/dL 1 21 1 25   GLUCOSE FASTING      65 - 99 mg/dL 94    Calcium      8 3 - 10 1 mg/dL 8 7 8 7   AST      5 - 45 U/L 19 17   ALT      12 - 78 U/L 23 18   Alkaline Phosphatase      46 - 116 U/L 62 70   Total Protein      6 4 - 8 2 g/dL 7 4 7 2   Albumin      3 5 - 5 0 g/dL 3 6 3 9   TOTAL BILIRUBIN      0 20 - 1 00 mg/dL 0 78 0 55   eGFR      ml/min/1 73sq m 60 58   Glucose, Random      65 - 140 mg/dL  92   Calcium, Ionized      1 12 - 1 32 mmol/L 1 12    PARATHYROID HORMONE      18 4 - 80 1 pg/mL <6 3 (L)    Phosphorus      2 3 - 4 1 mg/dL  3 3         Impression & Plan:    Problem List Items Addressed This Visit        Endocrine    Hypoparathyroidism ([address] ) - Primary     Calcium remains stable in the low normal range  Continue current dose of calcium/calcitriol    Check 24-hour urine calcium          Relevant Orders    Calcium, urine, 24 hour Lab Collect    Creatinine, urine, 24 hour Lab Collect    Calcium Lab Collect    Albumin Lab Collect    Phosphorus Lab Collect    Multiple thyroid nodules Monitor with repeat ultrasound and thyroid function testing  Relevant Orders    US thyroid    TSH, 3rd generation Lab Collect    T4, free Lab Collect       Other    Hypocalcemia     Stable/improved continue current medication/supplements  Relevant Medications    calcitriol (ROCALTROL) 0 25 mcg capsule    Other Relevant Orders    Calcium, urine, 24 hour Lab Collect    Creatinine, urine, 24 hour Lab Collect          Orders Placed This Encounter   Procedures    US thyroid     Standing Status:   Future     Standing Expiration Date:   8/28/2023     Scheduling Instructions:      No prep required  Please bring your insurance cards, a form of photo ID and a list of your medications with you  Arrive 15 minutes prior to your appointment time in order to register  To schedule this appointment, please contact Central Scheduling at [phone]   Calcium, urine, 24 hour Lab Collect     Standing Status:   Future     Standing Expiration Date:   8/28/2020    Creatinine, urine, 24 hour Lab Collect     Standing Status:   Future     Standing Expiration Date:   8/28/2020    Calcium Lab Collect     Standing Status:   Future     Standing Expiration Date:   8/28/2020    Albumin Lab Collect     Standing Status:   Future     Standing Expiration Date:   8/28/2020    Phosphorus Lab Collect     Standing Status:   Future     Standing Expiration Date:   8/28/2020    TSH, 3rd generation Lab Collect     This is a patient instruction: This test is non-fasting  Please drink two glasses of water morning of bloodwork  Standing Status:   Future     Standing Expiration Date:   8/28/2020    T4, free Lab Collect     Standing Status:   Future     Standing Expiration Date:   8/28/2020       There are no Patient Instructions on file for this visit  Discussed with the patient and all questioned fully answered  He will call me if any problems arise  Follow-up appointment in 12 months       Counseled patient on diagnostic results, prognosis, risk and benefit of treatment options, instruction for management, importance of treatment compliance, Risk  factor reduction and impressions      Juan Pablo Dixon PA-C

## 2019-09-19 DIAGNOSIS — I10 ESSENTIAL HYPERTENSION: Primary | ICD-10-CM

## 2019-09-19 RX ORDER — LISINOPRIL 20 MG/1
20 TABLET ORAL DAILY
Qty: 30 TABLET | Refills: 5 | Status: SHIPPED | OUTPATIENT
Start: 2019-09-19 | End: 2019-10-16 | Stop reason: SDUPTHER

## 2019-10-16 DIAGNOSIS — I10 ESSENTIAL HYPERTENSION: ICD-10-CM

## 2019-10-16 RX ORDER — LISINOPRIL 20 MG/1
20 TABLET ORAL DAILY
Qty: 90 TABLET | Refills: 3 | Status: SHIPPED | OUTPATIENT
Start: 2019-10-16 | End: 2020-12-14 | Stop reason: SDUPTHER

## 2019-10-24 ENCOUNTER — IMMUNIZATIONS (OUTPATIENT)
Dept: INTERNAL MEDICINE CLINIC | Facility: CLINIC | Age: 69
End: 2019-10-24
Payer: MEDICARE

## 2019-10-24 DIAGNOSIS — Z23 ENCOUNTER FOR IMMUNIZATION: ICD-10-CM

## 2019-10-24 PROCEDURE — 90662 IIV NO PRSV INCREASED AG IM: CPT

## 2019-10-24 PROCEDURE — G0008 ADMIN INFLUENZA VIRUS VAC: HCPCS

## 2019-10-29 ENCOUNTER — APPOINTMENT (OUTPATIENT)
Dept: LAB | Facility: CLINIC | Age: 69
End: 2019-10-29
Payer: MEDICARE

## 2019-10-29 DIAGNOSIS — E83.51 HYPOCALCEMIA: ICD-10-CM

## 2019-10-29 DIAGNOSIS — E20.9 HYPOPARATHYROIDISM, UNSPECIFIED HYPOPARATHYROIDISM TYPE (HCC): ICD-10-CM

## 2019-10-29 LAB
CALCIUM 24H UR-MCNC: 240.07 MG/24 HRS (ref 42–353)
CREAT 24H UR-MRATE: 1.5 G/24HR (ref 0.8–1.8)
SPECIMEN VOL UR: 2475 ML
SPECIMEN VOL UR: 2475 ML

## 2019-10-29 PROCEDURE — 82340 ASSAY OF CALCIUM IN URINE: CPT

## 2019-10-29 PROCEDURE — 82570 ASSAY OF URINE CREATININE: CPT

## 2019-10-30 ENCOUNTER — TELEPHONE (OUTPATIENT)
Dept: ENDOCRINOLOGY | Facility: CLINIC | Age: 69
End: 2019-10-30

## 2019-10-30 NOTE — TELEPHONE ENCOUNTER
----- Message from Soni See PA-C sent at 10/30/2019 10:46 AM EDT -----  24 hour urine calcium testing normal

## 2019-10-31 DIAGNOSIS — E83.51 HYPOCALCEMIA: ICD-10-CM

## 2019-10-31 RX ORDER — CALCITRIOL 0.25 UG/1
0.25 CAPSULE, LIQUID FILLED ORAL DAILY
Qty: 90 CAPSULE | Refills: 1 | Status: SHIPPED | OUTPATIENT
Start: 2019-10-31 | End: 2020-04-28

## 2019-11-22 ENCOUNTER — OFFICE VISIT (OUTPATIENT)
Dept: INTERNAL MEDICINE CLINIC | Facility: CLINIC | Age: 69
End: 2019-11-22
Payer: MEDICARE

## 2019-11-22 VITALS
WEIGHT: 208 LBS | RESPIRATION RATE: 18 BRPM | OXYGEN SATURATION: 95 % | TEMPERATURE: 97.9 F | HEART RATE: 71 BPM | HEIGHT: 74 IN | DIASTOLIC BLOOD PRESSURE: 68 MMHG | SYSTOLIC BLOOD PRESSURE: 122 MMHG | BODY MASS INDEX: 26.69 KG/M2

## 2019-11-22 DIAGNOSIS — K21.9 GERD WITHOUT ESOPHAGITIS: ICD-10-CM

## 2019-11-22 DIAGNOSIS — I10 ESSENTIAL HYPERTENSION: Primary | ICD-10-CM

## 2019-11-22 DIAGNOSIS — G43.909 MIGRAINE WITHOUT STATUS MIGRAINOSUS, NOT INTRACTABLE, UNSPECIFIED MIGRAINE TYPE: ICD-10-CM

## 2019-11-22 DIAGNOSIS — E04.2 MULTIPLE THYROID NODULES: ICD-10-CM

## 2019-11-22 DIAGNOSIS — Z13.31 NEGATIVE DEPRESSION SCREENING: ICD-10-CM

## 2019-11-22 DIAGNOSIS — R80.9 MICROALBUMINURIA: ICD-10-CM

## 2019-11-22 DIAGNOSIS — Z12.5 SCREENING FOR PROSTATE CANCER: ICD-10-CM

## 2019-11-22 DIAGNOSIS — E78.2 MIXED HYPERLIPIDEMIA: ICD-10-CM

## 2019-11-22 DIAGNOSIS — E20.9 HYPOPARATHYROIDISM, UNSPECIFIED HYPOPARATHYROIDISM TYPE (HCC): ICD-10-CM

## 2019-11-22 DIAGNOSIS — K63.5 BENIGN COLON POLYP: ICD-10-CM

## 2019-11-22 PROCEDURE — 99214 OFFICE O/P EST MOD 30 MIN: CPT | Performed by: INTERNAL MEDICINE

## 2019-11-22 NOTE — PATIENT INSTRUCTIONS

## 2019-11-22 NOTE — PROGRESS NOTES
Assessment/Plan:  Problem List Items Addressed This Visit        Digestive    Benign colon polyp    GERD without esophagitis       Endocrine    Hypoparathyroidism ([facility] [address] )    Relevant Orders    Calcium, ionized    PTH, intact    Multiple thyroid nodules    Relevant Orders    TSH, 3rd generation       Cardiovascular and Mediastinum    Hypertension - Primary    Relevant Orders    Comprehensive metabolic panel    Migraine, unspecified, not intractable, without status migrainosus       Other    Hyperlipidemia    Microalbuminuria      Other Visit Diagnoses     Negative depression screening        Screening for prostate cancer        Relevant Orders    PSA, Total Screen           Diagnoses and all orders for this visit:    Essential hypertension  -     Comprehensive metabolic panel; Future    Benign colon polyp    GERD without esophagitis    Hypoparathyroidism, unspecified hypoparathyroidism type (HCC)  -     Calcium, ionized; Future  -     PTH, intact; Future    Multiple thyroid nodules  -     TSH, 3rd generation; Future    Migraine without status migrainosus, not intractable, unspecified migraine type    Mixed hyperlipidemia    Microalbuminuria    Negative depression screening    Screening for prostate cancer  -     PSA, Total Screen; Future    Other orders  -     Cancel: Ambulatory referral to Gastroenterology; Future        No problem-specific Assessment & Plan notes found for this encounter  A/P: Doing well and will check labs  Pt to consider the shingles vaccine  Continue current treatment and RTC four months for routine  Subjective:      Patient ID: Yonis Cintron is a 78 y o  male  WM RTC with his wife for f/u htn, hypoparathyroidism, etc  Doing well and no new issues  Remains active w/o difficulty and no falls  No reflux and headaches controlled  STAR is good  Due for labs and already had his flu shot         The following portions of the patient's history were reviewed and updated as appropriate:   He has a past medical history of Abdominal pain, acute, generalized, Actinic keratosis, Chest pain, Colon polyp, Disease of thyroid gland, Dysfunction of both eustachian tubes, Fatigue, Generalized anxiety disorder, GERD (gastroesophageal reflux disease), Hematuria, Hypertension, Lightheadedness, Lump in neck, Malaise and fatigue, Nephropathy, Shortness of breath, Skin lesion, and Tinea corporis  ,  does not have any pertinent problems on file  ,   has a past surgical history that includes Skin biopsy; Thyroid surgery; Colonoscopy w/ polypectomy; Head & neck skin lesion excisional biopsy; and Tonsillectomy  ,  family history includes Hypertension in his father and mother; Other in his brother  ,   reports that he has never smoked  He has never used smokeless tobacco  He reports that he drinks alcohol  He reports that he does not use drugs  ,  has No Known Allergies     Current Outpatient Medications   Medication Sig Dispense Refill    calcitriol (ROCALTROL) 0 25 mcg capsule Take 1 capsule (0 25 mcg total) by mouth daily 90 capsule 1    Calcium Carbonate (CALCIUM 600 PO) Take by mouth Twice per day      Cholecalciferol (VITAMIN D3) 1000 units CAPS Take by mouth      lisinopril (ZESTRIL) 20 mg tablet Take 1 tablet (20 mg total) by mouth daily 90 tablet 3    Magnesium 250 MG TABS Take by mouth      metoprolol tartrate (LOPRESSOR) 25 mg tablet Take 1 tablet (25 mg total) by mouth daily 90 tablet 3    Multiple Vitamin (MULTI-VITAMIN DAILY PO) Take by mouth daily      Omega-3 Fatty Acids (FISH OIL) 1200 MG CAPS Take 2 capsules by mouth daily      Turmeric Curcumin 500 MG CAPS Take 1 capsule by mouth       No current facility-administered medications for this visit  Review of Systems   Constitutional: Negative for activity change, chills, diaphoresis, fatigue and fever  HENT: Negative  Eyes: Negative for visual disturbance  Respiratory: Negative for cough, chest tightness, shortness of breath and wheezing  Cardiovascular: Negative for chest pain, palpitations and leg swelling  Gastrointestinal: Negative for abdominal pain, constipation, diarrhea, nausea and vomiting  Endocrine: Negative for cold intolerance and heat intolerance  Genitourinary: Negative for difficulty urinating, dysuria and frequency  Musculoskeletal: Negative for arthralgias, gait problem and myalgias  Neurological: Negative for dizziness, seizures, syncope, weakness, light-headedness and headaches  Psychiatric/Behavioral: Negative for confusion, dysphoric mood and sleep disturbance  The patient is not nervous/anxious  PHQ-9 Depression Screening    PHQ-9:    Frequency of the following problems over the past two weeks:       Little interest or pleasure in doing things:  0 - not at all  Feeling down, depressed, or hopeless:  0 - not at all  PHQ-2 Score:  0        Objective:  Vitals:    11/22/19 1032   BP: 122/68   BP Location: Left arm   Patient Position: Sitting   Cuff Size: Adult   Pulse: 71   Resp: 18   Temp: 97 9 °F (36 6 °C)   TempSrc: Tympanic   SpO2: 95%   Weight: 94 3 kg (208 lb)   Height: 6' 2" (1 88 m)     Body mass index is 26 71 kg/m²  Physical Exam   Constitutional: He is oriented to person, place, and time  He appears well-developed and well-nourished  No distress  HENT:   Head: Normocephalic and atraumatic  Mouth/Throat: Oropharynx is clear and moist    Eyes: Pupils are equal, round, and reactive to light  Conjunctivae and EOM are normal    Neck: Neck supple  No JVD present  Cardiovascular: Normal rate, regular rhythm and normal heart sounds  Pulmonary/Chest: Effort normal and breath sounds normal  No respiratory distress  He has no wheezes  He has no rales  Abdominal: Soft  Bowel sounds are normal  He exhibits no distension  There is no tenderness  Musculoskeletal: He exhibits no edema  Neurological: He is alert and oriented to person, place, and time  Psychiatric: He has a normal mood and affect  His behavior is normal  Judgment and thought content normal    Nursing note and vitals reviewed

## 2019-11-25 ENCOUNTER — APPOINTMENT (OUTPATIENT)
Dept: LAB | Facility: CLINIC | Age: 69
End: 2019-11-25
Payer: MEDICARE

## 2019-11-25 DIAGNOSIS — E04.2 MULTIPLE THYROID NODULES: ICD-10-CM

## 2019-11-25 DIAGNOSIS — Z12.5 SCREENING FOR PROSTATE CANCER: ICD-10-CM

## 2019-11-25 DIAGNOSIS — E20.9 HYPOPARATHYROIDISM, UNSPECIFIED HYPOPARATHYROIDISM TYPE (HCC): ICD-10-CM

## 2019-11-25 DIAGNOSIS — I10 ESSENTIAL HYPERTENSION: ICD-10-CM

## 2019-11-25 LAB
ALBUMIN SERPL BCP-MCNC: 3.5 G/DL (ref 3.5–5)
ALP SERPL-CCNC: 61 U/L (ref 46–116)
ALT SERPL W P-5'-P-CCNC: 23 U/L (ref 12–78)
ANION GAP SERPL CALCULATED.3IONS-SCNC: 5 MMOL/L (ref 4–13)
AST SERPL W P-5'-P-CCNC: 17 U/L (ref 5–45)
BILIRUB SERPL-MCNC: 0.59 MG/DL (ref 0.2–1)
BUN SERPL-MCNC: 19 MG/DL (ref 5–25)
CA-I BLD-SCNC: 1.13 MMOL/L (ref 1.12–1.32)
CALCIUM SERPL-MCNC: 8.6 MG/DL (ref 8.3–10.1)
CHLORIDE SERPL-SCNC: 101 MMOL/L (ref 100–108)
CO2 SERPL-SCNC: 29 MMOL/L (ref 21–32)
CREAT SERPL-MCNC: 1.32 MG/DL (ref 0.6–1.3)
GFR SERPL CREATININE-BSD FRML MDRD: 54 ML/MIN/1.73SQ M
GLUCOSE P FAST SERPL-MCNC: 93 MG/DL (ref 65–99)
POTASSIUM SERPL-SCNC: 4.1 MMOL/L (ref 3.5–5.3)
PROT SERPL-MCNC: 7.4 G/DL (ref 6.4–8.2)
PSA SERPL-MCNC: 2.7 NG/ML (ref 0–4)
PTH-INTACT SERPL-MCNC: <6.3 PG/ML (ref 18.4–80.1)
SODIUM SERPL-SCNC: 135 MMOL/L (ref 136–145)
TSH SERPL DL<=0.05 MIU/L-ACNC: 1.01 UIU/ML (ref 0.36–3.74)

## 2019-11-25 PROCEDURE — 84443 ASSAY THYROID STIM HORMONE: CPT

## 2019-11-25 PROCEDURE — 82330 ASSAY OF CALCIUM: CPT

## 2019-11-25 PROCEDURE — 80053 COMPREHEN METABOLIC PANEL: CPT

## 2019-11-25 PROCEDURE — 36415 COLL VENOUS BLD VENIPUNCTURE: CPT

## 2019-11-25 PROCEDURE — G0103 PSA SCREENING: HCPCS

## 2019-11-25 PROCEDURE — 83970 ASSAY OF PARATHORMONE: CPT

## 2019-12-10 ENCOUNTER — OFFICE VISIT (OUTPATIENT)
Dept: NEPHROLOGY | Facility: CLINIC | Age: 69
End: 2019-12-10
Payer: MEDICARE

## 2019-12-10 VITALS
HEIGHT: 74 IN | DIASTOLIC BLOOD PRESSURE: 78 MMHG | WEIGHT: 208 LBS | HEART RATE: 78 BPM | BODY MASS INDEX: 26.69 KG/M2 | SYSTOLIC BLOOD PRESSURE: 128 MMHG

## 2019-12-10 DIAGNOSIS — R80.9 ASYMPTOMATIC PROTEINURIA: ICD-10-CM

## 2019-12-10 DIAGNOSIS — E83.42 HYPOMAGNESEMIA: ICD-10-CM

## 2019-12-10 DIAGNOSIS — N18.30 STAGE 3 CHRONIC KIDNEY DISEASE (HCC): Primary | ICD-10-CM

## 2019-12-10 DIAGNOSIS — I10 ESSENTIAL HYPERTENSION: ICD-10-CM

## 2019-12-10 DIAGNOSIS — N17.9 AKI (ACUTE KIDNEY INJURY) (HCC): ICD-10-CM

## 2019-12-10 PROCEDURE — 99214 OFFICE O/P EST MOD 30 MIN: CPT | Performed by: INTERNAL MEDICINE

## 2019-12-10 NOTE — PROGRESS NOTES
[facility] [place], [place]    Name: Clarissa Cintron  YOB: 1950      Assessment/Plan:    TRISTA (acute kidney injury) [facility])  Likely due to Turmeric, patient to stop and check labs in about 3 to 4 weeks  Stage 3 chronic kidney disease ([facility] [address] )  Check labs and avoid meds that can potentially adversely effect the kidney function  Hypertension  Blood pressure controlled, continue with current medications  Asymptomatic proteinuria  Continue with ACEI, follow urine protein levels  Problem List Items Addressed This Visit        Cardiovascular and Mediastinum    Hypertension     Blood pressure controlled, continue with current medications  Relevant Medications    aspirin (ECOTRIN LOW STRENGTH) 81 mg EC tablet       Genitourinary    Stage 3 chronic kidney disease (HCC) - Primary     Check labs and avoid meds that can potentially adversely effect the kidney function  Relevant Orders    Basic metabolic panel    TRISTA (acute kidney injury) ([facility] [address] )     Likely due to Turmeric, patient to stop and check labs in about 3 to 4 weeks  Other    Asymptomatic proteinuria     Continue with ACEI, follow urine protein levels  Other Visit Diagnoses     Hypomagnesemia        Relevant Orders    Magnesium          Patient is doing well at this time  He will stop magnesium supplement and we will check levels with next set of labs, in about 3 to 4 weeks  Patient will also stop turmeric in the meantime  He will also restart ASA 81mg daily given his 10 year risk of cardiac event 22%  Subjective:      Patient ID: Clarissa Cintron is a 78 y o  male  Patient recently started taking turmeric for "aches and pains" relief  He also stopped taking ASA due to recent news of no benefit  Hypertension   This is a chronic problem  The current episode started more than 1 year ago  The problem is unchanged  The problem is controlled   Pertinent negatives include no chest pain or orthopnea  There are no associated agents to hypertension  Risk factors for coronary artery disease include male gender  Past treatments include ACE inhibitors  There are no compliance problems  Hypertensive end-organ damage includes kidney disease  Identifiable causes of hypertension include chronic renal disease  The following portions of the patient's history were reviewed and updated as appropriate: allergies, current medications, past family history, past medical history, past social history, past surgical history and problem list     Review of Systems   Cardiovascular: Negative for chest pain and orthopnea  All other systems reviewed and are negative          Social History     Socioeconomic History    Marital status: /Civil Union     Spouse name: None    Number of children: None    Years of education: None    Highest education level: None   Occupational History    Occupation: Retired     Comment:     Social Needs    Financial resource strain: None    Food insecurity:     Worry: None     Inability: None    Transportation needs:     Medical: None     Non-medical: None   Tobacco Use    Smoking status: Never Smoker    Smokeless tobacco: Never Used   Substance and Sexual Activity    Alcohol use: Yes     Comment: rarely    Drug use: No    Sexual activity: None   Lifestyle    Physical activity:     Days per week: None     Minutes per session: None    Stress: None   Relationships    Social connections:     Talks on phone: None     Gets together: None     Attends [employer] service: None     Active member of club or organization: None     Attends meetings of clubs or organizations: None     Relationship status: None    Intimate partner violence:     Fear of current or ex partner: None     Emotionally abused: None     Physically abused: None     Forced sexual activity: None   Other Topics Concern    None   Social History Narrative Consumes on average 1 cup of regular coffee per day      Past Medical History:   Diagnosis Date    Abdominal pain, acute, generalized     last assessed - 21Apr2017    Actinic keratosis     last assessed - 12Jun2013    Chest pain     last assessed - [id number]    [facility]     Colon polyp     Disease of thyroid gland     Dysfunction of both eustachian tubes     suspect due to ETD  Recommend otc allergy meds and if fails then add flonase; last assessed - 06Aug2012    Edema     Essential hypertension     Fatigue     last assessed - [id number]    Generalized anxiety disorder     GERD (gastroesophageal reflux disease)     Hematuria     last assessed - 02Aug2012    Hypertension     Hypo-osmolality and hyponatremia     Hypocalcemia     Hypoparathyroidism ([facility] )     Hypothyroidism     Lightheadedness     last assessed - [id number]    Lump in neck     ? cause  Maybe a localized allergic reaction, dental issue, doubt sialdenitis, ect  Empiric abx and one dose steroids  Continue benadryl  If worsens, to er or ent      Malaise and fatigue     last assessed - 21Apr2017    Nephropathy     last assessed - [id number]    Nocturia     Proteinuria     Shortness of breath     last assessed - [id number]    Skin lesion     Resolved - [id number]    Tinea corporis     last assessed - [id number]     Past Surgical History:   Procedure Laterality Date    COLONOSCOPY W/ POLYPECTOMY      last assessed - 20Jan2017    HEAD & NECK SKIN LESION EXCISIONAL BIOPSY      excision of lesion scalp malignant - BCCA; last assessed - [id number]    SKIN BIOPSY      last assessed - 09Sep2014   [facility]      Biopsy thyroid using percutaneous core needle; last assessed - 09Sep2014    TONSILLECTOMY         Current Outpatient Medications:     calcitriol (ROCALTROL) 0 25 mcg capsule, Take 1 capsule (0 25 mcg total) by mouth daily, Disp: 90 capsule, Rfl: 1    Calcium Carbonate (CALCIUM 600 PO), Take by mouth Twice per day, Disp: , Rfl:     Cholecalciferol (VITAMIN D3) 1000 units CAPS, Take by mouth, Disp: , Rfl:     lisinopril (ZESTRIL) 20 mg tablet, Take 1 tablet (20 mg total) by mouth daily, Disp: 90 tablet, Rfl: 3    Magnesium 250 MG TABS, Take by mouth, Disp: , Rfl:     metoprolol tartrate (LOPRESSOR) 25 mg tablet, Take 1 tablet (25 mg total) by mouth daily, Disp: 90 tablet, Rfl: 3    Multiple Vitamin (MULTI-VITAMIN DAILY PO), Take by mouth daily, Disp: , Rfl:     aspirin (ECOTRIN LOW STRENGTH) 81 mg EC tablet, Take 1 tablet (81 mg total) by mouth daily, Disp: , Rfl:     Lab Results   Component Value Date     12/15/2015    SODIUM 135 (L) 11/25/2019    K 4 1 11/25/2019     11/25/2019    CO2 29 11/25/2019    ANIONGAP 7 12/15/2015    AGAP 5 11/25/2019    BUN 19 11/25/2019    CREATININE 1 32 (H) 11/25/2019    GLUC 92 08/20/2019    GLUF 93 11/25/2019    CALCIUM 8 6 11/25/2019    AST 17 11/25/2019    ALT 23 11/25/2019    ALKPHOS 61 11/25/2019    PROT 7 8 09/16/2015    TP 7 4 11/25/2019    BILITOT 0 51 09/16/2015    TBILI 0 59 11/25/2019    EGFR 54 11/25/2019     Lab Results   Component Value Date    WBC 6 92 07/18/2019    HGB 15 9 07/18/2019    HCT 48 0 07/18/2019    MCV 91 07/18/2019     07/18/2019     Lab Results   Component Value Date    CHOLESTEROL 198 07/18/2019    CHOLESTEROL 171 02/19/2018    CHOLESTEROL 229 (H) 06/24/2017     Lab Results   Component Value Date    HDL 44 07/18/2019    HDL 43 02/19/2018    HDL 39 (L) 09/20/2016     Lab Results   Component Value Date    LDLCALC 126 (H) 07/18/2019    LDLCALC 107 (H) 02/19/2018    LDLCALC 99 09/20/2016     Lab Results   Component Value Date    TRIG 139 07/18/2019    TRIG 208 (H) 10/24/2018    TRIG 104 02/19/2018     No results found for: [place], [place]  Lab Results   Component Value Date    GKU0BCSITMVG 1 010 11/25/2019     Lab Results   Component Value Date    PTH <6 3 (L) 11/25/2019    CALCIUM 8 6 11/25/2019    CAION 1 06 12/12/2012    PHOS 3 3 08/20/2019     No results found for: SPEP, UPEP  No results found for: JOE, [id number]        Objective:      /78 (BP Location: Left arm, Patient Position: Sitting, Cuff Size: Standard)   Pulse 78   Ht 6' 2" (1 88 m)   Wt 94 3 kg (208 lb)   BMI 26 71 kg/m²          Physical Exam   Constitutional: He is oriented to person, place, and time  He appears well-developed and well-nourished  No distress  HENT:   Head: Normocephalic and atraumatic  Eyes: Conjunctivae are normal    Neck: Neck supple  Cardiovascular: Normal rate and regular rhythm  Pulmonary/Chest: Effort normal and breath sounds normal    Abdominal: Soft  Musculoskeletal: He exhibits no edema  Neurological: He is alert and oriented to person, place, and time  No cranial nerve deficit  Skin: Skin is warm  No rash noted  Psychiatric: He has a normal mood and affect   His behavior is normal

## 2019-12-11 RX ORDER — ASPIRIN 81 MG/1
81 TABLET ORAL DAILY
Start: 2019-12-11

## 2020-01-09 ENCOUNTER — APPOINTMENT (OUTPATIENT)
Dept: LAB | Facility: CLINIC | Age: 70
End: 2020-01-09
Payer: MEDICARE

## 2020-01-09 DIAGNOSIS — E83.42 HYPOMAGNESEMIA: ICD-10-CM

## 2020-01-09 DIAGNOSIS — N18.30 STAGE 3 CHRONIC KIDNEY DISEASE (HCC): ICD-10-CM

## 2020-01-09 LAB
ANION GAP SERPL CALCULATED.3IONS-SCNC: 3 MMOL/L (ref 4–13)
BUN SERPL-MCNC: 18 MG/DL (ref 5–25)
CALCIUM SERPL-MCNC: 9 MG/DL (ref 8.3–10.1)
CHLORIDE SERPL-SCNC: 104 MMOL/L (ref 100–108)
CO2 SERPL-SCNC: 32 MMOL/L (ref 21–32)
CREAT SERPL-MCNC: 1.33 MG/DL (ref 0.6–1.3)
GFR SERPL CREATININE-BSD FRML MDRD: 54 ML/MIN/1.73SQ M
GLUCOSE P FAST SERPL-MCNC: 91 MG/DL (ref 65–99)
MAGNESIUM SERPL-MCNC: 2.3 MG/DL (ref 1.6–2.6)
POTASSIUM SERPL-SCNC: 4.4 MMOL/L (ref 3.5–5.3)
SODIUM SERPL-SCNC: 139 MMOL/L (ref 136–145)

## 2020-01-09 PROCEDURE — 36415 COLL VENOUS BLD VENIPUNCTURE: CPT

## 2020-01-09 PROCEDURE — 80048 BASIC METABOLIC PNL TOTAL CA: CPT

## 2020-01-09 PROCEDURE — 83735 ASSAY OF MAGNESIUM: CPT

## 2020-01-15 DIAGNOSIS — E83.42 HYPOMAGNESEMIA: ICD-10-CM

## 2020-01-15 DIAGNOSIS — N18.30 STAGE 3 CHRONIC KIDNEY DISEASE (HCC): Primary | ICD-10-CM

## 2020-02-21 ENCOUNTER — APPOINTMENT (OUTPATIENT)
Dept: LAB | Facility: CLINIC | Age: 70
End: 2020-02-21
Payer: MEDICARE

## 2020-02-21 ENCOUNTER — TRANSCRIBE ORDERS (OUTPATIENT)
Dept: LAB | Facility: CLINIC | Age: 70
End: 2020-02-21

## 2020-02-21 DIAGNOSIS — E20.9 HYPOPARATHYROIDISM, UNSPECIFIED HYPOPARATHYROIDISM TYPE (HCC): ICD-10-CM

## 2020-02-21 DIAGNOSIS — E04.2 MULTIPLE THYROID NODULES: ICD-10-CM

## 2020-02-21 LAB
ALBUMIN SERPL BCP-MCNC: 3.6 G/DL (ref 3.5–5)
CALCIUM SERPL-MCNC: 8.4 MG/DL (ref 8.3–10.1)
PHOSPHATE SERPL-MCNC: 3.6 MG/DL (ref 2.3–4.1)
T4 FREE SERPL-MCNC: 1.13 NG/DL (ref 0.76–1.46)
TSH SERPL DL<=0.05 MIU/L-ACNC: 0.94 UIU/ML (ref 0.36–3.74)

## 2020-02-21 PROCEDURE — 84100 ASSAY OF PHOSPHORUS: CPT

## 2020-02-21 PROCEDURE — 36415 COLL VENOUS BLD VENIPUNCTURE: CPT

## 2020-02-21 PROCEDURE — 82040 ASSAY OF SERUM ALBUMIN: CPT

## 2020-02-21 PROCEDURE — 82310 ASSAY OF CALCIUM: CPT

## 2020-02-21 PROCEDURE — 84443 ASSAY THYROID STIM HORMONE: CPT

## 2020-02-21 PROCEDURE — 84439 ASSAY OF FREE THYROXINE: CPT

## 2020-02-24 ENCOUNTER — TELEPHONE (OUTPATIENT)
Dept: ENDOCRINOLOGY | Facility: CLINIC | Age: 70
End: 2020-02-24

## 2020-02-24 NOTE — TELEPHONE ENCOUNTER
----- Message from Be Randolph PA-C sent at 2/24/2020  9:51 AM EST -----  Thyroid/calcium levels normal continue current meds/supplement

## 2020-02-25 ENCOUNTER — OFFICE VISIT (OUTPATIENT)
Dept: GASTROENTEROLOGY | Facility: CLINIC | Age: 70
End: 2020-02-25
Payer: MEDICARE

## 2020-02-25 VITALS
DIASTOLIC BLOOD PRESSURE: 71 MMHG | WEIGHT: 208 LBS | HEART RATE: 78 BPM | HEIGHT: 74 IN | SYSTOLIC BLOOD PRESSURE: 116 MMHG | BODY MASS INDEX: 26.69 KG/M2 | TEMPERATURE: 98 F

## 2020-02-25 DIAGNOSIS — K21.00 GASTROESOPHAGEAL REFLUX DISEASE WITH ESOPHAGITIS: Primary | ICD-10-CM

## 2020-02-25 DIAGNOSIS — Z86.010 HISTORY OF COLON POLYPS: ICD-10-CM

## 2020-02-25 PROCEDURE — 1160F RVW MEDS BY RX/DR IN RCRD: CPT | Performed by: PHYSICIAN ASSISTANT

## 2020-02-25 PROCEDURE — 99213 OFFICE O/P EST LOW 20 MIN: CPT | Performed by: PHYSICIAN ASSISTANT

## 2020-02-25 PROCEDURE — 3078F DIAST BP <80 MM HG: CPT | Performed by: PHYSICIAN ASSISTANT

## 2020-02-25 PROCEDURE — 4040F PNEUMOC VAC/ADMIN/RCVD: CPT | Performed by: PHYSICIAN ASSISTANT

## 2020-02-25 PROCEDURE — 3074F SYST BP LT 130 MM HG: CPT | Performed by: PHYSICIAN ASSISTANT

## 2020-02-25 PROCEDURE — 3008F BODY MASS INDEX DOCD: CPT | Performed by: PHYSICIAN ASSISTANT

## 2020-02-25 PROCEDURE — 1036F TOBACCO NON-USER: CPT | Performed by: PHYSICIAN ASSISTANT

## 2020-02-25 NOTE — PROGRESS NOTES
[facility] - Outpatient Follow-up Note  Lizandro Talbot  78 y o  male MRN: [id number]  Encounter: [id number]          ASSESSMENT AND PLAN:    1  GERD with esophagitis   -he had an upper endoscopy last year with an area of nodularity in the esophagus concerning for Smith's esophagus, fortunately biopsies were negative for intestinal metaplasia and were more consistent with reflux esophagitis  -he was taking pantoprazole for a month or 2 and stopped this on his own, he states he is not having reflux more than once per month at this point  He is using Tums as needed which he can continue  We discussed that if he does have recurrence of his symptoms on a more regular basis we can consider restarting the PPI at the lowest effective dose verses trying Pepcid    -Discussed case with attending physician in the office today; no need to repeat EGD for screening purposes  2   History of colon polyps   -he had a colonoscopy with 3 polyps removed which were hyperplastic on pathology, repeat colonoscopy was recommended in 5 years due to prior history of polyps however  He can follow up in 5 years for the colonoscopy or as needed in the meantime  ____________________________________________________________    SUBJECTIVE:    71-year-old male with past medical history of GERD, hypoparathyroidism, colon polyps presents to the office for follow-up  His wife is with him in the office and does help provide some of the history  He reports that he discontinue the Protonix after a month or 2 of taking it last year and did not have recurrence of his reflux on a regular basis  His wife states that he is having approximately once per month and he takes Tums for this with resolution of his symptoms  He denies any dysphagia, change in appetite or unintended weight loss  He denies any abdominal pain or change in his bowel habits  He denies any hematochezia or melena    Overall he has been feeling very well recently and denies any other complaints today  REVIEW OF SYSTEMS IS OTHERWISE NEGATIVE  Historical Information   Past Medical History:   Diagnosis Date    Abdominal pain, acute, generalized     last assessed - 21Apr2017    Actinic keratosis     last assessed - 12Jun2013    Chest pain     last assessed - [id number]    [facility]     Colon polyp     Disease of thyroid gland     Dysfunction of both eustachian tubes     suspect due to ETD  Recommend otc allergy meds and if fails then add flonase; last assessed - 06Aug2012    Edema     Essential hypertension     Fatigue     last assessed - [id number]    Generalized anxiety disorder     GERD (gastroesophageal reflux disease)     Hematuria     last assessed - 02Aug2012    Hypertension     Hypo-osmolality and hyponatremia     Hypocalcemia     [facility]roidism ([facility] )     Hypothyroidism     Lightheadedness     last assessed - [id number]    Lump in neck     ? cause  Maybe a localized allergic reaction, dental issue, doubt sialdenitis, ect  Empiric abx and one dose steroids  Continue benadryl  If worsens, to er or ent      Malaise and fatigue     last assessed - 21Apr2017    Nephropathy     last assessed - [id number]    Nocturia     Proteinuria     Shortness of breath     last assessed - [id number]    Skin lesion     Resolved - [id number]    Tinea corporis     last assessed - [id number]     Past Surgical History:   Procedure Laterality Date    COLONOSCOPY W/ POLYPECTOMY      last assessed - 20Jan2017    HEAD & NECK SKIN LESION EXCISIONAL BIOPSY      excision of lesion scalp malignant - BCCA; last assessed - [id number]    SKIN BIOPSY      last assessed - 09Sep2014   [facility] THYROID SURGERY      Biopsy thyroid using percutaneous core needle; last assessed - 09Sep2014    TONSILLECTOMY       Social History   Social History     Substance and Sexual Activity   Alcohol Use Yes    Comment: rarely     Social History Substance and Sexual Activity   Drug Use No     Social History     Tobacco Use   Smoking Status Never Smoker   Smokeless Tobacco Never Used     Family History   Problem Relation Age of Onset    Hypertension Mother     Hypertension Father     Diabetes Father     Other Brother         Schwannomatosis    Stroke Family        Meds/Allergies       Current Outpatient Medications:     aspirin (ECOTRIN LOW STRENGTH) 81 mg EC tablet    Calcium Carbonate (CALCIUM 600 PO)    Cholecalciferol (VITAMIN D3) 1000 units CAPS    lisinopril (ZESTRIL) 20 mg tablet    Magnesium 250 MG TABS    metoprolol tartrate (LOPRESSOR) 25 mg tablet    Multiple Vitamin (MULTI-VITAMIN DAILY PO)    calcitriol (ROCALTROL) 0 25 mcg capsule    No Known Allergies        Objective     Blood pressure 116/71, pulse 78, temperature 98 °F (36 7 °C), height 6' 2" (1 88 m), weight 94 3 kg (208 lb)  PHYSICAL EXAM:      Physical Exam   Constitutional: He is oriented to person, place, and time  He appears well-developed and well-nourished  No distress  HENT:   Head: Normocephalic and atraumatic  Eyes: Right eye exhibits no discharge  Left eye exhibits no discharge  No scleral icterus  Neck: Neck supple  No tracheal deviation present  Cardiovascular: Normal rate, regular rhythm, normal heart sounds and intact distal pulses  Exam reveals no gallop and no friction rub  No murmur heard  Pulmonary/Chest: Effort normal and breath sounds normal  No respiratory distress  He has no wheezes  He has no rales  Abdominal: Soft  Bowel sounds are normal  He exhibits no distension  There is no tenderness  There is no rebound and no guarding  Neurological: He is alert and oriented to person, place, and time  Skin: Skin is warm and dry  Psychiatric: He has a normal mood and affect  Lab Results:   No visits with results within 1 Day(s) from this visit     Latest known visit with results is:   Appointment on 02/21/2020   Component Date Value    Calcium 02/21/2020 8 4     Albumin 02/21/2020 3 6     Phosphorus 02/21/2020 3 6     Free T4 02/21/2020 1 13     TSH 3RD GENERATON 02/21/2020 0 936          Radiology Results:   No results found

## 2020-04-27 DIAGNOSIS — I10 ESSENTIAL HYPERTENSION: ICD-10-CM

## 2020-04-28 DIAGNOSIS — E83.51 HYPOCALCEMIA: ICD-10-CM

## 2020-04-28 RX ORDER — CALCITRIOL 0.25 UG/1
CAPSULE, LIQUID FILLED ORAL
Qty: 90 CAPSULE | Refills: 1 | Status: SHIPPED | OUTPATIENT
Start: 2020-04-28 | End: 2020-10-26 | Stop reason: SDUPTHER

## 2020-05-28 ENCOUNTER — APPOINTMENT (OUTPATIENT)
Dept: LAB | Facility: CLINIC | Age: 70
End: 2020-05-28
Payer: MEDICARE

## 2020-05-28 ENCOUNTER — OFFICE VISIT (OUTPATIENT)
Dept: INTERNAL MEDICINE CLINIC | Facility: CLINIC | Age: 70
End: 2020-05-28
Payer: MEDICARE

## 2020-05-28 VITALS
TEMPERATURE: 97.6 F | DIASTOLIC BLOOD PRESSURE: 78 MMHG | WEIGHT: 197.38 LBS | HEART RATE: 63 BPM | OXYGEN SATURATION: 99 % | BODY MASS INDEX: 25.33 KG/M2 | SYSTOLIC BLOOD PRESSURE: 130 MMHG | HEIGHT: 74 IN

## 2020-05-28 DIAGNOSIS — E20.9 HYPOPARATHYROIDISM, UNSPECIFIED HYPOPARATHYROIDISM TYPE (HCC): ICD-10-CM

## 2020-05-28 DIAGNOSIS — R80.9 MICROALBUMINURIA: ICD-10-CM

## 2020-05-28 DIAGNOSIS — G43.909 MIGRAINE WITHOUT STATUS MIGRAINOSUS, NOT INTRACTABLE, UNSPECIFIED MIGRAINE TYPE: ICD-10-CM

## 2020-05-28 DIAGNOSIS — I10 ESSENTIAL HYPERTENSION: Primary | ICD-10-CM

## 2020-05-28 DIAGNOSIS — K21.9 GERD WITHOUT ESOPHAGITIS: ICD-10-CM

## 2020-05-28 DIAGNOSIS — N18.30 STAGE 3 CHRONIC KIDNEY DISEASE (HCC): ICD-10-CM

## 2020-05-28 DIAGNOSIS — F41.1 GENERALIZED ANXIETY DISORDER: ICD-10-CM

## 2020-05-28 DIAGNOSIS — E78.2 MIXED HYPERLIPIDEMIA: ICD-10-CM

## 2020-05-28 DIAGNOSIS — I10 ESSENTIAL HYPERTENSION: ICD-10-CM

## 2020-05-28 DIAGNOSIS — E83.42 HYPOMAGNESEMIA: ICD-10-CM

## 2020-05-28 PROBLEM — N17.9 AKI (ACUTE KIDNEY INJURY) (HCC): Status: RESOLVED | Noted: 2019-12-10 | Resolved: 2020-05-28

## 2020-05-28 LAB
ALBUMIN SERPL BCP-MCNC: 3.7 G/DL (ref 3.5–5)
ALP SERPL-CCNC: 65 U/L (ref 46–116)
ALT SERPL W P-5'-P-CCNC: 20 U/L (ref 12–78)
ANION GAP SERPL CALCULATED.3IONS-SCNC: 1 MMOL/L (ref 4–13)
AST SERPL W P-5'-P-CCNC: 15 U/L (ref 5–45)
BACTERIA UR QL AUTO: NORMAL /HPF
BILIRUB SERPL-MCNC: 0.59 MG/DL (ref 0.2–1)
BILIRUB UR QL STRIP: NEGATIVE
BUN SERPL-MCNC: 17 MG/DL (ref 5–25)
CALCIUM SERPL-MCNC: 8.9 MG/DL (ref 8.3–10.1)
CHLORIDE SERPL-SCNC: 103 MMOL/L (ref 100–108)
CLARITY UR: CLEAR
CO2 SERPL-SCNC: 31 MMOL/L (ref 21–32)
COLOR UR: YELLOW
CREAT SERPL-MCNC: 1.36 MG/DL (ref 0.6–1.3)
CREAT UR-MCNC: 163 MG/DL
GFR SERPL CREATININE-BSD FRML MDRD: 52 ML/MIN/1.73SQ M
GLUCOSE P FAST SERPL-MCNC: 90 MG/DL (ref 65–99)
GLUCOSE UR STRIP-MCNC: NEGATIVE MG/DL
HGB UR QL STRIP.AUTO: NEGATIVE
KETONES UR STRIP-MCNC: NEGATIVE MG/DL
LDLC SERPL DIRECT ASSAY-MCNC: 121 MG/DL (ref 0–100)
LEUKOCYTE ESTERASE UR QL STRIP: NEGATIVE
MAGNESIUM SERPL-MCNC: 2.1 MG/DL (ref 1.6–2.6)
MICROALBUMIN UR-MCNC: 67.6 MG/L (ref 0–20)
MICROALBUMIN/CREAT 24H UR: 41 MG/G CREATININE (ref 0–30)
NITRITE UR QL STRIP: NEGATIVE
NON-SQ EPI CELLS URNS QL MICRO: NORMAL /HPF
PH UR STRIP.AUTO: 6 [PH]
POTASSIUM SERPL-SCNC: 4.7 MMOL/L (ref 3.5–5.3)
PROT SERPL-MCNC: 7.4 G/DL (ref 6.4–8.2)
PROT UR STRIP-MCNC: NEGATIVE MG/DL
PTH-INTACT SERPL-MCNC: <6.3 PG/ML (ref 18.4–80.1)
RBC #/AREA URNS AUTO: NORMAL /HPF
SODIUM SERPL-SCNC: 135 MMOL/L (ref 136–145)
SP GR UR STRIP.AUTO: 1.02 (ref 1–1.03)
TRIGL SERPL-MCNC: 155 MG/DL
UROBILINOGEN UR QL STRIP.AUTO: 0.2 E.U./DL
WBC #/AREA URNS AUTO: NORMAL /HPF

## 2020-05-28 PROCEDURE — 99214 OFFICE O/P EST MOD 30 MIN: CPT | Performed by: INTERNAL MEDICINE

## 2020-05-28 PROCEDURE — 4040F PNEUMOC VAC/ADMIN/RCVD: CPT | Performed by: INTERNAL MEDICINE

## 2020-05-28 PROCEDURE — 3078F DIAST BP <80 MM HG: CPT | Performed by: INTERNAL MEDICINE

## 2020-05-28 PROCEDURE — 36415 COLL VENOUS BLD VENIPUNCTURE: CPT

## 2020-05-28 PROCEDURE — 1160F RVW MEDS BY RX/DR IN RCRD: CPT | Performed by: INTERNAL MEDICINE

## 2020-05-28 PROCEDURE — 1036F TOBACCO NON-USER: CPT | Performed by: INTERNAL MEDICINE

## 2020-05-28 PROCEDURE — 83735 ASSAY OF MAGNESIUM: CPT

## 2020-05-28 PROCEDURE — 84478 ASSAY OF TRIGLYCERIDES: CPT

## 2020-05-28 PROCEDURE — 83721 ASSAY OF BLOOD LIPOPROTEIN: CPT

## 2020-05-28 PROCEDURE — 82043 UR ALBUMIN QUANTITATIVE: CPT | Performed by: INTERNAL MEDICINE

## 2020-05-28 PROCEDURE — 3075F SYST BP GE 130 - 139MM HG: CPT | Performed by: INTERNAL MEDICINE

## 2020-05-28 PROCEDURE — 82570 ASSAY OF URINE CREATININE: CPT | Performed by: INTERNAL MEDICINE

## 2020-05-28 PROCEDURE — 81001 URINALYSIS AUTO W/SCOPE: CPT | Performed by: INTERNAL MEDICINE

## 2020-05-28 PROCEDURE — 83970 ASSAY OF PARATHORMONE: CPT

## 2020-05-28 PROCEDURE — 80053 COMPREHEN METABOLIC PANEL: CPT

## 2020-05-28 PROCEDURE — 3008F BODY MASS INDEX DOCD: CPT | Performed by: INTERNAL MEDICINE

## 2020-06-19 ENCOUNTER — OFFICE VISIT (OUTPATIENT)
Dept: INTERNAL MEDICINE CLINIC | Facility: CLINIC | Age: 70
End: 2020-06-19
Payer: MEDICARE

## 2020-06-19 VITALS
DIASTOLIC BLOOD PRESSURE: 80 MMHG | SYSTOLIC BLOOD PRESSURE: 124 MMHG | BODY MASS INDEX: 25.8 KG/M2 | WEIGHT: 201 LBS | TEMPERATURE: 98.3 F | HEART RATE: 83 BPM | HEIGHT: 74 IN | OXYGEN SATURATION: 99 %

## 2020-06-19 DIAGNOSIS — Z23 ENCOUNTER FOR VACCINATION: ICD-10-CM

## 2020-06-19 DIAGNOSIS — S90.511A ABRASION OF RIGHT ANKLE, INITIAL ENCOUNTER: Primary | ICD-10-CM

## 2020-06-19 PROCEDURE — 4040F PNEUMOC VAC/ADMIN/RCVD: CPT | Performed by: INTERNAL MEDICINE

## 2020-06-19 PROCEDURE — 1160F RVW MEDS BY RX/DR IN RCRD: CPT | Performed by: INTERNAL MEDICINE

## 2020-06-19 PROCEDURE — 3074F SYST BP LT 130 MM HG: CPT | Performed by: INTERNAL MEDICINE

## 2020-06-19 PROCEDURE — 1036F TOBACCO NON-USER: CPT | Performed by: INTERNAL MEDICINE

## 2020-06-19 PROCEDURE — 90715 TDAP VACCINE 7 YRS/> IM: CPT | Performed by: INTERNAL MEDICINE

## 2020-06-19 PROCEDURE — 90471 IMMUNIZATION ADMIN: CPT | Performed by: INTERNAL MEDICINE

## 2020-06-19 PROCEDURE — 99213 OFFICE O/P EST LOW 20 MIN: CPT | Performed by: INTERNAL MEDICINE

## 2020-06-19 PROCEDURE — 3079F DIAST BP 80-89 MM HG: CPT | Performed by: INTERNAL MEDICINE

## 2020-06-23 ENCOUNTER — OFFICE VISIT (OUTPATIENT)
Dept: NEPHROLOGY | Facility: CLINIC | Age: 70
End: 2020-06-23
Payer: MEDICARE

## 2020-06-23 VITALS
HEART RATE: 75 BPM | SYSTOLIC BLOOD PRESSURE: 140 MMHG | TEMPERATURE: 98.4 F | WEIGHT: 200 LBS | BODY MASS INDEX: 25.67 KG/M2 | DIASTOLIC BLOOD PRESSURE: 88 MMHG | OXYGEN SATURATION: 98 % | HEIGHT: 74 IN

## 2020-06-23 DIAGNOSIS — N18.30 STAGE 3 CHRONIC KIDNEY DISEASE (HCC): Primary | ICD-10-CM

## 2020-06-23 DIAGNOSIS — R80.9 ASYMPTOMATIC PROTEINURIA: ICD-10-CM

## 2020-06-23 DIAGNOSIS — I10 ESSENTIAL HYPERTENSION: ICD-10-CM

## 2020-06-23 DIAGNOSIS — E20.9 HYPOPARATHYROIDISM, UNSPECIFIED HYPOPARATHYROIDISM TYPE (HCC): ICD-10-CM

## 2020-06-23 DIAGNOSIS — E83.51 HYPOCALCEMIA: ICD-10-CM

## 2020-06-23 PROCEDURE — 1036F TOBACCO NON-USER: CPT | Performed by: INTERNAL MEDICINE

## 2020-06-23 PROCEDURE — 3008F BODY MASS INDEX DOCD: CPT | Performed by: INTERNAL MEDICINE

## 2020-06-23 PROCEDURE — 99214 OFFICE O/P EST MOD 30 MIN: CPT | Performed by: INTERNAL MEDICINE

## 2020-06-23 PROCEDURE — 3079F DIAST BP 80-89 MM HG: CPT | Performed by: INTERNAL MEDICINE

## 2020-06-23 PROCEDURE — 3077F SYST BP >= 140 MM HG: CPT | Performed by: INTERNAL MEDICINE

## 2020-06-23 PROCEDURE — 1160F RVW MEDS BY RX/DR IN RCRD: CPT | Performed by: INTERNAL MEDICINE

## 2020-06-23 PROCEDURE — 4040F PNEUMOC VAC/ADMIN/RCVD: CPT | Performed by: INTERNAL MEDICINE

## 2020-06-29 ENCOUNTER — OFFICE VISIT (OUTPATIENT)
Dept: INTERNAL MEDICINE CLINIC | Facility: CLINIC | Age: 70
End: 2020-06-29
Payer: MEDICARE

## 2020-06-29 VITALS
BODY MASS INDEX: 25.67 KG/M2 | TEMPERATURE: 98 F | DIASTOLIC BLOOD PRESSURE: 70 MMHG | SYSTOLIC BLOOD PRESSURE: 128 MMHG | WEIGHT: 200 LBS | HEART RATE: 82 BPM | RESPIRATION RATE: 14 BRPM | OXYGEN SATURATION: 99 % | HEIGHT: 74 IN

## 2020-06-29 DIAGNOSIS — R39.9 UTI SYMPTOMS: Primary | ICD-10-CM

## 2020-06-29 LAB
SL AMB  POCT GLUCOSE, UA: NEGATIVE
SL AMB LEUKOCYTE ESTERASE,UA: ABNORMAL
SL AMB POCT BILIRUBIN,UA: NEGATIVE
SL AMB POCT BLOOD,UA: ABNORMAL
SL AMB POCT CLARITY,UA: CLEAR
SL AMB POCT COLOR,UA: YELLOW
SL AMB POCT KETONES,UA: ABNORMAL
SL AMB POCT NITRITE,UA: NEGATIVE
SL AMB POCT PH,UA: NORMAL
SL AMB POCT SPECIFIC GRAVITY,UA: 1.01
SL AMB POCT URINE PROTEIN: NORMAL
SL AMB POCT UROBILINOGEN: NORMAL

## 2020-06-29 PROCEDURE — 3008F BODY MASS INDEX DOCD: CPT | Performed by: INTERNAL MEDICINE

## 2020-06-29 PROCEDURE — 81002 URINALYSIS NONAUTO W/O SCOPE: CPT | Performed by: INTERNAL MEDICINE

## 2020-06-29 PROCEDURE — 1036F TOBACCO NON-USER: CPT | Performed by: INTERNAL MEDICINE

## 2020-06-29 PROCEDURE — 3078F DIAST BP <80 MM HG: CPT | Performed by: INTERNAL MEDICINE

## 2020-06-29 PROCEDURE — 3074F SYST BP LT 130 MM HG: CPT | Performed by: INTERNAL MEDICINE

## 2020-06-29 PROCEDURE — 99213 OFFICE O/P EST LOW 20 MIN: CPT | Performed by: INTERNAL MEDICINE

## 2020-06-29 PROCEDURE — 1160F RVW MEDS BY RX/DR IN RCRD: CPT | Performed by: INTERNAL MEDICINE

## 2020-06-29 PROCEDURE — 87086 URINE CULTURE/COLONY COUNT: CPT | Performed by: INTERNAL MEDICINE

## 2020-06-29 PROCEDURE — 4040F PNEUMOC VAC/ADMIN/RCVD: CPT | Performed by: INTERNAL MEDICINE

## 2020-06-29 RX ORDER — SULFAMETHOXAZOLE AND TRIMETHOPRIM 800; 160 MG/1; MG/1
1 TABLET ORAL EVERY 12 HOURS SCHEDULED
Qty: 14 TABLET | Refills: 0 | Status: SHIPPED | OUTPATIENT
Start: 2020-06-29 | End: 2020-07-06

## 2020-06-30 ENCOUNTER — TELEPHONE (OUTPATIENT)
Dept: INTERNAL MEDICINE CLINIC | Facility: CLINIC | Age: 70
End: 2020-06-30

## 2020-06-30 LAB — BACTERIA UR CULT: NORMAL

## 2020-07-04 DIAGNOSIS — I10 ESSENTIAL HYPERTENSION: ICD-10-CM

## 2020-07-07 ENCOUNTER — TELEPHONE (OUTPATIENT)
Dept: INTERNAL MEDICINE CLINIC | Facility: CLINIC | Age: 70
End: 2020-07-07

## 2020-09-15 NOTE — PROGRESS NOTES
Established Patient Progress Note     History of Present Illness:     Adriano Canales is a 69 y o  male with hypertension, hyperlipidemia, thyroid nodules, hypoparathyroidism of unknown etiology, hypocalcemia, and vitamin-D deficiency  Patient follows with Nephrology for chronic kidney disease stage 3 and proteinuria  Currently, patient is taking calcium, calcitriol, and vitamin-D  He takes calcitriol 0 25 mcg daily  He is taking calcium 600 mg twice per day, with vitamin-D  And vitamin-D 1000 units daily  He denies circumoral numbness or tingling, fatigue, or muscle cramps  He denies any episodes of kidney stones  For his thyroid nodules, patient is overdue for an ultrasound  He denies any compressive symptoms  He denies any symptoms of hypo or hyperthyroidism  He did have a fine-needle aspiration biopsy prior to 2013 of the right lower pole nodule which was negative for malignancy  For hypertension, patient is taking metoprolol and lisinopril  He denies any headache, orthostatic hypotension, or cough  For hyperlipidemia, patient is taking Omega 3 fatty acids        Patient Active Problem List   Diagnosis    Allergic rhinitis    Asymptomatic proteinuria    Basal cell tumor    Benign colon polyp    Generalized anxiety disorder    GERD without esophagitis    Hyperlipidemia    Hypertension    Hypocalcemia    [facility]opar[facility]roidism ([facility] [address]    Migraine, unspecified, not intractable, without status migrainosus    Multiple thyroid nodules    Palpitations    Microalbuminuria    BMI 27 0-27 9,adult    Stage 3 chronic kidney disease (HCC)      Past Medical History:   Diagnosis Date    Abdominal pain, acute, generalized     last assessed - 21Apr2017    Actinic keratosis     last assessed - 12Jun2013    Chest pain     last assessed - [id number]    Chronic kidney disease, stage 3 (HCC)     Colon polyp     Disease of thyroid gland     Dysfunction of both eustachian tubes     suspect due to ETD  Recommend otc allergy meds and if fails then add flonase; last assessed - 06Aug2012    Edema     Essential hypertension     Fatigue     last assessed - [id number]    Generalized anxiety disorder     GERD (gastroesophageal reflux disease)     Hematuria     last assessed - 02Aug2012    Hypertension     Hypo-osmolality and hyponatremia     Hypocalcemia     Hypoparathyroidism ([facility] [address]     Hypothyroidism     Lightheadedness     last assessed - [id number]    Lump in neck     ? cause  Maybe a localized allergic reaction, dental issue, doubt sialdenitis, ect  Empiric abx and one dose steroids  Continue benadryl  If worsens, to er or ent      Malaise and fatigue     last assessed - 21Apr2017    Nephropathy     last assessed - 16Sep2015    Nocturia     Proteinuria     Shortness of breath     last assessed - [id number]    Skin lesion     Resolved - [id number]    Tinea corporis     last assessed - [id number]      Past Surgical History:   Procedure Laterality Date    COLONOSCOPY W/ POLYPECTOMY      last assessed - 20Jan2017    HEAD & NECK SKIN LESION EXCISIONAL BIOPSY      excision of lesion scalp malignant - BCCA; last assessed - [id number]    SKIN BIOPSY      last assessed - 09Sep2014   [facility] THYROID SURGERY      Biopsy thyroid using percutaneous core needle; last assessed - 09Sep2014    TONSILLECTOMY        Family History   Problem Relation Age of Onset    Hypertension Mother     Hypertension Father     Diabetes Father     Other Brother         Schwannomatosis    Stroke Family      Social History     Tobacco Use    Smoking status: Never Smoker    Smokeless tobacco: Never Used   Substance Use Topics    Alcohol use: Yes     Comment: rarely     No Known Allergies    Current Outpatient Medications:     aspirin (ECOTRIN LOW STRENGTH) 81 mg EC tablet, Take 1 tablet (81 mg total) by mouth daily, Disp: , Rfl:     calcitriol (ROCALTROL) 0 25 mcg capsule, Take 1 capsule by mouth once daily, Disp: 90 capsule, Rfl: 1    Calcium Carbonate (CALCIUM 600 PO), Take by mouth Twice per day, Disp: , Rfl:     Cholecalciferol (VITAMIN D3) 1000 units CAPS, Take by mouth, Disp: , Rfl:     Malka, Zingiber officinalis, (MALKA ROOT PO), Take by mouth, Disp: , Rfl:     lisinopril (ZESTRIL) 20 mg tablet, Take 1 tablet (20 mg total) by mouth daily, Disp: 90 tablet, Rfl: 3    metoprolol tartrate (LOPRESSOR) 25 mg tablet, Take 1 tablet by mouth once daily, Disp: 90 tablet, Rfl: 1    Multiple Vitamin (MULTI-VITAMIN DAILY PO), Take by mouth daily, Disp: , Rfl:     Omega-3 Fatty Acids (SALMON OIL PO), Take by mouth, Disp: , Rfl:     Review of Systems   Constitutional: Negative for activity change, appetite change, fatigue and unexpected weight change  HENT: Negative for sore throat, trouble swallowing and voice change  Eyes: Negative for visual disturbance  Cardiovascular: Negative for chest pain, palpitations and leg swelling  Gastrointestinal: Negative for constipation, diarrhea, nausea and vomiting  Endocrine: Negative for cold intolerance, heat intolerance and polydipsia  Genitourinary: Negative for difficulty urinating and flank pain  Musculoskeletal: Negative for arthralgias, joint swelling and myalgias  Skin: Negative for color change  Neurological: Negative for dizziness, weakness, light-headedness, numbness and headaches  Psychiatric/Behavioral: Negative for confusion, decreased concentration, dysphoric mood and sleep disturbance  The patient is not nervous/anxious  Physical Exam:  Body mass index is 25 86 kg/m²  /82   Pulse 70   Temp 98 °F (36 7 °C)   Wt 91 4 kg (201 lb 6 4 oz)   BMI 25 86 kg/m²    Wt Readings from Last 3 Encounters:   09/16/20 91 4 kg (201 lb 6 4 oz)   06/29/20 90 7 kg (200 lb)   06/23/20 90 7 kg (200 lb)       Physical Exam  Vitals signs reviewed  Constitutional:       Appearance: He is well-developed  HENT:      Head: Normocephalic and atraumatic        Comments: Mask in place  Eyes:      Extraocular Movements: Extraocular movements intact  Conjunctiva/sclera: Conjunctivae normal       Pupils: Pupils are equal, round, and reactive to light  Neck:      Musculoskeletal: Normal range of motion and neck supple  Thyroid: No thyromegaly  Cardiovascular:      Rate and Rhythm: Normal rate and regular rhythm  Pulses: Normal pulses  Heart sounds: Normal heart sounds  Pulmonary:      Effort: Pulmonary effort is normal       Breath sounds: Normal breath sounds  Abdominal:      General: Bowel sounds are normal       Palpations: Abdomen is soft  Musculoskeletal:      Right lower leg: No edema  Left lower leg: No edema  Lymphadenopathy:      Cervical: No cervical adenopathy  Skin:     General: Skin is warm and dry  Neurological:      Mental Status: He is alert and oriented to person, place, and time  Sensory: No sensory deficit  Deep Tendon Reflexes: Reflexes normal    Psychiatric:         Mood and Affect: Mood normal          Behavior: Behavior normal          Thought Content:  Thought content normal          Judgment: Judgment normal        Labs:   Component      Latest Ref Rng & Units 5/28/2020   Sodium      136 - 145 mmol/L 135 (L)   Potassium      3 5 - 5 3 mmol/L 4 7   Chloride      100 - 108 mmol/L 103   CO2      21 - 32 mmol/L 31   Anion Gap      4 - 13 mmol/L 1 (L)   BUN      5 - 25 mg/dL 17   Creatinine      0 60 - 1 30 mg/dL 1 36 (H)   GLUCOSE FASTING      65 - 99 mg/dL 90   Calcium      8 3 - 10 1 mg/dL 8 9   AST      5 - 45 U/L 15   ALT      12 - 78 U/L 20   Alkaline Phosphatase      46 - 116 U/L 65   Total Protein      6 4 - 8 2 g/dL 7 4   Albumin      3 5 - 5 0 g/dL 3 7   TOTAL BILIRUBIN      0 20 - 1 00 mg/dL 0 59   eGFR      ml/min/1 73sq m 52   PARATHYROID HORMONE      18 4 - 80 1 pg/mL <6 3 (L)       Lab Results   Component Value Date    FREET4 1 13 02/21/2020         Impression & Plan:    Problem List Items Addressed This Visit        Endocrine    Hypoparathyroidism ([facility] [address] ) - Primary     Calcium remains stable in the low normal range  Continue current doses of calcium and calcitriol  Will continue to monitor  Will check 24 hour urine calcium  Reviewed symptoms of hypocalcemia and hypercalcemia with patient so that he knows to notify us should any of these occur  Relevant Orders    Calcium, urine, 24 hour Lab Collect    Phosphorus Lab Collect    PTH, intact Lab Collect Lab Collect    Multiple thyroid nodules     Denies any compressive symptoms  Obtain thyroid ultrasound  Relevant Orders    US thyroid       Cardiovascular and Mediastinum    Hypertension     Well controlled at this time  Continue current regimen  Other    Hyperlipidemia     Continue Omega 3 fatty acids  Hypocalcemia     Currently stable  Will continue to monitor  Continue current regimen  Orders Placed This Encounter   Procedures    US thyroid     Standing Status:   Future     Standing Expiration Date:   9/16/2024     Scheduling Instructions:      No prep required  Please bring your insurance cards, a form of photo ID and a list of your medications with you  Arrive 15 minutes prior to your appointment time in order to register  To schedule this appointment, please contact Central Scheduling at [phone]   Calcium, urine, 24 hour Lab Collect     Standing Status:   Future     Standing Expiration Date:   9/16/2021    Phosphorus Lab Collect     Standing Status:   Future     Standing Expiration Date:   9/16/2021    PTH, intact Lab Collect Lab Collect     Standing Status:   Future     Standing Expiration Date:   9/16/2021       Discussed with the patient and all questioned fully answered  He will call me if any problems arise  Follow-up appointment in 12 months       Counseled patient on diagnostic results, prognosis, risk and benefit of treatment options, instruction for management, importance of treatment compliance, Risk  factor reduction and impressions      TRISTIN Montalvo

## 2020-09-16 ENCOUNTER — OFFICE VISIT (OUTPATIENT)
Dept: ENDOCRINOLOGY | Facility: CLINIC | Age: 70
End: 2020-09-16
Payer: MEDICARE

## 2020-09-16 VITALS
BODY MASS INDEX: 25.86 KG/M2 | TEMPERATURE: 98 F | HEART RATE: 70 BPM | DIASTOLIC BLOOD PRESSURE: 82 MMHG | WEIGHT: 201.4 LBS | SYSTOLIC BLOOD PRESSURE: 136 MMHG

## 2020-09-16 DIAGNOSIS — I10 ESSENTIAL HYPERTENSION: ICD-10-CM

## 2020-09-16 DIAGNOSIS — E20.9 HYPOPARATHYROIDISM, UNSPECIFIED HYPOPARATHYROIDISM TYPE (HCC): Primary | ICD-10-CM

## 2020-09-16 DIAGNOSIS — E78.2 MIXED HYPERLIPIDEMIA: ICD-10-CM

## 2020-09-16 DIAGNOSIS — E04.2 MULTIPLE THYROID NODULES: ICD-10-CM

## 2020-09-16 DIAGNOSIS — E83.51 HYPOCALCEMIA: ICD-10-CM

## 2020-09-16 PROCEDURE — 99214 OFFICE O/P EST MOD 30 MIN: CPT | Performed by: NURSE PRACTITIONER

## 2020-09-16 NOTE — ASSESSMENT & PLAN NOTE
Calcium remains stable in the low normal range  Continue current doses of calcium and calcitriol  Will continue to monitor  Will check 24 hour urine calcium  Reviewed symptoms of hypocalcemia and hypercalcemia with patient so that he knows to notify us should any of these occur

## 2020-09-23 ENCOUNTER — HOSPITAL ENCOUNTER (OUTPATIENT)
Dept: ULTRASOUND IMAGING | Facility: HOSPITAL | Age: 70
Discharge: HOME | End: 2020-09-23
Payer: MEDICARE

## 2020-09-23 DIAGNOSIS — E04.2 MULTIPLE THYROID NODULES: ICD-10-CM

## 2020-09-23 PROCEDURE — 76536 US EXAM OF HEAD AND NECK: CPT

## 2020-09-24 NOTE — RESULT ENCOUNTER NOTE
Please call patient to let him know that his thyroid ultrasound is normal and requires no biopsies or follow-up at this time

## 2020-09-29 ENCOUNTER — APPOINTMENT (OUTPATIENT)
Dept: LAB | Facility: CLINIC | Age: 70
End: 2020-09-29
Payer: MEDICARE

## 2020-09-29 ENCOUNTER — OFFICE VISIT (OUTPATIENT)
Dept: INTERNAL MEDICINE CLINIC | Facility: CLINIC | Age: 70
End: 2020-09-29
Payer: MEDICARE

## 2020-09-29 ENCOUNTER — TELEPHONE (OUTPATIENT)
Dept: ENDOCRINOLOGY | Facility: CLINIC | Age: 70
End: 2020-09-29

## 2020-09-29 VITALS
HEIGHT: 74 IN | OXYGEN SATURATION: 98 % | SYSTOLIC BLOOD PRESSURE: 120 MMHG | HEART RATE: 64 BPM | TEMPERATURE: 98.1 F | WEIGHT: 200.25 LBS | BODY MASS INDEX: 25.7 KG/M2 | DIASTOLIC BLOOD PRESSURE: 80 MMHG

## 2020-09-29 DIAGNOSIS — N18.30 STAGE 3 CHRONIC KIDNEY DISEASE (HCC): ICD-10-CM

## 2020-09-29 DIAGNOSIS — F41.1 GENERALIZED ANXIETY DISORDER: ICD-10-CM

## 2020-09-29 DIAGNOSIS — G43.909 MIGRAINE WITHOUT STATUS MIGRAINOSUS, NOT INTRACTABLE, UNSPECIFIED MIGRAINE TYPE: ICD-10-CM

## 2020-09-29 DIAGNOSIS — E78.2 MIXED HYPERLIPIDEMIA: ICD-10-CM

## 2020-09-29 DIAGNOSIS — Z00.00 MEDICARE ANNUAL WELLNESS VISIT, SUBSEQUENT: ICD-10-CM

## 2020-09-29 DIAGNOSIS — K21.9 GERD WITHOUT ESOPHAGITIS: ICD-10-CM

## 2020-09-29 DIAGNOSIS — I10 ESSENTIAL HYPERTENSION: Primary | ICD-10-CM

## 2020-09-29 DIAGNOSIS — Z23 ENCOUNTER FOR VACCINATION: ICD-10-CM

## 2020-09-29 DIAGNOSIS — E20.9 HYPOPARATHYROIDISM, UNSPECIFIED HYPOPARATHYROIDISM TYPE (HCC): ICD-10-CM

## 2020-09-29 DIAGNOSIS — I10 ESSENTIAL HYPERTENSION: ICD-10-CM

## 2020-09-29 DIAGNOSIS — R80.9 ASYMPTOMATIC PROTEINURIA: ICD-10-CM

## 2020-09-29 LAB
ALBUMIN SERPL BCP-MCNC: 3.8 G/DL (ref 3.5–5)
ALP SERPL-CCNC: 67 U/L (ref 46–116)
ALT SERPL W P-5'-P-CCNC: 20 U/L (ref 12–78)
ANION GAP SERPL CALCULATED.3IONS-SCNC: 6 MMOL/L (ref 4–13)
AST SERPL W P-5'-P-CCNC: 19 U/L (ref 5–45)
BACTERIA UR QL AUTO: ABNORMAL /HPF
BACTERIA UR QL AUTO: NORMAL /HPF
BASOPHILS # BLD AUTO: 0.08 THOUSANDS/ΜL (ref 0–0.1)
BASOPHILS NFR BLD AUTO: 1 % (ref 0–1)
BILIRUB SERPL-MCNC: 0.68 MG/DL (ref 0.2–1)
BILIRUB UR QL STRIP: NEGATIVE
BILIRUB UR QL STRIP: NEGATIVE
BUN SERPL-MCNC: 17 MG/DL (ref 5–25)
CALCIUM SERPL-MCNC: 9.4 MG/DL (ref 8.3–10.1)
CHLORIDE SERPL-SCNC: 101 MMOL/L (ref 100–108)
CHOLEST SERPL-MCNC: 218 MG/DL (ref 50–200)
CLARITY UR: CLEAR
CLARITY UR: CLEAR
CO2 SERPL-SCNC: 30 MMOL/L (ref 21–32)
COLOR UR: YELLOW
COLOR UR: YELLOW
CREAT SERPL-MCNC: 1.33 MG/DL (ref 0.6–1.3)
CREAT UR-MCNC: 55.5 MG/DL
EOSINOPHIL # BLD AUTO: 0.11 THOUSAND/ΜL (ref 0–0.61)
EOSINOPHIL NFR BLD AUTO: 1 % (ref 0–6)
ERYTHROCYTE [DISTWIDTH] IN BLOOD BY AUTOMATED COUNT: 13.1 % (ref 11.6–15.1)
GFR SERPL CREATININE-BSD FRML MDRD: 53 ML/MIN/1.73SQ M
GLUCOSE P FAST SERPL-MCNC: 82 MG/DL (ref 65–99)
GLUCOSE UR STRIP-MCNC: NEGATIVE MG/DL
GLUCOSE UR STRIP-MCNC: NEGATIVE MG/DL
HCT VFR BLD AUTO: 47.8 % (ref 36.5–49.3)
HDLC SERPL-MCNC: 53 MG/DL
HGB BLD-MCNC: 15.8 G/DL (ref 12–17)
HGB UR QL STRIP.AUTO: NEGATIVE
HGB UR QL STRIP.AUTO: NEGATIVE
HYALINE CASTS #/AREA URNS LPF: ABNORMAL /LPF
HYALINE CASTS #/AREA URNS LPF: NORMAL /LPF
IMM GRANULOCYTES # BLD AUTO: 0.02 THOUSAND/UL (ref 0–0.2)
IMM GRANULOCYTES NFR BLD AUTO: 0 % (ref 0–2)
KETONES UR STRIP-MCNC: NEGATIVE MG/DL
KETONES UR STRIP-MCNC: NEGATIVE MG/DL
LDLC SERPL CALC-MCNC: 138 MG/DL (ref 0–100)
LEUKOCYTE ESTERASE UR QL STRIP: ABNORMAL
LEUKOCYTE ESTERASE UR QL STRIP: ABNORMAL
LYMPHOCYTES # BLD AUTO: 2 THOUSANDS/ΜL (ref 0.6–4.47)
LYMPHOCYTES NFR BLD AUTO: 23 % (ref 14–44)
MCH RBC QN AUTO: 30 PG (ref 26.8–34.3)
MCHC RBC AUTO-ENTMCNC: 33.1 G/DL (ref 31.4–37.4)
MCV RBC AUTO: 91 FL (ref 82–98)
MICROALBUMIN UR-MCNC: 42 MG/L (ref 0–20)
MICROALBUMIN/CREAT 24H UR: 76 MG/G CREATININE (ref 0–30)
MONOCYTES # BLD AUTO: 0.64 THOUSAND/ΜL (ref 0.17–1.22)
MONOCYTES NFR BLD AUTO: 7 % (ref 4–12)
NEUTROPHILS # BLD AUTO: 5.92 THOUSANDS/ΜL (ref 1.85–7.62)
NEUTS SEG NFR BLD AUTO: 68 % (ref 43–75)
NITRITE UR QL STRIP: NEGATIVE
NITRITE UR QL STRIP: NEGATIVE
NON-SQ EPI CELLS URNS QL MICRO: ABNORMAL /HPF
NON-SQ EPI CELLS URNS QL MICRO: NORMAL /HPF
NRBC BLD AUTO-RTO: 0 /100 WBCS
PH UR STRIP.AUTO: 6 [PH]
PH UR STRIP.AUTO: 6 [PH]
PHOSPHATE SERPL-MCNC: 3.6 MG/DL (ref 2.3–4.1)
PLATELET # BLD AUTO: 269 THOUSANDS/UL (ref 149–390)
PMV BLD AUTO: 9.6 FL (ref 8.9–12.7)
POTASSIUM SERPL-SCNC: 4.8 MMOL/L (ref 3.5–5.3)
PROT SERPL-MCNC: 7.7 G/DL (ref 6.4–8.2)
PROT UR STRIP-MCNC: NEGATIVE MG/DL
PROT UR STRIP-MCNC: NEGATIVE MG/DL
PTH-INTACT SERPL-MCNC: <6.3 PG/ML (ref 18.4–80.1)
RBC # BLD AUTO: 5.27 MILLION/UL (ref 3.88–5.62)
RBC #/AREA URNS AUTO: ABNORMAL /HPF
RBC #/AREA URNS AUTO: NORMAL /HPF
SODIUM SERPL-SCNC: 137 MMOL/L (ref 136–145)
SP GR UR STRIP.AUTO: 1.01 (ref 1–1.03)
SP GR UR STRIP.AUTO: 1.01 (ref 1–1.03)
TRIGL SERPL-MCNC: 136 MG/DL
URATE SERPL-MCNC: 5.9 MG/DL (ref 4.2–8)
UROBILINOGEN UR QL STRIP.AUTO: 0.2 E.U./DL
UROBILINOGEN UR QL STRIP.AUTO: 0.2 E.U./DL
WBC # BLD AUTO: 8.77 THOUSAND/UL (ref 4.31–10.16)
WBC #/AREA URNS AUTO: ABNORMAL /HPF
WBC #/AREA URNS AUTO: NORMAL /HPF

## 2020-09-29 PROCEDURE — 80053 COMPREHEN METABOLIC PANEL: CPT

## 2020-09-29 PROCEDURE — 81001 URINALYSIS AUTO W/SCOPE: CPT | Performed by: INTERNAL MEDICINE

## 2020-09-29 PROCEDURE — 80061 LIPID PANEL: CPT

## 2020-09-29 PROCEDURE — 99214 OFFICE O/P EST MOD 30 MIN: CPT | Performed by: INTERNAL MEDICINE

## 2020-09-29 PROCEDURE — 85025 COMPLETE CBC W/AUTO DIFF WBC: CPT

## 2020-09-29 PROCEDURE — G0008 ADMIN INFLUENZA VIRUS VAC: HCPCS | Performed by: INTERNAL MEDICINE

## 2020-09-29 PROCEDURE — 90662 IIV NO PRSV INCREASED AG IM: CPT | Performed by: INTERNAL MEDICINE

## 2020-09-29 PROCEDURE — 82570 ASSAY OF URINE CREATININE: CPT | Performed by: INTERNAL MEDICINE

## 2020-09-29 PROCEDURE — 82043 UR ALBUMIN QUANTITATIVE: CPT | Performed by: INTERNAL MEDICINE

## 2020-09-29 PROCEDURE — 36415 COLL VENOUS BLD VENIPUNCTURE: CPT

## 2020-09-29 PROCEDURE — 84550 ASSAY OF BLOOD/URIC ACID: CPT

## 2020-09-29 PROCEDURE — 84100 ASSAY OF PHOSPHORUS: CPT

## 2020-09-29 PROCEDURE — 83970 ASSAY OF PARATHORMONE: CPT

## 2020-09-29 NOTE — PROGRESS NOTES
Assessment and Plan:     Problem List Items Addressed This Visit        Digestive    GERD without esophagitis       Endocrine    Hypoparathyroidism ([address] )       Cardiovascular and Mediastinum    Hypertension - Primary    Relevant Orders    CBC and differential    Comprehensive metabolic panel    Migraine, unspecified, not intractable, without status migrainosus       Genitourinary    Stage 3 chronic kidney disease (HCC)    Relevant Orders    CBC and differential       Other    Generalized anxiety disorder    Hyperlipidemia    Relevant Orders    Lipid Panel with Direct LDL reflex      Other Visit Diagnoses     Medicare annual wellness visit, subsequent        Encounter for vaccination        Relevant Orders    influenza vaccine, high-dose, PF 0 5 mL           Preventive health issues were discussed with patient, and age appropriate screening tests were ordered as noted in patient's After Visit Summary  Personalized health advice and appropriate referrals for health education or preventive services given if needed, as noted in patient's After Visit Summary       History of Present Illness:     Patient presents for Medicare Annual Wellness visit    Patient Care Team:  Prachi Philip DO as PCP - General (Internal Medicine)  Bernard Izquierdo MD as PCP - Endocrinology (Endocrinology)  Bernard Izquierdo MD     Problem List:     Patient Active Problem List   Diagnosis    Allergic rhinitis    Asymptomatic proteinuria    Basal cell tumor    Benign colon polyp    Generalized anxiety disorder    GERD without esophagitis    Hyperlipidemia    Hypertension    Hypocalcemia    [facility]opar[facility]roidism ([facility] [address] )    Migraine, unspecified, not intractable, without status migrainosus    Multiple thyroid nodules    Palpitations    Microalbuminuria    BMI 27 0-27 9,adult    Stage 3 chronic kidney disease ([address] )      Past Medical and Surgical History:     Past Medical History:   Diagnosis Date    Abdominal pain, acute, generalized last assessed - 21Apr2017    Actinic keratosis     last assessed - 12Jun2013    Chest pain     last assessed - [id number]    Chronic kidney disease, stage 3 (HCC)     Colon polyp     Disease of thyroid gland     Dysfunction of both eustachian tubes     suspect due to ETD  Recommend otc allergy meds and if fails then add flonase; last assessed - 06Aug2012    Edema     Essential hypertension     Fatigue     last assessed - [id number]    Generalized anxiety disorder     GERD (gastroesophageal reflux disease)     Hematuria     last assessed - 02Aug2012    Hypertension     Hypo-osmolality and hyponatremia     Hypocalcemia     Hypoparathyroidism ([address] )     Hypothyroidism     Lightheadedness     last assessed - [id number]    Lump in neck     ? cause  Maybe a localized allergic reaction, dental issue, doubt sialdenitis, ect  Empiric abx and one dose steroids  Continue benadryl  If worsens, to er or ent      Malaise and fatigue     last assessed - 21Apr2017    Nephropathy     last assessed - [id number]    Nocturia     Proteinuria     Shortness of breath     last assessed - [id number]    Skin lesion     Resolved - [id number]    Tinea corporis     last assessed - [id number]     Past Surgical History:   Procedure Laterality Date    COLONOSCOPY W/ POLYPECTOMY      last assessed - 20Jan2017    HEAD & NECK SKIN LESION EXCISIONAL BIOPSY      excision of lesion scalp malignant - BCCA; last assessed - [id number]    SKIN BIOPSY      last assessed - 09Sep2014   [facility] THYROID SURGERY      Biopsy thyroid using percutaneous core needle; last assessed - 09Sep2014    TONSILLECTOMY        Family History:     Family History   Problem Relation Age of Onset    Hypertension Mother     Hypertension Father     Diabetes Father     Other Brother         Schwannomatosis    Stroke Family       Social History:        Social History     Socioeconomic History    Marital status: /Civil Union     Spouse name: None    Number of children: None    Years of education: None    Highest education level: None   Occupational History    Occupation: Retired     Comment:     Social Needs    Financial resource strain: None    Food insecurity     Worry: None     Inability: None    Transportation needs     Medical: None     Non-medical: None   Tobacco Use    Smoking status: Never Smoker    Smokeless tobacco: Never Used   Substance and Sexual Activity    Alcohol use: Yes     Comment: rarely    Drug use: No    Sexual activity: None   Lifestyle    Physical activity     Days per week: None     Minutes per session: None    Stress: None   Relationships    Social connections     Talks on phone: None     Gets together: None     Attends Nondenominational service: None     Active member of club or organization: None     Attends meetings of clubs or organizations: None     Relationship status: None    Intimate partner violence     Fear of current or ex partner: None     Emotionally abused: None     Physically abused: None     Forced sexual activity: None   Other Topics Concern    None   Social History Narrative    Consumes on average 1 cup of regular coffee per day       Medications and Allergies:     Current Outpatient Medications   Medication Sig Dispense Refill    aspirin (ECOTRIN LOW STRENGTH) 81 mg EC tablet Take 1 tablet (81 mg total) by mouth daily      calcitriol (ROCALTROL) 0 25 mcg capsule Take 1 capsule by mouth once daily 90 capsule 1    Calcium Carbonate (CALCIUM 600 PO) Take by mouth Twice per day      Cholecalciferol (VITAMIN D3) 1000 units CAPS Take by mouth      Malka, Zingiber officinalis, (MALKA ROOT PO) Take by mouth      lisinopril (ZESTRIL) 20 mg tablet Take 1 tablet (20 mg total) by mouth daily 90 tablet 3    metoprolol tartrate (LOPRESSOR) 25 mg tablet Take 1 tablet by mouth once daily 90 tablet 1    Multiple Vitamin (MULTI-VITAMIN DAILY PO) Take by mouth daily      Omega-3 Fatty Acids (SALMON OIL PO) Take by mouth       No current facility-administered medications for this visit  No Known Allergies   Immunizations:     Immunization History   Administered Date(s) Administered    INFLUENZA 10/24/2018    Influenza Split High Dose Preservative Free IM 10/24/2018    Influenza, high dose seasonal 0 7 mL 10/24/2019    Pneumococcal Conjugate 13-Valent 05/31/2017    Pneumococcal Polysaccharide PPV23 07/08/2014    Td (adult), adsorbed 09/08/1999    Tdap 01/01/2009, 06/19/2020    Zoster 04/16/2012      Health Maintenance:         Topic Date Due    Hepatitis C Screening  Completed         Topic Date Due    Influenza Vaccine  07/01/2020      Medicare Health Risk Assessment:     /80   Pulse 64   Temp 98 1 °F (36 7 °C)   Ht 6' 2" (1 88 m)   Wt 90 8 kg (200 lb 4 oz)   SpO2 98%   BMI 25 71 kg/m²      Corinne Dawson is here for his Subsequent Wellness visit  Last Medicare Wellness visit information reviewed, patient interviewed and updates made to the record today  Health Risk Assessment:   Patient rates overall health as good  Patient feels that their physical health rating is same  Eyesight was rated as same  Hearing was rated as same  Patient feels that their emotional and mental health rating is same  Pain experienced in the last 7 days has been none  Patient states that he has experienced no weight loss or gain in last 6 months  Depression Screening:   PHQ-2 Score: 0      Fall Risk Screening: In the past year, patient has experienced: no history of falling in past year      Home Safety:  Patient does not have trouble with stairs inside or outside of their home  Patient has working smoke alarms and has working carbon monoxide detector  Home safety hazards include: none  Nutrition:   Current diet is Regular  Medications:   Patient is currently taking over-the-counter supplements  OTC medications include: see medication list  Patient is able to manage medications       Activities of Daily Living (ADLs)/Instrumental Activities of Daily Living (IADLs):   Walk and transfer into and out of bed and chair?: Yes  Dress and groom yourself?: Yes    Bathe or shower yourself?: Yes    Feed yourself? Yes  Do your laundry/housekeeping?: Yes  Manage your money, pay your bills and track your expenses?: Yes  Make your own meals?: Yes    Do your own shopping?: Yes    Previous Hospitalizations:   Any hospitalizations or ED visits within the last 12 months?: No      Advance Care Planning:   Living will: No    Durable POA for healthcare: No    Advanced directive: No    Advanced directive counseling given: Yes    Five wishes given: No    Patient declined ACP directive: No    End of Life Decisions reviewed with patient: Yes    Provider agrees with end of life decisions: Yes      Comments: Will discuss once pt reviews the Five Wishes he has at home  Cognitive Screening:   Provider or family/friend/caregiver concerned regarding cognition?: No    PREVENTIVE SCREENINGS      Cardiovascular Screening:    General: History Lipid Disorder    Due for: Lipid Panel      Diabetes Screening:     General: Screening Current    Due for: Blood Glucose      Colorectal Cancer Screening:     General: Screening Current      Prostate Cancer Screening:    General: Screening Current      Osteoporosis Screening:    General: Risks and Benefits Discussed and Patient Declines      Abdominal Aortic Aneurysm (AAA) Screening:    Risk factors include: age between 73-69 yo        General: Screening Not Indicated      Lung Cancer Screening:     General: Screening Not Indicated      Hepatitis C Screening:    General: Screening Current    Other Counseling Topics:   Car/seat belt/driving safety, sunscreen and calcium and vitamin D intake and regular weightbearing exercise  A/P: Doing well and no falls  Denies depression and feels safe at home  Diverse diet  No problems operating a MV and uses seat belts  No living will or POA   Information give for pt to review  No DME or referrals needed today  RTC in one year for medicare wellness       Jay Garza, DO

## 2020-09-29 NOTE — TELEPHONE ENCOUNTER
----- Message from [address] [place] sent at 9/24/2020  3:34 PM EDT -----  Please call patient to let him know that his thyroid ultrasound is normal and requires no biopsies or follow-up at this time

## 2020-09-29 NOTE — PROGRESS NOTES
Assessment/Plan:  Problem List Items Addressed This Visit        Digestive    GERD without esophagitis       Endocrine    Hypoparathyroidism ([facility] )       Cardiovascular and Mediastinum    Hypertension - Primary    Relevant Orders    CBC and differential    Comprehensive metabolic panel    Migraine, unspecified, not intractable, without status migrainosus       Genitourinary    Stage 3 chronic kidney disease (HCC)    Relevant Orders    CBC and differential       Other    Generalized anxiety disorder    Hyperlipidemia    Relevant Orders    Lipid Panel with Direct LDL reflex      Other Visit Diagnoses     Medicare annual wellness visit, subsequent        Encounter for vaccination        Relevant Orders    influenza vaccine, high-dose, PF 0 5 mL (Completed)           Diagnoses and all orders for this visit:    Essential hypertension  -     CBC and differential; Future  -     Comprehensive metabolic panel; Future    Medicare annual wellness visit, subsequent    Hypoparathyroidism, unspecified hypoparathyroidism type ([facility] )    GERD without esophagitis    Stage 3 chronic kidney disease ([facility] )  -     CBC and differential; Future    Migraine without status migrainosus, not intractable, unspecified migraine type    Generalized anxiety disorder    Mixed hyperlipidemia  -     Lipid Panel with Direct LDL reflex; Future    Encounter for vaccination  -     influenza vaccine, high-dose, PF 0 5 mL        No problem-specific Assessment & Plan notes found for this encounter  A/P: Doing well and will check labs  Will update his flu vaccine  Continue current treatment and RTC four month for routine  Keep f/u with specialist      Subjective:      Patient ID: Brianna Alonzo is a 69 y o  male  WM RTC for f/u Htn, GERD, etc  Doing well and no new issues  Recent thyroid US was good  Remains active w/o difficulty and no falls  No reflux  Headaches controlled  STAR is good  Due for labs and vaccines         The following portions of the patient's history were reviewed and updated as appropriate:   He has a past medical history of Abdominal pain, acute, generalized, Actinic keratosis, Chest pain, Chronic kidney disease, stage 3 ([address] ), Colon polyp, Disease of thyroid gland, Dysfunction of both eustachian tubes, Edema, Essential hypertension, Fatigue, Generalized anxiety disorder, GERD (gastroesophageal reflux disease), Hematuria, Hypertension, Hypo-osmolality and hyponatremia, Hypocalcemia, Hypoparathyroidism ([address] ), Hypothyroidism, Lightheadedness, Lump in neck, Malaise and fatigue, Nephropathy, Nocturia, Proteinuria, Shortness of breath, Skin lesion, and Tinea corporis  ,  does not have any pertinent problems on file  ,   has a past surgical history that includes Skin biopsy; Thyroid surgery; Colonoscopy w/ polypectomy; Head & neck skin lesion excisional biopsy; and Tonsillectomy  ,  family history includes Diabetes in his father; Hypertension in his father and mother; Other in his brother; Stroke in his family  ,   reports that he has never smoked  He has never used smokeless tobacco  He reports current alcohol use  He reports that he does not use drugs  ,  has No Known Allergies     Current Outpatient Medications   Medication Sig Dispense Refill    aspirin (ECOTRIN LOW STRENGTH) 81 mg EC tablet Take 1 tablet (81 mg total) by mouth daily      calcitriol (ROCALTROL) 0 25 mcg capsule Take 1 capsule by mouth once daily 90 capsule 1    Calcium Carbonate (CALCIUM 600 PO) Take by mouth Twice per day      Cholecalciferol (VITAMIN D3) 1000 units CAPS Take by mouth      Malka, Zingiber officinalis, (MALKA ROOT PO) Take by mouth      lisinopril (ZESTRIL) 20 mg tablet Take 1 tablet (20 mg total) by mouth daily 90 tablet 3    metoprolol tartrate (LOPRESSOR) 25 mg tablet Take 1 tablet by mouth once daily 90 tablet 1    Multiple Vitamin (MULTI-VITAMIN DAILY PO) Take by mouth daily      Omega-3 Fatty Acids (SALMON OIL PO) Take by mouth       No current facility-administered medications for this visit  Review of Systems   Constitutional: Negative for activity change, chills, diaphoresis, fatigue and fever  HENT: Negative  Eyes: Negative for visual disturbance  Respiratory: Negative for cough, chest tightness, shortness of breath and wheezing  Cardiovascular: Negative for chest pain, palpitations and leg swelling  Gastrointestinal: Negative for abdominal pain, constipation, diarrhea, nausea and vomiting  Endocrine: Negative for cold intolerance and heat intolerance  Genitourinary: Negative for difficulty urinating, dysuria and frequency  Musculoskeletal: Negative for arthralgias, gait problem and myalgias  Neurological: Negative for dizziness, seizures, syncope, weakness, light-headedness and headaches  Psychiatric/Behavioral: Negative for confusion, dysphoric mood and sleep disturbance  The patient is not nervous/anxious  PHQ-9 Depression Screening    PHQ-9:    Frequency of the following problems over the past two weeks:       Little interest or pleasure in doing things:  0 - not at all  Feeling down, depressed, or hopeless:  0 - not at all  PHQ-2 Score:  0        Objective:  Vitals:    09/29/20 1035   BP: 120/80   Pulse: 64   Temp: 98 1 °F (36 7 °C)   SpO2: 98%   Weight: 90 8 kg (200 lb 4 oz)   Height: 6' 2" (1 88 m)     Body mass index is 25 71 kg/m²  Physical Exam  Vitals signs and nursing note reviewed  Constitutional:       General: He is not in acute distress  Appearance: Normal appearance  HENT:      Head: Normocephalic and atraumatic  Mouth/Throat:      Mouth: Mucous membranes are moist    Eyes:      Extraocular Movements: Extraocular movements intact  Conjunctiva/sclera: Conjunctivae normal       Pupils: Pupils are equal, round, and reactive to light  Neck:      Musculoskeletal: Neck supple  Vascular: No carotid bruit  Cardiovascular:      Rate and Rhythm: Normal rate and regular rhythm  Heart sounds: Normal heart sounds  Pulmonary:      Effort: Pulmonary effort is normal  No respiratory distress  Breath sounds: Normal breath sounds  No wheezing or rales  Abdominal:      General: Bowel sounds are normal  There is no distension  Palpations: Abdomen is soft  Tenderness: There is no abdominal tenderness  Musculoskeletal:      Right lower leg: No edema  Left lower leg: No edema  Neurological:      General: No focal deficit present  Mental Status: He is alert and oriented to person, place, and time  Mental status is at baseline  Psychiatric:         Mood and Affect: Mood normal          Behavior: Behavior normal          Thought Content:  Thought content normal          Judgment: Judgment normal

## 2020-09-29 NOTE — PATIENT INSTRUCTIONS
Medicare Preventive Visit Patient Instructions  Thank you for completing your Welcome to Medicare Visit or Medicare Annual Wellness Visit today  Your next wellness visit will be due in one year (9/29/2021)  The screening/preventive services that you may require over the next 5-10 years are detailed below  Some tests may not apply to you based off risk factors and/or age  Screening tests ordered at today's visit but not completed yet may show as past due  Also, please note that scanned in results may not display below  Preventive Screenings:  Service Recommendations Previous Testing/Comments   Colorectal Cancer Screening  · Colonoscopy    · Fecal Occult Blood Test (FOBT)/Fecal Immunochemical Test (FIT)  · Fecal DNA/Cologuard Test  · Flexible Sigmoidoscopy Age: 54-65 years old   Colonoscopy: every 10 years (May be performed more frequently if at higher risk)  OR  FOBT/FIT: every 1 year  OR  Cologuard: every 3 years  OR  Sigmoidoscopy: every 5 years  Screening may be recommended earlier than age 48 if at higher risk for colorectal cancer  Also, an individualized decision between you and your healthcare provider will decide whether screening between the ages of 74-80 would be appropriate   Colonoscopy: 07/19/2019  FOBT/FIT: Not on file  Cologuard: Not on file  Sigmoidoscopy: Not on file    Screening Current     Prostate Cancer Screening Individualized decision between patient and health care provider in men between ages of 53-78   Medicare will cover every 12 months beginning on the day after your 50th birthday PSA: 2 7 ng/mL     Screening Current     Hepatitis C Screening Once for adults born between 1945 and 1965  More frequently in patients at high risk for Hepatitis C Hep C Antibody: 10/05/2017    Screening Current   Diabetes Screening 1-2 times per year if you're at risk for diabetes or have pre-diabetes Fasting glucose: 90 mg/dL   A1C: 5 3 %    Screening Current   Cholesterol Screening Once every 5 years if you don't have a lipid disorder  May order more often based on risk factors  Lipid panel: 07/18/2019    Screening Not Indicated  History Lipid Disorder      Other Preventive Screenings Covered by Medicare:  1  Abdominal Aortic Aneurysm (AAA) Screening: covered once if your at risk  You're considered to be at risk if you have a family history of AAA or a male between the age of 73-68 who smoking at least 100 cigarettes in your lifetime  2  Lung Cancer Screening: covers low dose CT scan once per year if you meet all of the following conditions: (1) Age 50-69; (2) No signs or symptoms of lung cancer; (3) Current smoker or have quit smoking within the last 15 years; (4) You have a tobacco smoking history of at least 30 pack years (packs per day x number of years you smoked); (5) You get a written order from a healthcare provider  3  Glaucoma Screening: covered annually if you're considered high risk: (1) You have diabetes OR (2) Family history of glaucoma OR (3)  aged 48 and older OR (3)  American aged 72 and older  3  Osteoporosis Screening: covered every 2 years if you meet one of the following conditions: (1) Have a vertebral abnormality; (2) On glucocorticoid therapy for more than 3 months; (3) Have primary hyperparathyroidism; (4) On osteoporosis medications and need to assess response to drug therapy  5  HIV Screening: covered annually if you're between the age of 12-76  Also covered annually if you are younger than 13 and older than 72 with risk factors for HIV infection  For pregnant patients, it is covered up to 3 times per pregnancy      Immunizations:  Immunization Recommendations   Influenza Vaccine Annual influenza vaccination during flu season is recommended for all persons aged >= 6 months who do not have contraindications   Pneumococcal Vaccine (Prevnar and Pneumovax)  * Prevnar = PCV13  * Pneumovax = PPSV23 Adults 25-60 years old: 1-3 doses may be recommended based on certain risk factors  Adults 72 years old: Prevnar (PCV13) vaccine recommended followed by Pneumovax (PPSV23) vaccine  If already received PPSV23 since turning 65, then PCV13 recommended at least one year after PPSV23 dose  Hepatitis B Vaccine 3 dose series if at intermediate or high risk (ex: diabetes, end stage renal disease, liver disease)   Tetanus (Td) Vaccine - COST NOT COVERED BY MEDICARE PART B Following completion of primary series, a booster dose should be given every 10 years to maintain immunity against tetanus  Td may also be given as tetanus wound prophylaxis  Tdap Vaccine - COST NOT COVERED BY MEDICARE PART B Recommended at least once for all adults  For pregnant patients, recommended with each pregnancy  Shingles Vaccine (Shingrix) - COST NOT COVERED BY MEDICARE PART B  2 shot series recommended in those aged 48 and above     Health Maintenance Due:      Topic Date Due    Hepatitis C Screening  Completed     Immunizations Due:      Topic Date Due    Influenza Vaccine  07/01/2020     Advance Directives   What are advance directives? Advance directives are legal documents that state your wishes and plans for medical care  These plans are made ahead of time in case you lose your ability to make decisions for yourself  Advance directives can apply to any medical decision, such as the treatments you want, and if you want to donate organs  What are the types of advance directives? There are many types of advance directives, and each state has rules about how to use them  You may choose a combination of any of the following:  · Living will: This is a written record of the treatment you want  You can also choose which treatments you do not want, which to limit, and which to stop at a certain time  This includes surgery, medicine, IV fluid, and tube feedings  · Durable power of  for healthcare [place] [facility]):   This is a written record that states who you want to make healthcare choices for you when you are unable to make them for yourself  This person, called a proxy, is usually a family member or a friend  You may choose more than 1 proxy  · Do not resuscitate (DNR) order:  A DNR order is used in case your heart stops beating or you stop breathing  It is a request not to have certain forms of treatment, such as CPR  A DNR order may be included in other types of advance directives  · Medical directive: This covers the care that you want if you are in a coma, near death, or unable to make decisions for yourself  You can list the treatments you want for each condition  Treatment may include pain medicine, surgery, blood transfusions, dialysis, IV or tube feedings, and a ventilator (breathing machine)  · Values history: This document has questions about your views, beliefs, and how you feel and think about life  This information can help others choose the care that you would choose  Why are advance directives important? An advance directive helps you control your care  Although spoken wishes may be used, it is better to have your wishes written down  Spoken wishes can be misunderstood, or not followed  Treatments may be given even if you do not want them  An advance directive may make it easier for your family to make difficult choices about your care  Weight Management   Why it is important to manage your weight:  Being overweight increases your risk of health conditions such as heart disease, high blood pressure, type 2 diabetes, and certain types of cancer  It can also increase your risk for osteoarthritis, sleep apnea, and other respiratory problems  Aim for a slow, steady weight loss  Even a small amount of weight loss can lower your risk of health problems  How to lose weight safely:  A safe and healthy way to lose weight is to eat fewer calories and get regular exercise  You can lose up about 1 pound a week by decreasing the number of calories you eat by 500 calories each day     Healthy meal plan for weight management:  A healthy meal plan includes a variety of foods, contains fewer calories, and helps you stay healthy  A healthy meal plan includes the following:  · Eat whole-grain foods more often  A healthy meal plan should contain fiber  Fiber is the part of grains, fruits, and vegetables that is not broken down by your body  Whole-grain foods are healthy and provide extra fiber in your diet  Some examples of whole-grain foods are whole-wheat breads and pastas, oatmeal, brown rice, and bulgur  · Eat a variety of vegetables every day  Include dark, leafy greens such as spinach, kale, israel greens, and mustard greens  Eat yellow and orange vegetables such as carrots, sweet potatoes, and winter squash  · Eat a variety of fruits every day  Choose fresh or canned fruit (canned in its own juice or light syrup) instead of juice  Fruit juice has very little or no fiber  · Eat low-fat dairy foods  Drink fat-free (skim) milk or 1% milk  Eat fat-free yogurt and low-fat cottage cheese  Try low-fat cheeses such as mozzarella and other reduced-fat cheeses  · Choose meat and other protein foods that are low in fat  Choose beans or other legumes such as split peas or lentils  Choose fish, skinless poultry (chicken or turkey), or lean cuts of red meat (beef or pork)  Before you cook meat or poultry, cut off any visible fat  · Use less fat and oil  Try baking foods instead of frying them  Add less fat, such as margarine, sour cream, regular salad dressing and mayonnaise to foods  Eat fewer high-fat foods  Some examples of high-fat foods include french fries, doughnuts, ice cream, and cakes  · Eat fewer sweets  Limit foods and drinks that are high in sugar  This includes candy, cookies, regular soda, and sweetened drinks  Exercise:  Exercise at least 30 minutes per day on most days of the week  Some examples of exercise include walking, biking, dancing, and swimming   You can also fit in more physical activity by taking the stairs instead of the elevator or parking farther away from stores  Ask your healthcare provider about the best exercise plan for you  © Copyright [employer] 2018 Information is for End User's use only and may not be sold, redistributed or otherwise used for commercial purposes  All illustrations and images included in CareNotes® are the copyrighted property of [employer] , [employer]  or [facility] Hypertension   AMBULATORY CARE:   Hypertension  is high blood pressure (BP)  Your BP is the force of your blood moving against the walls of your arteries  Normal BP is less than 120/80  Prehypertension is between 120/80 and 139/89  Hypertension is 140/90 or higher  Hypertension causes your BP to get so high that your heart has to work much harder than normal  This can damage your heart  Chronic hypertension is a long-term condition that you can control with a healthy lifestyle or medicines  A controlled blood pressure helps protect your organs, such as your heart, lungs, brain, and kidneys  Common symptoms include the following:   · Headache     · Blurred vision    · Chest pain     · Dizziness or weakness     · Trouble breathing     · Nosebleeds  Call 911 for any of the following:   · You have discomfort in your chest that feels like squeezing, pressure, fullness, or pain  · You become confused or have difficulty speaking  · You suddenly feel lightheaded or have trouble breathing  · You have pain or discomfort in your back, neck, jaw, stomach, or arm  Seek care immediately if:   · You have a severe headache or vision loss  · You have weakness in an arm or leg  Contact your healthcare provider if:   · You feel faint, dizzy, confused, or drowsy  · You have been taking your BP medicine and your BP is still higher than your healthcare provider says it should be  · You have questions or concerns about your condition or care    Treatment for chronic hypertension  may include medicine to lower your BP and lower your cholesterol level  A low cholesterol level helps prevent heart disease and makes it easier to control your blood pressure  Heart disease can make your blood pressure harder to control  You may also need to make lifestyle changes  Take your medicine exactly as directed  Manage chronic hypertension:  Talk with your healthcare provider about these and other ways to manage hypertension:  · Take your BP at home  Sit and rest for 5 minutes before you take your BP  Extend your arm and support it on a flat surface  Your arm should be at the same level as your heart  Follow the directions that came with your BP monitor  If possible, take at least 2 BP readings each time  Take your BP at least twice a day at the same times each day, such as morning and evening  Keep a record of your BP readings and bring it to your follow-up visits  Ask your healthcare provider what your blood pressure should be  · Limit sodium (salt) as directed  Too much sodium can affect your fluid balance  Check labels to find low-sodium or no-salt-added foods  Some low-sodium foods use potassium salts for flavor  Too much potassium can also cause health problems  Your healthcare provider will tell you how much sodium and potassium are safe for you to have in a day  He or she may recommend that you limit sodium to 2,300 mg a day  · Follow the meal plan recommended by your healthcare provider  A dietitian or your provider can give you more information on low-sodium plans or the DASH (Dietary Approaches to Stop Hypertension) eating plan  The DASH plan is low in sodium, unhealthy fats, and total fat  It is high in potassium, calcium, and fiber  · Exercise to maintain a healthy weight  Exercise at least 30 minutes per day, on most days of the week  This will help decrease your blood pressure  Ask about the best exercise plan for you  · Decrease stress    This may help lower your BP  Learn ways to relax, such as deep breathing or listening to music  · Limit alcohol  Women should limit alcohol to 1 drink a day  Men should limit alcohol to 2 drinks a day  A drink of alcohol is 12 ounces of beer, 5 ounces of wine, or 1½ ounces of liquor  · Do not smoke  Nicotine and other chemicals in cigarettes and cigars can increase your BP and also cause lung damage  Ask your healthcare provider for information if you currently smoke and need help to quit  E-cigarettes or smokeless tobacco still contain nicotine  Talk to your healthcare provider before you use these products  Follow up with your healthcare provider as directed: You will need to return to have your BP checked and to have other lab tests done  Write down your questions so you remember to ask them during your visits  © 2017 [address] Information is for End User's use only and may not be sold, redistributed or otherwise used for commercial purposes  All illustrations and images included in CareNotes® are the copyrighted property of A [employer] A [employer]  or [employer]  The above information is an  only  It is not intended as medical advice for individual conditions or treatments  Talk to your doctor, nurse or pharmacist before following any medical regimen to see if it is safe and effective for you  Medicare Preventive Visit Patient Instructions  Thank you for completing your Welcome to Medicare Visit or Medicare Annual Wellness Visit today  Your next wellness visit will be due in one year (9/29/2021)  The screening/preventive services that you may require over the next 5-10 years are detailed below  Some tests may not apply to you based off risk factors and/or age  Screening tests ordered at today's visit but not completed yet may show as past due  Also, please note that scanned in results may not display below    Preventive Screenings:  Service Recommendations Previous Testing/Comments Colorectal Cancer Screening  · Colonoscopy    · Fecal Occult Blood Test (FOBT)/Fecal Immunochemical Test (FIT)  · Fecal DNA/Cologuard Test  · Flexible Sigmoidoscopy Age: 54-65 years old   Colonoscopy: every 10 years (May be performed more frequently if at higher risk)  OR  FOBT/FIT: every 1 year  OR  Cologuard: every 3 years  OR  Sigmoidoscopy: every 5 years  Screening may be recommended earlier than age 48 if at higher risk for colorectal cancer  Also, an individualized decision between you and your healthcare provider will decide whether screening between the ages of 74-80 would be appropriate  Colonoscopy: 07/19/2019  FOBT/FIT: Not on file  Cologuard: Not on file  Sigmoidoscopy: Not on file    Screening Current     Prostate Cancer Screening Individualized decision between patient and health care provider in men between ages of 53-78   Medicare will cover every 12 months beginning on the day after your 50th birthday PSA: 2 7 ng/mL     Screening Current     Hepatitis C Screening Once for adults born between 1945 and 1965  More frequently in patients at high risk for Hepatitis C Hep C Antibody: 10/05/2017    Screening Current   Diabetes Screening 1-2 times per year if you're at risk for diabetes or have pre-diabetes Fasting glucose: 90 mg/dL   A1C: 5 3 %    Screening Current   Cholesterol Screening Once every 5 years if you don't have a lipid disorder  May order more often based on risk factors  Lipid panel: 07/18/2019    Screening Not Indicated  History Lipid Disorder      Other Preventive Screenings Covered by Medicare:  6  Abdominal Aortic Aneurysm (AAA) Screening: covered once if your at risk  You're considered to be at risk if you have a family history of AAA or a male between the age of 73-68 who smoking at least 100 cigarettes in your lifetime    7  Lung Cancer Screening: covers low dose CT scan once per year if you meet all of the following conditions: (1) Age 50-69; (2) No signs or symptoms of lung cancer; (3) Current smoker or have quit smoking within the last 15 years; (4) You have a tobacco smoking history of at least 30 pack years (packs per day x number of years you smoked); (5) You get a written order from a healthcare provider  8  Glaucoma Screening: covered annually if you're considered high risk: (1) You have diabetes OR (2) Family history of glaucoma OR (3)  aged 48 and older OR (3)  American aged 72 and older  5  Osteoporosis Screening: covered every 2 years if you meet one of the following conditions: (1) Have a vertebral abnormality; (2) On glucocorticoid therapy for more than 3 months; (3) Have primary hyperparathyroidism; (4) On osteoporosis medications and need to assess response to drug therapy  10  HIV Screening: covered annually if you're between the age of 12-76  Also covered annually if you are younger than 13 and older than 72 with risk factors for HIV infection  For pregnant patients, it is covered up to 3 times per pregnancy  Immunizations:  Immunization Recommendations   Influenza Vaccine Annual influenza vaccination during flu season is recommended for all persons aged >= 6 months who do not have contraindications   Pneumococcal Vaccine (Prevnar and Pneumovax)  * Prevnar = PCV13  * Pneumovax = PPSV23 Adults 25-60 years old: 1-3 doses may be recommended based on certain risk factors  Adults 72 years old: Prevnar (PCV13) vaccine recommended followed by Pneumovax (PPSV23) vaccine  If already received PPSV23 since turning 65, then PCV13 recommended at least one year after PPSV23 dose  Hepatitis B Vaccine 3 dose series if at intermediate or high risk (ex: diabetes, end stage renal disease, liver disease)   Tetanus (Td) Vaccine - COST NOT COVERED BY MEDICARE PART B Following completion of primary series, a booster dose should be given every 10 years to maintain immunity against tetanus  Td may also be given as tetanus wound prophylaxis     Tdap Vaccine - COST NOT COVERED BY MEDICARE PART B Recommended at least once for all adults  For pregnant patients, recommended with each pregnancy  Shingles Vaccine (Shingrix) - COST NOT COVERED BY MEDICARE PART B  2 shot series recommended in those aged 48 and above     Health Maintenance Due:      Topic Date Due    Hepatitis C Screening  Completed     Immunizations Due:      Topic Date Due    Influenza Vaccine  07/01/2020     Advance Directives   What are advance directives? Advance directives are legal documents that state your wishes and plans for medical care  These plans are made ahead of time in case you lose your ability to make decisions for yourself  Advance directives can apply to any medical decision, such as the treatments you want, and if you want to donate organs  What are the types of advance directives? There are many types of advance directives, and each state has rules about how to use them  You may choose a combination of any of the following:  · Living will: This is a written record of the treatment you want  You can also choose which treatments you do not want, which to limit, and which to stop at a certain time  This includes surgery, medicine, IV fluid, and tube feedings  · Durable power of  for healthcare [place] [facility]): This is a written record that states who you want to make healthcare choices for you when you are unable to make them for yourself  This person, called a proxy, is usually a family member or a friend  You may choose more than 1 proxy  · Do not resuscitate (DNR) order:  A DNR order is used in case your heart stops beating or you stop breathing  It is a request not to have certain forms of treatment, such as CPR  A DNR order may be included in other types of advance directives  · Medical directive: This covers the care that you want if you are in a coma, near death, or unable to make decisions for yourself  You can list the treatments you want for each condition   Treatment may include pain medicine, surgery, blood transfusions, dialysis, IV or tube feedings, and a ventilator (breathing machine)  · Values history: This document has questions about your views, beliefs, and how you feel and think about life  This information can help others choose the care that you would choose  Why are advance directives important? An advance directive helps you control your care  Although spoken wishes may be used, it is better to have your wishes written down  Spoken wishes can be misunderstood, or not followed  Treatments may be given even if you do not want them  An advance directive may make it easier for your family to make difficult choices about your care  Weight Management   Why it is important to manage your weight:  Being overweight increases your risk of health conditions such as heart disease, high blood pressure, type 2 diabetes, and certain types of cancer  It can also increase your risk for osteoarthritis, sleep apnea, and other respiratory problems  Aim for a slow, steady weight loss  Even a small amount of weight loss can lower your risk of health problems  How to lose weight safely:  A safe and healthy way to lose weight is to eat fewer calories and get regular exercise  You can lose up about 1 pound a week by decreasing the number of calories you eat by 500 calories each day  Healthy meal plan for weight management:  A healthy meal plan includes a variety of foods, contains fewer calories, and helps you stay healthy  A healthy meal plan includes the following:  · Eat whole-grain foods more often  A healthy meal plan should contain fiber  Fiber is the part of grains, fruits, and vegetables that is not broken down by your body  Whole-grain foods are healthy and provide extra fiber in your diet  Some examples of whole-grain foods are whole-wheat breads and pastas, oatmeal, brown rice, and bulgur  · Eat a variety of vegetables every day    Include dark, leafy greens such as spinach, kale, israel greens, and mustard greens  Eat yellow and orange vegetables such as carrots, sweet potatoes, and winter squash  · Eat a variety of fruits every day  Choose fresh or canned fruit (canned in its own juice or light syrup) instead of juice  Fruit juice has very little or no fiber  · Eat low-fat dairy foods  Drink fat-free (skim) milk or 1% milk  Eat fat-free yogurt and low-fat cottage cheese  Try low-fat cheeses such as mozzarella and other reduced-fat cheeses  · Choose meat and other protein foods that are low in fat  Choose beans or other legumes such as split peas or lentils  Choose fish, skinless poultry (chicken or turkey), or lean cuts of red meat (beef or pork)  Before you cook meat or poultry, cut off any visible fat  · Use less fat and oil  Try baking foods instead of frying them  Add less fat, such as margarine, sour cream, regular salad dressing and mayonnaise to foods  Eat fewer high-fat foods  Some examples of high-fat foods include french fries, doughnuts, ice cream, and cakes  · Eat fewer sweets  Limit foods and drinks that are high in sugar  This includes candy, cookies, regular soda, and sweetened drinks  Exercise:  Exercise at least 30 minutes per day on most days of the week  Some examples of exercise include walking, biking, dancing, and swimming  You can also fit in more physical activity by taking the stairs instead of the elevator or parking farther away from stores  Ask your healthcare provider about the best exercise plan for you  © Copyright [employer] 2018 Information is for End User's use only and may not be sold, redistributed or otherwise used for commercial purposes   All illustrations and images included in CareNotes® are the copyrighted property of [employer]  or [address] [place] [address] [facility]

## 2020-10-02 ENCOUNTER — LAB (OUTPATIENT)
Dept: LAB | Facility: CLINIC | Age: 70
End: 2020-10-02
Payer: MEDICARE

## 2020-10-02 DIAGNOSIS — E20.9 HYPOPARATHYROIDISM, UNSPECIFIED HYPOPARATHYROIDISM TYPE (HCC): ICD-10-CM

## 2020-10-02 LAB
CALCIUM 24H UR-MCNC: 211.22 MG/24 HRS (ref 42–353)
SPECIMEN VOL UR: 2975 ML

## 2020-10-02 PROCEDURE — 82340 ASSAY OF CALCIUM IN URINE: CPT

## 2020-10-26 DIAGNOSIS — E83.51 HYPOCALCEMIA: ICD-10-CM

## 2020-10-26 RX ORDER — CALCITRIOL 0.25 UG/1
0.25 CAPSULE, LIQUID FILLED ORAL DAILY
Qty: 90 CAPSULE | Refills: 1 | Status: SHIPPED | OUTPATIENT
Start: 2020-10-26 | End: 2021-04-26 | Stop reason: SDUPTHER

## 2020-12-14 ENCOUNTER — TELEPHONE (OUTPATIENT)
Dept: INTERNAL MEDICINE CLINIC | Facility: CLINIC | Age: 70
End: 2020-12-14

## 2020-12-14 DIAGNOSIS — I10 ESSENTIAL HYPERTENSION: ICD-10-CM

## 2020-12-14 RX ORDER — LISINOPRIL 20 MG/1
20 TABLET ORAL DAILY
Qty: 90 TABLET | Refills: 3 | Status: SHIPPED | OUTPATIENT
Start: 2020-12-14 | End: 2021-10-13

## 2021-03-29 ENCOUNTER — OFFICE VISIT (OUTPATIENT)
Dept: NEPHROLOGY | Facility: CLINIC | Age: 71
End: 2021-03-29
Payer: MEDICARE

## 2021-03-29 VITALS
HEART RATE: 73 BPM | SYSTOLIC BLOOD PRESSURE: 152 MMHG | BODY MASS INDEX: 25.67 KG/M2 | DIASTOLIC BLOOD PRESSURE: 84 MMHG | WEIGHT: 200 LBS | OXYGEN SATURATION: 98 % | HEIGHT: 74 IN

## 2021-03-29 DIAGNOSIS — N18.31 STAGE 3A CHRONIC KIDNEY DISEASE (HCC): Primary | ICD-10-CM

## 2021-03-29 DIAGNOSIS — E20.9 HYPOPARATHYROIDISM, UNSPECIFIED HYPOPARATHYROIDISM TYPE (HCC): ICD-10-CM

## 2021-03-29 DIAGNOSIS — R35.0 URINARY FREQUENCY: ICD-10-CM

## 2021-03-29 DIAGNOSIS — R39.15 URINARY URGENCY: ICD-10-CM

## 2021-03-29 DIAGNOSIS — R80.9 ASYMPTOMATIC PROTEINURIA: ICD-10-CM

## 2021-03-29 DIAGNOSIS — I10 ESSENTIAL HYPERTENSION: ICD-10-CM

## 2021-03-29 PROCEDURE — 99213 OFFICE O/P EST LOW 20 MIN: CPT | Performed by: NURSE PRACTITIONER

## 2021-03-29 NOTE — PROGRESS NOTES
Nephrology   Office Follow-Up  Shakira Cintron 69 y o  male MRN: [id number]    Encounter: [id number]        Dav Smiley was seen in the [employer] office today  All diagnoses and orders for visit:     1  Stage 3a chronic kidney disease  · [facility] record review indicates baseline sCr 1 15-1 3 mg/dL dating back to 2015  MAC ratio 76 mg/g  Is on lisinopril  · He will continue to avoid NSAIDs and nephrotoxins  He will RTO 6 months with primary nephrologist  He has some urinary frequency and will have renal US/UA/PSA done now  -     PSA Total (Reflex To Free); Future   -     CBC and differential; Future; Expected date: 08/29/2021   -     Comprehensive metabolic panel; Future; Expected date: 08/29/2021   -     Phosphorus; Future; Expected date: 08/29/2021   -     Urinalysis with microscopic; Future; Expected date: 08/29/2021   -     Microalbumin / creatinine urine ratio; Future; Expected date: 08/29/2021  2  Essential hypertension  · Blood pressure a bit elevated today due to anxiety  BP ranges 009-241 systolic at home  He will check his blood pressure twice this week and call if SBP > 130 mmHg  He will start taking lisinopril in the AM versus at bedtime  3  Asymptomatic proteinuria  · Proteinuria essential stable  He is on lisinopril  If increases can rule out monocloncal gammopathy  4  Hypoparathyroidism, unspecified hypoparathyroidism type ([facility] [address] )  · Follows with Endocrinology for hypothyroidism and hypocalcemia  5  Urinary frequency  · Have asked him to decrease sodium intake, take lisinopril in AM verus, PM and he will get UA, PSA, and ultrasound done   -     US kidney and bladder with pvr; Future; Expected date: 03/29/2021   -     UA w Reflex to Microscopic w Reflex to Culture   -     PSA Total (Reflex To Free); Future  6   Urinary urgency   -     US kidney and bladder with pvr; Future; Expected date: 03/29/2021   -     UA w Reflex to Microscopic w Reflex to Culture    HPI: Shakira Cintron is a 69 y o  male with an active problem list significant for CKD 3, hypertension, persistent proteinuria, hypoparathyroidism, who is here for scheduled follow-up for renal needs  Primary nephrologist is Dr Nomi Lentz and their last visit together was June 2020  Renal function stable  Only complaint on exam is urinary frequency and urgency at night  He voids roughly 4-6 times overnight into the morning  Have advised low sodium diet, switching lisinopril to AM, ultrasound, PSA and UA  He will return to office in 6 months but will pull patient back in sooner if necessary  The medical record, including Care Everywhere and media tabs were reviewed  ROS:   Review of Systems   Constitutional: Negative  HENT: Negative  Eyes: Negative  Respiratory: Negative  Cardiovascular: Negative  Gastrointestinal: Negative  Endocrine: Negative  Genitourinary: Positive for frequency and urgency  Nocturia   Musculoskeletal: Negative  Allergic/Immunologic: Negative  Neurological: Negative  Hematological: Negative  Psychiatric/Behavioral: Negative  All other systems reviewed and are negative  Allergies: Patient has no known allergies      Medications:   Current Outpatient Medications:     aspirin (ECOTRIN LOW STRENGTH) 81 mg EC tablet, Take 1 tablet (81 mg total) by mouth daily, Disp: , Rfl:     calcitriol (ROCALTROL) 0 25 mcg capsule, Take 1 capsule (0 25 mcg total) by mouth daily, Disp: 90 capsule, Rfl: 1    Calcium Carbonate (CALCIUM 600 PO), Take by mouth Twice per day, Disp: , Rfl:     Cholecalciferol (VITAMIN D3) 1000 units CAPS, Take by mouth, Disp: , Rfl:     Malka, Zingiber officinalis, (MALKA ROOT PO), Take by mouth, Disp: , Rfl:     lisinopril (ZESTRIL) 20 mg tablet, Take 1 tablet (20 mg total) by mouth daily, Disp: 90 tablet, Rfl: 3    metoprolol tartrate (LOPRESSOR) 25 mg tablet, Take 1 tablet by mouth once daily, Disp: 90 tablet, Rfl: 1    Multiple Vitamin (MULTI-VITAMIN DAILY PO), Take by mouth daily, Disp: , Rfl:     Omega-3 Fatty Acids (SALMON OIL PO), Take by mouth, Disp: , Rfl:     Past Medical History:   Diagnosis Date    Abdominal pain, acute, generalized     last assessed - [id number]    Actinic keratosis     last assessed - [id number]    Chest pain     last assessed - [id number]    Chronic kidney disease, stage 3     Colon polyp     Disease of thyroid gland     Dysfunction of both eustachian tubes     suspect due to ETD  Recommend otc allergy meds and if fails then add flonase; last assessed - [id number]    Edema     Essential hypertension     Fatigue     last assessed - 04GSI0582    Generalized anxiety disorder     GERD (gastroesophageal reflux disease)     Hematuria     last assessed - 73Mvj5373    Hypertension     Hypo-osmolality and hyponatremia     Hypocalcemia     Hypoparathyroidism ([facility] [address] )     Hypothyroidism     Lightheadedness     last assessed - 97TDM9519    Lump in neck     ? cause  Maybe a localized allergic reaction, dental issue, doubt sialdenitis, ect  Empiric abx and one dose steroids  Continue benadryl  If worsens, to er or ent      Malaise and fatigue     last assessed - 37Jrl3128    Nephropathy     last assessed - 38Vnu0966    Nocturia     Proteinuria     Shortness of breath     last assessed - 69KOW3142    Skin lesion     Resolved - 65UOJ0433    Tinea corporis     last assessed - 77EEK0361     Past Surgical History:   Procedure Laterality Date    COLONOSCOPY W/ POLYPECTOMY  07/19/2019    HEAD & NECK SKIN LESION EXCISIONAL BIOPSY      excision of lesion scalp malignant - BCCA; last assessed - [id number]    SKIN BIOPSY      last assessed - 56Nej7082   Lisbet Arce THYROID SURGERY      Biopsy thyroid using percutaneous core needle; last assessed - 64Kon6300    TONSILLECTOMY       Family History   Problem Relation Age of Onset    Hypertension Mother     Hypertension Father     Diabetes Father    Lisbet Arce Other Brother Schwannomatosis    Stroke Family       reports that he has never smoked  He has never used smokeless tobacco  He reports current alcohol use  He reports that he does not use drugs  Physical Exam:   Vitals:    03/29/21 0932   BP: 152/84   Pulse: 73   SpO2: 98%   Weight: 90 7 kg (200 lb)   Height: 6' 2" (1 88 m)     Body mass index is 25 68 kg/m²  General: conscious, cooperative, in no acute distress, appears stated age  Eyes: conjunctivae pale, anicteric sclerae  ENT: lips and mucous membranes moist  Neck: supple, no JVD, no masses  Chest:  essentially clear breath sounds bilaterally, no crackles, ronchus or wheezings  CVS: S1 & S2, normal rate, regular rhythm  Abdomen: soft, non-tender, non-distended, normoactive bowel sounds, rounded  Extremities: no edema of both legs  Skin: no rash   Neuro: awake, alert, oriented       Diagnostic Data:  Lab: I have personally reviewed pertinent lab results  ,   CBC:       CMP: No results found for: SODIUM, K, CL, CO2, ANIONGAP, BUN, CREATININE, GLUCOSE, CALCIUM, AST, ALT, ALKPHOS, PROT, BILITOT, EGFR,   PT/INR: No results found for: PT, INR,   Magnesium: No components found for: MAG,  Phosphorous: No results found for: PHOS    Patient Instructions   Take lisinopril in morning  Low sodium diet, goal less than 2,000 mg per day  Get urinalysis and PSA done  Get kidney/bladder ultrasound done    Blood work prior to next appt with Dr Nyla Johnson in September    Check blood pressure at home 1-2 times and call us if the systolic (top number) greater than 130      Portions of the record may have been created with voice recognition software  Occasional wrong word or "sound a like" substitutions may have occurred due to the inherent limitations of voice recognition software  Read the chart carefully and recognize, using context, where substitutions have occurred  If you have any questions, please contact the dictating provider

## 2021-03-29 NOTE — PATIENT INSTRUCTIONS
Take lisinopril in morning  Low sodium diet, goal less than 2,000 mg per day  Get urinalysis and PSA done  Get kidney/bladder ultrasound done    Blood work prior to next appt with Dr Derrell Molina in September    Check blood pressure at home 1-2 times and call us if the systolic (top number) greater than 130

## 2021-04-03 ENCOUNTER — HOSPITAL ENCOUNTER (OUTPATIENT)
Dept: ULTRASOUND IMAGING | Facility: HOSPITAL | Age: 71
Discharge: HOME | End: 2021-04-03
Payer: MEDICARE

## 2021-04-03 DIAGNOSIS — R39.15 URINARY URGENCY: ICD-10-CM

## 2021-04-03 DIAGNOSIS — R35.0 URINARY FREQUENCY: ICD-10-CM

## 2021-04-03 PROCEDURE — 76770 US EXAM ABDO BACK WALL COMP: CPT

## 2021-04-06 ENCOUNTER — APPOINTMENT (OUTPATIENT)
Dept: LAB | Facility: CLINIC | Age: 71
End: 2021-04-06
Payer: MEDICARE

## 2021-04-06 DIAGNOSIS — R35.0 URINARY FREQUENCY: ICD-10-CM

## 2021-04-06 DIAGNOSIS — N18.31 STAGE 3A CHRONIC KIDNEY DISEASE (HCC): ICD-10-CM

## 2021-04-06 LAB
BILIRUB UR QL STRIP: NEGATIVE
CLARITY UR: CLEAR
COLOR UR: YELLOW
GLUCOSE UR STRIP-MCNC: NEGATIVE MG/DL
HGB UR QL STRIP.AUTO: NEGATIVE
KETONES UR STRIP-MCNC: NEGATIVE MG/DL
LEUKOCYTE ESTERASE UR QL STRIP: NEGATIVE
NITRITE UR QL STRIP: NEGATIVE
PH UR STRIP.AUTO: 6 [PH]
PROT UR STRIP-MCNC: NEGATIVE MG/DL
SP GR UR STRIP.AUTO: 1.01 (ref 1–1.03)
UROBILINOGEN UR QL STRIP.AUTO: 0.2 E.U./DL

## 2021-04-06 PROCEDURE — 84154 ASSAY OF PSA FREE: CPT

## 2021-04-06 PROCEDURE — 36415 COLL VENOUS BLD VENIPUNCTURE: CPT

## 2021-04-06 PROCEDURE — 84153 ASSAY OF PSA TOTAL: CPT

## 2021-04-06 PROCEDURE — 81003 URINALYSIS AUTO W/O SCOPE: CPT | Performed by: NURSE PRACTITIONER

## 2021-04-07 ENCOUNTER — TELEPHONE (OUTPATIENT)
Dept: NEPHROLOGY | Facility: CLINIC | Age: 71
End: 2021-04-07

## 2021-04-07 LAB
PSA FREE MFR SERPL: 22 %
PSA FREE SERPL-MCNC: 0.66 NG/ML
PSA SERPL-MCNC: 3 NG/ML (ref 0–4)

## 2021-04-07 NOTE — TELEPHONE ENCOUNTER
----- Message from Lena Salazar Aileen sent at 4/7/2021  2:27 PM EDT -----  Please let Allen Méndez know the urine study was completely clean   The kidney ultrasound is still pending official read by radiologist

## 2021-04-09 ENCOUNTER — IMMUNIZATIONS (OUTPATIENT)
Dept: FAMILY MEDICINE CLINIC | Facility: HOSPITAL | Age: 71
End: 2021-04-09

## 2021-04-09 DIAGNOSIS — Z23 ENCOUNTER FOR IMMUNIZATION: Primary | ICD-10-CM

## 2021-04-09 PROCEDURE — 91301 SARS-COV-2 / COVID-19 MRNA VACCINE (MODERNA) 100 MCG: CPT

## 2021-04-09 PROCEDURE — 0011A SARS-COV-2 / COVID-19 MRNA VACCINE (MODERNA) 100 MCG: CPT

## 2021-04-12 ENCOUNTER — TELEPHONE (OUTPATIENT)
Dept: NEPHROLOGY | Facility: CLINIC | Age: 71
End: 2021-04-12

## 2021-04-12 DIAGNOSIS — R35.0 BENIGN PROSTATIC HYPERPLASIA WITH URINARY FREQUENCY: Primary | ICD-10-CM

## 2021-04-12 DIAGNOSIS — N40.1 BENIGN PROSTATIC HYPERPLASIA WITH URINARY FREQUENCY: Primary | ICD-10-CM

## 2021-04-12 RX ORDER — TAMSULOSIN HYDROCHLORIDE 0.4 MG/1
0.4 CAPSULE ORAL
Qty: 90 CAPSULE | Refills: 3 | Status: SHIPPED | OUTPATIENT
Start: 2021-04-12 | End: 2021-10-12 | Stop reason: SDUPTHER

## 2021-04-12 NOTE — TELEPHONE ENCOUNTER
----- Message from Geo Jaramlilo, [address] sent at 4/12/2021  8:23 AM EDT -----  Please let Fran Hernandez know I received the results of his kidney ultrasound    He has two kidneys and they are normal in size  There is a left kidney stone and a stable small left cyst that has been there since at least 2017  His prostate is enlarged which is likely the culprit of his urinary frequency   Please ask him if he would like to start a medication called flomax

## 2021-04-26 DIAGNOSIS — E83.51 HYPOCALCEMIA: ICD-10-CM

## 2021-04-26 RX ORDER — CALCITRIOL 0.25 UG/1
0.25 CAPSULE, LIQUID FILLED ORAL DAILY
Qty: 90 CAPSULE | Refills: 1 | Status: SHIPPED | OUTPATIENT
Start: 2021-04-26 | End: 2021-07-21 | Stop reason: SDUPTHER

## 2021-04-27 ENCOUNTER — OFFICE VISIT (OUTPATIENT)
Dept: INTERNAL MEDICINE CLINIC | Facility: CLINIC | Age: 71
End: 2021-04-27
Payer: MEDICARE

## 2021-04-27 ENCOUNTER — APPOINTMENT (OUTPATIENT)
Dept: LAB | Facility: CLINIC | Age: 71
End: 2021-04-27
Payer: MEDICARE

## 2021-04-27 VITALS
OXYGEN SATURATION: 97 % | DIASTOLIC BLOOD PRESSURE: 78 MMHG | SYSTOLIC BLOOD PRESSURE: 132 MMHG | HEART RATE: 67 BPM | BODY MASS INDEX: 26.06 KG/M2 | WEIGHT: 203 LBS | TEMPERATURE: 96.2 F

## 2021-04-27 DIAGNOSIS — F41.1 GENERALIZED ANXIETY DISORDER: ICD-10-CM

## 2021-04-27 DIAGNOSIS — I10 ESSENTIAL HYPERTENSION: ICD-10-CM

## 2021-04-27 DIAGNOSIS — Z00.00 MEDICARE ANNUAL WELLNESS VISIT, SUBSEQUENT: ICD-10-CM

## 2021-04-27 DIAGNOSIS — I10 ESSENTIAL HYPERTENSION: Primary | ICD-10-CM

## 2021-04-27 DIAGNOSIS — E04.2 MULTIPLE THYROID NODULES: ICD-10-CM

## 2021-04-27 DIAGNOSIS — E20.9 HYPOPARATHYROIDISM, UNSPECIFIED HYPOPARATHYROIDISM TYPE (HCC): ICD-10-CM

## 2021-04-27 DIAGNOSIS — E78.2 MIXED HYPERLIPIDEMIA: ICD-10-CM

## 2021-04-27 DIAGNOSIS — N18.31 STAGE 3A CHRONIC KIDNEY DISEASE (HCC): ICD-10-CM

## 2021-04-27 DIAGNOSIS — N18.30 STAGE 3 CHRONIC KIDNEY DISEASE, UNSPECIFIED WHETHER STAGE 3A OR 3B CKD (HCC): ICD-10-CM

## 2021-04-27 DIAGNOSIS — N40.0 BENIGN PROSTATIC HYPERPLASIA WITHOUT LOWER URINARY TRACT SYMPTOMS: ICD-10-CM

## 2021-04-27 DIAGNOSIS — E34.2 ECTOPIC HORMONE SECRETION, NOT ELSEWHERE CLASSIFIED: ICD-10-CM

## 2021-04-27 DIAGNOSIS — G43.909 MIGRAINE WITHOUT STATUS MIGRAINOSUS, NOT INTRACTABLE, UNSPECIFIED MIGRAINE TYPE: ICD-10-CM

## 2021-04-27 DIAGNOSIS — K21.9 GERD WITHOUT ESOPHAGITIS: ICD-10-CM

## 2021-04-27 LAB
ALBUMIN SERPL BCP-MCNC: 3.6 G/DL (ref 3.5–5)
ALP SERPL-CCNC: 58 U/L (ref 46–116)
ALT SERPL W P-5'-P-CCNC: 22 U/L (ref 12–78)
ANION GAP SERPL CALCULATED.3IONS-SCNC: 5 MMOL/L (ref 4–13)
AST SERPL W P-5'-P-CCNC: 21 U/L (ref 5–45)
BASOPHILS # BLD AUTO: 0.07 THOUSANDS/ΜL (ref 0–0.1)
BASOPHILS NFR BLD AUTO: 1 % (ref 0–1)
BILIRUB SERPL-MCNC: 0.59 MG/DL (ref 0.2–1)
BUN SERPL-MCNC: 19 MG/DL (ref 5–25)
CALCIUM SERPL-MCNC: 8.9 MG/DL (ref 8.3–10.1)
CHLORIDE SERPL-SCNC: 102 MMOL/L (ref 100–108)
CO2 SERPL-SCNC: 28 MMOL/L (ref 21–32)
CREAT SERPL-MCNC: 1.26 MG/DL (ref 0.6–1.3)
CREAT UR-MCNC: 116 MG/DL
EOSINOPHIL # BLD AUTO: 0.12 THOUSAND/ΜL (ref 0–0.61)
EOSINOPHIL NFR BLD AUTO: 2 % (ref 0–6)
ERYTHROCYTE [DISTWIDTH] IN BLOOD BY AUTOMATED COUNT: 13.1 % (ref 11.6–15.1)
GFR SERPL CREATININE-BSD FRML MDRD: 57 ML/MIN/1.73SQ M
GLUCOSE P FAST SERPL-MCNC: 89 MG/DL (ref 65–99)
HCT VFR BLD AUTO: 45.5 % (ref 36.5–49.3)
HGB BLD-MCNC: 15 G/DL (ref 12–17)
IMM GRANULOCYTES # BLD AUTO: 0.01 THOUSAND/UL (ref 0–0.2)
IMM GRANULOCYTES NFR BLD AUTO: 0 % (ref 0–2)
LDLC SERPL DIRECT ASSAY-MCNC: 113 MG/DL (ref 0–100)
LYMPHOCYTES # BLD AUTO: 1.8 THOUSANDS/ΜL (ref 0.6–4.47)
LYMPHOCYTES NFR BLD AUTO: 30 % (ref 14–44)
MCH RBC QN AUTO: 29.8 PG (ref 26.8–34.3)
MCHC RBC AUTO-ENTMCNC: 33 G/DL (ref 31.4–37.4)
MCV RBC AUTO: 91 FL (ref 82–98)
MICROALBUMIN UR-MCNC: 43 MG/L (ref 0–20)
MICROALBUMIN/CREAT 24H UR: 37 MG/G CREATININE (ref 0–30)
MONOCYTES # BLD AUTO: 0.48 THOUSAND/ΜL (ref 0.17–1.22)
MONOCYTES NFR BLD AUTO: 8 % (ref 4–12)
NEUTROPHILS # BLD AUTO: 3.59 THOUSANDS/ΜL (ref 1.85–7.62)
NEUTS SEG NFR BLD AUTO: 59 % (ref 43–75)
NRBC BLD AUTO-RTO: 0 /100 WBCS
PLATELET # BLD AUTO: 251 THOUSANDS/UL (ref 149–390)
PMV BLD AUTO: 9.3 FL (ref 8.9–12.7)
POTASSIUM SERPL-SCNC: 4.3 MMOL/L (ref 3.5–5.3)
PROT SERPL-MCNC: 7.3 G/DL (ref 6.4–8.2)
PTH-INTACT SERPL-MCNC: <6.3 PG/ML (ref 18.4–80.1)
RBC # BLD AUTO: 5.03 MILLION/UL (ref 3.88–5.62)
SODIUM SERPL-SCNC: 135 MMOL/L (ref 136–145)
TRIGL SERPL-MCNC: 156 MG/DL
WBC # BLD AUTO: 6.07 THOUSAND/UL (ref 4.31–10.16)

## 2021-04-27 PROCEDURE — 83721 ASSAY OF BLOOD LIPOPROTEIN: CPT

## 2021-04-27 PROCEDURE — 80053 COMPREHEN METABOLIC PANEL: CPT

## 2021-04-27 PROCEDURE — 82043 UR ALBUMIN QUANTITATIVE: CPT | Performed by: INTERNAL MEDICINE

## 2021-04-27 PROCEDURE — 85025 COMPLETE CBC W/AUTO DIFF WBC: CPT

## 2021-04-27 PROCEDURE — 83970 ASSAY OF PARATHORMONE: CPT

## 2021-04-27 PROCEDURE — G0438 PPPS, INITIAL VISIT: HCPCS | Performed by: INTERNAL MEDICINE

## 2021-04-27 PROCEDURE — 99214 OFFICE O/P EST MOD 30 MIN: CPT | Performed by: INTERNAL MEDICINE

## 2021-04-27 PROCEDURE — 1123F ACP DISCUSS/DSCN MKR DOCD: CPT | Performed by: INTERNAL MEDICINE

## 2021-04-27 PROCEDURE — 82570 ASSAY OF URINE CREATININE: CPT | Performed by: INTERNAL MEDICINE

## 2021-04-27 PROCEDURE — 36415 COLL VENOUS BLD VENIPUNCTURE: CPT

## 2021-04-27 PROCEDURE — 84478 ASSAY OF TRIGLYCERIDES: CPT

## 2021-04-27 NOTE — PROGRESS NOTES
Assessment and Plan:     Problem List Items Addressed This Visit        Digestive    GERD without esophagitis       Endocrine    Multiple thyroid nodules    Relevant Orders    DXA bone density spine hip and pelvis    Hypoparathyroidism ([address] )    Relevant Orders    CBC and differential    PTH, intact    DXA bone density spine hip and pelvis       Cardiovascular and Mediastinum    Migraine, unspecified, not intractable, without status migrainosus    Hypertension - Primary    Relevant Orders    CBC and differential    Comprehensive metabolic panel    Microalbumin / creatinine urine ratio       Genitourinary    Stage 3 chronic kidney disease (HCC)    Relevant Orders    DXA bone density spine hip and pelvis    Benign prostatic hyperplasia without lower urinary tract symptoms       Other    Hyperlipidemia    Relevant Orders    Comprehensive metabolic panel    LDL cholesterol, direct    Triglycerides    Generalized anxiety disorder      Other Visit Diagnoses     Medicare annual wellness visit, subsequent        Ectopic hormone secretion, not elsewhere classified         Relevant Orders    DXA bone density spine hip and pelvis           Preventive health issues were discussed with patient, and age appropriate screening tests were ordered as noted in patient's After Visit Summary  Personalized health advice and appropriate referrals for health education or preventive services given if needed, as noted in patient's After Visit Summary       History of Present Illness:     Patient presents for Medicare Annual Wellness visit    Patient Care Team:  Sae Paige DO as PCP - General (Internal Medicine)  Emilee Hart MD as PCP - Endocrinology (Endocrinology)  Emilee Hart MD     Problem List:     Patient Active Problem List   Diagnosis    Allergic rhinitis    Asymptomatic proteinuria    Basal cell tumor    Benign colon polyp    Generalized anxiety disorder    GERD without esophagitis    Hyperlipidemia    Hypertension  Hypocalcemia    Hypoparathyroidism (HCC)    Migraine, unspecified, not intractable, without status migrainosus    Multiple thyroid nodules    Palpitations    Microalbuminuria    BMI 27 0-27 9,adult    Stage 3 chronic kidney disease (HCC)    Benign prostatic hyperplasia without lower urinary tract symptoms      Past Medical and Surgical History:     Past Medical History:   Diagnosis Date    Abdominal pain, acute, generalized     last assessed - 21Apr2017    Actinic keratosis     last assessed - 12Jun2013    Chest pain     last assessed - [id number]    Chronic kidney disease, stage 3 ([facility] [address] )     Colon polyp     Disease of thyroid gland     Dysfunction of both eustachian tubes     suspect due to ETD  Recommend otc allergy meds and if fails then add flonase; last assessed - 06Aug2012    Edema     Essential hypertension     Fatigue     last assessed - 17WZO6445    Generalized anxiety disorder     GERD (gastroesophageal reflux disease)     Hematuria     last assessed - 04Pqw1759    Hypertension     Hypo-osmolality and hyponatremia     Hypocalcemia     [facility]     Hypothyroidism     Lightheadedness     last assessed - 01DVZ7018    Lump in neck     ? cause  Maybe a localized allergic reaction, dental issue, doubt sialdenitis, ect  Empiric abx and one dose steroids  Continue benadryl  If worsens, to er or ent      Malaise and fatigue     last assessed - 21Apr2017    Nephropathy     last assessed - 17Itc9957    Nocturia     Proteinuria     Shortness of breath     last assessed - 49DMP0854    Skin lesion     Resolved - 63ZHJ3507    Tinea corporis     last assessed - 49EOK3720     Past Surgical History:   Procedure Laterality Date    COLONOSCOPY W/ POLYPECTOMY  07/19/2019    HEAD & NECK SKIN LESION EXCISIONAL BIOPSY      excision of lesion scalp malignant - BCCA; last assessed - [id number]    SKIN BIOPSY      last assessed - 34Fjj2435   Lupe Freeman THYROID SURGERY      Biopsy thyroid using percutaneous core needle; last assessed - 83Sjk2067    TONSILLECTOMY        Family History:     Family History   Problem Relation Age of Onset    Hypertension Mother     Hypertension Father     Diabetes Father     Other Brother         Schwannomatosis    Stroke Family       Social History:        Social History     Socioeconomic History    Marital status: /Civil Union     Spouse name: None    Number of children: None    Years of education: None    Highest education level: None   Occupational History    Occupation: Retired     Comment:     Social Needs    Financial resource strain: None    Food insecurity     Worry: None     Inability: None    Transportation needs     Medical: None     Non-medical: None   Tobacco Use    Smoking status: Never Smoker    Smokeless tobacco: Never Used   Substance and Sexual Activity    Alcohol use: Yes     Comment: rarely    Drug use: No    Sexual activity: None   Lifestyle    Physical activity     Days per week: None     Minutes per session: None    Stress: None   Relationships    Social connections     Talks on phone: None     Gets together: None     Attends [employer] service: None     Active member of club or organization: None     Attends meetings of clubs or organizations: None     Relationship status: None    Intimate partner violence     Fear of current or ex partner: None     Emotionally abused: None     Physically abused: None     Forced sexual activity: None   Other Topics Concern    None   Social History Narrative    Consumes on average 1 cup of regular coffee per day       Medications and Allergies:     Current Outpatient Medications   Medication Sig Dispense Refill    aspirin (ECOTRIN LOW STRENGTH) 81 mg EC tablet Take 1 tablet (81 mg total) by mouth daily      calcitriol (ROCALTROL) 0 25 mcg capsule Take 1 capsule (0 25 mcg total) by mouth daily 90 capsule 1    Calcium Carbonate (CALCIUM 600 PO) Take by mouth Twice per day  Cholecalciferol (VITAMIN D3) 1000 units CAPS Take by mouth      Malka, Zingiber officinalis, (MALKA ROOT PO) Take by mouth      lisinopril (ZESTRIL) 20 mg tablet Take 1 tablet (20 mg total) by mouth daily 90 tablet 3    metoprolol tartrate (LOPRESSOR) 25 mg tablet Take 1 tablet by mouth once daily 90 tablet 1    Multiple Vitamin (MULTI-VITAMIN DAILY PO) Take by mouth daily      Omega-3 Fatty Acids (SALMON OIL PO) Take by mouth      tamsulosin (FLOMAX) 0 4 mg Take 1 capsule (0 4 mg total) by mouth daily with dinner 90 capsule 3     No current facility-administered medications for this visit  Allergies   Allergen Reactions    Pollen Extract Sneezing      Immunizations:     Immunization History   Administered Date(s) Administered    INFLUENZA 10/24/2018    Influenza Split High Dose Preservative Free IM 10/24/2018    Influenza, high dose seasonal 0 7 mL 10/24/2019, 09/29/2020    Pneumococcal Conjugate 13-Valent 05/31/2017    Pneumococcal Polysaccharide PPV23 07/08/2014    SARS-CoV-2 / COVID-19 mRNA IM (Moderna) 04/09/2021    Td (adult), adsorbed 09/08/1999    Tdap 01/01/2009, 06/19/2020    Zoster 04/16/2012      Health Maintenance:         Topic Date Due    Colorectal Cancer Screening  07/19/2029    Hepatitis C Screening  Completed     There are no preventive care reminders to display for this patient  Medicare Health Risk Assessment:     /78   Pulse 67   Temp (!) 96 2 °F (35 7 °C)   Wt 92 1 kg (203 lb)   SpO2 97%   BMI 26 06 kg/m²      Lorin Obando is here for his Subsequent Wellness visit  Last Medicare Wellness visit information reviewed, patient interviewed and updates made to the record today  Health Risk Assessment:   Patient rates overall health as good  Patient feels that their physical health rating is same  Patient is very satisfied with their life  Eyesight was rated as same  Hearing was rated as same   Patient feels that their emotional and mental health rating is same  Patients states they are never, rarely angry  Patient states they are never, rarely unusually tired/fatigued  Pain experienced in the last 7 days has been none  Patient states that he has experienced no weight loss or gain in last 6 months  Depression Screening:   PHQ-2 Score: 0      Fall Risk Screening: In the past year, patient has experienced: no history of falling in past year      Home Safety:  Patient does not have trouble with stairs inside or outside of their home  Patient has working smoke alarms and has working carbon monoxide detector  Home safety hazards include: none  Nutrition:   Current diet is Regular  Medications:   Patient is not currently taking any over-the-counter supplements  Patient is able to manage medications  Activities of Daily Living (ADLs)/Instrumental Activities of Daily Living (IADLs):   Walk and transfer into and out of bed and chair?: Yes  Dress and groom yourself?: Yes    Bathe or shower yourself?: Yes    Feed yourself? Yes  Do your laundry/housekeeping?: Yes  Manage your money, pay your bills and track your expenses?: Yes  Make your own meals?: Yes    Do your own shopping?: Yes    Previous Hospitalizations:   Any hospitalizations or ED visits within the last 12 months?: No      Advance Care Planning:   Living will: No    Durable POA for healthcare: No    Advanced directive: No    Advanced directive counseling given: Yes    Five wishes given: No    Patient declined ACP directive: No    End of Life Decisions reviewed with patient: Yes    Provider agrees with end of life decisions: Yes      Comments: Will discuss further once he reviews the information given       Cognitive Screening:   Provider or family/friend/caregiver concerned regarding cognition?: No    PREVENTIVE SCREENINGS      Cardiovascular Screening:    General: Screening Not Indicated and History Lipid Disorder    Due for: Lipid Panel      Diabetes Screening:     General: Screening Current    Due for: Blood Glucose      Colorectal Cancer Screening:     General: Screening Current      Prostate Cancer Screening:    General: Screening Current      Osteoporosis Screening:    General: Risks and Benefits Discussed    Due for: DXA Appendicular      Abdominal Aortic Aneurysm (AAA) Screening:    Risk factors include: age between 73-67 yo        General: Risks and Benefits Discussed and Patient Declines      Lung Cancer Screening:     General: Screening Not Indicated      Hepatitis C Screening:    General: Screening Current    Screening, Brief Intervention, and Referral to Treatment (SBIRT)    Screening  Typical number of drinks in a day: 0  Typical number of drinks in a week: 0  Interpretation: Low risk drinking behavior  Single Item Drug Screening:  How often have you used an illegal drug (including marijuana) or a prescription medication for non-medical reasons in the past year? never    Single Item Drug Screen Score: 0  Interpretation: Negative screen for possible drug use disorder    Other Counseling Topics:   Car/seat belt/driving safety, sunscreen and calcium and vitamin D intake and regular weightbearing exercise  A/P: Doing well and no falls  Denies depression and feels safe at home  Diverse diet  No problems operating a MV and uses seat belts  No living will or POA  Information give for pt to review  No DME or referrals needed today  RTC in one year for medicare wellness       Barbara Osler, DO

## 2021-04-27 NOTE — PATIENT INSTRUCTIONS
Medicare Preventive Visit Patient Instructions  Thank you for completing your Welcome to Medicare Visit or Medicare Annual Wellness Visit today  Your next wellness visit will be due in one year (4/28/2022)  The screening/preventive services that you may require over the next 5-10 years are detailed below  Some tests may not apply to you based off risk factors and/or age  Screening tests ordered at today's visit but not completed yet may show as past due  Also, please note that scanned in results may not display below  Preventive Screenings:  Service Recommendations Previous Testing/Comments   Colorectal Cancer Screening  · Colonoscopy    · Fecal Occult Blood Test (FOBT)/Fecal Immunochemical Test (FIT)  · Fecal DNA/Cologuard Test  · Flexible Sigmoidoscopy Age: 54-65 years old   Colonoscopy: every 10 years (May be performed more frequently if at higher risk)  OR  FOBT/FIT: every 1 year  OR  Cologuard: every 3 years  OR  Sigmoidoscopy: every 5 years  Screening may be recommended earlier than age 48 if at higher risk for colorectal cancer  Also, an individualized decision between you and your healthcare provider will decide whether screening between the ages of 74-80 would be appropriate   Colonoscopy: 07/19/2019  FOBT/FIT: Not on file  Cologuard: Not on file  Sigmoidoscopy: Not on file    Screening Current     Prostate Cancer Screening Individualized decision between patient and health care provider in men between ages of 53-78   Medicare will cover every 12 months beginning on the day after your 50th birthday PSA: 3 0 ng/mL     Screening Current     Hepatitis C Screening Once for adults born between 1945 and 1965  More frequently in patients at high risk for Hepatitis C Hep C Antibody: 10/05/2017    Screening Current   Diabetes Screening 1-2 times per year if you're at risk for diabetes or have pre-diabetes Fasting glucose: 82 mg/dL   A1C: 5 3 %    Screening Current   Cholesterol Screening Once every 5 years if you don't have a lipid disorder  May order more often based on risk factors  Lipid panel: 09/29/2020    Screening Not Indicated  History Lipid Disorder      Other Preventive Screenings Covered by Medicare:  1  Abdominal Aortic Aneurysm (AAA) Screening: covered once if your at risk  You're considered to be at risk if you have a family history of AAA or a male between the age of 73-68 who smoking at least 100 cigarettes in your lifetime  2  Lung Cancer Screening: covers low dose CT scan once per year if you meet all of the following conditions: (1) Age 50-69; (2) No signs or symptoms of lung cancer; (3) Current smoker or have quit smoking within the last 15 years; (4) You have a tobacco smoking history of at least 30 pack years (packs per day x number of years you smoked); (5) You get a written order from a healthcare provider  3  Glaucoma Screening: covered annually if you're considered high risk: (1) You have diabetes OR (2) Family history of glaucoma OR (3)  aged 48 and older OR (3)  American aged 72 and older  3  Osteoporosis Screening: covered every 2 years if you meet one of the following conditions: (1) Have a vertebral abnormality; (2) On glucocorticoid therapy for more than 3 months; (3) Have primary hyperparathyroidism; (4) On osteoporosis medications and need to assess response to drug therapy  5  HIV Screening: covered annually if you're between the age of 12-76  Also covered annually if you are younger than 13 and older than 72 with risk factors for HIV infection  For pregnant patients, it is covered up to 3 times per pregnancy      Immunizations:  Immunization Recommendations   Influenza Vaccine Annual influenza vaccination during flu season is recommended for all persons aged >= 6 months who do not have contraindications   Pneumococcal Vaccine (Prevnar and Pneumovax)  * Prevnar = PCV13  * Pneumovax = PPSV23 Adults 25-60 years old: 1-3 doses may be recommended based on certain risk factors  Adults 72 years old: Prevnar (PCV13) vaccine recommended followed by Pneumovax (PPSV23) vaccine  If already received PPSV23 since turning 65, then PCV13 recommended at least one year after PPSV23 dose  Hepatitis B Vaccine 3 dose series if at intermediate or high risk (ex: diabetes, end stage renal disease, liver disease)   Tetanus (Td) Vaccine - COST NOT COVERED BY MEDICARE PART B Following completion of primary series, a booster dose should be given every 10 years to maintain immunity against tetanus  Td may also be given as tetanus wound prophylaxis  Tdap Vaccine - COST NOT COVERED BY MEDICARE PART B Recommended at least once for all adults  For pregnant patients, recommended with each pregnancy  Shingles Vaccine (Shingrix) - COST NOT COVERED BY MEDICARE PART B  2 shot series recommended in those aged 48 and above     Health Maintenance Due:      Topic Date Due    Colorectal Cancer Screening  07/19/2029    Hepatitis C Screening  Completed     Immunizations Due:  There are no preventive care reminders to display for this patient  Advance Directives   What are advance directives? Advance directives are legal documents that state your wishes and plans for medical care  These plans are made ahead of time in case you lose your ability to make decisions for yourself  Advance directives can apply to any medical decision, such as the treatments you want, and if you want to donate organs  What are the types of advance directives? There are many types of advance directives, and each state has rules about how to use them  You may choose a combination of any of the following:  · Living will: This is a written record of the treatment you want  You can also choose which treatments you do not want, which to limit, and which to stop at a certain time  This includes surgery, medicine, IV fluid, and tube feedings  · Durable power of  for healthcare [facility]):   This is a written record that states who you want to make healthcare choices for you when you are unable to make them for yourself  This person, called a proxy, is usually a family member or a friend  You may choose more than 1 proxy  · Do not resuscitate (DNR) order:  A DNR order is used in case your heart stops beating or you stop breathing  It is a request not to have certain forms of treatment, such as CPR  A DNR order may be included in other types of advance directives  · Medical directive: This covers the care that you want if you are in a coma, near death, or unable to make decisions for yourself  You can list the treatments you want for each condition  Treatment may include pain medicine, surgery, blood transfusions, dialysis, IV or tube feedings, and a ventilator (breathing machine)  · Values history: This document has questions about your views, beliefs, and how you feel and think about life  This information can help others choose the care that you would choose  Why are advance directives important? An advance directive helps you control your care  Although spoken wishes may be used, it is better to have your wishes written down  Spoken wishes can be misunderstood, or not followed  Treatments may be given even if you do not want them  An advance directive may make it easier for your family to make difficult choices about your care  Weight Management   Why it is important to manage your weight:  Being overweight increases your risk of health conditions such as heart disease, high blood pressure, type 2 diabetes, and certain types of cancer  It can also increase your risk for osteoarthritis, sleep apnea, and other respiratory problems  Aim for a slow, steady weight loss  Even a small amount of weight loss can lower your risk of health problems  How to lose weight safely:  A safe and healthy way to lose weight is to eat fewer calories and get regular exercise   You can lose up about 1 pound a week by decreasing the number of calories you eat by 500 calories each day  Healthy meal plan for weight management:  A healthy meal plan includes a variety of foods, contains fewer calories, and helps you stay healthy  A healthy meal plan includes the following:  · Eat whole-grain foods more often  A healthy meal plan should contain fiber  Fiber is the part of grains, fruits, and vegetables that is not broken down by your body  Whole-grain foods are healthy and provide extra fiber in your diet  Some examples of whole-grain foods are whole-wheat breads and pastas, oatmeal, brown rice, and bulgur  · Eat a variety of vegetables every day  Include dark, leafy greens such as spinach, kale, israel greens, and mustard greens  Eat yellow and orange vegetables such as carrots, sweet potatoes, and winter squash  · Eat a variety of fruits every day  Choose fresh or canned fruit (canned in its own juice or light syrup) instead of juice  Fruit juice has very little or no fiber  · Eat low-fat dairy foods  Drink fat-free (skim) milk or 1% milk  Eat fat-free yogurt and low-fat cottage cheese  Try low-fat cheeses such as mozzarella and other reduced-fat cheeses  · Choose meat and other protein foods that are low in fat  Choose beans or other legumes such as split peas or lentils  Choose fish, skinless poultry (chicken or turkey), or lean cuts of red meat (beef or pork)  Before you cook meat or poultry, cut off any visible fat  · Use less fat and oil  Try baking foods instead of frying them  Add less fat, such as margarine, sour cream, regular salad dressing and mayonnaise to foods  Eat fewer high-fat foods  Some examples of high-fat foods include french fries, doughnuts, ice cream, and cakes  · Eat fewer sweets  Limit foods and drinks that are high in sugar  This includes candy, cookies, regular soda, and sweetened drinks  Exercise:  Exercise at least 30 minutes per day on most days of the week   Some examples of exercise include walking, biking, dancing, and swimming  You can also fit in more physical activity by taking the stairs instead of the elevator or parking farther away from stores  Ask your healthcare provider about the best exercise plan for you  © Copyright [employer] 2018 Information is for End User's use only and may not be sold, redistributed or otherwise used for commercial purposes  All illustrations and images included in CareNotes® are the copyrighted property of A D A M , Inc  or [facility] Jerrell Ma   Weight Management   AMBULATORY CARE:   Why it is important to manage your weight:  Being overweight increases your risk of health conditions such as heart disease, high blood pressure, type 2 diabetes, and certain types of cancer  It can also increase your risk for osteoarthritis, sleep apnea, and other respiratory problems  Aim for a slow, steady weight loss  Even a small amount of weight loss can lower your risk of health problems  How to lose weight safely:  A safe and healthy way to lose weight is to eat fewer calories and get regular exercise  · You can lose up about 1 pound a week by decreasing the number of calories you eat by 500 calories each day  You can decrease calories by eating smaller portion sizes or by cutting out high-calorie foods  Read labels to find out how many calories are in the foods you eat  · You can also burn calories with exercise such as walking, swimming, or biking  You will be more likely to keep weight off if you make these changes part of your lifestyle  Exercise at least 30 minutes per day on most days of the week  You can also fit in more physical activity by taking the stairs instead of the elevator or parking farther away from stores  Ask your healthcare provider about the best exercise plan for you  Healthy meal plan for weight management:  A healthy meal plan includes a variety of foods, contains fewer calories, and helps you stay healthy   A healthy meal plan includes the following:     · Eat whole-grain foods more often  A healthy meal plan should contain fiber  Fiber is the part of grains, fruits, and vegetables that is not broken down by your body  Whole-grain foods are healthy and provide extra fiber in your diet  Some examples of whole-grain foods are whole-wheat breads and pastas, oatmeal, brown rice, and bulgur  · Eat a variety of vegetables every day  Include dark, leafy greens such as spinach, kale, israel greens, and mustard greens  Eat yellow and orange vegetables such as carrots, sweet potatoes, and winter squash  · Eat a variety of fruits every day  Choose fresh or canned fruit (canned in its own juice or light syrup) instead of juice  Fruit juice has very little or no fiber  · Eat low-fat dairy foods  Drink fat-free (skim) milk or 1% milk  Eat fat-free yogurt and low-fat cottage cheese  Try low-fat cheeses such as mozzarella and other reduced-fat cheeses  · Choose meat and other protein foods that are low in fat  Choose beans or other legumes such as split peas or lentils  Choose fish, skinless poultry (chicken or turkey), or lean cuts of red meat (beef or pork)  Before you cook meat or poultry, cut off any visible fat  · Use less fat and oil  Try baking foods instead of frying them  Add less fat, such as margarine, sour cream, regular salad dressing and mayonnaise to foods  Eat fewer high-fat foods  Some examples of high-fat foods include french fries, doughnuts, ice cream, and cakes  · Eat fewer sweets  Limit foods and drinks that are high in sugar  This includes candy, cookies, regular soda, and sweetened drinks  Ways to decrease calories:   · Eat smaller portions  ? Use a small plate with smaller servings  ? Do not eat second helpings  ? When you eat at a restaurant, ask for a box and place half of your meal in the box before you eat  ? Share an entrée with someone else  · Replace high-calorie snacks with healthy, low-calorie snacks  ? Choose fresh fruit, vegetables, fat-free rice cakes, or air-popped popcorn instead of potato chips, nuts, or chocolate  ? Choose water or calorie-free drinks instead of soda or sweetened drinks  · Do not shop for groceries when you are hungry  You may be more likely to make unhealthy food choices  Take a grocery list of healthy foods and shop after you have eaten  · Eat regular meals  Do not skip meals  Skipping meals can lead to overeating later in the day  This can make it harder for you to lose weight  Eat a healthy snack in place of a meal if you do not have time to eat a regular meal  Talk with a dietitian to help you create a meal plan and schedule that is right for you  Other things to consider as you try to lose weight:   · Be aware of situations that may give you the urge to overeat, such as eating while watching television  Find ways to avoid these situations  For example, read a book, go for a walk, or do crafts  · Meet with a weight loss support group or friends who are also trying to lose weight  This may help you stay motivated to continue working on your weight loss goals  © Copyright [address] Information is for End User's use only and may not be sold, redistributed or otherwise used for commercial purposes  All illustrations and images included in CareNotes® are the copyrighted property of [employer]  or [facility] Jerrell Ma   The above information is an  only  It is not intended as medical advice for individual conditions or treatments  Talk to your doctor, nurse or pharmacist before following any medical regimen to see if it is safe and effective for you  Low Fat Diet   AMBULATORY CARE:   A low-fat diet  is an eating plan that is low in total fat, unhealthy fat, and cholesterol  You may need to follow a low-fat diet if you have trouble digesting or absorbing fat  You may also need to follow this diet if you have high cholesterol   You can also lower your cholesterol by increasing the amount of fiber in your diet  Soluble fiber is a type of fiber that helps to decrease cholesterol levels  Different types of fat in food:   · Limit unhealthy fats  A diet that is high in cholesterol, saturated fat, and trans fat may cause unhealthy cholesterol levels  Unhealthy cholesterol levels increase your risk of heart disease  ? Cholesterol:  Limit intake of cholesterol to less than 200 mg per day  Cholesterol is found in meat, eggs, and dairy  ? Saturated fat:  Limit saturated fat to less than 7% of your total daily calories  Ask your dietitian how many calories you need each day  Saturated fat is found in butter, cheese, ice cream, whole milk, and palm oil  Saturated fat is also found in meat, such as beef, pork, chicken skin, and processed meats  Processed meats include sausage, hot dogs, and bologna  ? Trans fat:  Avoid trans fat as much as possible  Trans fat is used in fried and baked foods  Foods that say trans fat free on the label may still have up to 0 5 grams of trans fat per serving  · Include healthy fats  Replace foods that are high in saturated and trans fat with foods high in healthy fats  This may help to decrease high cholesterol levels  ? Monounsaturated fats: These are found in avocados, nuts, and vegetable oils, such as olive, canola, and sunflower oil  ? Polyunsaturated fats: These can be found in vegetable oils, such as soybean or corn oil  Omega-3 fats can help to decrease the risk of heart disease  Omega-3 fats are found in fish, such as salmon, herring, trout, and tuna  Omega-3 fats can also be found in plant foods, such as walnuts, flaxseed, soybeans, and canola oil  Foods to limit or avoid:   · Grains:      ? Snacks that are made with partially hydrogenated oils, such as chips, regular crackers, and butter-flavored popcorn    ?  High-fat baked goods, such as biscuits, croissants, doughnuts, pies, cookies, and pastries    · Dairy:      ? Whole milk, 2% milk, and yogurt and ice cream made with whole milk    ? Half and half creamer, heavy cream, and whipping cream    ? Cheese, cream cheese, and sour cream    · Meats and proteins:      ? High-fat cuts of meat (T-bone steak, regular hamburger, and ribs)    ? Fried meat, poultry (turkey and chicken), and fish    ? Poultry (chicken and turkey) with skin    ? Cold cuts (salami or bologna), hot dogs, camara, and sausage    ? Whole eggs and egg yolks    · Vegetables and fruits with added fat:      ? Fried vegetables or vegetables in butter or high-fat sauces, such as cream or cheese sauces    ? Fried fruit or fruit served with butter or cream    · Fats:      ? Butter, stick margarine, and shortening    ? Coconut, palm oil, and palm kernel oil    Foods to include:   · Grains:      ? Whole-grain breads, cereals, pasta, and brown rice    ? Low-fat crackers and pretzels    · Vegetables and fruits:      ? Fresh, frozen, or canned vegetables (no salt or low-sodium)    ? Fresh, frozen, dried, or canned fruit (canned in light syrup or fruit juice)    ? Avocado    · Low-fat dairy products:      ? Nonfat (skim) or 1% milk    ? Nonfat or low-fat cheese, yogurt, and cottage cheese    · Meats and proteins:      ? Chicken or turkey with no skin    ? Baked or broiled fish    ? Lean beef and pork (loin, round, extra lean hamburger)    ? Beans and peas, unsalted nuts, soy products    ? Egg whites and substitutes    ? Seeds and nuts    · Fats:      ? Unsaturated oil, such as canola, olive, peanut, soybean, or sunflower oil    ? Soft or liquid margarine and vegetable oil spread    ? Low-fat salad dressing    Other ways to decrease fat:   · Read food labels before you buy foods  Choose foods that have less than 30% of calories from fat  Choose low-fat or fat-free dairy products  Remember that fat free does not mean calorie free   These foods still contain calories, and too many calories can lead to weight gain  · Trim fat from meat and avoid fried food  Trim all visible fat from meat before you cook it  Remove the skin from poultry  Do not adame meat, fish, or poultry  Bake, roast, boil, or broil these foods instead  Avoid fried foods  Eat a baked potato instead of [place] fries  Steam vegetables instead of sautéing them in butter  · Add less fat to foods  Use imitation camara bits on salads and baked potatoes instead of regular camara bits  Use fat-free or low-fat salad dressings instead of regular dressings  Use low-fat or nonfat butter-flavored topping instead of regular butter or margarine on popcorn and other foods  Ways to decrease fat in recipes:  Replace high-fat ingredients with low-fat or nonfat ones  This may cause baked goods to be drier than usual  You may need to use nonfat cooking spray on pans to prevent food from sticking  You also may need to change the amount of other ingredients, such as water, in the recipe  Try the following:  · Use low-fat or light margarine instead of regular margarine or shortening  · Use lean ground turkey breast or chicken, or lean ground beef (less than 5% fat) instead of hamburger  · Add 1 teaspoon of canola oil to 8 ounces of skim milk instead of using cream or half and half  · Use grated zucchini, carrots, or apples in breads instead of coconut  · Use blenderized, low-fat cottage cheese, plain tofu, or low-fat ricotta cheese instead of cream cheese  · Use 1 egg white and 1 teaspoon of canola oil, or use ¼ cup (2 ounces) of fat-free egg substitute instead of a whole egg  · Replace half of the oil that is called for in a recipe with applesauce when you bake  Use 3 tablespoons of cocoa powder and 1 tablespoon of canola oil instead of a square of baking chocolate  How to increase fiber:  Eat enough high-fiber foods to get 20 to 30 grams of fiber every day   Slowly increase your fiber intake to avoid stomach cramps, gas, and other problems  · Eat 3 ounces of whole-grain foods each day  An ounce is about 1 slice of bread  Eat whole-grain breads, such as whole-wheat bread  Whole wheat, whole-wheat flour, or other whole grains should be listed as the first ingredient on the food label  Replace white flour with whole-grain flour or use half of each in recipes  Whole-grain flour is heavier than white flour, so you may have to add more yeast or baking powder  · Eat a high-fiber cereal for breakfast   Oatmeal is a good source of soluble fiber  Look for cereals that have bran or fiber in the name  Choose whole-grain products, such as brown rice, barley, and whole-wheat pasta  · Eat more beans, peas, and lentils  For example, add beans to soups or salads  Eat at least 5 cups of fruits and vegetables each day  Eat fruits and vegetables with the peel because the peel is high in fiber  © Copyright [address] Information is for End User's use only and may not be sold, redistributed or otherwise used for commercial purposes  All illustrations and images included in CareNotes® are the copyrighted property of A D A M , Inc  or [address] [place] [address] [facility]  The above information is an  only  It is not intended as medical advice for individual conditions or treatments  Talk to your doctor, nurse or pharmacist before following any medical regimen to see if it is safe and effective for you  Heart Healthy Diet   AMBULATORY CARE:   A heart healthy diet  is an eating plan low in unhealthy fats and sodium (salt)  The plan is high in healthy fats and fiber  A heart healthy diet helps improve your cholesterol levels and lowers your risk for heart disease and stroke  A dietitian will teach you how to read and understand food labels  Heart healthy diet guidelines to follow:   · Choose foods that contain healthy fats  ? Unsaturated fats  include monounsaturated and polyunsaturated fats   Unsaturated fat is found in foods such as soybean, canola, olive, corn, and safflower oils  It is also found in soft tub margarine that is made with liquid vegetable oil  ? Omega-3 fat  is found in certain fish, such as salmon, tuna, and trout, and in walnuts and flaxseed  Eat fish high in omega-3 fats at least 2 times a week  · Get 20 to 30 grams of fiber each day  Fruits, vegetables, whole-grain foods, and legumes (cooked beans) are good sources of fiber  · Limit or do not have unhealthy fats  ? Cholesterol  is found in animal foods, such as eggs and lobster, and in dairy products made from whole milk  Limit cholesterol to less than 200 mg each day  ? Saturated fat  is found in meats, such as camara and hamburger  It is also found in chicken or turkey skin, whole milk, and butter  Limit saturated fat to less than 7% of your total daily calories  ? Trans fat  is found in packaged foods, such as potato chips and cookies  It is also in hard margarine, some fried foods, and shortening  Do not eat foods that contain trans fats  · Limit sodium as directed  You may be told to limit sodium to 2,000 to 2,300 mg each day  Choose low-sodium or no-salt-added foods  Add little or no salt to food you prepare  Use herbs and spices in place of salt  Include the following in your heart healthy plan:  Ask your dietitian or healthcare provider how many servings to have from each of the following food groups:  · Grains:      ? Whole-wheat breads, cereals, and pastas, and brown rice    ? Low-fat, low-sodium crackers and chips    · Vegetables:      ? Broccoli, green beans, green peas, and spinach    ? Collards, kale, and lima beans    ? Carrots, sweet potatoes, tomatoes, and peppers    ? Canned vegetables with no salt added    · Fruits:      ? Bananas, peaches, pears, and pineapple    ? Grapes, raisins, and dates    ? Oranges, tangerines, grapefruit, orange juice, and grapefruit juice    ?  Apricots, mangoes, melons, and papaya    ? Raspberries and strawberries    ? Canned fruit with no added sugar    · Low-fat dairy:      ? Nonfat (skim) milk, 1% milk, and low-fat almond, cashew, or soy milks fortified with calcium    ? Low-fat cheese, regular or frozen yogurt, and cottage cheese    · Meats and proteins:      ? Lean cuts of beef and pork (loin, leg, round), skinless chicken and turkey    ? Legumes, soy products, egg whites, or nuts    Limit or do not include the following in your heart healthy plan:   · Unhealthy fats and oils:      ? Whole or 2% milk, cream cheese, sour cream, or cheese    ? High-fat cuts of beef (T-bone steaks, ribs), chicken or turkey with skin, and organ meats such as liver    ? Butter, stick margarine, shortening, and cooking oils such as coconut or palm oil    · Foods and liquids high in sodium:      ? Packaged foods, such as frozen dinners, cookies, macaroni and cheese, and cereals with more than 300 mg of sodium per serving    ? Vegetables with added sodium, such as instant potatoes, vegetables with added sauces, or regular canned vegetables    ? Cured or smoked meats, such as hot dogs, camara, and sausage    ? High-sodium ketchup, barbecue sauce, salad dressing, pickles, olives, soy sauce, or miso    · Foods and liquids high in sugar:      ? Candy, cake, cookies, pies, or doughnuts    ? Soft drinks (soda), sports drinks, or sweetened tea    ? Canned or dry mixes for cakes, soups, sauces, or gravies    Other healthy heart guidelines:   · Do not smoke  Nicotine and other chemicals in cigarettes and cigars can cause lung and heart damage  Ask your healthcare provider for information if you currently smoke and need help to quit  E-cigarettes or smokeless tobacco still contain nicotine  Talk to your healthcare provider before you use these products  · Limit or do not drink alcohol as directed  Alcohol can damage your heart and raise your blood pressure   Your healthcare provider may give you specific daily and weekly limits  The general recommended limit is 1 drink a day for women 21 or older and for men 72 or older  Do not have more than 3 drinks in a day or 7 in a week  The recommended limit is 2 drinks a day for men 24to 59years of age  Do not have more than 4 drinks in a day or 14 in a week  A drink of alcohol is 12 ounces of beer, 5 ounces of wine, or 1½ ounces of liquor  · Exercise regularly  Exercise can help you maintain a healthy weight and improve your blood pressure and cholesterol levels  Regular exercise can also decrease your risk for heart problems  Ask your healthcare provider about the best exercise plan for you  Do not start an exercise program without asking your healthcare provider  Follow up with your doctor or cardiologist as directed:  Write down your questions so you remember to ask them during your visits  © Copyright [address] [place] [address] Drive Information is for End User's use only and may not be sold, redistributed or otherwise used for commercial purposes  All illustrations and images included in CareNotes® are the copyrighted property of A D A M , Inc  or [address] [place] [address]  The above information is an  only  It is not intended as medical advice for individual conditions or treatments  Talk to your doctor, nurse or pharmacist before following any medical regimen to see if it is safe and effective for you  Chronic Hypertension   AMBULATORY CARE:   Hypertension  is high blood pressure  Your blood pressure is the force of your blood moving against the walls of your arteries  Hypertension causes your blood pressure to get so high that your heart has to work much harder than normal  This can damage your heart  Even if you have hypertension for years, lifestyle changes, medicines, or both can help bring your blood pressure to normal   Call 911 for any of the following:   · You have chest pain  · You have any of the following signs of a heart attack:      ?  Squeezing, pressure, or pain in your chest    ? You may  also have any of the following:     § Discomfort or pain in your back, neck, jaw, stomach, or arm    § Shortness of breath    § Nausea or vomiting    § Lightheadedness or a sudden cold sweat    · You become confused or have difficulty speaking  · You suddenly feel lightheaded or have trouble breathing  Seek care immediately if:   · You have a severe headache or vision loss  · You have weakness in an arm or leg  Contact your healthcare provider if:   · You feel faint, dizzy, confused, or drowsy  · You have been taking your blood pressure medicine but your pressure is higher than your provider says it should be  · You have questions or concerns about your condition or care  Treatment for chronic hypertension  may include medicine to lower your blood pressure and cholesterol levels  A low cholesterol level helps prevent heart disease and makes it easier to control your blood pressure  Heart disease can make your blood pressure harder to control  You may also need to make lifestyle changes  What you need to know about the stages of hypertension:       · Normal blood pressure is 119/79 or lower   Your healthcare provider may only check your blood pressure each year if it stays at a normal level  · Elevated blood pressure is 120/79 to 129/79   This is sometimes called prehypertension  Your healthcare provider may suggest lifestyle changes to help lower your blood pressure to a normal level  He or she may then check it again in 3 to 6 months  · Stage 1 hypertension is 130/80  to 139/89   Your provider may recommend lifestyle changes, medication, and checks every 3 to 6 months until your blood pressure is controlled  · Stage 2 hypertension is 140/90 or higher   Your provider will recommend lifestyle changes and have you take 2 kinds of hypertension medicines   You will also need to have your blood pressure checked monthly until it is controlled  Manage chronic hypertension:   · Check your blood pressure at home  Avoid smoking, caffeine, and exercise at least 30 minutes before checking your blood pressure  Sit and rest for 5 minutes before you take your blood pressure  Extend your arm and support it on a flat surface  Your arm should be at the same level as your heart  Follow the directions that came with your blood pressure monitor  Check your blood pressure 2 times, 1 minute apart, before you take your medicine in the morning  Also check your blood pressure before your evening meal  Keep a record of your readings and bring it to your follow-up visits  Ask your healthcare provider what your blood pressure should be  · Manage any other health conditions you have  Health conditions such as diabetes can increase your risk for hypertension  Follow your healthcare provider's instructions and take all your medicines as directed  Talk to your healthcare provider about any new health conditions you have recently developed  · Ask about all medicines  Certain medicines can increase your blood pressure  Examples include oral birth control pills, decongestants, herbal supplements, and NSAIDs, such as ibuprofen  Your healthcare provider can tell you which medicines are safe for you to take  This includes prescription and over-the-counter medicines  Lifestyle changes you can make to lower your blood pressure: Your provider may want you to make more lifestyle changes if you are having trouble controlling your blood pressure  This may feel difficult over time, especially if you think you are making good changes but your pressure is still high  It might help to focus on one new change at a time  For example, try to add 1 more day of exercise, or exercise for an extra 10 minutes on 2 days  Small changes can make a big difference   Your healthcare provider can also refer you to specialists such as a dietitian who can help you make small changes  · Limit sodium (salt) as directed  Too much sodium can affect your fluid balance  Check labels to find low-sodium or no-salt-added foods  Some low-sodium foods use potassium salts for flavor  Too much potassium can also cause health problems  Your healthcare provider will tell you how much sodium and potassium are safe for you to have in a day  He or she may recommend that you limit sodium to 2,300 mg a day  · Follow the meal plan recommended by your healthcare provider  A dietitian or your provider can give you more information on low-sodium plans or the DASH (Dietary Approaches to Stop Hypertension) eating plan  The DASH plan is low in sodium, unhealthy fats, and total fat  It is high in potassium, calcium, and fiber  · Exercise to maintain a healthy weight  Exercise at least 30 minutes per day, on most days of the week  This will help decrease your blood pressure  Ask your healthcare provider about the best exercise plan for you  · Decrease stress  This may help lower your blood pressure  Learn ways to relax, such as deep breathing or listening to music  · Limit alcohol as directed  Alcohol can increase your blood pressure  A drink of alcohol is 12 ounces of beer, 5 ounces of wine, or 1½ ounces of liquor  · Do not smoke  Nicotine and other chemicals in cigarettes and cigars can increase your blood pressure and also cause lung damage  Ask your healthcare provider for information if you currently smoke and need help to quit  E-cigarettes or smokeless tobacco still contain nicotine  Talk to your healthcare provider before you use these products  Follow up with your healthcare provider as directed: You will need to return to have your blood pressure checked and to have other lab tests done  Write down your questions so you remember to ask them during your visits     © Copyright [address] Information is for End User's use only and may not be sold, redistributed or otherwise used for commercial purposes  All illustrations and images included in CareNotes® are the copyrighted property of A D [employer] M , Inc  or Renee Petersen  The above information is an  only  It is not intended as medical advice for individual conditions or treatments  Talk to your doctor, nurse or pharmacist before following any medical regimen to see if it is safe and effective for you  Medicare Preventive Visit Patient Instructions  Thank you for completing your Welcome to Medicare Visit or Medicare Annual Wellness Visit today  Your next wellness visit will be due in one year (4/28/2022)  The screening/preventive services that you may require over the next 5-10 years are detailed below  Some tests may not apply to you based off risk factors and/or age  Screening tests ordered at today's visit but not completed yet may show as past due  Also, please note that scanned in results may not display below  Preventive Screenings:  Service Recommendations Previous Testing/Comments   Colorectal Cancer Screening  · Colonoscopy    · Fecal Occult Blood Test (FOBT)/Fecal Immunochemical Test (FIT)  · Fecal DNA/Cologuard Test  · Flexible Sigmoidoscopy Age: 54-65 years old   Colonoscopy: every 10 years (May be performed more frequently if at higher risk)  OR  FOBT/FIT: every 1 year  OR  Cologuard: every 3 years  OR  Sigmoidoscopy: every 5 years  Screening may be recommended earlier than age 48 if at higher risk for colorectal cancer  Also, an individualized decision between you and your healthcare provider will decide whether screening between the ages of 74-80 would be appropriate   Colonoscopy: 07/19/2019  FOBT/FIT: Not on file  Cologuard: Not on file  Sigmoidoscopy: Not on file    Screening Current     Prostate Cancer Screening Individualized decision between patient and health care provider in men between ages of 53-78   Medicare will cover every 12 months beginning on the day after your 50th birthday PSA: 3 0 ng/mL     Screening Current     Hepatitis C Screening Once for adults born between 1945 and 1965  More frequently in patients at high risk for Hepatitis C Hep C Antibody: 10/05/2017    Screening Current   Diabetes Screening 1-2 times per year if you're at risk for diabetes or have pre-diabetes Fasting glucose: 82 mg/dL   A1C: 5 3 %    Screening Current   Cholesterol Screening Once every 5 years if you don't have a lipid disorder  May order more often based on risk factors  Lipid panel: 09/29/2020    Screening Not Indicated  History Lipid Disorder      Other Preventive Screenings Covered by Medicare:  6  Abdominal Aortic Aneurysm (AAA) Screening: covered once if your at risk  You're considered to be at risk if you have a family history of AAA or a male between the age of 73-68 who smoking at least 100 cigarettes in your lifetime  7  Lung Cancer Screening: covers low dose CT scan once per year if you meet all of the following conditions: (1) Age 50-69; (2) No signs or symptoms of lung cancer; (3) Current smoker or have quit smoking within the last 15 years; (4) You have a tobacco smoking history of at least 30 pack years (packs per day x number of years you smoked); (5) You get a written order from a healthcare provider  8  Glaucoma Screening: covered annually if you're considered high risk: (1) You have diabetes OR (2) Family history of glaucoma OR (3)  aged 48 and older OR (3)  American aged 72 and older  5  Osteoporosis Screening: covered every 2 years if you meet one of the following conditions: (1) Have a vertebral abnormality; (2) On glucocorticoid therapy for more than 3 months; (3) Have primary hyperparathyroidism; (4) On osteoporosis medications and need to assess response to drug therapy  10  HIV Screening: covered annually if you're between the age of 12-76   Also covered annually if you are younger than 13 and older than 72 with risk factors for HIV infection  For pregnant patients, it is covered up to 3 times per pregnancy  Immunizations:  Immunization Recommendations   Influenza Vaccine Annual influenza vaccination during flu season is recommended for all persons aged >= 6 months who do not have contraindications   Pneumococcal Vaccine (Prevnar and Pneumovax)  * Prevnar = PCV13  * Pneumovax = PPSV23 Adults 25-60 years old: 1-3 doses may be recommended based on certain risk factors  Adults 72 years old: Prevnar (PCV13) vaccine recommended followed by Pneumovax (PPSV23) vaccine  If already received PPSV23 since turning 65, then PCV13 recommended at least one year after PPSV23 dose  Hepatitis B Vaccine 3 dose series if at intermediate or high risk (ex: diabetes, end stage renal disease, liver disease)   Tetanus (Td) Vaccine - COST NOT COVERED BY MEDICARE PART B Following completion of primary series, a booster dose should be given every 10 years to maintain immunity against tetanus  Td may also be given as tetanus wound prophylaxis  Tdap Vaccine - COST NOT COVERED BY MEDICARE PART B Recommended at least once for all adults  For pregnant patients, recommended with each pregnancy  Shingles Vaccine (Shingrix) - COST NOT COVERED BY MEDICARE PART B  2 shot series recommended in those aged 48 and above     Health Maintenance Due:      Topic Date Due    Colorectal Cancer Screening  07/19/2029    Hepatitis C Screening  Completed     Immunizations Due:  There are no preventive care reminders to display for this patient  Advance Directives   What are advance directives? Advance directives are legal documents that state your wishes and plans for medical care  These plans are made ahead of time in case you lose your ability to make decisions for yourself  Advance directives can apply to any medical decision, such as the treatments you want, and if you want to donate organs  What are the types of advance directives?   There are many types of advance directives, and each state has rules about how to use them  You may choose a combination of any of the following:  · Living will: This is a written record of the treatment you want  You can also choose which treatments you do not want, which to limit, and which to stop at a certain time  This includes surgery, medicine, IV fluid, and tube feedings  · Durable power of  for healthcare [place] [facility]): This is a written record that states who you want to make healthcare choices for you when you are unable to make them for yourself  This person, called a proxy, is usually a family member or a friend  You may choose more than 1 proxy  · Do not resuscitate (DNR) order:  A DNR order is used in case your heart stops beating or you stop breathing  It is a request not to have certain forms of treatment, such as CPR  A DNR order may be included in other types of advance directives  · Medical directive: This covers the care that you want if you are in a coma, near death, or unable to make decisions for yourself  You can list the treatments you want for each condition  Treatment may include pain medicine, surgery, blood transfusions, dialysis, IV or tube feedings, and a ventilator (breathing machine)  · Values history: This document has questions about your views, beliefs, and how you feel and think about life  This information can help others choose the care that you would choose  Why are advance directives important? An advance directive helps you control your care  Although spoken wishes may be used, it is better to have your wishes written down  Spoken wishes can be misunderstood, or not followed  Treatments may be given even if you do not want them  An advance directive may make it easier for your family to make difficult choices about your care     Weight Management   Why it is important to manage your weight:  Being overweight increases your risk of health conditions such as heart disease, high blood pressure, type 2 diabetes, and certain types of cancer  It can also increase your risk for osteoarthritis, sleep apnea, and other respiratory problems  Aim for a slow, steady weight loss  Even a small amount of weight loss can lower your risk of health problems  How to lose weight safely:  A safe and healthy way to lose weight is to eat fewer calories and get regular exercise  You can lose up about 1 pound a week by decreasing the number of calories you eat by 500 calories each day  Healthy meal plan for weight management:  A healthy meal plan includes a variety of foods, contains fewer calories, and helps you stay healthy  A healthy meal plan includes the following:  · Eat whole-grain foods more often  A healthy meal plan should contain fiber  Fiber is the part of grains, fruits, and vegetables that is not broken down by your body  Whole-grain foods are healthy and provide extra fiber in your diet  Some examples of whole-grain foods are whole-wheat breads and pastas, oatmeal, brown rice, and bulgur  · Eat a variety of vegetables every day  Include dark, leafy greens such as spinach, kale, israel greens, and mustard greens  Eat yellow and orange vegetables such as carrots, sweet potatoes, and winter squash  · Eat a variety of fruits every day  Choose fresh or canned fruit (canned in its own juice or light syrup) instead of juice  Fruit juice has very little or no fiber  · Eat low-fat dairy foods  Drink fat-free (skim) milk or 1% milk  Eat fat-free yogurt and low-fat cottage cheese  Try low-fat cheeses such as mozzarella and other reduced-fat cheeses  · Choose meat and other protein foods that are low in fat  Choose beans or other legumes such as split peas or lentils  Choose fish, skinless poultry (chicken or turkey), or lean cuts of red meat (beef or pork)  Before you cook meat or poultry, cut off any visible fat  · Use less fat and oil  Try baking foods instead of frying them   Add less fat, such as margarine, sour cream, regular salad dressing and mayonnaise to foods  Eat fewer high-fat foods  Some examples of high-fat foods include french fries, doughnuts, ice cream, and cakes  · Eat fewer sweets  Limit foods and drinks that are high in sugar  This includes candy, cookies, regular soda, and sweetened drinks  Exercise:  Exercise at least 30 minutes per day on most days of the week  Some examples of exercise include walking, biking, dancing, and swimming  You can also fit in more physical activity by taking the stairs instead of the elevator or parking farther away from stores  Ask your healthcare provider about the best exercise plan for you  © Copyright [employer] 2018 Information is for End User's use only and may not be sold, redistributed or otherwise used for commercial purposes   All illustrations and images included in CareNotes® are the copyrighted property of A D A M , Inc  or [address] [place] [address]

## 2021-04-27 NOTE — PROGRESS NOTES
BMI Counseling: There is no height or weight on file to calculate BMI  The BMI is above normal  Nutrition recommendations include decreasing portion sizes and encouraging healthy choices of fruits and vegetables  Exercise recommendations include moderate physical activity 150 minutes/week  No pharmacotherapy was ordered  Assessment/Plan:  Problem List Items Addressed This Visit        Digestive    GERD without esophagitis       Endocrine    Multiple thyroid nodules    Relevant Orders    DXA bone density spine hip and pelvis    Hypoparathyroidism (HCC)    Relevant Orders    CBC and differential    PTH, intact    DXA bone density spine hip and pelvis       Cardiovascular and Mediastinum    Migraine, unspecified, not intractable, without status migrainosus    Hypertension - Primary    Relevant Orders    CBC and differential    Comprehensive metabolic panel    Microalbumin / creatinine urine ratio       Genitourinary    Stage 3 chronic kidney disease (HCC)    Relevant Orders    DXA bone density spine hip and pelvis    Benign prostatic hyperplasia without lower urinary tract symptoms       Other    Hyperlipidemia    Relevant Orders    Comprehensive metabolic panel    LDL cholesterol, direct    Triglycerides    Generalized anxiety disorder      Other Visit Diagnoses     Medicare annual wellness visit, subsequent        Ectopic hormone secretion, not elsewhere classified         Relevant Orders    DXA bone density spine hip and pelvis           Diagnoses and all orders for this visit:    Essential hypertension  -     CBC and differential; Future  -     Comprehensive metabolic panel; Future  -     Microalbumin / creatinine urine ratio    Medicare annual wellness visit, subsequent    Hypoparathyroidism, unspecified hypoparathyroidism type ([facility] [address] )  -     CBC and differential; Future  -     PTH, intact; Future  -     DXA bone density spine hip and pelvis;  Future    GERD without esophagitis    Multiple thyroid nodules  - DXA bone density spine hip and pelvis; Future    Migraine without status migrainosus, not intractable, unspecified migraine type    Stage 3a chronic kidney disease ([facility] [address] )    Mixed hyperlipidemia  -     Comprehensive metabolic panel; Future  -     LDL cholesterol, direct; Future  -     Triglycerides; Future    Generalized anxiety disorder    Stage 3 chronic kidney disease, unspecified whether stage 3a or 3b CKD ([facility] [address] )  -     DXA bone density spine hip and pelvis; Future    Ectopic hormone secretion, not elsewhere classified   -     DXA bone density spine hip and pelvis; Future    Benign prostatic hyperplasia without lower urinary tract symptoms        No problem-specific Assessment & Plan notes found for this encounter  A/P: Doing well and will check labs  Discussed BMI and will give information on diet and exercise  Will check dexa given his endocrine status  Continue current treatment and RTC four months for routine  Subjective:      Patient ID: Zara Crower Traincooper is a 69 y o  male  WM RTC for f/u htn, GERD, etc  Doing well and no new issues  Now on flomax for BPH  Remains active w/o difficulty and no falls  No reflux  Headaches controlled  STAR is controlled  Due for labs  The following portions of the patient's history were reviewed and updated as appropriate:   He has a past medical history of Abdominal pain, acute, generalized, Actinic keratosis, Chest pain, Chronic kidney disease, stage 3 ([address] ), Colon polyp, Disease of thyroid gland, Dysfunction of both eustachian tubes, Edema, Essential hypertension, Fatigue, Generalized anxiety disorder, GERD (gastroesophageal reflux disease), Hematuria, Hypertension, Hypo-osmolality and hyponatremia, Hypocalcemia, Hypoparathyroidism ([address] ), Hypothyroidism, Lightheadedness, Lump in neck, Malaise and fatigue, Nephropathy, Nocturia, Proteinuria, Shortness of breath, Skin lesion, and Tinea corporis  ,  does not have any pertinent problems on file  ,   has a past surgical history that includes Skin biopsy; Thyroid surgery; Colonoscopy w/ polypectomy (07/19/2019); Head & neck skin lesion excisional biopsy; and Tonsillectomy  ,  family history includes Diabetes in his father; Hypertension in his father and mother; Other in his brother; Stroke in his family  ,   reports that he has never smoked  He has never used smokeless tobacco  He reports current alcohol use  He reports that he does not use drugs  ,  is allergic to pollen extract     Current Outpatient Medications   Medication Sig Dispense Refill    aspirin (ECOTRIN LOW STRENGTH) 81 mg EC tablet Take 1 tablet (81 mg total) by mouth daily      calcitriol (ROCALTROL) 0 25 mcg capsule Take 1 capsule (0 25 mcg total) by mouth daily 90 capsule 1    Calcium Carbonate (CALCIUM 600 PO) Take by mouth Twice per day      Cholecalciferol (VITAMIN D3) 1000 units CAPS Take by mouth      Malka, Zingiber officinalis, (AMLKA ROOT PO) Take by mouth      lisinopril (ZESTRIL) 20 mg tablet Take 1 tablet (20 mg total) by mouth daily 90 tablet 3    metoprolol tartrate (LOPRESSOR) 25 mg tablet Take 1 tablet by mouth once daily 90 tablet 1    Multiple Vitamin (MULTI-VITAMIN DAILY PO) Take by mouth daily      Omega-3 Fatty Acids (SALMON OIL PO) Take by mouth      tamsulosin (FLOMAX) 0 4 mg Take 1 capsule (0 4 mg total) by mouth daily with dinner 90 capsule 3     No current facility-administered medications for this visit  Review of Systems   Constitutional: Negative for activity change, chills, diaphoresis, fatigue and fever  HENT: Negative  Eyes: Negative for visual disturbance  Respiratory: Negative for cough, chest tightness, shortness of breath and wheezing  Cardiovascular: Negative for chest pain, palpitations and leg swelling  Gastrointestinal: Negative for abdominal pain, constipation, diarrhea, nausea and vomiting  Endocrine: Negative for cold intolerance and heat intolerance     Genitourinary: Negative for difficulty urinating, dysuria and frequency  Musculoskeletal: Negative for arthralgias, gait problem and myalgias  Neurological: Negative for dizziness, seizures, syncope, weakness, light-headedness and headaches  Psychiatric/Behavioral: Negative for confusion, dysphoric mood and sleep disturbance  The patient is not nervous/anxious  PHQ-9 Depression Screening    PHQ-9:   Frequency of the following problems over the past two weeks:      Little interest or pleasure in doing things: 0 - not at all  Feeling down, depressed, or hopeless: 0 - not at all  PHQ-2 Score: 0        Objective:  Vitals:    04/27/21 0941   BP: 132/78   Pulse: 67   Temp: (!) 96 2 °F (35 7 °C)   SpO2: 97%   Weight: 92 1 kg (203 lb)     Body mass index is 26 06 kg/m²  Physical Exam  Vitals signs and nursing note reviewed  Constitutional:       General: He is not in acute distress  Appearance: Normal appearance  He is not ill-appearing  HENT:      Head: Normocephalic and atraumatic  Mouth/Throat:      Mouth: Mucous membranes are moist    Eyes:      Extraocular Movements: Extraocular movements intact  Conjunctiva/sclera: Conjunctivae normal       Pupils: Pupils are equal, round, and reactive to light  Neck:      Musculoskeletal: Neck supple  Vascular: No carotid bruit  Cardiovascular:      Rate and Rhythm: Normal rate and regular rhythm  Heart sounds: Normal heart sounds  Pulmonary:      Effort: Pulmonary effort is normal  No respiratory distress  Breath sounds: Normal breath sounds  No wheezing or rales  Abdominal:      General: Bowel sounds are normal  There is no distension  Palpations: Abdomen is soft  Tenderness: There is no abdominal tenderness  Musculoskeletal:      Right lower leg: No edema  Left lower leg: No edema  Neurological:      General: No focal deficit present  Mental Status: He is alert and oriented to person, place, and time  Mental status is at baseline  Psychiatric:         Mood and Affect: Mood normal          Behavior: Behavior normal          Thought Content: Thought content normal          Judgment: Judgment normal          BMI Counseling: Body mass index is 26 06 kg/m²  The BMI is above normal  Nutrition recommendations include reducing portion sizes, decreasing overall calorie intake, reducing intake of saturated fat and trans fat and reducing intake of cholesterol  Exercise recommendations include moderate aerobic physical activity for 150 minutes/week

## 2021-05-10 ENCOUNTER — IMMUNIZATIONS (OUTPATIENT)
Dept: FAMILY MEDICINE CLINIC | Facility: HOSPITAL | Age: 71
End: 2021-05-10

## 2021-05-10 DIAGNOSIS — Z23 ENCOUNTER FOR IMMUNIZATION: Primary | ICD-10-CM

## 2021-05-10 PROCEDURE — 0012A SARS-COV-2 / COVID-19 MRNA VACCINE (MODERNA) 100 MCG: CPT

## 2021-05-10 PROCEDURE — 91301 SARS-COV-2 / COVID-19 MRNA VACCINE (MODERNA) 100 MCG: CPT

## 2021-07-21 DIAGNOSIS — E83.51 HYPOCALCEMIA: ICD-10-CM

## 2021-07-21 DIAGNOSIS — I10 ESSENTIAL HYPERTENSION: ICD-10-CM

## 2021-07-21 RX ORDER — CALCITRIOL 0.25 UG/1
0.25 CAPSULE, LIQUID FILLED ORAL DAILY
Qty: 90 CAPSULE | Refills: 1 | Status: SHIPPED | OUTPATIENT
Start: 2021-07-21 | End: 2022-04-25 | Stop reason: SDUPTHER

## 2021-07-22 DIAGNOSIS — I10 ESSENTIAL HYPERTENSION: ICD-10-CM

## 2021-08-27 ENCOUNTER — OFFICE VISIT (OUTPATIENT)
Dept: INTERNAL MEDICINE CLINIC | Facility: CLINIC | Age: 71
End: 2021-08-27
Payer: MEDICARE

## 2021-08-27 VITALS
TEMPERATURE: 97 F | HEIGHT: 74 IN | BODY MASS INDEX: 25.94 KG/M2 | OXYGEN SATURATION: 99 % | SYSTOLIC BLOOD PRESSURE: 140 MMHG | DIASTOLIC BLOOD PRESSURE: 80 MMHG | HEART RATE: 84 BPM | WEIGHT: 202.13 LBS

## 2021-08-27 DIAGNOSIS — I10 ESSENTIAL HYPERTENSION: Primary | ICD-10-CM

## 2021-08-27 DIAGNOSIS — F41.1 GENERALIZED ANXIETY DISORDER: ICD-10-CM

## 2021-08-27 DIAGNOSIS — E83.51 HYPOCALCEMIA: ICD-10-CM

## 2021-08-27 DIAGNOSIS — N18.31 STAGE 3A CHRONIC KIDNEY DISEASE (HCC): ICD-10-CM

## 2021-08-27 DIAGNOSIS — K21.9 GERD WITHOUT ESOPHAGITIS: ICD-10-CM

## 2021-08-27 DIAGNOSIS — E78.2 MIXED HYPERLIPIDEMIA: ICD-10-CM

## 2021-08-27 DIAGNOSIS — N40.0 BENIGN PROSTATIC HYPERPLASIA WITHOUT LOWER URINARY TRACT SYMPTOMS: ICD-10-CM

## 2021-08-27 DIAGNOSIS — R80.9 MICROALBUMINURIA: ICD-10-CM

## 2021-08-27 DIAGNOSIS — E20.9 HYPOPARATHYROIDISM, UNSPECIFIED HYPOPARATHYROIDISM TYPE (HCC): ICD-10-CM

## 2021-08-27 PROCEDURE — 99214 OFFICE O/P EST MOD 30 MIN: CPT | Performed by: INTERNAL MEDICINE

## 2021-08-27 NOTE — PATIENT INSTRUCTIONS
Chronic Hypertension   AMBULATORY CARE:   Hypertension  is high blood pressure  Your blood pressure is the force of your blood moving against the walls of your arteries  Hypertension causes your blood pressure to get so high that your heart has to work much harder than normal  This can damage your heart  Even if you have hypertension for years, lifestyle changes, medicines, or both can help bring your blood pressure to normal   Call your local emergency number (911 in the [address] [facility]) or have someone call if:   · You have chest pain  · You have any of the following signs of a heart attack:      ? Squeezing, pressure, or pain in your chest    ? You may  also have any of the following:     § Discomfort or pain in your back, neck, jaw, stomach, or arm    § Shortness of breath    § Nausea or vomiting    § Lightheadedness or a sudden cold sweat    · You become confused or have difficulty speaking  · You suddenly feel lightheaded or have trouble breathing  Seek care immediately if:   · You have a severe headache or vision loss  · You have weakness in an arm or leg  Call your doctor if:   · You feel faint, dizzy, confused, or drowsy  · You have been taking your blood pressure medicine but your pressure is higher than your provider says it should be  · You have questions or concerns about your condition or care  Treatment for chronic hypertension  may include medicine to lower your blood pressure and cholesterol levels  A low cholesterol level helps prevent heart disease and makes it easier to control your blood pressure  Heart disease can make your blood pressure harder to control  You may also need to make lifestyle changes  What you need to know about the stages of hypertension:       · Normal blood pressure is 119/79 or lower   Your healthcare provider may only check your blood pressure each year if it stays at a normal level  · Elevated blood pressure is 120/79 to 129/79    This is sometimes called prehypertension  Your healthcare provider may suggest lifestyle changes to help lower your blood pressure to a normal level  He or she may then check it again in 3 to 6 months  · Stage 1 hypertension is 130/80  to 139/89   Your provider may recommend lifestyle changes, medication, and checks every 3 to 6 months until your blood pressure is controlled  · Stage 2 hypertension is 140/90 or higher   Your provider will recommend lifestyle changes and have you take 2 kinds of hypertension medicines  You will also need to have your blood pressure checked monthly until it is controlled  Manage chronic hypertension:   · Check your blood pressure at home  Avoid smoking, caffeine, and exercise at least 30 minutes before checking your blood pressure  Sit and rest for 5 minutes before you take your blood pressure  Extend your arm and support it on a flat surface  Your arm should be at the same level as your heart  Follow the directions that came with your blood pressure monitor  Check your blood pressure 2 times, 1 minute apart, before you take your medicine in the morning  Also check your blood pressure before your evening meal  Keep a record of your readings and bring it to your follow-up visits  Ask your healthcare provider what your blood pressure should be  · Manage any other health conditions you have  Health conditions such as diabetes can increase your risk for hypertension  Follow your healthcare provider's instructions and take all your medicines as directed  Talk to your healthcare provider about any new health conditions you have recently developed  · Ask about all medicines  Certain medicines can increase your blood pressure  Examples include oral birth control pills, decongestants, herbal supplements, and NSAIDs, such as ibuprofen  Your healthcare provider can tell you which medicines are safe for you to take  This includes prescription and over-the-counter medicines      Lifestyle changes you can make to lower your blood pressure: Your provider may want you to make more lifestyle changes if you are having trouble controlling your blood pressure  This may feel difficult over time, especially if you think you are making good changes but your pressure is still high  It might help to focus on one new change at a time  For example, try to add 1 more day of exercise, or exercise for an extra 10 minutes on 2 days  Small changes can make a big difference  Your healthcare provider can also refer you to specialists such as a dietitian who can help you make small changes  Your family members may be included in helping you learn to create lifestyle changes, such as the following:  · Limit sodium (salt) as directed  Too much sodium can affect your fluid balance  Check labels to find low-sodium or no-salt-added foods  Some low-sodium foods use potassium salts for flavor  Too much potassium can also cause health problems  Your healthcare provider will tell you how much sodium and potassium are safe for you to have in a day  He or she may recommend that you limit sodium to 2,300 mg a day  · Follow the meal plan recommended by your healthcare provider  A dietitian or your provider can give you more information on low-sodium plans or the DASH (Dietary Approaches to Stop Hypertension) eating plan  The DASH plan is low in sodium, processed sugar, unhealthy fats, and total fat  It is high in potassium, calcium, and fiber  These can be found in vegetables, fruit, and whole-grain foods  · Be physically active throughout the day  Physical activity, such as exercise, can help control your blood pressure and your weight  Be physically active for at least 30 minutes per day, on most days of the week  Include aerobic activity, such as walking or riding a bicycle  Also include strength training at least 2 times each week  Your healthcare providers can help you create a physical activity plan  · Decrease stress  This may help lower your blood pressure  Learn ways to relax, such as deep breathing or listening to music  · Limit alcohol as directed  Alcohol can increase your blood pressure  A drink of alcohol is 12 ounces of beer, 5 ounces of wine, or 1½ ounces of liquor  · Do not smoke  Nicotine and other chemicals in cigarettes and cigars can increase your blood pressure and also cause lung damage  Ask your healthcare provider for information if you currently smoke and need help to quit  E-cigarettes or smokeless tobacco still contain nicotine  Talk to your healthcare provider before you use these products  Follow up with your doctor as directed: You will need to return to have your blood pressure checked and to have other lab tests done  Write down your questions so you remember to ask them during your visits  © Copyright [employer] 2021 Information is for End User's use only and may not be sold, redistributed or otherwise used for commercial purposes  All illustrations and images included in CareNotes® are the copyrighted property of [employer]  or [facility] [place] [facility]   The above information is an  only  It is not intended as medical advice for individual conditions or treatments  Talk to your doctor, nurse or pharmacist before following any medical regimen to see if it is safe and effective for you

## 2021-08-27 NOTE — PROGRESS NOTES
Assessment/Plan:  Problem List Items Addressed This Visit        Digestive    GERD without esophagitis       Endocrine    Hypoparathyroidism ([address] )       Cardiovascular and Mediastinum    Hypertension - Primary       Genitourinary    Stage 3 chronic kidney disease ([address] )    Benign prostatic hyperplasia without lower urinary tract symptoms       Other    Microalbuminuria    Hypocalcemia    Hyperlipidemia    Generalized anxiety disorder           Diagnoses and all orders for this visit:    Essential hypertension    Hypoparathyroidism, unspecified hypoparathyroidism type ([address] )    GERD without esophagitis    Stage 3a chronic kidney disease ([address] )    Benign prostatic hyperplasia without lower urinary tract symptoms    Microalbuminuria    Generalized anxiety disorder    Mixed hyperlipidemia    Hypocalcemia        No problem-specific Assessment & Plan notes found for this encounter  A/P: Doing well and labs are up to date  Continue current treatment and RTC four months for routine  Subjective:      Patient ID: Ismael Cintron is a 66 y o  male  WM RTC for f/u htn, GERD, etc  Doing well and no new issues  Remains active w/o difficulty and no falls  No reflux  Urinary s/s are manageable  STAR is controlled  Labs are up to date  The following portions of the patient's history were reviewed and updated as appropriate:   He has a past medical history of Abdominal pain, acute, generalized, Actinic keratosis, Chest pain, Chronic kidney disease, stage 3 ([address] ), Colon polyp, Disease of thyroid gland, Dysfunction of both eustachian tubes, Edema, Essential hypertension, Fatigue, Generalized anxiety disorder, GERD (gastroesophageal reflux disease), Hematuria, Hypertension, Hypo-osmolality and hyponatremia, Hypocalcemia, Hypoparathyroidism ([address] ), Hypothyroidism, Lightheadedness, Lump in neck, Malaise and fatigue, Nephropathy, Nocturia, Proteinuria, Shortness of breath, Skin lesion, and Tinea corporis  ,  does not have any pertinent problems on file  ,   has a past surgical history that includes Skin biopsy; Thyroid surgery; Colonoscopy w/ polypectomy (07/19/2019); Head & neck skin lesion excisional biopsy; and Tonsillectomy  ,  family history includes Diabetes in his father; Hypertension in his father and mother; Other in his brother; Stroke in his family  ,   reports that he has never smoked  He has never used smokeless tobacco  He reports current alcohol use  He reports that he does not use drugs  ,  is allergic to pollen extract     Current Outpatient Medications   Medication Sig Dispense Refill    aspirin (ECOTRIN LOW STRENGTH) 81 mg EC tablet Take 1 tablet (81 mg total) by mouth daily      calcitriol (ROCALTROL) 0 25 mcg capsule Take 1 capsule (0 25 mcg total) by mouth daily 90 capsule 1    Calcium Carbonate (CALCIUM 600 PO) Take by mouth Twice per day      Cholecalciferol (VITAMIN D3) 1000 units CAPS Take by mouth      lisinopril (ZESTRIL) 20 mg tablet Take 1 tablet (20 mg total) by mouth daily 90 tablet 3    metoprolol tartrate (LOPRESSOR) 25 mg tablet Take 1 tablet by mouth once daily 90 tablet 1    Multiple Vitamin (MULTI-VITAMIN DAILY PO) Take by mouth daily      Omega-3 Fatty Acids (SALMON OIL PO) Take by mouth      tamsulosin (FLOMAX) 0 4 mg Take 1 capsule (0 4 mg total) by mouth daily with dinner 90 capsule 3    Malka, Zingiber officinalis, (MALKA ROOT PO) Take by mouth       No current facility-administered medications for this visit  Review of Systems   Constitutional: Negative for activity change, chills, diaphoresis, fatigue and fever  HENT: Negative  Eyes: Negative for visual disturbance  Respiratory: Negative for cough, chest tightness, shortness of breath and wheezing  Cardiovascular: Negative for chest pain, palpitations and leg swelling  Gastrointestinal: Negative for abdominal pain, constipation, diarrhea, nausea and vomiting     Endocrine: Negative for cold intolerance and heat intolerance  Genitourinary: Negative for difficulty urinating, dysuria and frequency  Musculoskeletal: Negative for arthralgias, gait problem and myalgias  Neurological: Negative for dizziness, seizures, syncope, weakness, light-headedness and headaches  Psychiatric/Behavioral: Negative for confusion, dysphoric mood and sleep disturbance  The patient is not nervous/anxious  PHQ-9 Depression Screening    PHQ-9:   Frequency of the following problems over the past two weeks:            Objective:  Vitals:    08/27/21 0914   BP: 140/80   Pulse: 84   Temp: (!) 97 °F (36 1 °C)   SpO2: 99%   Weight: 91 7 kg (202 lb 2 oz)   Height: 6' 2" (1 88 m)     Body mass index is 25 95 kg/m²  Physical Exam  Vitals and nursing note reviewed  Constitutional:       General: He is not in acute distress  Appearance: Normal appearance  He is not ill-appearing  HENT:      Head: Normocephalic and atraumatic  Mouth/Throat:      Mouth: Mucous membranes are moist    Eyes:      Extraocular Movements: Extraocular movements intact  Conjunctiva/sclera: Conjunctivae normal       Pupils: Pupils are equal, round, and reactive to light  Neck:      Vascular: No carotid bruit  Cardiovascular:      Rate and Rhythm: Regular rhythm  Pulses: Normal pulses  Heart sounds: Normal heart sounds  Pulmonary:      Effort: Pulmonary effort is normal  No respiratory distress  Breath sounds: Normal breath sounds  No wheezing or rales  Abdominal:      General: Bowel sounds are normal  There is no distension  Palpations: Abdomen is soft  Tenderness: There is no abdominal tenderness  Musculoskeletal:      Cervical back: Neck supple  Right lower leg: No edema  Left lower leg: No edema  Neurological:      General: No focal deficit present  Mental Status: He is alert and oriented to person, place, and time  Mental status is at baseline     Psychiatric:         Mood and Affect: Mood normal  Behavior: Behavior normal          Thought Content:  Thought content normal          Judgment: Judgment normal

## 2021-09-28 ENCOUNTER — APPOINTMENT (OUTPATIENT)
Dept: LAB | Facility: CLINIC | Age: 71
End: 2021-09-28
Payer: MEDICARE

## 2021-09-28 DIAGNOSIS — N18.31 STAGE 3A CHRONIC KIDNEY DISEASE (HCC): ICD-10-CM

## 2021-09-28 LAB
ALBUMIN SERPL BCP-MCNC: 3.6 G/DL (ref 3.5–5)
ALP SERPL-CCNC: 63 U/L (ref 46–116)
ALT SERPL W P-5'-P-CCNC: 21 U/L (ref 12–78)
ANION GAP SERPL CALCULATED.3IONS-SCNC: 4 MMOL/L (ref 4–13)
AST SERPL W P-5'-P-CCNC: 18 U/L (ref 5–45)
BACTERIA UR QL AUTO: ABNORMAL /HPF
BASOPHILS # BLD AUTO: 0.06 THOUSANDS/ΜL (ref 0–0.1)
BASOPHILS NFR BLD AUTO: 1 % (ref 0–1)
BILIRUB SERPL-MCNC: 0.8 MG/DL (ref 0.2–1)
BILIRUB UR QL STRIP: NEGATIVE
BUN SERPL-MCNC: 16 MG/DL (ref 5–25)
CALCIUM SERPL-MCNC: 8.7 MG/DL (ref 8.3–10.1)
CHLORIDE SERPL-SCNC: 100 MMOL/L (ref 100–108)
CLARITY UR: CLEAR
CO2 SERPL-SCNC: 30 MMOL/L (ref 21–32)
COLOR UR: YELLOW
CREAT SERPL-MCNC: 1.27 MG/DL (ref 0.6–1.3)
CREAT UR-MCNC: 163 MG/DL
EOSINOPHIL # BLD AUTO: 0.17 THOUSAND/ΜL (ref 0–0.61)
EOSINOPHIL NFR BLD AUTO: 3 % (ref 0–6)
ERYTHROCYTE [DISTWIDTH] IN BLOOD BY AUTOMATED COUNT: 12.8 % (ref 11.6–15.1)
GFR SERPL CREATININE-BSD FRML MDRD: 56 ML/MIN/1.73SQ M
GLUCOSE P FAST SERPL-MCNC: 97 MG/DL (ref 65–99)
GLUCOSE UR STRIP-MCNC: NEGATIVE MG/DL
HCT VFR BLD AUTO: 46.1 % (ref 36.5–49.3)
HGB BLD-MCNC: 15.4 G/DL (ref 12–17)
HGB UR QL STRIP.AUTO: NEGATIVE
IMM GRANULOCYTES # BLD AUTO: 0.01 THOUSAND/UL (ref 0–0.2)
IMM GRANULOCYTES NFR BLD AUTO: 0 % (ref 0–2)
KETONES UR STRIP-MCNC: NEGATIVE MG/DL
LEUKOCYTE ESTERASE UR QL STRIP: ABNORMAL
LYMPHOCYTES # BLD AUTO: 1.83 THOUSANDS/ΜL (ref 0.6–4.47)
LYMPHOCYTES NFR BLD AUTO: 29 % (ref 14–44)
MCH RBC QN AUTO: 29.8 PG (ref 26.8–34.3)
MCHC RBC AUTO-ENTMCNC: 33.4 G/DL (ref 31.4–37.4)
MCV RBC AUTO: 89 FL (ref 82–98)
MICROALBUMIN UR-MCNC: 27.8 MG/L (ref 0–20)
MICROALBUMIN/CREAT 24H UR: 17 MG/G CREATININE (ref 0–30)
MONOCYTES # BLD AUTO: 0.57 THOUSAND/ΜL (ref 0.17–1.22)
MONOCYTES NFR BLD AUTO: 9 % (ref 4–12)
NEUTROPHILS # BLD AUTO: 3.66 THOUSANDS/ΜL (ref 1.85–7.62)
NEUTS SEG NFR BLD AUTO: 58 % (ref 43–75)
NITRITE UR QL STRIP: NEGATIVE
NON-SQ EPI CELLS URNS QL MICRO: ABNORMAL /HPF
NRBC BLD AUTO-RTO: 0 /100 WBCS
PH UR STRIP.AUTO: 6.5 [PH]
PHOSPHATE SERPL-MCNC: 3.4 MG/DL (ref 2.3–4.1)
PLATELET # BLD AUTO: 250 THOUSANDS/UL (ref 149–390)
PMV BLD AUTO: 9.1 FL (ref 8.9–12.7)
POTASSIUM SERPL-SCNC: 4.2 MMOL/L (ref 3.5–5.3)
PROT SERPL-MCNC: 7.3 G/DL (ref 6.4–8.2)
PROT UR STRIP-MCNC: NEGATIVE MG/DL
RBC # BLD AUTO: 5.17 MILLION/UL (ref 3.88–5.62)
RBC #/AREA URNS AUTO: ABNORMAL /HPF
SODIUM SERPL-SCNC: 134 MMOL/L (ref 136–145)
SP GR UR STRIP.AUTO: 1.02 (ref 1–1.03)
UROBILINOGEN UR QL STRIP.AUTO: 0.2 E.U./DL
WBC # BLD AUTO: 6.3 THOUSAND/UL (ref 4.31–10.16)
WBC #/AREA URNS AUTO: ABNORMAL /HPF

## 2021-09-28 PROCEDURE — 82043 UR ALBUMIN QUANTITATIVE: CPT

## 2021-09-28 PROCEDURE — 85025 COMPLETE CBC W/AUTO DIFF WBC: CPT

## 2021-09-28 PROCEDURE — 82570 ASSAY OF URINE CREATININE: CPT

## 2021-09-28 PROCEDURE — 84100 ASSAY OF PHOSPHORUS: CPT

## 2021-09-28 PROCEDURE — 80053 COMPREHEN METABOLIC PANEL: CPT

## 2021-09-28 PROCEDURE — 81001 URINALYSIS AUTO W/SCOPE: CPT

## 2021-09-28 PROCEDURE — 36415 COLL VENOUS BLD VENIPUNCTURE: CPT

## 2021-09-29 ENCOUNTER — OFFICE VISIT (OUTPATIENT)
Dept: NEPHROLOGY | Facility: CLINIC | Age: 71
End: 2021-09-29
Payer: MEDICARE

## 2021-09-29 VITALS
WEIGHT: 200 LBS | SYSTOLIC BLOOD PRESSURE: 138 MMHG | DIASTOLIC BLOOD PRESSURE: 74 MMHG | BODY MASS INDEX: 25.67 KG/M2 | HEART RATE: 81 BPM | HEIGHT: 74 IN | OXYGEN SATURATION: 96 %

## 2021-09-29 DIAGNOSIS — E20.9 HYPOPARATHYROIDISM, UNSPECIFIED HYPOPARATHYROIDISM TYPE (HCC): ICD-10-CM

## 2021-09-29 DIAGNOSIS — I10 ESSENTIAL HYPERTENSION: ICD-10-CM

## 2021-09-29 DIAGNOSIS — E83.51 HYPOCALCEMIA: ICD-10-CM

## 2021-09-29 DIAGNOSIS — R80.9 MICROALBUMINURIA: ICD-10-CM

## 2021-09-29 DIAGNOSIS — N40.0 BENIGN PROSTATIC HYPERPLASIA WITHOUT LOWER URINARY TRACT SYMPTOMS: ICD-10-CM

## 2021-09-29 DIAGNOSIS — N18.31 STAGE 3A CHRONIC KIDNEY DISEASE (HCC): Primary | ICD-10-CM

## 2021-09-29 PROCEDURE — 99214 OFFICE O/P EST MOD 30 MIN: CPT | Performed by: INTERNAL MEDICINE

## 2021-09-29 NOTE — PROGRESS NOTES
[facility] [place], [place]    Name: Kendall Cintron  YOB: 1950      Assessment/Plan:    Stage 3 chronic kidney disease [facility])  Lab Results   Component Value Date    EGFR 56 09/28/2021    EGFR 57 04/27/2021    EGFR 53 09/29/2020    CREATININE 1 27 09/28/2021    CREATININE 1 26 04/27/2021    CREATININE 1 33 (H) 09/29/2020       Kidney function stable to slightly better than usual over the last year so  Continue to take current medications avoid potential nephrotoxins  Hypertension    Blood pressures are controlled at this time, continue current medications including lisinopril and metoprolol  Hypocalcemia    Currently managed with activated vitamin D agent and supplemental calcium, continue care according to our Endocrinology colleagues  Microalbuminuria    Patient noted to have no albuminuria with the most recent labs, this was the 1st time in a few years that he has not had albuminuria  Continue lisinopril, continue monitor with each set of labs  Problem List Items Addressed This Visit        Endocrine    Hypoparathyroidism [facility])       Cardiovascular and Mediastinum    Hypertension       Blood pressures are controlled at this time, continue current medications including lisinopril and metoprolol  Genitourinary    Stage 3 chronic kidney disease [facility]) - Primary     Lab Results   Component Value Date    EGFR 56 09/28/2021    EGFR 57 04/27/2021    EGFR 53 09/29/2020    CREATININE 1 27 09/28/2021    CREATININE 1 26 04/27/2021    CREATININE 1 33 (H) 09/29/2020       Kidney function stable to slightly better than usual over the last year so  Continue to take current medications avoid potential nephrotoxins           Relevant Orders    Microalbumin / creatinine urine ratio    Urinalysis with microscopic    Comprehensive metabolic panel    PTH, intact    Phosphorus    Magnesium    Vitamin D 25 hydroxy    Benign prostatic hyperplasia without lower urinary tract symptoms       Other    Hypocalcemia       Currently managed with activated vitamin D agent and supplemental calcium, continue care according to our Endocrinology colleagues  Microalbuminuria       Patient noted to have no albuminuria with the most recent labs, this was the 1st time in a few years that he has not had albuminuria  Continue lisinopril, continue monitor with each set of labs  Patient is stable at this time from the renal standpoint  We will see him back in 6 months for regular appointment with labs to be done prior to that appointment  Subjective:      Patient ID: Arcenio Cintron is a 66 y o  male  Patient presents for follow-up  Reviewed his labs, kidney function stable, there are no significant electrolyte abnormalities  Patient has been avoiding all NSAIDs  Patient is taking all medications as prescribed with no specific side effects at this time  Although the patient did not have a significant electrolyte abnormality, he did have a mild hyponatremia  Patient has been drinking a lot a water in order to maintain hydration  Hypertension  This is a chronic problem  The current episode started more than 1 year ago  The problem is unchanged  The problem is controlled  Pertinent negatives include no chest pain, orthopnea or peripheral edema  There are no associated agents to hypertension  Risk factors for coronary artery disease include male gender  Past treatments include ACE inhibitors and lifestyle changes  There are no compliance problems  Hypertensive end-organ damage includes kidney disease  Identifiable causes of hypertension include chronic renal disease         The following portions of the patient's history were reviewed and updated as appropriate: allergies, current medications, past family history, past medical history, past social history, past surgical history and problem list     Review of Systems Cardiovascular: Negative for chest pain and orthopnea  All other systems reviewed and are negative  Social History     Socioeconomic History    Marital status: /Civil Union     Spouse name: Not on file    Number of children: Not on file    Years of education: Not on file    Highest education level: Not on file   Occupational History    Occupation: Retired     Comment:     Tobacco Use    Smoking status: Never Smoker    Smokeless tobacco: Never Used   Substance and Sexual Activity    Alcohol use: Yes     Comment: rarely    Drug use: No    Sexual activity: Not on file   Other Topics Concern    Not on file   Social History Narrative    Consumes on average 1 cup of regular coffee per day      Social Determinants of Health     Financial Resource Strain:     Difficulty of Paying Living Expenses:    Food Insecurity:     Worried About Running Out of Food in the Last Year:     [address] in the Last Year:    Transportation Needs:     Lack of Transportation (Medical):      Lack of Transportation (Non-Medical):    Physical Activity:     Days of Exercise per Week:     Minutes of Exercise per Session:    Stress:     Feeling of Stress :    Social Connections:     Frequency of Communication with Friends and Family:     Frequency of Social Gatherings with Friends and Family:     Attends [employer] Services:     Active Member of Clubs or Organizations:     Attends Club or Organization Meetings:     Marital Status:    Intimate Partner Violence:     Fear of Current or Ex-Partner:     Emotionally Abused:     Physically Abused:     Sexually Abused:      Past Medical History:   Diagnosis Date    Abdominal pain, acute, generalized     last assessed - 21Apr2017    Actinic keratosis     last assessed - 12Jun2013    Chest pain     last assessed - [id number]    Chronic kidney disease, stage 3 ([facility] [address] )     Colon polyp     Disease of thyroid gland     Dysfunction of both eustachian tubes     suspect due to ETD  Recommend otc allergy meds and if fails then add flonase; last assessed - [id number]    Edema     Essential hypertension     Fatigue     last assessed - [id number]    Generalized anxiety disorder     GERD (gastroesophageal reflux disease)     Hematuria     last assessed - [id number]    Hypertension     Hypo-osmolality and hyponatremia     Hypocalcemia     [facility] [address] [facility]     Hypothyroidism     Lightheadedness     last assessed - [id number]    Lump in neck     ? cause  Maybe a localized allergic reaction, dental issue, doubt sialdenitis, ect  Empiric abx and one dose steroids  Continue benadryl  If worsens, to er or ent      Malaise and fatigue     last assessed - [id number]    Nephropathy     last assessed - [id number]    Nocturia     Proteinuria     Shortness of breath     last assessed - [id number]    Skin lesion     Resolved - [id number]    Tinea corporis     last assessed - [id number]     Past Surgical History:   Procedure Laterality Date    COLONOSCOPY W/ POLYPECTOMY  07/19/2019    HEAD & NECK SKIN LESION EXCISIONAL BIOPSY      excision of lesion scalp malignant - [facility]; last assessed - [id number]    SKIN BIOPSY      last assessed - 09Sep2014   [place] January THYROID SURGERY      Biopsy thyroid using percutaneous core needle; last assessed - 09Sep2014    TONSILLECTOMY         Current Outpatient Medications:     aspirin (ECOTRIN LOW STRENGTH) 81 mg EC tablet, Take 1 tablet (81 mg total) by mouth daily, Disp: , Rfl:     calcitriol (ROCALTROL) 0 25 mcg capsule, Take 1 capsule (0 25 mcg total) by mouth daily, Disp: 90 capsule, Rfl: 1    Calcium Carbonate (CALCIUM 600 PO), Take by mouth Twice per day, Disp: , Rfl:     Cholecalciferol (VITAMIN D3) 1000 units CAPS, Take by mouth, Disp: , Rfl:     lisinopril (ZESTRIL) 20 mg tablet, Take 1 tablet (20 mg total) by mouth daily, Disp: 90 tablet, Rfl: 3    metoprolol tartrate (LOPRESSOR) 25 mg tablet, Take 1 tablet by mouth once daily, Disp: 90 tablet, Rfl: 1    Multiple Vitamin (MULTI-VITAMIN DAILY PO), Take by mouth daily, Disp: , Rfl:     Omega-3 Fatty Acids (SALMON OIL PO), Take by mouth, Disp: , Rfl:     tamsulosin (FLOMAX) 0 4 mg, Take 1 capsule (0 4 mg total) by mouth daily with dinner, Disp: 90 capsule, Rfl: 3    Lab Results   Component Value Date     12/15/2015    SODIUM 134 (L) 09/28/2021    K 4 2 09/28/2021     09/28/2021    CO2 30 09/28/2021    ANIONGAP 7 12/15/2015    AGAP 4 09/28/2021    BUN 16 09/28/2021    CREATININE 1 27 09/28/2021    GLUC 92 08/20/2019    GLUF 97 09/28/2021    CALCIUM 8 7 09/28/2021    AST 18 09/28/2021    ALT 21 09/28/2021    ALKPHOS 63 09/28/2021    PROT 7 8 09/16/2015    TP 7 3 09/28/2021    BILITOT 0 51 09/16/2015    TBILI 0 80 09/28/2021    EGFR 56 09/28/2021     Lab Results   Component Value Date    WBC 6 30 09/28/2021    HGB 15 4 09/28/2021    HCT 46 1 09/28/2021    MCV 89 09/28/2021     09/28/2021     Lab Results   Component Value Date    CHOLESTEROL 218 (H) 09/29/2020    CHOLESTEROL 198 07/18/2019    CHOLESTEROL 171 02/19/2018     Lab Results   Component Value Date    HDL 53 09/29/2020    HDL 44 07/18/2019    HDL 43 02/19/2018     Lab Results   Component Value Date    LDLCALC 138 (H) 09/29/2020    LDLCALC 126 (H) 07/18/2019    LDLCALC 107 (H) 02/19/2018     Lab Results   Component Value Date    TRIG 156 (H) 04/27/2021    TRIG 136 09/29/2020    TRIG 155 (H) 05/28/2020     No results found for: [place], [place]  Lab Results   Component Value Date    KNF4GAWKOBIV 0 936 02/21/2020     Lab Results   Component Value Date    PTH <6 3 (L) 04/27/2021    CALCIUM 8 7 09/28/2021    CAION 1 06 12/12/2012    PHOS 3 4 09/28/2021     No results found for: SPEP, UPEP  No results found for: RITU DIAZ        Objective:      /74   Pulse 81   Ht 6' 2" (1 88 m)   Wt 90 7 kg (200 lb)   SpO2 96%   BMI 25 68 kg/m²          Physical Exam  Vitals reviewed     Constitutional:       General: He is not in acute distress  Appearance: He is well-developed  HENT:      Head: Normocephalic and atraumatic  Eyes:      Conjunctiva/sclera: Conjunctivae normal    Cardiovascular:      Rate and Rhythm: Normal rate and regular rhythm  Pulmonary:      Effort: Pulmonary effort is normal       Breath sounds: Normal breath sounds  Abdominal:      Palpations: Abdomen is soft  Musculoskeletal:      Cervical back: Neck supple  Skin:     General: Skin is warm  Findings: No rash  Neurological:      Mental Status: He is alert and oriented to person, place, and time  Cranial Nerves: No cranial nerve deficit     Psychiatric:         Behavior: Behavior normal

## 2021-09-30 NOTE — ASSESSMENT & PLAN NOTE
Blood pressures are controlled at this time, continue current medications including lisinopril and metoprolol

## 2021-09-30 NOTE — ASSESSMENT & PLAN NOTE
Lab Results   Component Value Date    EGFR 56 09/28/2021    EGFR 57 04/27/2021    EGFR 53 09/29/2020    CREATININE 1 27 09/28/2021    CREATININE 1 26 04/27/2021    CREATININE 1 33 (H) 09/29/2020       Kidney function stable to slightly better than usual over the last year so  Continue to take current medications avoid potential nephrotoxins

## 2021-09-30 NOTE — ASSESSMENT & PLAN NOTE
Patient noted to have no albuminuria with the most recent labs, this was the 1st time in a few years that he has not had albuminuria  Continue lisinopril, continue monitor with each set of labs  n/a

## 2021-09-30 NOTE — ASSESSMENT & PLAN NOTE
Currently managed with activated vitamin D agent and supplemental calcium, continue care according to our Endocrinology colleagues

## 2021-10-10 DIAGNOSIS — I10 ESSENTIAL HYPERTENSION: ICD-10-CM

## 2021-10-12 ENCOUNTER — IMMUNIZATIONS (OUTPATIENT)
Dept: INTERNAL MEDICINE CLINIC | Facility: CLINIC | Age: 71
End: 2021-10-12
Payer: MEDICARE

## 2021-10-12 DIAGNOSIS — Z23 ENCOUNTER FOR IMMUNIZATION: Primary | ICD-10-CM

## 2021-10-12 DIAGNOSIS — R35.0 BENIGN PROSTATIC HYPERPLASIA WITH URINARY FREQUENCY: ICD-10-CM

## 2021-10-12 DIAGNOSIS — N40.1 BENIGN PROSTATIC HYPERPLASIA WITH URINARY FREQUENCY: ICD-10-CM

## 2021-10-12 PROCEDURE — 90662 IIV NO PRSV INCREASED AG IM: CPT

## 2021-10-12 PROCEDURE — G0008 ADMIN INFLUENZA VIRUS VAC: HCPCS

## 2021-10-12 RX ORDER — TAMSULOSIN HYDROCHLORIDE 0.4 MG/1
0.4 CAPSULE ORAL
Qty: 90 CAPSULE | Refills: 3 | Status: SHIPPED | OUTPATIENT
Start: 2021-10-12 | End: 2022-06-07 | Stop reason: SDUPTHER

## 2021-10-13 RX ORDER — LISINOPRIL 20 MG/1
TABLET ORAL
Qty: 90 TABLET | Refills: 0 | Status: SHIPPED | OUTPATIENT
Start: 2021-10-13 | End: 2022-03-28 | Stop reason: SDUPTHER

## 2021-11-09 ENCOUNTER — HOSPITAL ENCOUNTER (OUTPATIENT)
Dept: BONE DENSITY | Facility: HOSPITAL | Age: 71
Discharge: HOME | End: 2021-11-09
Attending: INTERNAL MEDICINE
Payer: MEDICARE

## 2021-11-09 DIAGNOSIS — E34.2 ECTOPIC HORMONE SECRETION, NOT ELSEWHERE CLASSIFIED: ICD-10-CM

## 2021-11-09 DIAGNOSIS — E04.2 MULTIPLE THYROID NODULES: ICD-10-CM

## 2021-11-09 DIAGNOSIS — E20.9 HYPOPARATHYROIDISM, UNSPECIFIED HYPOPARATHYROIDISM TYPE (HCC): ICD-10-CM

## 2021-11-09 DIAGNOSIS — N18.30 STAGE 3 CHRONIC KIDNEY DISEASE, UNSPECIFIED WHETHER STAGE 3A OR 3B CKD (HCC): ICD-10-CM

## 2021-11-09 PROCEDURE — 77080 DXA BONE DENSITY AXIAL: CPT

## 2021-12-17 ENCOUNTER — OFFICE VISIT (OUTPATIENT)
Dept: ENDOCRINOLOGY | Facility: CLINIC | Age: 71
End: 2021-12-17
Payer: MEDICARE

## 2021-12-17 VITALS
TEMPERATURE: 98.6 F | SYSTOLIC BLOOD PRESSURE: 128 MMHG | HEIGHT: 74 IN | BODY MASS INDEX: 26.56 KG/M2 | DIASTOLIC BLOOD PRESSURE: 78 MMHG | RESPIRATION RATE: 20 BRPM | HEART RATE: 81 BPM | WEIGHT: 207 LBS

## 2021-12-17 DIAGNOSIS — E20.9 HYPOPARATHYROIDISM, UNSPECIFIED HYPOPARATHYROIDISM TYPE (HCC): Primary | ICD-10-CM

## 2021-12-17 DIAGNOSIS — E04.2 MULTIPLE THYROID NODULES: ICD-10-CM

## 2021-12-17 PROCEDURE — 99213 OFFICE O/P EST LOW 20 MIN: CPT | Performed by: NURSE PRACTITIONER

## 2021-12-17 RX ORDER — PSYLLIUM SEED (WITH DEXTROSE)
1 POWDER (GRAM) ORAL AS NEEDED
COMMUNITY

## 2022-01-05 ENCOUNTER — OFFICE VISIT (OUTPATIENT)
Dept: INTERNAL MEDICINE CLINIC | Facility: CLINIC | Age: 72
End: 2022-01-05
Payer: MEDICARE

## 2022-01-05 ENCOUNTER — APPOINTMENT (OUTPATIENT)
Dept: LAB | Facility: CLINIC | Age: 72
End: 2022-01-05
Payer: MEDICARE

## 2022-01-05 VITALS
WEIGHT: 204 LBS | HEART RATE: 66 BPM | OXYGEN SATURATION: 98 % | DIASTOLIC BLOOD PRESSURE: 76 MMHG | SYSTOLIC BLOOD PRESSURE: 122 MMHG | BODY MASS INDEX: 26.18 KG/M2 | TEMPERATURE: 97 F | HEIGHT: 74 IN

## 2022-01-05 DIAGNOSIS — N40.0 BENIGN PROSTATIC HYPERPLASIA WITHOUT LOWER URINARY TRACT SYMPTOMS: ICD-10-CM

## 2022-01-05 DIAGNOSIS — E20.9 HYPOPARATHYROIDISM, UNSPECIFIED HYPOPARATHYROIDISM TYPE (HCC): ICD-10-CM

## 2022-01-05 DIAGNOSIS — N18.31 STAGE 3A CHRONIC KIDNEY DISEASE (HCC): ICD-10-CM

## 2022-01-05 DIAGNOSIS — Z23 ENCOUNTER FOR VACCINATION: ICD-10-CM

## 2022-01-05 DIAGNOSIS — E78.2 MIXED HYPERLIPIDEMIA: ICD-10-CM

## 2022-01-05 DIAGNOSIS — I10 PRIMARY HYPERTENSION: Primary | ICD-10-CM

## 2022-01-05 DIAGNOSIS — F41.1 GENERALIZED ANXIETY DISORDER: ICD-10-CM

## 2022-01-05 DIAGNOSIS — G43.909 MIGRAINE WITHOUT STATUS MIGRAINOSUS, NOT INTRACTABLE, UNSPECIFIED MIGRAINE TYPE: ICD-10-CM

## 2022-01-05 DIAGNOSIS — K21.9 GERD WITHOUT ESOPHAGITIS: ICD-10-CM

## 2022-01-05 LAB
25(OH)D3 SERPL-MCNC: 55.9 NG/ML (ref 30–100)
ALBUMIN SERPL BCP-MCNC: 3.8 G/DL (ref 3.5–5)
ALP SERPL-CCNC: 68 U/L (ref 46–116)
ALT SERPL W P-5'-P-CCNC: 19 U/L (ref 12–78)
ANION GAP SERPL CALCULATED.3IONS-SCNC: 4 MMOL/L (ref 4–13)
AST SERPL W P-5'-P-CCNC: 21 U/L (ref 5–45)
BILIRUB SERPL-MCNC: 0.72 MG/DL (ref 0.2–1)
BUN SERPL-MCNC: 17 MG/DL (ref 5–25)
CALCIUM SERPL-MCNC: 9.5 MG/DL (ref 8.3–10.1)
CHLORIDE SERPL-SCNC: 101 MMOL/L (ref 100–108)
CHOLEST SERPL-MCNC: 223 MG/DL
CO2 SERPL-SCNC: 31 MMOL/L (ref 21–32)
CREAT SERPL-MCNC: 1.33 MG/DL (ref 0.6–1.3)
GFR SERPL CREATININE-BSD FRML MDRD: 53 ML/MIN/1.73SQ M
GLUCOSE P FAST SERPL-MCNC: 90 MG/DL (ref 65–99)
HDLC SERPL-MCNC: 54 MG/DL
LDLC SERPL CALC-MCNC: 140 MG/DL (ref 0–100)
POTASSIUM SERPL-SCNC: 4.2 MMOL/L (ref 3.5–5.3)
PROT SERPL-MCNC: 7.6 G/DL (ref 6.4–8.2)
PTH-INTACT SERPL-MCNC: <6.3 PG/ML (ref 18.4–80.1)
SODIUM SERPL-SCNC: 136 MMOL/L (ref 136–145)
TRIGL SERPL-MCNC: 144 MG/DL

## 2022-01-05 PROCEDURE — 36415 COLL VENOUS BLD VENIPUNCTURE: CPT

## 2022-01-05 PROCEDURE — 82306 VITAMIN D 25 HYDROXY: CPT

## 2022-01-05 PROCEDURE — 80061 LIPID PANEL: CPT

## 2022-01-05 PROCEDURE — 99214 OFFICE O/P EST MOD 30 MIN: CPT | Performed by: INTERNAL MEDICINE

## 2022-01-05 PROCEDURE — 83970 ASSAY OF PARATHORMONE: CPT

## 2022-01-05 PROCEDURE — 80053 COMPREHEN METABOLIC PANEL: CPT

## 2022-01-05 NOTE — PATIENT INSTRUCTIONS
Chronic Hypertension   AMBULATORY CARE:   Hypertension  is high blood pressure  Your blood pressure is the force of your blood moving against the walls of your arteries  Hypertension causes your blood pressure to get so high that your heart has to work much harder than normal  This can damage your heart  Even if you have hypertension for years, lifestyle changes, medicines, or both can help bring your blood pressure to normal   Call your local emergency number (911 in the [address] [facility]) or have someone call if:   · You have chest pain  · You have any of the following signs of a heart attack:      ? Squeezing, pressure, or pain in your chest    ? You may  also have any of the following:     § Discomfort or pain in your back, neck, jaw, stomach, or arm    § Shortness of breath    § Nausea or vomiting    § Lightheadedness or a sudden cold sweat    · You become confused or have difficulty speaking  · You suddenly feel lightheaded or have trouble breathing  Seek care immediately if:   · You have a severe headache or vision loss  · You have weakness in an arm or leg  Call your doctor or cardiologist if:   · You feel faint, dizzy, confused, or drowsy  · You have been taking your blood pressure medicine but your pressure is higher than your provider says it should be  · You have questions or concerns about your condition or care  Treatment for chronic hypertension  may include medicine to lower your blood pressure and cholesterol levels  A low cholesterol level helps prevent heart disease and makes it easier to control your blood pressure  Heart disease can make your blood pressure harder to control  You may also need to make lifestyle changes  What you need to know about the stages of hypertension:       · Normal blood pressure is 119/79 or lower   Your healthcare provider may only check your blood pressure each year if it stays at a normal level  · Elevated blood pressure is 120/79 to 129/79    This is sometimes called prehypertension  Your healthcare provider may suggest lifestyle changes to help lower your blood pressure to a normal level  He or she may then check it again in 3 to 6 months  · Stage 1 hypertension is 130/80  to 139/89   Your provider may recommend lifestyle changes, medication, and checks every 3 to 6 months until your blood pressure is controlled  · Stage 2 hypertension is 140/90 or higher   Your provider will recommend lifestyle changes and have you take 2 kinds of hypertension medicines  You will also need to have your blood pressure checked monthly until it is controlled  Manage chronic hypertension:   · Check your blood pressure at home  Avoid smoking, caffeine, and exercise at least 30 minutes before checking your blood pressure  Sit and rest for 5 minutes before you take your blood pressure  Extend your arm and support it on a flat surface  Your arm should be at the same level as your heart  Follow the directions that came with your blood pressure monitor  Check your blood pressure 2 times, 1 minute apart, before you take your medicine in the morning  Also check your blood pressure before your evening meal  Keep a record of your readings and bring it to your follow-up visits  Ask your healthcare provider what your blood pressure should be  · Manage any other health conditions you have  Health conditions such as diabetes can increase your risk for hypertension  Follow your healthcare provider's instructions and take all your medicines as directed  Talk to your healthcare provider about any new health conditions you have recently developed  · Ask about all medicines  Certain medicines can increase your blood pressure  Examples include oral birth control pills, decongestants, herbal supplements, and NSAIDs, such as ibuprofen  Your healthcare provider can tell you which medicines are safe for you to take   This includes prescription and over-the-counter medicines  Lifestyle changes you can make to lower your blood pressure: Your provider may want you to make more lifestyle changes if you are having trouble controlling your blood pressure  This may feel difficult over time, especially if you think you are making good changes but your pressure is still high  It might help to focus on one new change at a time  For example, try to add 1 more day of exercise, or exercise for an extra 10 minutes on 2 days  Small changes can make a big difference  Your healthcare provider can also refer you to specialists such as a dietitian who can help you make small changes  Your family members may be included in helping you learn to create lifestyle changes, such as the following:     · Limit sodium (salt) as directed  Too much sodium can affect your fluid balance  Check labels to find low-sodium or no-salt-added foods  Some low-sodium foods use potassium salts for flavor  Too much potassium can also cause health problems  Your healthcare provider will tell you how much sodium and potassium are safe for you to have in a day  He or she may recommend that you limit sodium to 2,300 mg a day  · Follow the meal plan recommended by your healthcare provider  A dietitian or your provider can give you more information on low-sodium plans or the DASH (Dietary Approaches to Stop Hypertension) eating plan  The DASH plan is low in sodium, processed sugar, unhealthy fats, and total fat  It is high in potassium, calcium, and fiber  These can be found in vegetables, fruit, and whole-grain foods  · Be physically active throughout the day  Physical activity, such as exercise, can help control your blood pressure and your weight  Be physically active for at least 30 minutes per day, on most days of the week  Include aerobic activity, such as walking or riding a bicycle  Also include strength training at least 2 times each week   Your healthcare providers can help you create a physical activity plan  · Decrease stress  This may help lower your blood pressure  Learn ways to relax, such as deep breathing or listening to music  · Limit alcohol as directed  Alcohol can increase your blood pressure  A drink of alcohol is 12 ounces of beer, 5 ounces of wine, or 1½ ounces of liquor  · Do not smoke  Nicotine and other chemicals in cigarettes and cigars can increase your blood pressure and also cause lung damage  Ask your healthcare provider for information if you currently smoke and need help to quit  E-cigarettes or smokeless tobacco still contain nicotine  Talk to your healthcare provider before you use these products  Follow up with your doctor or cardiologist as directed: You will need to return to have your blood pressure checked and to have other lab tests done  Write down your questions so you remember to ask them during your visits  © Copyright [employer] 2021 Information is for End User's use only and may not be sold, redistributed or otherwise used for commercial purposes  All illustrations and images included in CareNotes® are the copyrighted property of A D A M , Inc  or [facility] Jerrell Ma   The above information is an  only  It is not intended as medical advice for individual conditions or treatments  Talk to your doctor, nurse or pharmacist before following any medical regimen to see if it is safe and effective for you  Weight Management   AMBULATORY CARE:   Why it is important to manage your weight:  Being overweight increases your risk of health conditions such as heart disease, high blood pressure, type 2 diabetes, and certain types of cancer  It can also increase your risk for osteoarthritis, sleep apnea, and other respiratory problems  Aim for a slow, steady weight loss  Even a small amount of weight loss can lower your risk of health problems    How to lose weight safely:  A safe and healthy way to lose weight is to eat fewer calories and get regular exercise  · You can lose up about 1 pound a week by decreasing the number of calories you eat by 500 calories each day  You can decrease calories by eating smaller portion sizes or by cutting out high-calorie foods  Read labels to find out how many calories are in the foods you eat  · You can also burn calories with exercise such as walking, swimming, or biking  You will be more likely to keep weight off if you make these changes part of your lifestyle  Exercise at least 30 minutes per day on most days of the week  You can also fit in more physical activity by taking the stairs instead of the elevator or parking farther away from stores  Ask your healthcare provider about the best exercise plan for you  Healthy meal plan for weight management:  A healthy meal plan includes a variety of foods, contains fewer calories, and helps you stay healthy  A healthy meal plan includes the following:     · Eat whole-grain foods more often  A healthy meal plan should contain fiber  Fiber is the part of grains, fruits, and vegetables that is not broken down by your body  Whole-grain foods are healthy and provide extra fiber in your diet  Some examples of whole-grain foods are whole-wheat breads and pastas, oatmeal, brown rice, and bulgur  · Eat a variety of vegetables every day  Include dark, leafy greens such as spinach, kale, [place] greens, and mustard greens  Eat yellow and orange vegetables such as carrots, sweet potatoes, and winter squash  · Eat a variety of fruits every day  Choose fresh or canned fruit (canned in its own juice or light syrup) instead of juice  Fruit juice has very little or no fiber  · Eat low-fat dairy foods  Drink fat-free (skim) milk or 1% milk  Eat fat-free yogurt and low-fat cottage cheese  Try low-fat cheeses such as mozzarella and other reduced-fat cheeses  · Choose meat and other protein foods that are low in fat    Choose beans or other legumes such as split peas or lentils  Choose fish, skinless poultry (chicken or turkey), or lean cuts of red meat (beef or pork)  Before you cook meat or poultry, cut off any visible fat  · Use less fat and oil  Try baking foods instead of frying them  Add less fat, such as margarine, sour cream, regular salad dressing and mayonnaise to foods  Eat fewer high-fat foods  Some examples of high-fat foods include french fries, doughnuts, ice cream, and cakes  · Eat fewer sweets  Limit foods and drinks that are high in sugar  This includes candy, cookies, regular soda, and sweetened drinks  Ways to decrease calories:   · Eat smaller portions  ? Use a small plate with smaller servings  ? Do not eat second helpings  ? When you eat at a restaurant, ask for a box and place half of your meal in the box before you eat  ? Share an entrée with someone else  · Replace high-calorie snacks with healthy, low-calorie snacks  ? Choose fresh fruit, vegetables, fat-free rice cakes, or air-popped popcorn instead of potato chips, nuts, or chocolate  ? Choose water or calorie-free drinks instead of soda or sweetened drinks  · Do not shop for groceries when you are hungry  You may be more likely to make unhealthy food choices  Take a grocery list of healthy foods and shop after you have eaten  · Eat regular meals  Do not skip meals  Skipping meals can lead to overeating later in the day  This can make it harder for you to lose weight  Eat a healthy snack in place of a meal if you do not have time to eat a regular meal  Talk with a dietitian to help you create a meal plan and schedule that is right for you  Other things to consider as you try to lose weight:   · Be aware of situations that may give you the urge to overeat, such as eating while watching television  Find ways to avoid these situations  For example, read a book, go for a walk, or do crafts      · Meet with a weight loss support group or friends who are also trying to lose weight  This may help you stay motivated to continue working on your weight loss goals  © Copyright [employer] 2021 Information is for End User's use only and may not be sold, redistributed or otherwise used for commercial purposes  All illustrations and images included in CareNotes® are the copyrighted property of [employer]  or Renee Ma   The above information is an  only  It is not intended as medical advice for individual conditions or treatments  Talk to your doctor, nurse or pharmacist before following any medical regimen to see if it is safe and effective for you  Low Fat Diet   AMBULATORY CARE:   A low-fat diet  is an eating plan that is low in total fat, unhealthy fat, and cholesterol  You may need to follow a low-fat diet if you have trouble digesting or absorbing fat  You may also need to follow this diet if you have high cholesterol  You can also lower your cholesterol by increasing the amount of fiber in your diet  Soluble fiber is a type of fiber that helps to decrease cholesterol levels  Different types of fat in food:   · Limit unhealthy fats  A diet that is high in cholesterol, saturated fat, and trans fat may cause unhealthy cholesterol levels  Unhealthy cholesterol levels increase your risk of heart disease  ? Cholesterol:  Limit intake of cholesterol to less than 200 mg per day  Cholesterol is found in meat, eggs, and dairy  ? Saturated fat:  Limit saturated fat to less than 7% of your total daily calories  Ask your dietitian how many calories you need each day  Saturated fat is found in butter, cheese, ice cream, whole milk, and palm oil  Saturated fat is also found in meat, such as beef, pork, chicken skin, and processed meats  Processed meats include sausage, hot dogs, and bologna  ? Trans fat:  Avoid trans fat as much as possible  Trans fat is used in fried and baked foods   Foods that say trans fat free on the label may still have up to 0 5 grams of trans fat per serving  · Include healthy fats  Replace foods that are high in saturated and trans fat with foods high in healthy fats  This may help to decrease high cholesterol levels  ? Monounsaturated fats: These are found in avocados, nuts, and vegetable oils, such as olive, canola, and sunflower oil  ? Polyunsaturated fats: These can be found in vegetable oils, such as soybean or corn oil  Omega-3 fats can help to decrease the risk of heart disease  Omega-3 fats are found in fish, such as salmon, herring, trout, and tuna  Omega-3 fats can also be found in plant foods, such as walnuts, flaxseed, soybeans, and canola oil  Foods to limit or avoid:   · Grains:      ? Snacks that are made with partially hydrogenated oils, such as chips, regular crackers, and butter-flavored popcorn    ? High-fat baked goods, such as biscuits, croissants, doughnuts, pies, cookies, and pastries    · Dairy:      ? Whole milk, 2% milk, and yogurt and ice cream made with whole milk    ? Half and half creamer, heavy cream, and whipping cream    ? Cheese, cream cheese, and sour cream    · Meats and proteins:      ? High-fat cuts of meat (T-bone steak, regular hamburger, and ribs)    ? Fried meat, poultry (turkey and chicken), and fish    ? Poultry (chicken and turkey) with skin    ? Cold cuts (salami or bologna), hot dogs, camara, and sausage    ? Whole eggs and egg yolks    · Vegetables and fruits with added fat:      ? Fried vegetables or vegetables in butter or high-fat sauces, such as cream or cheese sauces    ? Fried fruit or fruit served with butter or cream    · Fats:      ? Butter, stick margarine, and shortening    ? Coconut, palm oil, and palm kernel oil    Foods to include:   · Grains:      ? Whole-grain breads, cereals, pasta, and brown rice    ?  Low-fat crackers and pretzels    · Vegetables and fruits:      ? Fresh, frozen, or canned vegetables (no salt or low-sodium)    ? Fresh, frozen, dried, or canned fruit (canned in light syrup or fruit juice)    ? Avocado    · Low-fat dairy products:      ? Nonfat (skim) or 1% milk    ? Nonfat or low-fat cheese, yogurt, and cottage cheese    · Meats and proteins:      ? Chicken or turkey with no skin    ? Baked or broiled fish    ? Lean beef and pork (loin, round, extra lean hamburger)    ? Beans and peas, unsalted nuts, soy products    ? Egg whites and substitutes    ? Seeds and nuts    · Fats:      ? Unsaturated oil, such as canola, olive, peanut, soybean, or sunflower oil    ? Soft or liquid margarine and vegetable oil spread    ? Low-fat salad dressing    Other ways to decrease fat:   · Read food labels before you buy foods  Choose foods that have less than 30% of calories from fat  Choose low-fat or fat-free dairy products  Remember that fat free does not mean calorie free  These foods still contain calories, and too many calories can lead to weight gain  · Trim fat from meat and avoid fried food  Trim all visible fat from meat before you cook it  Remove the skin from poultry  Do not adame meat, fish, or poultry  Bake, roast, boil, or broil these foods instead  Avoid fried foods  Eat a baked potato instead of [place] fries  Steam vegetables instead of sautéing them in butter  · Add less fat to foods  Use imitation camara bits on salads and baked potatoes instead of regular camara bits  Use fat-free or low-fat salad dressings instead of regular dressings  Use low-fat or nonfat butter-flavored topping instead of regular butter or margarine on popcorn and other foods  Ways to decrease fat in recipes:  Replace high-fat ingredients with low-fat or nonfat ones  This may cause baked goods to be drier than usual  You may need to use nonfat cooking spray on pans to prevent food from sticking  You also may need to change the amount of other ingredients, such as water, in the recipe   Try the following:  · Use low-fat or light margarine instead of regular margarine or shortening  · Use lean ground turkey breast or chicken, or lean ground beef (less than 5% fat) instead of hamburger  · Add 1 teaspoon of canola oil to 8 ounces of skim milk instead of using cream or half and half  · Use grated zucchini, carrots, or apples in breads instead of coconut  · Use blenderized, low-fat cottage cheese, plain tofu, or low-fat ricotta cheese instead of cream cheese  · Use 1 egg white and 1 teaspoon of canola oil, or use ¼ cup (2 ounces) of fat-free egg substitute instead of a whole egg  · Replace half of the oil that is called for in a recipe with applesauce when you bake  Use 3 tablespoons of cocoa powder and 1 tablespoon of canola oil instead of a square of baking chocolate  How to increase fiber:  Eat enough high-fiber foods to get 20 to 30 grams of fiber every day  Slowly increase your fiber intake to avoid stomach cramps, gas, and other problems  · Eat 3 ounces of whole-grain foods each day  An ounce is about 1 slice of bread  Eat whole-grain breads, such as whole-wheat bread  Whole wheat, whole-wheat flour, or other whole grains should be listed as the first ingredient on the food label  Replace white flour with whole-grain flour or use half of each in recipes  Whole-grain flour is heavier than white flour, so you may have to add more yeast or baking powder  · Eat a high-fiber cereal for breakfast   Oatmeal is a good source of soluble fiber  Look for cereals that have bran or fiber in the name  Choose whole-grain products, such as brown rice, barley, and whole-wheat pasta  · Eat more beans, peas, and lentils  For example, add beans to soups or salads  Eat at least 5 cups of fruits and vegetables each day  Eat fruits and vegetables with the peel because the peel is high in fiber      © Copyright [employer] 2021 Information is for End User's use only and may not be sold, redistributed or otherwise used for commercial purposes  All illustrations and images included in CareNotes® are the copyrighted property of A D A M , Inc  or [facility] Jerrell Ma   The above information is an  only  It is not intended as medical advice for individual conditions or treatments  Talk to your doctor, nurse or pharmacist before following any medical regimen to see if it is safe and effective for you  Heart Healthy Diet   AMBULATORY CARE:   A heart healthy diet  is an eating plan low in unhealthy fats and sodium (salt)  The plan is high in healthy fats and fiber  A heart healthy diet helps improve your cholesterol levels and lowers your risk for heart disease and stroke  A dietitian will teach you how to read and understand food labels  Heart healthy diet guidelines to follow:   · Choose foods that contain healthy fats  ? Unsaturated fats  include monounsaturated and polyunsaturated fats  Unsaturated fat is found in foods such as soybean, canola, olive, corn, and safflower oils  It is also found in soft tub margarine that is made with liquid vegetable oil  ? Omega-3 fat  is found in certain fish, such as salmon, tuna, and trout, and in walnuts and flaxseed  Eat fish high in omega-3 fats at least 2 times a week  · Get 20 to 30 grams of fiber each day  Fruits, vegetables, whole-grain foods, and legumes (cooked beans) are good sources of fiber  · Limit or do not have unhealthy fats  ? Cholesterol  is found in animal foods, such as eggs and lobster, and in dairy products made from whole milk  Limit cholesterol to less than 200 mg each day  ? Saturated fat  is found in meats, such as camara and hamburger  It is also found in chicken or turkey skin, whole milk, and butter  Limit saturated fat to less than 7% of your total daily calories  ? Trans fat  is found in packaged foods, such as potato chips and cookies  It is also in hard margarine, some fried foods, and shortening   Do not eat foods that contain trans fats  · Limit sodium as directed  You may be told to limit sodium to 2,000 to 2,300 mg each day  Choose low-sodium or no-salt-added foods  Add little or no salt to food you prepare  Use herbs and spices in place of salt  Include the following in your heart healthy plan:  Ask your dietitian or healthcare provider how many servings to have from each of the following food groups:  · Grains:      ? Whole-wheat breads, cereals, and pastas, and brown rice    ? Low-fat, low-sodium crackers and chips    · Vegetables:      ? Broccoli, green beans, green peas, and spinach    ? Collards, kale, and lima beans    ? Carrots, sweet potatoes, tomatoes, and peppers    ? Canned vegetables with no salt added    · Fruits:      ? Bananas, peaches, pears, and pineapple    ? Grapes, raisins, and dates    ? Oranges, tangerines, grapefruit, orange juice, and grapefruit juice    ? Apricots, mangoes, melons, and papaya    ? Raspberries and strawberries    ? Canned fruit with no added sugar    · Low-fat dairy:      ? Nonfat (skim) milk, 1% milk, and low-fat almond, cashew, or soy milks fortified with calcium    ? Low-fat cheese, regular or frozen yogurt, and cottage cheese    · Meats and proteins:      ? Lean cuts of beef and pork (loin, leg, round), skinless chicken and turkey    ? Legumes, soy products, egg whites, or nuts    Limit or do not include the following in your heart healthy plan:   · Unhealthy fats and oils:      ? Whole or 2% milk, cream cheese, sour cream, or cheese    ? High-fat cuts of beef (T-bone steaks, ribs), chicken or turkey with skin, and organ meats such as liver    ? Butter, stick margarine, shortening, and cooking oils such as coconut or palm oil    · Foods and liquids high in sodium:      ? Packaged foods, such as frozen dinners, cookies, macaroni and cheese, and cereals with more than 300 mg of sodium per serving    ?  Vegetables with added sodium, such as instant potatoes, vegetables with added sauces, or regular canned vegetables    ? Cured or smoked meats, such as hot dogs, camara, and sausage    ? High-sodium ketchup, barbecue sauce, salad dressing, pickles, olives, soy sauce, or miso    · Foods and liquids high in sugar:      ? Candy, cake, cookies, pies, or doughnuts    ? Soft drinks (soda), sports drinks, or sweetened tea    ? Canned or dry mixes for cakes, soups, sauces, or gravies    Other healthy heart guidelines:   · Do not smoke  Nicotine and other chemicals in cigarettes and cigars can cause lung and heart damage  Ask your healthcare provider for information if you currently smoke and need help to quit  E-cigarettes or smokeless tobacco still contain nicotine  Talk to your healthcare provider before you use these products  · Limit or do not drink alcohol as directed  Alcohol can damage your heart and raise your blood pressure  Your healthcare provider may give you specific daily and weekly limits  The general recommended limit is 1 drink a day for women 21 or older and for men 72 or older  Do not have more than 3 drinks in a day or 7 in a week  The recommended limit is 2 drinks a day for men 24to 59years of age  Do not have more than 4 drinks in a day or 14 in a week  A drink of alcohol is 12 ounces of beer, 5 ounces of wine, or 1½ ounces of liquor  · Exercise regularly  Exercise can help you maintain a healthy weight and improve your blood pressure and cholesterol levels  Regular exercise can also decrease your risk for heart problems  Ask your healthcare provider about the best exercise plan for you  Do not start an exercise program without asking your healthcare provider  Follow up with your doctor or cardiologist as directed:  Write down your questions so you remember to ask them during your visits  © Copyright [employer] 2021 Information is for End User's use only and may not be sold, redistributed or otherwise used for commercial purposes   All illustrations and images included in CareNotes® are the copyrighted property of A D A M [employer]  or Renee Ma   The above information is an  only  It is not intended as medical advice for individual conditions or treatments  Talk to your doctor, nurse or pharmacist before following any medical regimen to see if it is safe and effective for you

## 2022-01-05 NOTE — PROGRESS NOTES
BMI Counseling: There is no height or weight on file to calculate BMI  The BMI is above normal  Nutrition recommendations include decreasing portion sizes and encouraging healthy choices of fruits and vegetables  Exercise recommendations include moderate physical activity 150 minutes/week  No pharmacotherapy was ordered  Rationale for BMI follow-up plan is due to patient being overweight or obese  Assessment/Plan:  Problem List Items Addressed This Visit        Digestive    GERD without esophagitis       Endocrine    Hypoparathyroidism ([facility] [address] )       Cardiovascular and Mediastinum    Migraine, unspecified, not intractable, without status migrainosus    Hypertension - Primary       Genitourinary    Stage 3 chronic kidney disease (HCC)    Benign prostatic hyperplasia without lower urinary tract symptoms       Other    Hyperlipidemia    Relevant Orders    Lipid Panel with Direct LDL reflex    Generalized anxiety disorder      Other Visit Diagnoses     Encounter for vaccination               Diagnoses and all orders for this visit:    Primary hypertension    Hypoparathyroidism, unspecified hypoparathyroidism type ([facility] [address] )    GERD without esophagitis    Migraine without status migrainosus, not intractable, unspecified migraine type    Stage 3a chronic kidney disease ([facility] [address] )    Benign prostatic hyperplasia without lower urinary tract symptoms    Generalized anxiety disorder    Mixed hyperlipidemia  -     Lipid Panel with Direct LDL reflex; Future    Encounter for vaccination        No problem-specific Assessment & Plan notes found for this encounter  A/P: Doing ok and will check labs  Discussed vaccines and already had his flu vaccine  Discussed BMI and will give information on diet and exercise  Continue current treatment and RTC four months for routine  Keep f/u with his specialists  Subjective:      Patient ID: Mitzi Cintron is a 66 y o  male      WM RTC for f/u htn, hyperlipidemia, etc  Doing well and no new issues  Remains active w/o difficulty and no falls  No reflux  Headaches manageable  STAR is controlled  Due for labs and vaccines  The following portions of the patient's history were reviewed and updated as appropriate:   He has a past medical history of Abdominal pain, acute, generalized, Actinic keratosis, Chest pain, Chronic kidney disease, stage 3 ([facility] [address] ), Colon polyp, Disease of thyroid gland, Dysfunction of both eustachian tubes, Edema, Essential hypertension, Fatigue, Generalized anxiety disorder, GERD (gastroesophageal reflux disease), Hematuria, Hypertension, Hypo-osmolality and hyponatremia, Hypocalcemia, Hypoparathyroidism ([facility] [address] ), Hypothyroidism, Lightheadedness, Lump in neck, Malaise and fatigue, Nephropathy, Nocturia, Proteinuria, Shortness of breath, Skin lesion, and Tinea corporis  ,  does not have any pertinent problems on file  ,   has a past surgical history that includes Skin biopsy; Thyroid surgery; Colonoscopy w/ polypectomy (07/19/2019); Head & neck skin lesion excisional biopsy; and Tonsillectomy  ,  family history includes Diabetes in his father; Hypertension in his father and mother; Other in his brother; Stroke in his family  ,   reports that he has never smoked  He has never used smokeless tobacco  He reports current alcohol use  He reports that he does not use drugs  ,  is allergic to pollen extract     Current Outpatient Medications   Medication Sig Dispense Refill    aspirin (ECOTRIN LOW STRENGTH) 81 mg EC tablet Take 1 tablet (81 mg total) by mouth daily      calcitriol (ROCALTROL) 0 25 mcg capsule Take 1 capsule (0 25 mcg total) by mouth daily 90 capsule 1    Calcium Carbonate (CALCIUM 600 PO) Take by mouth Twice per day      Cholecalciferol (VITAMIN D3) 1000 units CAPS Take by mouth      lisinopril (ZESTRIL) 20 mg tablet Take 1 tablet by mouth once daily 90 tablet 0    metoprolol tartrate (LOPRESSOR) 25 mg tablet Take 1 tablet by mouth once daily 90 tablet 1    Multiple Vitamin (MULTI-VITAMIN DAILY PO) Take by mouth daily      Psyllium (Metamucil) 28 3 % POWD Take by mouth      tamsulosin (FLOMAX) 0 4 mg Take 1 capsule (0 4 mg total) by mouth daily with dinner 90 capsule 3     No current facility-administered medications for this visit  Review of Systems   Constitutional: Negative for activity change, chills, diaphoresis, fatigue and fever  HENT: Negative  Eyes: Negative for visual disturbance  Respiratory: Negative for cough, chest tightness, shortness of breath and wheezing  Cardiovascular: Negative for chest pain, palpitations and leg swelling  Gastrointestinal: Negative for abdominal pain, constipation, diarrhea, nausea and vomiting  Endocrine: Negative for cold intolerance and heat intolerance  Genitourinary: Negative for difficulty urinating, dysuria and frequency  Musculoskeletal: Negative for arthralgias, gait problem and myalgias  Neurological: Negative for dizziness, seizures, syncope, weakness, light-headedness and headaches  Psychiatric/Behavioral: Negative for confusion, dysphoric mood and sleep disturbance  The patient is not nervous/anxious  PHQ-2/9 Depression Screening    Little interest or pleasure in doing things: 0 - not at all  Feeling down, depressed, or hopeless: 0 - not at all  PHQ-2 Score: 0  PHQ-2 Interpretation: Negative depression screen        Objective:  Vitals:    01/05/22 0834   BP: 122/76   Pulse: 66   Temp: (!) 97 °F (36 1 °C)   SpO2: 98%   Weight: 92 5 kg (204 lb)   Height: 6' 2" (1 88 m)     Body mass index is 26 19 kg/m²  Physical Exam  Vitals and nursing note reviewed  Constitutional:       General: He is not in acute distress  Appearance: Normal appearance  He is not ill-appearing  HENT:      Head: Normocephalic and atraumatic  Mouth/Throat:      Mouth: Mucous membranes are moist    Eyes:      Extraocular Movements: Extraocular movements intact        Conjunctiva/sclera: Conjunctivae normal  Pupils: Pupils are equal, round, and reactive to light  Neck:      Vascular: No carotid bruit  Cardiovascular:      Rate and Rhythm: Normal rate and regular rhythm  Heart sounds: Normal heart sounds  Pulmonary:      Effort: Pulmonary effort is normal  No respiratory distress  Breath sounds: Normal breath sounds  No wheezing or rales  Abdominal:      General: Bowel sounds are normal  There is no distension  Palpations: Abdomen is soft  Tenderness: There is no abdominal tenderness  Musculoskeletal:      Cervical back: Neck supple  Right lower leg: No edema  Left lower leg: No edema  Neurological:      General: No focal deficit present  Mental Status: He is alert and oriented to person, place, and time  Mental status is at baseline  Psychiatric:         Mood and Affect: Mood normal          Behavior: Behavior normal          Thought Content: Thought content normal          Judgment: Judgment normal          BMI Counseling: Body mass index is 26 19 kg/m²  The BMI is above normal  Nutrition recommendations include reducing portion sizes, decreasing overall calorie intake, reducing intake of saturated fat and trans fat and reducing intake of cholesterol  Exercise recommendations include moderate aerobic physical activity for 150 minutes/week

## 2022-03-01 ENCOUNTER — APPOINTMENT (OUTPATIENT)
Dept: LAB | Facility: CLINIC | Age: 72
End: 2022-03-01
Payer: MEDICARE

## 2022-03-01 DIAGNOSIS — N18.31 STAGE 3A CHRONIC KIDNEY DISEASE (HCC): ICD-10-CM

## 2022-03-01 LAB
25(OH)D3 SERPL-MCNC: 60.8 NG/ML (ref 30–100)
ALBUMIN SERPL BCP-MCNC: 3.9 G/DL (ref 3.5–5)
ALP SERPL-CCNC: 68 U/L (ref 46–116)
ALT SERPL W P-5'-P-CCNC: 22 U/L (ref 12–78)
ANION GAP SERPL CALCULATED.3IONS-SCNC: 6 MMOL/L (ref 4–13)
AST SERPL W P-5'-P-CCNC: 21 U/L (ref 5–45)
BACTERIA UR QL AUTO: ABNORMAL /HPF
BILIRUB SERPL-MCNC: 0.6 MG/DL (ref 0.2–1)
BILIRUB UR QL STRIP: NEGATIVE
BUN SERPL-MCNC: 16 MG/DL (ref 5–25)
CALCIUM SERPL-MCNC: 9.2 MG/DL (ref 8.3–10.1)
CHLORIDE SERPL-SCNC: 101 MMOL/L (ref 100–108)
CLARITY UR: CLEAR
CO2 SERPL-SCNC: 28 MMOL/L (ref 21–32)
COLOR UR: YELLOW
CREAT SERPL-MCNC: 1.38 MG/DL (ref 0.6–1.3)
CREAT UR-MCNC: 129 MG/DL
GFR SERPL CREATININE-BSD FRML MDRD: 50 ML/MIN/1.73SQ M
GLUCOSE P FAST SERPL-MCNC: 100 MG/DL (ref 65–99)
GLUCOSE UR STRIP-MCNC: NEGATIVE MG/DL
HGB UR QL STRIP.AUTO: NEGATIVE
HYALINE CASTS #/AREA URNS LPF: ABNORMAL /LPF
KETONES UR STRIP-MCNC: NEGATIVE MG/DL
LEUKOCYTE ESTERASE UR QL STRIP: NEGATIVE
MAGNESIUM SERPL-MCNC: 2 MG/DL (ref 1.6–2.6)
MICROALBUMIN UR-MCNC: 151 MG/L (ref 0–20)
MICROALBUMIN/CREAT 24H UR: 117 MG/G CREATININE (ref 0–30)
MUCOUS THREADS UR QL AUTO: ABNORMAL
NITRITE UR QL STRIP: NEGATIVE
NON-SQ EPI CELLS URNS QL MICRO: ABNORMAL /HPF
PH UR STRIP.AUTO: 6 [PH]
PHOSPHATE SERPL-MCNC: 3.8 MG/DL (ref 2.3–4.1)
POTASSIUM SERPL-SCNC: 4.2 MMOL/L (ref 3.5–5.3)
PROT SERPL-MCNC: 8.4 G/DL (ref 6.4–8.2)
PROT UR STRIP-MCNC: ABNORMAL MG/DL
PTH-INTACT SERPL-MCNC: <6.3 PG/ML (ref 18.4–80.1)
RBC #/AREA URNS AUTO: ABNORMAL /HPF
SODIUM SERPL-SCNC: 135 MMOL/L (ref 136–145)
SP GR UR STRIP.AUTO: 1.02 (ref 1–1.03)
UROBILINOGEN UR STRIP-ACNC: <2 MG/DL
WBC #/AREA URNS AUTO: ABNORMAL /HPF

## 2022-03-01 PROCEDURE — 80053 COMPREHEN METABOLIC PANEL: CPT

## 2022-03-01 PROCEDURE — 83735 ASSAY OF MAGNESIUM: CPT

## 2022-03-01 PROCEDURE — 82043 UR ALBUMIN QUANTITATIVE: CPT

## 2022-03-01 PROCEDURE — 82570 ASSAY OF URINE CREATININE: CPT

## 2022-03-01 PROCEDURE — 83970 ASSAY OF PARATHORMONE: CPT

## 2022-03-01 PROCEDURE — 36415 COLL VENOUS BLD VENIPUNCTURE: CPT

## 2022-03-01 PROCEDURE — 84100 ASSAY OF PHOSPHORUS: CPT

## 2022-03-01 PROCEDURE — 81001 URINALYSIS AUTO W/SCOPE: CPT

## 2022-03-01 PROCEDURE — 82306 VITAMIN D 25 HYDROXY: CPT

## 2022-03-04 ENCOUNTER — TELEPHONE (OUTPATIENT)
Dept: NEPHROLOGY | Facility: CLINIC | Age: 72
End: 2022-03-04

## 2022-03-04 NOTE — TELEPHONE ENCOUNTER
Appointment Confirmation   Person confirmed appointment with  If not patient, name of the person Spouse    Date and time of appointment 3/7/2022 @ 9:30     Patient acknowledged and will be at appointment? yes    Did you advise the patient that they will need a urine sample if they are a new patient?  N/A    Did you advise the patient to bring their current medications for verification? (including any OTC) Yes    Additional Information

## 2022-03-07 ENCOUNTER — OFFICE VISIT (OUTPATIENT)
Dept: NEPHROLOGY | Facility: CLINIC | Age: 72
End: 2022-03-07
Payer: MEDICARE

## 2022-03-07 VITALS
OXYGEN SATURATION: 98 % | WEIGHT: 206 LBS | HEIGHT: 74 IN | BODY MASS INDEX: 26.44 KG/M2 | HEART RATE: 78 BPM | DIASTOLIC BLOOD PRESSURE: 74 MMHG | SYSTOLIC BLOOD PRESSURE: 122 MMHG

## 2022-03-07 DIAGNOSIS — E83.51 HYPOCALCEMIA: Primary | ICD-10-CM

## 2022-03-07 DIAGNOSIS — E20.9 HYPOPARATHYROIDISM, UNSPECIFIED HYPOPARATHYROIDISM TYPE (HCC): ICD-10-CM

## 2022-03-07 DIAGNOSIS — N18.31 STAGE 3A CHRONIC KIDNEY DISEASE (HCC): ICD-10-CM

## 2022-03-07 DIAGNOSIS — N28.1 RENAL CYST, LEFT: ICD-10-CM

## 2022-03-07 PROCEDURE — 99214 OFFICE O/P EST MOD 30 MIN: CPT | Performed by: PHYSICIAN ASSISTANT

## 2022-03-07 NOTE — ASSESSMENT & PLAN NOTE
Lab Results   Component Value Date    EGFR 50 03/01/2022    EGFR 53 01/05/2022    EGFR 56 09/28/2021    CREATININE 1 38 (H) 03/01/2022    CREATININE 1 33 (H) 01/05/2022    CREATININE 1 27 09/28/2021   Stable  Continue lisinopril 20 mg daily  Avoid nephrotoxins

## 2022-03-07 NOTE — PATIENT INSTRUCTIONS
Stop metoprolol   Bring log of home blood pressures to follow up  Please avoid NSAIDs (nonsteroidal anti-inflammatory drugs), such as Advil (ibuprofen), Aleve (naproxen), Naprosyn, BCs, Goody's powder  Tylenol (acetaminophen) is a safer option for the kidneys  Do not exceed the maximum dose of acetaminophen  Note that this may be in combination with other drugs NSAID medications  Please avoid herbal supplements, such as turmeric  Please consult your nephrologist before taking any over-the-counter medications  Low sodium diet, do not exceed 2 g (or 2,000 mg) of sodium daily  Avoid processed, canned, frozen foods unless "no salt added " Opt for fresh meats, produce  Utilize compression socks during the day, remove before bed    Watch fluid intake (this includes water, tea, coffee soda, soups etc )

## 2022-03-07 NOTE — PROGRESS NOTES
Assessment & Plan:    1  Hypocalcemia  Assessment & Plan:  See hypoparathyroidism      2  BMI 25 0-25 9,adult  Assessment & Plan:  Continue whole foods, low sodium diet  3  Stage 3a chronic kidney disease [facility])  Assessment & Plan:  Lab Results   Component Value Date    EGFR 50 03/01/2022    EGFR 53 01/05/2022    EGFR 56 09/28/2021    CREATININE 1 38 (H) 03/01/2022    CREATININE 1 33 (H) 01/05/2022    CREATININE 1 27 09/28/2021   Stable  Continue lisinopril 20 mg daily  Avoid nephrotoxins  Orders:  -     CBC and differential; Future; Expected date: 08/26/2022  -     Comprehensive metabolic panel; Future; Expected date: 08/26/2022  -     Magnesium; Future; Expected date: 08/26/2022  -     Microalbumin / creatinine urine ratio; Future; Expected date: 08/26/2022  -     Phosphorus; Future; Expected date: 08/26/2022  -     Protein / creatinine ratio, urine; Future; Expected date: 08/26/2022  -     PTH, intact; Future; Expected date: 08/26/2022  -     Urinalysis with microscopic; Future; Expected date: 08/26/2022  -     Vitamin D 25 hydroxy; Future; Expected date: 08/26/2022    4  Renal cyst, left  -     US kidney and bladder; Future; Expected date: 08/22/2022    5  Hypoparathyroidism, unspecified hypoparathyroidism type [facility])  Assessment & Plan:  Management per endocrinology  On Calcium, Vit D and calcitriol  The benefits, risks and alternatives to the treatment plan were discussed at this visit  Patient was advised of common adverse effects of any medical therapies prescribed  All questions were answered and discussed with the patient and any accompanying family members or caretakers  Subjective:      Patient ID: Dylon Cintron is a 66 y o  male seen in the [facility]  Patient is followed by Dr Brittney Bradshaw for management of stage 3 chronic kidney disease  HPI     Today, patient presents for routine follow up   Denies any changes to health, changes to medications, hospitalizations, falls, bones fractures or trips to the ER since last visit  Blood pressure is 122/74 HR 78  He reports home SBPs 109-120s over DBPs 60s to 70s  We discussed and reviewed the result of labs performed 03/01/2022, which reveal renal function is stable with a sCr 1 38 mg/dL and eGFR 50 mL/min  History is obtained from patient and wife  The following portions of the patient's history were reviewed and updated as appropriate: allergies, current medications, past family history, past medical history, past social history, past surgical history, and problem list     Review of Systems   Constitutional: Negative for activity change, chills, diaphoresis, fatigue and fever  HENT: Negative for mouth sores and trouble swallowing  Respiratory: Negative for apnea, cough, chest tightness, shortness of breath and wheezing  Cardiovascular: Negative for chest pain, palpitations and leg swelling  Gastrointestinal: Negative for abdominal distention, abdominal pain, blood in stool, constipation, diarrhea and nausea  Genitourinary: Negative for decreased urine volume, difficulty urinating, dysuria, enuresis, frequency, hematuria and urgency  Musculoskeletal: Negative for arthralgias, back pain and joint swelling  Skin: Negative for pallor, rash and wound  Neurological: Negative for dizziness, seizures, light-headedness, numbness and headaches  Hematological: Does not bruise/bleed easily  Psychiatric/Behavioral: Negative for agitation, behavioral problems and confusion  The patient is not nervous/anxious  Objective:      /74   Pulse 78   Ht 6' 2" (1 88 m)   Wt 93 4 kg (206 lb)   SpO2 98%   BMI 26 45 kg/m²          Physical Exam  Vitals and nursing note reviewed  Constitutional:       General: He is awake  He is not in acute distress  Appearance: Normal appearance  He is well-developed and normal weight  He is not ill-appearing, toxic-appearing or diaphoretic     HENT:      Head: Normocephalic and atraumatic  Jaw: There is normal jaw occlusion  Nose: Nose normal       Mouth/Throat:      Mouth: Mucous membranes are moist       Pharynx: Oropharynx is clear  No oropharyngeal exudate or posterior oropharyngeal erythema  Eyes:      General: Lids are normal  Vision grossly intact  Gaze aligned appropriately  No scleral icterus  Right eye: No discharge  Left eye: No discharge  Extraocular Movements: Extraocular movements intact  Conjunctiva/sclera: Conjunctivae normal       Pupils: Pupils are equal, round, and reactive to light  Neck:      Thyroid: No thyroid mass or thyromegaly  Trachea: Trachea and phonation normal    Cardiovascular:      Rate and Rhythm: Normal rate and regular rhythm  Heart sounds: Normal heart sounds, S1 normal and S2 normal  No murmur heard  No friction rub  No gallop  Pulmonary:      Effort: Pulmonary effort is normal  No respiratory distress  Breath sounds: Normal breath sounds  No stridor  No wheezing, rhonchi or rales  Abdominal:      General: Abdomen is flat  Bowel sounds are normal  There is no distension  Palpations: Abdomen is soft  There is no mass  Tenderness: There is no abdominal tenderness  There is no guarding  Hernia: No hernia is present  Musculoskeletal:         General: Normal range of motion  Cervical back: Normal range of motion and neck supple  No rigidity or tenderness  Right lower leg: No edema  Left lower leg: No edema  Lymphadenopathy:      Cervical: No cervical adenopathy  Skin:     General: Skin is warm and dry  Coloration: Skin is not jaundiced  Findings: No bruising  Nails: There is no clubbing  Neurological:      General: No focal deficit present  Mental Status: He is alert and oriented to person, place, and time  Mental status is at baseline     Psychiatric:         Attention and Perception: Attention and perception normal  Mood and Affect: Mood and affect normal          Speech: Speech normal          Behavior: Behavior normal  Behavior is cooperative  Thought Content:  Thought content normal          Judgment: Judgment normal              Lab Results   Component Value Date     12/15/2015    SODIUM 135 (L) 03/01/2022    K 4 2 03/01/2022     03/01/2022    CO2 28 03/01/2022    ANIONGAP 7 12/15/2015    AGAP 6 03/01/2022    BUN 16 03/01/2022    CREATININE 1 38 (H) 03/01/2022    GLUC 92 08/20/2019    GLUF 100 (H) 03/01/2022    CALCIUM 9 2 03/01/2022    AST 21 03/01/2022    ALT 22 03/01/2022    ALKPHOS 68 03/01/2022    PROT 7 8 09/16/2015    TP 8 4 (H) 03/01/2022    BILITOT 0 51 09/16/2015    TBILI 0 60 03/01/2022    EGFR 50 03/01/2022      Lab Results   Component Value Date    CREATININE 1 38 (H) 03/01/2022    CREATININE 1 33 (H) 01/05/2022    CREATININE 1 27 09/28/2021    CREATININE 1 26 04/27/2021    CREATININE 1 33 (H) 09/29/2020    CREATININE 1 36 (H) 05/28/2020    CREATININE 1 33 (H) 01/09/2020    CREATININE 1 32 (H) 11/25/2019    CREATININE 1 25 08/20/2019    CREATININE 1 21 07/18/2019    CREATININE 1 29 05/15/2019    CREATININE 1 26 12/17/2018    CREATININE 1 29 10/24/2018    CREATININE 1 26 07/20/2018    CREATININE 1 37 (H) 06/20/2018      Lab Results   Component Value Date    COLORU Yellow 03/01/2022    CLARITYU Clear 03/01/2022    SPECGRAV 1 019 03/01/2022    PHUR 6 0 03/01/2022    LEUKOCYTESUR Negative 03/01/2022    NITRITE Negative 03/01/2022    PROTEIN UA 30 (1+) (A) 03/01/2022    GLUCOSEU Negative 03/01/2022    KETONESU Negative 03/01/2022    UROBILINOGEN <2 0 03/01/2022    BILIRUBINUR Negative 03/01/2022    BLOODU Negative 03/01/2022    RBCUA None Seen 03/01/2022    WBCUA 1-2 03/01/2022    EPIS None Seen 03/01/2022    BACTERIA None Seen 03/01/2022      No results found for: LABPROT  No results found for: Monica Mcfadden  Lab Results   Component Value Date    WBC 6 30 09/28/2021    HGB 15 4 09/28/2021    HCT 46 1 09/28/2021    MCV 89 09/28/2021     09/28/2021      Lab Results   Component Value Date    HGB 15 4 09/28/2021    HGB 15 0 04/27/2021    HGB 15 8 09/29/2020    HGB 15 9 07/18/2019    HGB 15 7 02/19/2018      Lab Results   Component Value Date    FERRITIN 151 02/24/2017      No results found for: PTHCALCIUM, [id number], PHOSPHORUS   Lab Results   Component Value Date    CHOLESTEROL 223 (H) 01/05/2022    HDL 54 01/05/2022    LDLCALC 140 (H) 01/05/2022    TRIG 144 01/05/2022      Lab Results   Component Value Date    URICACID 5 9 09/29/2020      Lab Results   Component Value Date    HGBA1C 5 3 10/24/2018      Lab Results   Component Value Date    O7DJLKP 1 00 02/19/2018    FREET4 1 13 02/21/2020      No results found for: CRISS, DSDNAAB, RFIGM   Lab Results   Component Value Date    PROT 7 8 09/16/2015        Portions of the record may have been created with voice recognition software  Occasional wrong word or "sound a like" substitutions may have occurred due to the inherent limitations of voice recognition software  Read the chart carefully and recognize, using context, where substitutions have occurred  If you have any questions, please contact the dictating provider

## 2022-03-28 DIAGNOSIS — I10 ESSENTIAL HYPERTENSION: ICD-10-CM

## 2022-03-28 RX ORDER — LISINOPRIL 20 MG/1
20 TABLET ORAL DAILY
Qty: 90 TABLET | Refills: 1 | Status: SHIPPED | OUTPATIENT
Start: 2022-03-28 | End: 2022-06-23

## 2022-04-25 DIAGNOSIS — E83.51 HYPOCALCEMIA: ICD-10-CM

## 2022-04-25 RX ORDER — CALCITRIOL 0.25 UG/1
0.25 CAPSULE, LIQUID FILLED ORAL DAILY
Qty: 90 CAPSULE | Refills: 1 | Status: SHIPPED | OUTPATIENT
Start: 2022-04-25

## 2022-05-05 ENCOUNTER — OFFICE VISIT (OUTPATIENT)
Dept: INTERNAL MEDICINE CLINIC | Facility: CLINIC | Age: 72
End: 2022-05-05
Payer: MEDICARE

## 2022-05-05 ENCOUNTER — APPOINTMENT (OUTPATIENT)
Dept: LAB | Facility: CLINIC | Age: 72
End: 2022-05-05
Payer: MEDICARE

## 2022-05-05 VITALS
OXYGEN SATURATION: 95 % | SYSTOLIC BLOOD PRESSURE: 120 MMHG | BODY MASS INDEX: 26.21 KG/M2 | WEIGHT: 204.2 LBS | DIASTOLIC BLOOD PRESSURE: 80 MMHG | TEMPERATURE: 97.8 F | HEART RATE: 80 BPM | HEIGHT: 74 IN

## 2022-05-05 DIAGNOSIS — Z00.00 MEDICARE ANNUAL WELLNESS VISIT, SUBSEQUENT: ICD-10-CM

## 2022-05-05 DIAGNOSIS — K21.9 GERD WITHOUT ESOPHAGITIS: ICD-10-CM

## 2022-05-05 DIAGNOSIS — Z12.5 SCREENING FOR PROSTATE CANCER: ICD-10-CM

## 2022-05-05 DIAGNOSIS — G43.909 MIGRAINE WITHOUT STATUS MIGRAINOSUS, NOT INTRACTABLE, UNSPECIFIED MIGRAINE TYPE: ICD-10-CM

## 2022-05-05 DIAGNOSIS — F41.1 GENERALIZED ANXIETY DISORDER: ICD-10-CM

## 2022-05-05 DIAGNOSIS — I10 PRIMARY HYPERTENSION: Primary | ICD-10-CM

## 2022-05-05 DIAGNOSIS — E20.9 HYPOPARATHYROIDISM, UNSPECIFIED HYPOPARATHYROIDISM TYPE (HCC): ICD-10-CM

## 2022-05-05 DIAGNOSIS — E78.2 MIXED HYPERLIPIDEMIA: ICD-10-CM

## 2022-05-05 LAB — PSA SERPL-MCNC: 2.7 NG/ML (ref 0–4)

## 2022-05-05 PROCEDURE — 1123F ACP DISCUSS/DSCN MKR DOCD: CPT | Performed by: INTERNAL MEDICINE

## 2022-05-05 PROCEDURE — 99214 OFFICE O/P EST MOD 30 MIN: CPT | Performed by: INTERNAL MEDICINE

## 2022-05-05 PROCEDURE — G0439 PPPS, SUBSEQ VISIT: HCPCS | Performed by: INTERNAL MEDICINE

## 2022-05-05 PROCEDURE — G0103 PSA SCREENING: HCPCS

## 2022-05-05 PROCEDURE — 36415 COLL VENOUS BLD VENIPUNCTURE: CPT

## 2022-05-05 NOTE — PROGRESS NOTES
Assessment/Plan:  Problem List Items Addressed This Visit        Digestive    GERD without esophagitis       Endocrine    Hypoparathyroidism ([facility] [address] )       Cardiovascular and Mediastinum    Migraine, unspecified, not intractable, without status migrainosus    Hypertension - Primary       Other    Hyperlipidemia    Generalized anxiety disorder      Other Visit Diagnoses     Screening for prostate cancer        Relevant Orders    PSA, Total Screen    Medicare annual wellness visit, subsequent               Diagnoses and all orders for this visit:    Primary hypertension    Hypoparathyroidism, unspecified hypoparathyroidism type ([facility] [address] )    GERD without esophagitis    Migraine without status migrainosus, not intractable, unspecified migraine type    Mixed hyperlipidemia    Generalized anxiety disorder    Screening for prostate cancer  -     PSA, Total Screen; Future    Medicare annual wellness visit, subsequent    Other orders  -     Loratadine-Pseudoephedrine (ALAVERT ALLERGY/SINUS PO); Take 1 capsule by mouth if needed      No problem-specific Assessment & Plan notes found for this encounter  A/P: Doing well and labs are up to date except for PSA  Continue current treatment and RTC four months for routine  Subjective:      Patient ID: Aquiles Cintron is a 66 y o  male  WM RTC for f/u HTN, GERD, etc  Doing well and no new issues  Remains active w/o difficulty and no falls  No reflux  STAR is controlled  Due for labs         The following portions of the patient's history were reviewed and updated as appropriate:   He has a past medical history of Abdominal pain, acute, generalized, Actinic keratosis, Chest pain, Chronic kidney disease, stage 3 ([address] ), Colon polyp, Disease of thyroid gland, Dysfunction of both eustachian tubes, Edema, Essential hypertension, Fatigue, Generalized anxiety disorder, GERD (gastroesophageal reflux disease), Hematuria, Hypertension, Hypo-osmolality and hyponatremia, Hypocalcemia, Hypoparathyroidism ([facility] [address] ), Hypothyroidism, Lightheadedness, Lump in neck, Malaise and fatigue, Nephropathy, Nocturia, Proteinuria, Shortness of breath, Skin lesion, and Tinea corporis  ,  does not have any pertinent problems on file  ,   has a past surgical history that includes Skin biopsy; Thyroid surgery; Colonoscopy w/ polypectomy (07/19/2019); Head & neck skin lesion excisional biopsy; and Tonsillectomy  ,  family history includes Diabetes in his father; Hypertension in his father and mother; Other in his brother; Stroke in his family  ,   reports that he has never smoked  He has never used smokeless tobacco  He reports current alcohol use  He reports that he does not use drugs  ,  is allergic to pollen extract     Current Outpatient Medications   Medication Sig Dispense Refill    aspirin (ECOTRIN LOW STRENGTH) 81 mg EC tablet Take 1 tablet (81 mg total) by mouth daily      calcitriol (ROCALTROL) 0 25 mcg capsule Take 1 capsule (0 25 mcg total) by mouth daily 90 capsule 1    Calcium Carbonate (CALCIUM 600 PO) Take by mouth Twice per day      Cholecalciferol (VITAMIN D3) 1000 units CAPS Take 1 capsule by mouth in the morning        lisinopril (ZESTRIL) 20 mg tablet Take 1 tablet (20 mg total) by mouth daily 90 tablet 1    Loratadine-Pseudoephedrine (ALAVERT ALLERGY/SINUS PO) Take 1 capsule by mouth if needed      Multiple Vitamin (MULTI-VITAMIN DAILY PO) Take by mouth daily      Psyllium (Metamucil) 28 3 % POWD Take 1 Package by mouth in the morning        tamsulosin (FLOMAX) 0 4 mg Take 1 capsule (0 4 mg total) by mouth daily with dinner 90 capsule 3     No current facility-administered medications for this visit  Review of Systems   Constitutional: Negative for activity change, chills, diaphoresis, fatigue and fever  HENT: Negative  Eyes: Negative for visual disturbance  Respiratory: Negative for cough, chest tightness, shortness of breath and wheezing      Cardiovascular: Negative for chest pain, palpitations and leg swelling  Gastrointestinal: Negative for abdominal pain, constipation, diarrhea, nausea and vomiting  Endocrine: Negative for cold intolerance and heat intolerance  Genitourinary: Negative for difficulty urinating, dysuria and frequency  Musculoskeletal: Negative for arthralgias, gait problem and myalgias  Neurological: Negative for dizziness, seizures, syncope, weakness, light-headedness and headaches  Psychiatric/Behavioral: Negative for confusion, dysphoric mood and sleep disturbance  The patient is not nervous/anxious  PHQ-2/9 Depression Screening    Little interest or pleasure in doing things: 0 - not at all  Feeling down, depressed, or hopeless: 0 - not at all  PHQ-2 Score: 0  PHQ-2 Interpretation: Negative depression screen        Objective:  Vitals:    05/05/22 0908   BP: 120/80   Pulse: 80   Temp: 97 8 °F (36 6 °C)   TempSrc: Tympanic   SpO2: 95%   Weight: 92 6 kg (204 lb 3 2 oz)   Height: 6' 2" (1 88 m)     Body mass index is 26 22 kg/m²  Physical Exam  Vitals and nursing note reviewed  Constitutional:       General: He is not in acute distress  Appearance: Normal appearance  He is not ill-appearing  HENT:      Head: Normocephalic and atraumatic  Mouth/Throat:      Mouth: Mucous membranes are moist    Eyes:      Extraocular Movements: Extraocular movements intact  Conjunctiva/sclera: Conjunctivae normal       Pupils: Pupils are equal, round, and reactive to light  Neck:      Vascular: No carotid bruit  Cardiovascular:      Rate and Rhythm: Normal rate and regular rhythm  Heart sounds: Normal heart sounds  Pulmonary:      Effort: Pulmonary effort is normal  No respiratory distress  Breath sounds: Normal breath sounds  No wheezing or rales  Abdominal:      General: Bowel sounds are normal  There is no distension  Palpations: Abdomen is soft  Tenderness: There is no abdominal tenderness     Musculoskeletal: Cervical back: Neck supple  Right lower leg: No edema  Left lower leg: No edema  Neurological:      General: No focal deficit present  Mental Status: He is alert and oriented to person, place, and time  Mental status is at baseline  Psychiatric:         Mood and Affect: Mood normal          Behavior: Behavior normal          Thought Content:  Thought content normal          Judgment: Judgment normal

## 2022-05-05 NOTE — PROGRESS NOTES
Assessment and Plan:     Problem List Items Addressed This Visit        Digestive    GERD without esophagitis       Endocrine    Hypoparathyroidism ([facility] )       Cardiovascular and Mediastinum    Migraine, unspecified, not intractable, without status migrainosus    Hypertension - Primary       Other    Hyperlipidemia    Generalized anxiety disorder      Other Visit Diagnoses     Screening for prostate cancer        Relevant Orders    PSA, Total Screen    Medicare annual wellness visit, subsequent               Preventive health issues were discussed with patient, and age appropriate screening tests were ordered as noted in patient's After Visit Summary  Personalized health advice and appropriate referrals for health education or preventive services given if needed, as noted in patient's After Visit Summary       History of Present Illness:     Patient presents for Medicare Annual Wellness visit    Patient Care Team:  Jody Eduardo DO as PCP - General (Internal Medicine)  Curly Sheridan MD as PCP - Endocrinology (Endocrinology)  Curly Sheridan MD     Problem List:     Patient Active Problem List   Diagnosis    Allergic rhinitis    Asymptomatic proteinuria    Basal cell tumor    Benign colon polyp    Generalized anxiety disorder    GERD without esophagitis    Hyperlipidemia    Hypertension    Hypocalcemia    Hypoparathyroidism ([facility] )    Migraine, unspecified, not intractable, without status migrainosus    Multiple thyroid nodules    Palpitations    Microalbuminuria    BMI 25 0-25 9,adult    Stage 3 chronic kidney disease ([facility] )    Benign prostatic hyperplasia without lower urinary tract symptoms      Past Medical and Surgical History:     Past Medical History:   Diagnosis Date    Abdominal pain, acute, generalized     last assessed - [id number]    Actinic keratosis     last assessed - [id number]    Chest pain     last assessed - [id number]    Chronic kidney disease, stage 3 ([facility] )     Colon polyp     Disease of thyroid gland     Dysfunction of both eustachian tubes     suspect due to ETD  Recommend otc allergy meds and if fails then add flonase; last assessed - 06Aug2012    Edema     Essential hypertension     Fatigue     last assessed - [id number]    Generalized anxiety disorder     GERD (gastroesophageal reflux disease)     Hematuria     last assessed - [id number]    Hypertension     Hypo-osmolality and hyponatremia     Hypocalcemia     Hypoparathyroidism ([address] )     Hypothyroidism     Lightheadedness     last assessed - [id number]    Lump in neck     ? cause  Maybe a localized allergic reaction, dental issue, doubt sialdenitis, ect  Empiric abx and one dose steroids  Continue benadryl  If worsens, to er or ent      Malaise and fatigue     last assessed - [id number]    Nephropathy     last assessed - [id number]    Nocturia     Proteinuria     Shortness of breath     last assessed - [id number]    Skin lesion     Resolved - [id number]    Tinea corporis     last assessed - [id number]     Past Surgical History:   Procedure Laterality Date    COLONOSCOPY W/ POLYPECTOMY  07/19/2019    HEAD & NECK SKIN LESION EXCISIONAL BIOPSY      excision of lesion scalp malignant - [facility]; last assessed - [id number]    SKIN BIOPSY      last assessed - 09Sep2014   [facility] THYROID SURGERY      Biopsy thyroid using percutaneous core needle; last assessed - 09Sep2014    TONSILLECTOMY        Family History:     Family History   Problem Relation Age of Onset    Hypertension Mother     Hypertension Father     Diabetes Father     Other Brother         Schwannomatosis    Stroke Family       Social History:     Social History     Socioeconomic History    Marital status: /Civil Union     Spouse name: None    Number of children: None    Years of education: None    Highest education level: None   Occupational History    Occupation: Retired     Comment:     Tobacco Use    Smoking status: Never Smoker    Smokeless tobacco: Never Used   Vaping Use    Vaping Use: Never used   Substance and Sexual Activity    Alcohol use: Yes     Comment: rarely    Drug use: No    Sexual activity: None   Other Topics Concern    None   Social History Narrative    Consumes on average 1 cup of regular coffee per day      Social Determinants of Health     Financial Resource Strain: Not on file   Food Insecurity: Not on file   Transportation Needs: Not on file   Physical Activity: Not on file   Stress: Not on file   Social Connections: Not on file   Intimate Partner Violence: Not on file   Housing Stability: Not on file      Medications and Allergies:     Current Outpatient Medications   Medication Sig Dispense Refill    aspirin (ECOTRIN LOW STRENGTH) 81 mg EC tablet Take 1 tablet (81 mg total) by mouth daily      calcitriol (ROCALTROL) 0 25 mcg capsule Take 1 capsule (0 25 mcg total) by mouth daily 90 capsule 1    Calcium Carbonate (CALCIUM 600 PO) Take by mouth Twice per day      Cholecalciferol (VITAMIN D3) 1000 units CAPS Take 1 capsule by mouth in the morning        lisinopril (ZESTRIL) 20 mg tablet Take 1 tablet (20 mg total) by mouth daily 90 tablet 1    Loratadine-Pseudoephedrine (ALAVERT ALLERGY/SINUS PO) Take 1 capsule by mouth if needed      Multiple Vitamin (MULTI-VITAMIN DAILY PO) Take by mouth daily      Psyllium (Metamucil) 28 3 % POWD Take 1 Package by mouth in the morning        tamsulosin (FLOMAX) 0 4 mg Take 1 capsule (0 4 mg total) by mouth daily with dinner 90 capsule 3     No current facility-administered medications for this visit       Allergies   Allergen Reactions    Pollen Extract Sneezing      Immunizations:     Immunization History   Administered Date(s) Administered    COVID-19 MODERNA VACC 0 5 ML IM 04/09/2021, 05/10/2021    INFLUENZA 10/24/2018    Influenza Split High Dose Preservative Free IM 10/24/2018    Influenza, high dose seasonal 0 7 mL 10/24/2019, 09/29/2020, 10/12/2021    Pneumococcal Conjugate 13-Valent 05/31/2017    Pneumococcal Polysaccharide PPV23 07/08/2014    Td (adult), adsorbed 09/08/1999    Tdap 01/01/2009, 06/19/2020    Zoster 04/16/2012      Health Maintenance:         Topic Date Due    Colorectal Cancer Screening  07/19/2029    Hepatitis C Screening  Completed         Topic Date Due    COVID-19 Vaccine (3 - Booster for Moderna series) 10/10/2021      Medicare Health Risk Assessment:     /80   Pulse 80   Temp 97 8 °F (36 6 °C) (Tympanic)   Ht 6' 2" (1 88 m)   Wt 92 6 kg (204 lb 3 2 oz)   SpO2 95%   BMI 26 22 kg/m²      Marques Whiting is here for his Subsequent Wellness visit  Last Medicare Wellness visit information reviewed, patient interviewed and updates made to the record today  Health Risk Assessment:   Patient rates overall health as very good  Patient feels that their physical health rating is same  Patient is very satisfied with their life  Eyesight was rated as same  Hearing was rated as same  Patient feels that their emotional and mental health rating is same  Patients states they are never, rarely angry  Patient states they are sometimes unusually tired/fatigued  Pain experienced in the last 7 days has been none  Patient states that he has experienced no weight loss or gain in last 6 months  Depression Screening:   PHQ-2 Score: 0      Fall Risk Screening: In the past year, patient has experienced: no history of falling in past year      Home Safety:  Patient does not have trouble with stairs inside or outside of their home  Patient has working smoke alarms and has working carbon monoxide detector  Home safety hazards include: none  Nutrition:   Current diet is Regular  Medications:   Patient is currently taking over-the-counter supplements  OTC medications include: see medication list  Patient is able to manage medications       Activities of Daily Living (ADLs)/Instrumental Activities of Daily Living (IADLs):   Walk and transfer into and out of bed and chair?: Yes  Dress and groom yourself?: Yes    Bathe or shower yourself?: Yes    Feed yourself? Yes  Do your laundry/housekeeping?: Yes  Manage your money, pay your bills and track your expenses?: Yes  Make your own meals?: Yes    Do your own shopping?: Yes    Previous Hospitalizations:   Any hospitalizations or ED visits within the last 12 months?: No      Advance Care Planning:   Living will: No    Durable POA for healthcare: No    Advanced directive: No    Advanced directive counseling given: Yes    Five wishes given: No    Patient declined ACP directive: No    End of Life Decisions reviewed with patient: Yes    Provider agrees with end of life decisions: Yes      Comments: Reminded to review the five wishes he has at home and will review further once this is done  Cognitive Screening:   Provider or family/friend/caregiver concerned regarding cognition?: No    PREVENTIVE SCREENINGS      Cardiovascular Screening:    General: Screening Not Indicated, History Lipid Disorder and Screening Current      Diabetes Screening:     General: Screening Current      Colorectal Cancer Screening:     General: Screening Current      Prostate Cancer Screening:      Due for: PSA      Osteoporosis Screening:    General: Screening Not Indicated      Abdominal Aortic Aneurysm (AAA) Screening:    Risk factors include: age between 73-67 yo        General: Screening Not Indicated      Lung Cancer Screening:     General: Screening Not Indicated      Hepatitis C Screening:    General: Screening Current    Screening, Brief Intervention, and Referral to Treatment (SBIRT)    Screening  Typical number of drinks in a day: 0  Typical number of drinks in a week: 0  Interpretation: Low risk drinking behavior      Single Item Drug Screening:  How often have you used an illegal drug (including marijuana) or a prescription medication for non-medical reasons in the past year? never    Single Item Drug Screen Score: 0  Interpretation: Negative screen for possible drug use disorder    Other Counseling Topics:   Car/seat belt/driving safety, sunscreen and calcium and vitamin D intake and regular weightbearing exercise  A/P: Doing well and no falls  Denies depression and feels safe at home  Diverse diet  No problems operating a MV and uses seat belts  No living will or POA  Information give for pt to review  No DME or referrals needed today  RTC in one year for medicare wellness       Valerie Cabrera DO

## 2022-05-05 NOTE — PATIENT INSTRUCTIONS
Chronic Hypertension   AMBULATORY CARE:   Hypertension  is high blood pressure  Your blood pressure is the force of your blood moving against the walls of your arteries  Hypertension causes your blood pressure to get so high that your heart has to work much harder than normal  This can damage your heart  Even if you have hypertension for years, lifestyle changes, medicines, or both can help bring your blood pressure to normal   Call your local emergency number (911 in the [address] [facility]) or have someone call if:   · You have chest pain  · You have any of the following signs of a heart attack:      ? Squeezing, pressure, or pain in your chest    ? You may  also have any of the following:     § Discomfort or pain in your back, neck, jaw, stomach, or arm    § Shortness of breath    § Nausea or vomiting    § Lightheadedness or a sudden cold sweat    · You become confused or have difficulty speaking  · You suddenly feel lightheaded or have trouble breathing  Seek care immediately if:   · You have a severe headache or vision loss  · You have weakness in an arm or leg  Call your doctor or cardiologist if:   · You feel faint, dizzy, confused, or drowsy  · You have been taking your blood pressure medicine but your pressure is higher than your provider says it should be  · You have questions or concerns about your condition or care  Treatment for chronic hypertension  may include medicine to lower your blood pressure and cholesterol levels  A low cholesterol level helps prevent heart disease and makes it easier to control your blood pressure  Heart disease can make your blood pressure harder to control  You may also need to make lifestyle changes  What you need to know about the stages of hypertension:       · Normal blood pressure is 119/79 or lower   Your healthcare provider may only check your blood pressure each year if it stays at a normal level  · Elevated blood pressure is 120/79 to 129/79    This is sometimes called prehypertension  Your healthcare provider may suggest lifestyle changes to help lower your blood pressure to a normal level  He or she may then check it again in 3 to 6 months  · Stage 1 hypertension is 130/80  to 139/89   Your provider may recommend lifestyle changes, medication, and checks every 3 to 6 months until your blood pressure is controlled  · Stage 2 hypertension is 140/90 or higher   Your provider will recommend lifestyle changes and have you take 2 kinds of hypertension medicines  You will also need to have your blood pressure checked monthly until it is controlled  Manage chronic hypertension:   · Check your blood pressure at home  Avoid smoking, caffeine, and exercise at least 30 minutes before checking your blood pressure  Sit and rest for 5 minutes before you take your blood pressure  Extend your arm and support it on a flat surface  Your arm should be at the same level as your heart  Follow the directions that came with your blood pressure monitor  Check your blood pressure 2 times, 1 minute apart, before you take your medicine in the morning  Also check your blood pressure before your evening meal  Keep a record of your readings and bring it to your follow-up visits  Ask your healthcare provider what your blood pressure should be  · Manage any other health conditions you have  Health conditions such as diabetes can increase your risk for hypertension  Follow your healthcare provider's instructions and take all your medicines as directed  Talk to your healthcare provider about any new health conditions you have recently developed  · Ask about all medicines  Certain medicines can increase your blood pressure  Examples include oral birth control pills, decongestants, herbal supplements, and NSAIDs, such as ibuprofen  Your healthcare provider can tell you which medicines are safe for you to take   This includes prescription and over-the-counter medicines  Lifestyle changes you can make to lower your blood pressure: Your provider may want you to make more lifestyle changes if you are having trouble controlling your blood pressure  This may feel difficult over time, especially if you think you are making good changes but your pressure is still high  It might help to focus on one new change at a time  For example, try to add 1 more day of exercise, or exercise for an extra 10 minutes on 2 days  Small changes can make a big difference  Your healthcare provider can also refer you to specialists such as a dietitian who can help you make small changes  Your family members may be included in helping you learn to create lifestyle changes, such as the following:     · Limit sodium (salt) as directed  Too much sodium can affect your fluid balance  Check labels to find low-sodium or no-salt-added foods  Some low-sodium foods use potassium salts for flavor  Too much potassium can also cause health problems  Your healthcare provider will tell you how much sodium and potassium are safe for you to have in a day  He or she may recommend that you limit sodium to 2,300 mg a day  · Follow the meal plan recommended by your healthcare provider  A dietitian or your provider can give you more information on low-sodium plans or the DASH (Dietary Approaches to Stop Hypertension) eating plan  The DASH plan is low in sodium, processed sugar, unhealthy fats, and total fat  It is high in potassium, calcium, and fiber  These can be found in vegetables, fruit, and whole-grain foods  · Be physically active throughout the day  Physical activity, such as exercise, can help control your blood pressure and your weight  Be physically active for at least 30 minutes per day, on most days of the week  Include aerobic activity, such as walking or riding a bicycle  Also include strength training at least 2 times each week   Your healthcare providers can help you create a physical activity plan  · Decrease stress  This may help lower your blood pressure  Learn ways to relax, such as deep breathing or listening to music  · Limit alcohol as directed  Alcohol can increase your blood pressure  A drink of alcohol is 12 ounces of beer, 5 ounces of wine, or 1½ ounces of liquor  · Do not smoke  Nicotine and other chemicals in cigarettes and cigars can increase your blood pressure and also cause lung damage  Ask your healthcare provider for information if you currently smoke and need help to quit  E-cigarettes or smokeless tobacco still contain nicotine  Talk to your healthcare provider before you use these products  Follow up with your doctor or cardiologist as directed: You will need to return to have your blood pressure checked and to have other lab tests done  Write down your questions so you remember to ask them during your visits  © Copyright [employer] 2022 Information is for End User's use only and may not be sold, redistributed or otherwise used for commercial purposes  All illustrations and images included in CareNotes® are the copyrighted property of [employer] M [employer]  or [address] [place], [place] [address]   The above information is an  only  It is not intended as medical advice for individual conditions or treatments  Talk to your doctor, nurse or pharmacist before following any medical regimen to see if it is safe and effective for you  Medicare Preventive Visit Patient Instructions  Thank you for completing your Welcome to Medicare Visit or Medicare Annual Wellness Visit today  Your next wellness visit will be due in one year (5/6/2023)  The screening/preventive services that you may require over the next 5-10 years are detailed below  Some tests may not apply to you based off risk factors and/or age  Screening tests ordered at today's visit but not completed yet may show as past due   Also, please note that scanned in results may not display below   Preventive Screenings:  Service Recommendations Previous Testing/Comments   Colorectal Cancer Screening  · Colonoscopy    · Fecal Occult Blood Test (FOBT)/Fecal Immunochemical Test (FIT)  · Fecal DNA/Cologuard Test  · Flexible Sigmoidoscopy Age: 54-65 years old   Colonoscopy: every 10 years (May be performed more frequently if at higher risk)  OR  FOBT/FIT: every 1 year  OR  Cologuard: every 3 years  OR  Sigmoidoscopy: every 5 years  Screening may be recommended earlier than age 48 if at higher risk for colorectal cancer  Also, an individualized decision between you and your healthcare provider will decide whether screening between the ages of 74-80 would be appropriate  Colonoscopy: 07/19/2019  FOBT/FIT: Not on file  Cologuard: Not on file  Sigmoidoscopy: Not on file    Screening Current     Prostate Cancer Screening Individualized decision between patient and health care provider in men between ages of 53-78   Medicare will cover every 12 months beginning on the day after your 50th birthday PSA: 3 0 ng/mL           Hepatitis C Screening Once for adults born between 1945 and 1965  More frequently in patients at high risk for Hepatitis C Hep C Antibody: 10/05/2017    Screening Current   Diabetes Screening 1-2 times per year if you're at risk for diabetes or have pre-diabetes Fasting glucose: 100 mg/dL   A1C: 5 3 %    Screening Current   Cholesterol Screening Once every 5 years if you don't have a lipid disorder  May order more often based on risk factors  Lipid panel: 01/05/2022    Screening Not Indicated  History Lipid Disorder      Other Preventive Screenings Covered by Medicare:  1  Abdominal Aortic Aneurysm (AAA) Screening: covered once if your at risk  You're considered to be at risk if you have a family history of AAA or a male between the age of 73-68 who smoking at least 100 cigarettes in your lifetime    2  Lung Cancer Screening: covers low dose CT scan once per year if you meet all of the following conditions: (1) Age 50-69; (2) No signs or symptoms of lung cancer; (3) Current smoker or have quit smoking within the last 15 years; (4) You have a tobacco smoking history of at least 30 pack years (packs per day x number of years you smoked); (5) You get a written order from a healthcare provider  3  Glaucoma Screening: covered annually if you're considered high risk: (1) You have diabetes OR (2) Family history of glaucoma OR (3)  aged 48 and older OR (3)  American aged 72 and older  3  Osteoporosis Screening: covered every 2 years if you meet one of the following conditions: (1) Have a vertebral abnormality; (2) On glucocorticoid therapy for more than 3 months; (3) Have primary hyperparathyroidism; (4) On osteoporosis medications and need to assess response to drug therapy  5  HIV Screening: covered annually if you're between the age of 12-76  Also covered annually if you are younger than 13 and older than 72 with risk factors for HIV infection  For pregnant patients, it is covered up to 3 times per pregnancy  Immunizations:  Immunization Recommendations   Influenza Vaccine Annual influenza vaccination during flu season is recommended for all persons aged >= 6 months who do not have contraindications   Pneumococcal Vaccine (Prevnar and Pneumovax)  * Prevnar = PCV13  * Pneumovax = PPSV23 Adults 25-60 years old: 1-3 doses may be recommended based on certain risk factors  Adults 72 years old: Prevnar (PCV13) vaccine recommended followed by Pneumovax (PPSV23) vaccine  If already received PPSV23 since turning 65, then PCV13 recommended at least one year after PPSV23 dose  Hepatitis B Vaccine 3 dose series if at intermediate or high risk (ex: diabetes, end stage renal disease, liver disease)   Tetanus (Td) Vaccine - COST NOT COVERED BY MEDICARE PART B Following completion of primary series, a booster dose should be given every 10 years to maintain immunity against tetanus   Td may also be given as tetanus wound prophylaxis  Tdap Vaccine - COST NOT COVERED BY MEDICARE PART B Recommended at least once for all adults  For pregnant patients, recommended with each pregnancy  Shingles Vaccine (Shingrix) - COST NOT COVERED BY MEDICARE PART B  2 shot series recommended in those aged 48 and above     Health Maintenance Due:      Topic Date Due    Colorectal Cancer Screening  07/19/2029    Hepatitis C Screening  Completed     Immunizations Due:      Topic Date Due    COVID-19 Vaccine (3 - Booster for Moderna series) 10/10/2021     Advance Directives   What are advance directives? Advance directives are legal documents that state your wishes and plans for medical care  These plans are made ahead of time in case you lose your ability to make decisions for yourself  Advance directives can apply to any medical decision, such as the treatments you want, and if you want to donate organs  What are the types of advance directives? There are many types of advance directives, and each state has rules about how to use them  You may choose a combination of any of the following:  · Living will: This is a written record of the treatment you want  You can also choose which treatments you do not want, which to limit, and which to stop at a certain time  This includes surgery, medicine, IV fluid, and tube feedings  · Durable power of  for healthcare [facility]): This is a written record that states who you want to make healthcare choices for you when you are unable to make them for yourself  This person, called a proxy, is usually a family member or a friend  You may choose more than 1 proxy  · Do not resuscitate (DNR) order:  A DNR order is used in case your heart stops beating or you stop breathing  It is a request not to have certain forms of treatment, such as CPR  A DNR order may be included in other types of advance directives  · Medical directive:   This covers the care that you want if you are in a coma, near death, or unable to make decisions for yourself  You can list the treatments you want for each condition  Treatment may include pain medicine, surgery, blood transfusions, dialysis, IV or tube feedings, and a ventilator (breathing machine)  · Values history: This document has questions about your views, beliefs, and how you feel and think about life  This information can help others choose the care that you would choose  Why are advance directives important? An advance directive helps you control your care  Although spoken wishes may be used, it is better to have your wishes written down  Spoken wishes can be misunderstood, or not followed  Treatments may be given even if you do not want them  An advance directive may make it easier for your family to make difficult choices about your care  Weight Management   Why it is important to manage your weight:  Being overweight increases your risk of health conditions such as heart disease, high blood pressure, type 2 diabetes, and certain types of cancer  It can also increase your risk for osteoarthritis, sleep apnea, and other respiratory problems  Aim for a slow, steady weight loss  Even a small amount of weight loss can lower your risk of health problems  How to lose weight safely:  A safe and healthy way to lose weight is to eat fewer calories and get regular exercise  You can lose up about 1 pound a week by decreasing the number of calories you eat by 500 calories each day  Healthy meal plan for weight management:  A healthy meal plan includes a variety of foods, contains fewer calories, and helps you stay healthy  A healthy meal plan includes the following:  · Eat whole-grain foods more often  A healthy meal plan should contain fiber  Fiber is the part of grains, fruits, and vegetables that is not broken down by your body  Whole-grain foods are healthy and provide extra fiber in your diet   Some examples of whole-grain foods are whole-wheat breads and pastas, oatmeal, brown rice, and bulgur  · Eat a variety of vegetables every day  Include dark, leafy greens such as spinach, kale, israel greens, and mustard greens  Eat yellow and orange vegetables such as carrots, sweet potatoes, and winter squash  · Eat a variety of fruits every day  Choose fresh or canned fruit (canned in its own juice or light syrup) instead of juice  Fruit juice has very little or no fiber  · Eat low-fat dairy foods  Drink fat-free (skim) milk or 1% milk  Eat fat-free yogurt and low-fat cottage cheese  Try low-fat cheeses such as mozzarella and other reduced-fat cheeses  · Choose meat and other protein foods that are low in fat  Choose beans or other legumes such as split peas or lentils  Choose fish, skinless poultry (chicken or turkey), or lean cuts of red meat (beef or pork)  Before you cook meat or poultry, cut off any visible fat  · Use less fat and oil  Try baking foods instead of frying them  Add less fat, such as margarine, sour cream, regular salad dressing and mayonnaise to foods  Eat fewer high-fat foods  Some examples of high-fat foods include french fries, doughnuts, ice cream, and cakes  · Eat fewer sweets  Limit foods and drinks that are high in sugar  This includes candy, cookies, regular soda, and sweetened drinks  Exercise:  Exercise at least 30 minutes per day on most days of the week  Some examples of exercise include walking, biking, dancing, and swimming  You can also fit in more physical activity by taking the stairs instead of the elevator or parking farther away from stores  Ask your healthcare provider about the best exercise plan for you  © Copyright [employer] 2018 Information is for End User's use only and may not be sold, redistributed or otherwise used for commercial purposes   All illustrations and images included in CareNotes® are the copyrighted property of [employer]  or [address] [place] [address]

## 2022-06-07 DIAGNOSIS — N40.1 BENIGN PROSTATIC HYPERPLASIA WITH URINARY FREQUENCY: ICD-10-CM

## 2022-06-07 DIAGNOSIS — R35.0 BENIGN PROSTATIC HYPERPLASIA WITH URINARY FREQUENCY: ICD-10-CM

## 2022-06-07 RX ORDER — TAMSULOSIN HYDROCHLORIDE 0.4 MG/1
0.4 CAPSULE ORAL
Qty: 90 CAPSULE | Refills: 3 | Status: SHIPPED | OUTPATIENT
Start: 2022-06-07

## 2022-06-09 ENCOUNTER — HOSPITAL ENCOUNTER (OUTPATIENT)
Dept: ULTRASOUND IMAGING | Facility: HOSPITAL | Age: 72
Discharge: HOME | End: 2022-06-09
Payer: MEDICARE

## 2022-06-09 DIAGNOSIS — E04.2 MULTIPLE THYROID NODULES: ICD-10-CM

## 2022-06-09 PROCEDURE — 76536 US EXAM OF HEAD AND NECK: CPT

## 2022-06-21 ENCOUNTER — OFFICE VISIT (OUTPATIENT)
Dept: INTERNAL MEDICINE CLINIC | Facility: CLINIC | Age: 72
End: 2022-06-21
Payer: MEDICARE

## 2022-06-21 VITALS
SYSTOLIC BLOOD PRESSURE: 132 MMHG | RESPIRATION RATE: 16 BRPM | OXYGEN SATURATION: 97 % | HEIGHT: 74 IN | TEMPERATURE: 99.7 F | BODY MASS INDEX: 25.33 KG/M2 | HEART RATE: 104 BPM | DIASTOLIC BLOOD PRESSURE: 84 MMHG | WEIGHT: 197.4 LBS

## 2022-06-21 DIAGNOSIS — H10.32 ACUTE BACTERIAL CONJUNCTIVITIS OF LEFT EYE: ICD-10-CM

## 2022-06-21 DIAGNOSIS — J01.10 ACUTE NON-RECURRENT FRONTAL SINUSITIS: Primary | ICD-10-CM

## 2022-06-21 PROCEDURE — 99213 OFFICE O/P EST LOW 20 MIN: CPT | Performed by: INTERNAL MEDICINE

## 2022-06-21 RX ORDER — POLYMYXIN B SULFATE AND TRIMETHOPRIM 1; 10000 MG/ML; [USP'U]/ML
1 SOLUTION OPHTHALMIC EVERY 4 HOURS
Qty: 10 ML | Refills: 0 | Status: SHIPPED | OUTPATIENT
Start: 2022-06-21

## 2022-06-21 RX ORDER — AMOXICILLIN AND CLAVULANATE POTASSIUM 875; 125 MG/1; MG/1
1 TABLET, FILM COATED ORAL EVERY 12 HOURS SCHEDULED
Qty: 14 TABLET | Refills: 0 | Status: SHIPPED | OUTPATIENT
Start: 2022-06-21 | End: 2022-06-23

## 2022-06-22 ENCOUNTER — TELEPHONE (OUTPATIENT)
Dept: INTERNAL MEDICINE CLINIC | Facility: CLINIC | Age: 72
End: 2022-06-22

## 2022-06-22 ENCOUNTER — HOSPITAL ENCOUNTER (INPATIENT)
Facility: HOSPITAL | Age: 72
LOS: 1 days | Discharge: HOME | DRG: 871 | End: 2022-06-23
Attending: EMERGENCY MEDICINE | Admitting: INTERNAL MEDICINE
Payer: MEDICARE

## 2022-06-22 ENCOUNTER — APPOINTMENT (OUTPATIENT)
Dept: CT IMAGING | Facility: HOSPITAL | Age: 72
DRG: 871 | End: 2022-06-22
Payer: MEDICARE

## 2022-06-22 ENCOUNTER — APPOINTMENT (OUTPATIENT)
Dept: RADIOLOGY | Facility: HOSPITAL | Age: 72
DRG: 871 | End: 2022-06-22
Payer: MEDICARE

## 2022-06-22 DIAGNOSIS — T78.3XXA ANGIOEDEMA, INITIAL ENCOUNTER: Primary | ICD-10-CM

## 2022-06-22 DIAGNOSIS — R22.0 TONGUE SWELLING: ICD-10-CM

## 2022-06-22 DIAGNOSIS — I10 PRIMARY HYPERTENSION: ICD-10-CM

## 2022-06-22 PROBLEM — A41.9 SEPSIS (HCC): Status: ACTIVE | Noted: 2022-06-22

## 2022-06-22 PROBLEM — R06.89 ACUTE RESPIRATORY INSUFFICIENCY: Status: ACTIVE | Noted: 2022-06-22

## 2022-06-22 PROBLEM — R79.89 ELEVATED D-DIMER: Status: ACTIVE | Noted: 2022-06-22

## 2022-06-22 PROBLEM — J06.9 URI (UPPER RESPIRATORY INFECTION): Status: ACTIVE | Noted: 2022-06-22

## 2022-06-22 PROBLEM — U07.1 COVID-19: Status: ACTIVE | Noted: 2022-06-22

## 2022-06-22 LAB
ALBUMIN SERPL BCP-MCNC: 3.3 G/DL (ref 3.5–5)
ALP SERPL-CCNC: 67 U/L (ref 34–104)
ALT SERPL W P-5'-P-CCNC: 29 U/L (ref 7–52)
ANION GAP SERPL CALCULATED.3IONS-SCNC: 8 MMOL/L (ref 4–13)
APTT PPP: 30 SECONDS (ref 23–37)
AST SERPL W P-5'-P-CCNC: 34 U/L (ref 13–39)
BACTERIA UR QL AUTO: ABNORMAL /HPF
BASOPHILS # BLD AUTO: 0.05 THOUSANDS/ΜL (ref 0–0.1)
BASOPHILS NFR BLD AUTO: 0 % (ref 0–1)
BILIRUB DIRECT SERPL-MCNC: 0.02 MG/DL (ref 0–0.2)
BILIRUB SERPL-MCNC: 0.56 MG/DL (ref 0.2–1)
BILIRUB UR QL STRIP: NEGATIVE
BUN SERPL-MCNC: 17 MG/DL (ref 5–25)
CALCIUM SERPL-MCNC: 8.5 MG/DL (ref 8.4–10.2)
CARDIAC TROPONIN I PNL SERPL HS: 7 NG/L
CHLORIDE SERPL-SCNC: 97 MMOL/L (ref 96–108)
CK SERPL-CCNC: 43 U/L (ref 39–308)
CLARITY UR: CLEAR
CO2 SERPL-SCNC: 29 MMOL/L (ref 21–32)
COLOR UR: YELLOW
CREAT SERPL-MCNC: 1.17 MG/DL (ref 0.6–1.3)
CRP SERPL QL: 64.2 MG/L
D DIMER PPP FEU-MCNC: 12.98 UG/ML FEU
EOSINOPHIL # BLD AUTO: 0.11 THOUSAND/ΜL (ref 0–0.61)
EOSINOPHIL NFR BLD AUTO: 1 % (ref 0–6)
ERYTHROCYTE [DISTWIDTH] IN BLOOD BY AUTOMATED COUNT: 13.1 % (ref 11.6–15.1)
FLUAV RNA RESP QL NAA+PROBE: NEGATIVE
FLUBV RNA RESP QL NAA+PROBE: NEGATIVE
GFR SERPL CREATININE-BSD FRML MDRD: 61 ML/MIN/1.73SQ M
GLUCOSE SERPL-MCNC: 102 MG/DL (ref 65–140)
GLUCOSE UR STRIP-MCNC: NEGATIVE MG/DL
HCT VFR BLD AUTO: 44.4 % (ref 36.5–49.3)
HGB BLD-MCNC: 14.3 G/DL (ref 12–17)
HGB UR QL STRIP.AUTO: ABNORMAL
IMM GRANULOCYTES # BLD AUTO: 0.07 THOUSAND/UL (ref 0–0.2)
IMM GRANULOCYTES NFR BLD AUTO: 1 % (ref 0–2)
INR PPP: 1.09 (ref 0.84–1.19)
KETONES UR STRIP-MCNC: NEGATIVE MG/DL
LACTATE SERPL-SCNC: 1 MMOL/L (ref 0.5–2)
LEUKOCYTE ESTERASE UR QL STRIP: NEGATIVE
LYMPHOCYTES # BLD AUTO: 1.88 THOUSANDS/ΜL (ref 0.6–4.47)
LYMPHOCYTES NFR BLD AUTO: 14 % (ref 14–44)
MAGNESIUM SERPL-MCNC: 2 MG/DL (ref 1.9–2.7)
MCH RBC QN AUTO: 28.9 PG (ref 26.8–34.3)
MCHC RBC AUTO-ENTMCNC: 32.2 G/DL (ref 31.4–37.4)
MCV RBC AUTO: 90 FL (ref 82–98)
MONOCYTES # BLD AUTO: 1.14 THOUSAND/ΜL (ref 0.17–1.22)
MONOCYTES NFR BLD AUTO: 9 % (ref 4–12)
NEUTROPHILS # BLD AUTO: 10.15 THOUSANDS/ΜL (ref 1.85–7.62)
NEUTS SEG NFR BLD AUTO: 75 % (ref 43–75)
NITRITE UR QL STRIP: NEGATIVE
NON-SQ EPI CELLS URNS QL MICRO: ABNORMAL /HPF
NRBC BLD AUTO-RTO: 0 /100 WBCS
PH UR STRIP.AUTO: 6 [PH]
PHOSPHATE SERPL-MCNC: 3.6 MG/DL (ref 2.3–4.1)
PLATELET # BLD AUTO: 394 THOUSANDS/UL (ref 149–390)
PMV BLD AUTO: 8.2 FL (ref 8.9–12.7)
POTASSIUM SERPL-SCNC: 3.8 MMOL/L (ref 3.5–5.3)
PROCALCITONIN SERPL-MCNC: 0.06 NG/ML
PROT SERPL-MCNC: 7.1 G/DL (ref 6.4–8.4)
PROT UR STRIP-MCNC: NEGATIVE MG/DL
PROTHROMBIN TIME: 14 SECONDS (ref 11.6–14.5)
RBC # BLD AUTO: 4.94 MILLION/UL (ref 3.88–5.62)
RBC #/AREA URNS AUTO: ABNORMAL /HPF
RSV RNA RESP QL NAA+PROBE: NEGATIVE
SARS-COV-2 RNA RESP QL NAA+PROBE: POSITIVE
SODIUM SERPL-SCNC: 134 MMOL/L (ref 135–147)
SP GR UR STRIP.AUTO: <=1.005 (ref 1–1.03)
UROBILINOGEN UR QL STRIP.AUTO: 0.2 E.U./DL
WBC # BLD AUTO: 13.4 THOUSAND/UL (ref 4.31–10.16)
WBC #/AREA URNS AUTO: ABNORMAL /HPF

## 2022-06-22 PROCEDURE — 84100 ASSAY OF PHOSPHORUS: CPT | Performed by: NURSE PRACTITIONER

## 2022-06-22 PROCEDURE — 80048 BASIC METABOLIC PNL TOTAL CA: CPT | Performed by: EMERGENCY MEDICINE

## 2022-06-22 PROCEDURE — 96374 THER/PROPH/DIAG INJ IV PUSH: CPT

## 2022-06-22 PROCEDURE — 84145 PROCALCITONIN (PCT): CPT | Performed by: NURSE PRACTITIONER

## 2022-06-22 PROCEDURE — 86140 C-REACTIVE PROTEIN: CPT | Performed by: NURSE PRACTITIONER

## 2022-06-22 PROCEDURE — 96372 THER/PROPH/DIAG INJ SC/IM: CPT

## 2022-06-22 PROCEDURE — 82550 ASSAY OF CK (CPK): CPT | Performed by: NURSE PRACTITIONER

## 2022-06-22 PROCEDURE — 85025 COMPLETE CBC W/AUTO DIFF WBC: CPT | Performed by: EMERGENCY MEDICINE

## 2022-06-22 PROCEDURE — 84484 ASSAY OF TROPONIN QUANT: CPT | Performed by: NURSE PRACTITIONER

## 2022-06-22 PROCEDURE — 87449 NOS EACH ORGANISM AG IA: CPT | Performed by: NURSE PRACTITIONER

## 2022-06-22 PROCEDURE — 81001 URINALYSIS AUTO W/SCOPE: CPT | Performed by: NURSE PRACTITIONER

## 2022-06-22 PROCEDURE — 85520 HEPARIN ASSAY: CPT | Performed by: NURSE PRACTITIONER

## 2022-06-22 PROCEDURE — 85610 PROTHROMBIN TIME: CPT | Performed by: NURSE PRACTITIONER

## 2022-06-22 PROCEDURE — 70491 CT SOFT TISSUE NECK W/DYE: CPT

## 2022-06-22 PROCEDURE — 0241U HB NFCT DS VIR RESP RNA 4 TRGT: CPT | Performed by: PHYSICIAN ASSISTANT

## 2022-06-22 PROCEDURE — 80076 HEPATIC FUNCTION PANEL: CPT | Performed by: NURSE PRACTITIONER

## 2022-06-22 PROCEDURE — 87040 BLOOD CULTURE FOR BACTERIA: CPT | Performed by: NURSE PRACTITIONER

## 2022-06-22 PROCEDURE — 83735 ASSAY OF MAGNESIUM: CPT | Performed by: NURSE PRACTITIONER

## 2022-06-22 PROCEDURE — 94760 N-INVAS EAR/PLS OXIMETRY 1: CPT

## 2022-06-22 PROCEDURE — 83605 ASSAY OF LACTIC ACID: CPT | Performed by: NURSE PRACTITIONER

## 2022-06-22 PROCEDURE — 99285 EMERGENCY DEPT VISIT HI MDM: CPT | Performed by: EMERGENCY MEDICINE

## 2022-06-22 PROCEDURE — 36415 COLL VENOUS BLD VENIPUNCTURE: CPT | Performed by: NURSE PRACTITIONER

## 2022-06-22 PROCEDURE — 82728 ASSAY OF FERRITIN: CPT | Performed by: NURSE PRACTITIONER

## 2022-06-22 PROCEDURE — 99223 1ST HOSP IP/OBS HIGH 75: CPT | Performed by: INTERNAL MEDICINE

## 2022-06-22 PROCEDURE — 83880 ASSAY OF NATRIURETIC PEPTIDE: CPT | Performed by: NURSE PRACTITIONER

## 2022-06-22 PROCEDURE — 99285 EMERGENCY DEPT VISIT HI MDM: CPT

## 2022-06-22 PROCEDURE — 71045 X-RAY EXAM CHEST 1 VIEW: CPT

## 2022-06-22 PROCEDURE — 85379 FIBRIN DEGRADATION QUANT: CPT | Performed by: NURSE PRACTITIONER

## 2022-06-22 PROCEDURE — 85730 THROMBOPLASTIN TIME PARTIAL: CPT | Performed by: NURSE PRACTITIONER

## 2022-06-22 RX ORDER — CEFTRIAXONE 1 G/50ML
1000 INJECTION, SOLUTION INTRAVENOUS EVERY 24 HOURS
Status: DISCONTINUED | OUTPATIENT
Start: 2022-06-22 | End: 2022-06-23 | Stop reason: HOSPADM

## 2022-06-22 RX ORDER — CEFTRIAXONE 1 G/50ML
1000 INJECTION, SOLUTION INTRAVENOUS EVERY 24 HOURS
Status: DISCONTINUED | OUTPATIENT
Start: 2022-06-22 | End: 2022-06-22

## 2022-06-22 RX ORDER — SODIUM CHLORIDE, SODIUM GLUCONATE, SODIUM ACETATE, POTASSIUM CHLORIDE, MAGNESIUM CHLORIDE, SODIUM PHOSPHATE, DIBASIC, AND POTASSIUM PHOSPHATE .53; .5; .37; .037; .03; .012; .00082 G/100ML; G/100ML; G/100ML; G/100ML; G/100ML; G/100ML; G/100ML
100 INJECTION, SOLUTION INTRAVENOUS CONTINUOUS
Status: DISPENSED | OUTPATIENT
Start: 2022-06-22 | End: 2022-06-23

## 2022-06-22 RX ORDER — METHYLPREDNISOLONE SODIUM SUCCINATE 125 MG/2ML
80 INJECTION, POWDER, LYOPHILIZED, FOR SOLUTION INTRAMUSCULAR; INTRAVENOUS ONCE
Status: COMPLETED | OUTPATIENT
Start: 2022-06-22 | End: 2022-06-22

## 2022-06-22 RX ORDER — CEFTRIAXONE 2 G/50ML
2000 INJECTION, SOLUTION INTRAVENOUS EVERY 24 HOURS
Status: DISCONTINUED | OUTPATIENT
Start: 2022-06-22 | End: 2022-06-22

## 2022-06-22 RX ORDER — ENOXAPARIN SODIUM 100 MG/ML
40 INJECTION SUBCUTANEOUS DAILY
Status: DISCONTINUED | OUTPATIENT
Start: 2022-06-23 | End: 2022-06-22

## 2022-06-22 RX ORDER — ACETAMINOPHEN 325 MG/1
975 TABLET ORAL EVERY 6 HOURS PRN
Status: DISCONTINUED | OUTPATIENT
Start: 2022-06-22 | End: 2022-06-23 | Stop reason: HOSPADM

## 2022-06-22 RX ORDER — EPINEPHRINE 1 MG/ML
INJECTION, SOLUTION, CONCENTRATE INTRAVENOUS
Status: DISCONTINUED
Start: 2022-06-22 | End: 2022-06-22

## 2022-06-22 RX ORDER — EPINEPHRINE 1 MG/ML
0.5 INJECTION, SOLUTION, CONCENTRATE INTRAVENOUS ONCE
Status: COMPLETED | OUTPATIENT
Start: 2022-06-22 | End: 2022-06-22

## 2022-06-22 RX ORDER — HYDRALAZINE HYDROCHLORIDE 20 MG/ML
5 INJECTION INTRAMUSCULAR; INTRAVENOUS EVERY 6 HOURS PRN
Status: DISCONTINUED | OUTPATIENT
Start: 2022-06-22 | End: 2022-06-23 | Stop reason: HOSPADM

## 2022-06-22 RX ORDER — DEXAMETHASONE SODIUM PHOSPHATE 4 MG/ML
6 INJECTION, SOLUTION INTRA-ARTICULAR; INTRALESIONAL; INTRAMUSCULAR; INTRAVENOUS; SOFT TISSUE EVERY 24 HOURS
Status: DISCONTINUED | OUTPATIENT
Start: 2022-06-22 | End: 2022-06-23 | Stop reason: HOSPADM

## 2022-06-22 RX ORDER — ENOXAPARIN SODIUM 100 MG/ML
30 INJECTION SUBCUTANEOUS EVERY 12 HOURS SCHEDULED
Status: DISCONTINUED | OUTPATIENT
Start: 2022-06-22 | End: 2022-06-22

## 2022-06-22 RX ORDER — ENOXAPARIN SODIUM 100 MG/ML
1 INJECTION SUBCUTANEOUS EVERY 12 HOURS SCHEDULED
Status: DISCONTINUED | OUTPATIENT
Start: 2022-06-22 | End: 2022-06-23 | Stop reason: HOSPADM

## 2022-06-22 RX ADMIN — CEFTRIAXONE 1000 MG: 1 INJECTION, SOLUTION INTRAVENOUS at 19:04

## 2022-06-22 RX ADMIN — EPINEPHRINE 0.5 MG: 1 INJECTION, SOLUTION, CONCENTRATE INTRAVENOUS at 15:48

## 2022-06-22 RX ADMIN — TRANEXAMIC ACID 1000 MG: 100 INJECTION INTRAVENOUS at 16:28

## 2022-06-22 RX ADMIN — IOHEXOL 80 ML: 350 INJECTION, SOLUTION INTRAVENOUS at 17:33

## 2022-06-22 RX ADMIN — METHYLPREDNISOLONE SODIUM SUCCINATE 80 MG: 125 INJECTION, POWDER, FOR SOLUTION INTRAMUSCULAR; INTRAVENOUS at 15:51

## 2022-06-22 RX ADMIN — DEXAMETHASONE SODIUM PHOSPHATE 6 MG: 4 INJECTION, SOLUTION INTRAMUSCULAR; INTRAVENOUS at 19:04

## 2022-06-22 RX ADMIN — SODIUM CHLORIDE, SODIUM GLUCONATE, SODIUM ACETATE, POTASSIUM CHLORIDE, MAGNESIUM CHLORIDE, SODIUM PHOSPHATE, DIBASIC, AND POTASSIUM PHOSPHATE 100 ML/HR: .53; .5; .37; .037; .03; .012; .00082 INJECTION, SOLUTION INTRAVENOUS at 17:36

## 2022-06-22 RX ADMIN — ENOXAPARIN SODIUM 90 MG: 100 INJECTION, SOLUTION INTRAVENOUS; SUBCUTANEOUS at 20:17

## 2022-06-22 NOTE — SEPSIS NOTE
Sepsis Note   Thea Randolph Larned 67 y o  male MRN: [id number]  Unit/Bed#: ICU 11-01 Encounter: [id number]       qSOFA     [address] Name 06/22/22 1840 06/22/22 1800 06/22/22 1745 06/22/22 1715 06/22/22 1541    Altered mental status GCS < 15 -- -- -- -- --    Respiratory Rate > / =22 1 0 0 0 0    Systolic BP < / =428 0 0 0 0 0    Q Sofa Score 1 0 0 0 0               Initial Sepsis Screening     Row Name 06/22/22 1840                Is the patient's history suggestive of a new or worsening infection? Yes (Proceed)  -BA        Suspected source of infection pneumonia  URI  -BA        Are two or more of the following signs & symptoms of infection both present and new to the patient? Yes (Proceed)  -BA        Indicate SIRS criteria Tachycardia > 90 bpm;Leukocytosis (WBC > 79061 IJL); Tachypnea > 20 resp per min  -BA        If the answer is yes to both questions, suspicion of sepsis is present --        If severe sepsis is present AND tissue hypoperfusion perists in the hour after fluid resuscitation or lactate > 4, the patient meets criteria for SEPTIC SHOCK --        Are any of the following organ dysfunction criteria present within 6 hours of suspected infection and SIRS criteria that are NOT considered to be chronic conditions? No  -BA        Organ dysfunction --  -BA        Date of presentation of severe sepsis --        Time of presentation of severe sepsis --        Tissue hypoperfusion persists in the hour after crystalloid fluid administration, evidenced, by either: --        Was hypotension present within one hour of the conclusion of crystalloid fluid administration? --        Date of presentation of septic shock --        Time of presentation of septic shock --              User Key  (r) = Recorded By, (t) = Taken By, (c) = Cosigned By    Initials Name Provider Type    BA Shantel Umaña Nurse Practitioner              Likely viral/bacterial sepsis in setting of COVID-19/URI

## 2022-06-22 NOTE — ASSESSMENT & PLAN NOTE
Lab Results   Component Value Date    EGFR 61 06/22/2022    EGFR 50 03/01/2022    EGFR 53 01/05/2022    CREATININE 1 17 06/22/2022    CREATININE 1 38 (H) 03/01/2022    CREATININE 1 33 (H) 01/05/2022     · Creat at baseline (1 2-1 3)  · Trend UOP and creat  · Isolyte @ 100 mL x10 hours given history of poor PO intake last 10 days

## 2022-06-22 NOTE — ASSESSMENT & PLAN NOTE
68 yo M presents to the ED with acute onset of tongue swelling  Patient awoke around 12 noon today and walked out to his wife  She noted him to have unilateral tongue swelling and slurred speech  PCP recommended evaluation in ED  Patient does take lisinopril  Of note, patient has had 10 days of fevers, laryngitis, malaise, cough and pharyngitis       Plan:  · CT soft tissue neck to r/o abscess  · Hold lisinopril  · Admit to ICU for airway watch

## 2022-06-22 NOTE — H&P
Angie U  65   H&P- Ruchi Cintron 1950, 67 y o  male MRN: [id number]  Unit/Bed#: TR 03 Encounter: [id number]  Primary Care Provider: Farheen Victoria DO   Date and time admitted to hospital: 6/22/2022  3:39 PM    * Tongue swelling  Assessment & Plan  68 yo M presents to the ED with acute onset of tongue swelling  Patient awoke around 12 noon today and walked out to his wife  She noted him to have unilateral tongue swelling and slurred speech  PCP recommended evaluation in ED  Patient does take lisinopril  Of note, patient has had 10 days of fevers, laryngitis, malaise, cough and pharyngitis  Plan:  · CT soft tissue neck to r/o abscess  · Hold lisinopril  · Admit to ICU for airway watch    URI (upper respiratory infection)  Assessment & Plan  · Continue unasyn IV while NPO  · CXR  · COVID test  · Supportive care    Hypertension  Assessment & Plan  · Hold lisinopril   · IV hydralazine PRN for SBP >160    Stage 3 chronic kidney disease [facility])  Assessment & Plan  Lab Results   Component Value Date    EGFR 61 06/22/2022    EGFR 50 03/01/2022    EGFR 53 01/05/2022    CREATININE 1 17 06/22/2022    CREATININE 1 38 (H) 03/01/2022    CREATININE 1 33 (H) 01/05/2022     · Creat at baseline (1 2-1 3)  · Trend UOP and creat  · Isolyte @ 100 mL x10 hours given history of poor PO intake last 10 days    Hyperlipidemia  Assessment & Plan  · Hold home medications while on airway watch    GERD without esophagitis  Assessment & Plan  · Hold home medications      -------------------------------------------------------------------------------------------------------------  Chief Complaint: Tongue swelling    History of Present Illness     Ruchi Cintron is a 67 y o  male who presents to the ED with acute onset of unilateral tongue swelling  10 days ago, he started with malaise, fevers, laryngitis, pharnygitis, poor PO intake, and cough  His wife was also sick  She has since improved and he has not   He saw his PCP yesterday and was started on augmentin for sinusitis  Today, he was noted to have acute tongue swelling and presented to the ED  He denies trouble swallowing  Patient does take lisinopril at home  Has had tongue swelling once before  PMH: HTN, HLD, CKD, hypoparathyroid, GERD, STAR, seasonal allergies  History obtained from the patient   -------------------------------------------------------------------------------------------------------------  Dispo: Admit to Stepdown Level 1    Code Status: Level 1 - Full Code  --------------------------------------------------------------------------------------------------------------  Review of Systems   Constitutional: Positive for appetite change, fatigue and fever  HENT: Positive for congestion and voice change  Respiratory: Positive for cough  Negative for chest tightness  Cardiovascular: Negative for chest pain  Gastrointestinal: Negative for nausea and vomiting  All other systems reviewed and are negative  Physical Exam  Vitals reviewed  Constitutional:       General: He is awake  HENT:      Mouth/Throat:     Cardiovascular:      Rate and Rhythm: Normal rate and regular rhythm  Heart sounds: No murmur heard  No friction rub  Pulmonary:      Effort: Pulmonary effort is normal       Breath sounds: Normal breath sounds  No wheezing, rhonchi or rales  Musculoskeletal:      Cervical back: Full passive range of motion without pain  Skin:     General: Skin is warm and dry  Neurological:      General: No focal deficit present  Mental Status: He is alert and oriented to person, place, and time  Mental status is at baseline     Psychiatric:         Behavior: Behavior is cooperative        --------------------------------------------------------------------------------------------------------------  Vitals:   Vitals:    06/22/22 1541 06/22/22 1715   BP: 166/98 146/68   BP Location: Left arm    Pulse: 94 96   Resp: 17 17   Temp: 98 1 °F (36 7 °C)    TempSrc: Temporal    SpO2: 96% 94%   Weight: 89 4 kg (197 lb)    Height: 6' 2" (1 88 m)      Temp  Min: 98 1 °F (36 7 °C)  Max: 98 1 °F (36 7 °C)     IBW (Ideal Body Weight): 82 2 kg  Height: 6' 2" (188 cm)  Body mass index is 25 29 kg/m²  Laboratory and Diagnostics:  Results from last 7 days   Lab Units 06/22/22  1550   WBC Thousand/uL 13 40*   HEMOGLOBIN g/dL 14 3   HEMATOCRIT % 44 4   PLATELETS Thousands/uL 394*   NEUTROS PCT % 75   MONOS PCT % 9     Results from last 7 days   Lab Units 06/22/22  1550   SODIUM mmol/L 134*   POTASSIUM mmol/L 3 8   CHLORIDE mmol/L 97   CO2 mmol/L 29   ANION GAP mmol/L 8   BUN mg/dL 17   CREATININE mg/dL 1 17   CALCIUM mg/dL 8 5   GLUCOSE RANDOM mg/dL 102     Imaging: pending    Historical Information   Past Medical History:   Diagnosis Date    Abdominal pain, acute, generalized     last assessed - 21Apr2017    Actinic keratosis     last assessed - 12Jun2013    Chest pain     last assessed - [id number]    Chronic kidney disease, stage 3 (HCC)     Colon polyp     Disease of thyroid gland     Dysfunction of both eustachian tubes     suspect due to ETD  Recommend otc allergy meds and if fails then add flonase; last assessed - 06Aug2012    Edema     Essential hypertension     Fatigue     last assessed - [id number]    Generalized anxiety disorder     GERD (gastroesophageal reflux disease)     Hematuria     last assessed - 02Aug2012    Hypertension     Hypo-osmolality and hyponatremia     Hypocalcemia     Hypoparathyroidism ([address] )     Hypothyroidism     Lightheadedness     last assessed - [id number]    Lump in neck     ? cause  Maybe a localized allergic reaction, dental issue, doubt sialdenitis, ect  Empiric abx and one dose steroids  Continue benadryl  If worsens, to er or ent      Malaise and fatigue     last assessed - 21Apr2017    Nephropathy     last assessed - [id number]    Nocturia     Proteinuria     Shortness of breath     last assessed - [id number]    Skin lesion     Resolved - [id number]    Tinea corporis     last assessed - [id number]     Past Surgical History:   Procedure Laterality Date    COLONOSCOPY W/ POLYPECTOMY  07/19/2019    HEAD & NECK SKIN LESION EXCISIONAL BIOPSY      excision of lesion scalp malignant - BCCA; last assessed - [id number]    SKIN BIOPSY      last assessed - 09Sep2014    THYROID SURGERY      Biopsy thyroid using percutaneous core needle; last assessed - 09Sep2014    TONSILLECTOMY       Social History   Social History     Substance and Sexual Activity   Alcohol Use Yes    Comment: rarely     Social History     Substance and Sexual Activity   Drug Use No     Social History     Tobacco Use   Smoking Status Never Smoker   Smokeless Tobacco Never Used     Family History:   Family History   Problem Relation Age of Onset    Hypertension Mother     Hypertension Father     Diabetes Father     Other Brother         Schwannomatosis    Stroke Family      I have reviewed this patient's family history and commented on sigificant items within the HPI    Medications:  Current Facility-Administered Medications   Medication Dose Route Frequency    ampicillin-sulbactam (UNASYN) 3 g in sodium chloride 0 9 % 100 mL IVPB  3 g Intravenous Q6H    [START ON 6/23/2022] enoxaparin (LOVENOX) subcutaneous injection 40 mg  40 mg Subcutaneous Daily    hydrALAZINE (APRESOLINE) injection 5 mg  5 mg Intravenous Q6H PRN    multi-electrolyte (PLASMALYTE-A/ISOLYTE-S PH 7 4) IV solution  100 mL/hr Intravenous Continuous     Home medications:  Prior to Admission Medications   Prescriptions Last Dose Informant Patient Reported? Taking?    Calcium Carbonate (CALCIUM 600 PO)  Self Yes No   Sig: Take by mouth Twice per day   Cholecalciferol (VITAMIN D3) 1000 units CAPS  Self Yes No   Sig: Take 1 capsule by mouth in the morning     Loratadine-Pseudoephedrine (ALAVERT ALLERGY/SINUS PO)   Yes No   Sig: Take 1 capsule by mouth if needed   Multiple Vitamin (MULTI-VITAMIN DAILY PO)  Self Yes No   Sig: Take by mouth daily   Psyllium (Metamucil) 28 3 % POWD   Yes No   Sig: Take 1 Package by mouth in the morning     amoxicillin-clavulanate (AUGMENTIN) 875-125 mg per tablet   No No   Sig: Take 1 tablet by mouth every 12 (twelve) hours for 7 days   aspirin (ECOTRIN LOW STRENGTH) 81 mg EC tablet  Self No No   Sig: Take 1 tablet (81 mg total) by mouth daily   calcitriol (ROCALTROL) 0 25 mcg capsule   No No   Sig: Take 1 capsule (0 25 mcg total) by mouth daily   lisinopril (ZESTRIL) 20 mg tablet   No No   Sig: Take 1 tablet (20 mg total) by mouth daily   polymyxin b-trimethoprim (POLYTRIM) ophthalmic solution   No No   Sig: Administer 1 drop into the left eye every 4 (four) hours   tamsulosin (FLOMAX) 0 4 mg   No No   Sig: Take 1 capsule (0 4 mg total) by mouth daily with dinner      Facility-Administered Medications: None     Allergies: Allergies   Allergen Reactions    Pollen Extract Sneezing     ------------------------------------------------------------------------------------------------------------  Anticipated Length of Stay is > 2 midnights    Care Time Delivered:   No Critical Care time spent     Pat Walker PA-C    Portions of the record may have been created with voice recognition software  Occasional wrong word or "sound a like" substitutions may have occurred due to the inherent limitations of voice recognition software    Read the chart carefully and recognize, using context, where substitutions have occurred

## 2022-06-22 NOTE — TELEPHONE ENCOUNTER
Pt wife called states pt was sleeping woke up and left side of tongue is really swollen  States he think he bit his tounge however does not remember it  Pt speech is slurred due to swollen tongue,  Smile is normal, no difference in facial features  I informed pt to go to the ED for a check up and evaluation

## 2022-06-22 NOTE — PROGRESS NOTES
Assessment/Plan:    Problem List Items Addressed This Visit    None     Visit Diagnoses     Acute non-recurrent frontal sinusitis    -  Primary    Amoxicillin 875/125 mg PO QDay  Nasal saline   robitussin PRN    Relevant Medications    amoxicillin-clavulanate (AUGMENTIN) 875-125 mg per tablet    Acute bacterial conjunctivitis of left eye        Relevant Medications    amoxicillin-clavulanate (AUGMENTIN) 875-125 mg per tablet    polymyxin b-trimethoprim (POLYTRIM) ophthalmic solution           Diagnoses and all orders for this visit:    Acute non-recurrent frontal sinusitis  Comments:  Amoxicillin 875/125 mg PO QDay  Nasal saline   robitussin PRN  Orders:  -     amoxicillin-clavulanate (AUGMENTIN) 875-125 mg per tablet; Take 1 tablet by mouth every 12 (twelve) hours for 7 days    Acute bacterial conjunctivitis of left eye  -     polymyxin b-trimethoprim (POLYTRIM) ophthalmic solution; Administer 1 drop into the left eye every 4 (four) hours      No problem-specific Assessment & Plan notes found for this encounter  Subjective:      Patient ID: Kaye Cintron is a 67 y o  male  Sinusitis  This is a new problem  The current episode started 1 to 4 weeks ago  The problem has been gradually worsening since onset  There has been no fever  He is experiencing no pain  Associated symptoms include congestion, coughing, headaches, sinus pressure and a sore throat  Pertinent negatives include no chills, diaphoresis, ear pain, hoarse voice, neck pain, shortness of breath, sneezing or swollen glands  Past treatments include nothing         The following portions of the patient's history were reviewed and updated as appropriate:   He has a past medical history of Abdominal pain, acute, generalized, Actinic keratosis, Chest pain, Chronic kidney disease, stage 3 ([address] ), Colon polyp, Disease of thyroid gland, Dysfunction of both eustachian tubes, Edema, Essential hypertension, Fatigue, Generalized anxiety disorder, GERD (gastroesophageal reflux disease), Hematuria, Hypertension, Hypo-osmolality and hyponatremia, Hypocalcemia, Hypoparathyroidism ([address] ), Hypothyroidism, Lightheadedness, Lump in neck, Malaise and fatigue, Nephropathy, Nocturia, Proteinuria, Shortness of breath, Skin lesion, and Tinea corporis  ,  does not have any pertinent problems on file  ,   has a past surgical history that includes Skin biopsy; Thyroid surgery; Colonoscopy w/ polypectomy (07/19/2019); Head & neck skin lesion excisional biopsy; and Tonsillectomy  ,  family history includes Diabetes in his father; Hypertension in his father and mother; Other in his brother; Stroke in his family  ,   reports that he has never smoked  He has never used smokeless tobacco  He reports current alcohol use  He reports that he does not use drugs  ,  is allergic to pollen extract     Current Outpatient Medications   Medication Sig Dispense Refill    amoxicillin-clavulanate (AUGMENTIN) 875-125 mg per tablet Take 1 tablet by mouth every 12 (twelve) hours for 7 days 14 tablet 0    aspirin (ECOTRIN LOW STRENGTH) 81 mg EC tablet Take 1 tablet (81 mg total) by mouth daily      calcitriol (ROCALTROL) 0 25 mcg capsule Take 1 capsule (0 25 mcg total) by mouth daily 90 capsule 1    Calcium Carbonate (CALCIUM 600 PO) Take by mouth Twice per day      Cholecalciferol (VITAMIN D3) 1000 units CAPS Take 1 capsule by mouth in the morning        lisinopril (ZESTRIL) 20 mg tablet Take 1 tablet (20 mg total) by mouth daily 90 tablet 1    Loratadine-Pseudoephedrine (ALAVERT ALLERGY/SINUS PO) Take 1 capsule by mouth if needed      Multiple Vitamin (MULTI-VITAMIN DAILY PO) Take by mouth daily      polymyxin b-trimethoprim (POLYTRIM) ophthalmic solution Administer 1 drop into the left eye every 4 (four) hours 10 mL 0    Psyllium (Metamucil) 28 3 % POWD Take 1 Package by mouth in the morning        tamsulosin (FLOMAX) 0 4 mg Take 1 capsule (0 4 mg total) by mouth daily with dinner 90 capsule 3 No current facility-administered medications for this visit  Review of Systems   Constitutional: Negative for chills and diaphoresis  HENT: Positive for congestion, sinus pressure and sore throat  Negative for ear pain, hoarse voice and sneezing  Respiratory: Positive for cough  Negative for shortness of breath  Musculoskeletal: Negative for neck pain  Neurological: Positive for headaches  Objective:  Vitals:    06/21/22 1448   BP: 132/84   BP Location: Left arm   Patient Position: Sitting   Cuff Size: Large   Pulse: 104   Resp: 16   Temp: 99 7 °F (37 6 °C)   SpO2: 97%   Weight: 89 5 kg (197 lb 6 4 oz)   Height: 6' 2" (1 88 m)     Body mass index is 25 34 kg/m²  Physical Exam  Vitals and nursing note reviewed  Constitutional:       Appearance: He is well-developed  HENT:      Head: Normocephalic and atraumatic  Nose: No congestion or rhinorrhea  Mouth/Throat:      Pharynx: Posterior oropharyngeal erythema present  No oropharyngeal exudate  Eyes:      Pupils: Pupils are equal, round, and reactive to light  Cardiovascular:      Rate and Rhythm: Normal rate and regular rhythm  Heart sounds: Normal heart sounds  No murmur heard  Pulmonary:      Effort: Pulmonary effort is normal       Breath sounds: Normal breath sounds  No stridor  No rales  Abdominal:      General: Bowel sounds are normal  There is no distension  Palpations: Abdomen is soft  Tenderness: There is no abdominal tenderness  Musculoskeletal:         General: No deformity  Normal range of motion  Cervical back: Normal range of motion and neck supple  Skin:     General: Skin is warm and dry  Neurological:      Mental Status: He is alert and oriented to person, place, and time            PHQ-2/9 Depression Screening

## 2022-06-23 ENCOUNTER — TRANSITIONAL CARE MANAGEMENT (OUTPATIENT)
Dept: INTERNAL MEDICINE CLINIC | Facility: CLINIC | Age: 72
End: 2022-06-23

## 2022-06-23 ENCOUNTER — APPOINTMENT (INPATIENT)
Dept: NON INVASIVE DIAGNOSTICS | Facility: HOSPITAL | Age: 72
DRG: 871 | End: 2022-06-23
Payer: MEDICARE

## 2022-06-23 VITALS
DIASTOLIC BLOOD PRESSURE: 87 MMHG | RESPIRATION RATE: 20 BRPM | WEIGHT: 199.08 LBS | SYSTOLIC BLOOD PRESSURE: 149 MMHG | HEART RATE: 92 BPM | HEIGHT: 74 IN | BODY MASS INDEX: 25.55 KG/M2 | TEMPERATURE: 98.7 F | OXYGEN SATURATION: 93 %

## 2022-06-23 LAB
ALBUMIN SERPL BCP-MCNC: 3.3 G/DL (ref 3.5–5)
ALP SERPL-CCNC: 72 U/L (ref 34–104)
ALT SERPL W P-5'-P-CCNC: 29 U/L (ref 7–52)
ANION GAP SERPL CALCULATED.3IONS-SCNC: 10 MMOL/L (ref 4–13)
AST SERPL W P-5'-P-CCNC: 25 U/L (ref 13–39)
BASOPHILS # BLD AUTO: 0.02 THOUSANDS/ΜL (ref 0–0.1)
BASOPHILS NFR BLD AUTO: 0 % (ref 0–1)
BILIRUB SERPL-MCNC: 0.51 MG/DL (ref 0.2–1)
BUN SERPL-MCNC: 20 MG/DL (ref 5–25)
CALCIUM ALBUM COR SERPL-MCNC: 8.8 MG/DL (ref 8.3–10.1)
CALCIUM SERPL-MCNC: 8.2 MG/DL (ref 8.4–10.2)
CHLORIDE SERPL-SCNC: 98 MMOL/L (ref 96–108)
CO2 SERPL-SCNC: 28 MMOL/L (ref 21–32)
CREAT SERPL-MCNC: 1.14 MG/DL (ref 0.6–1.3)
EOSINOPHIL # BLD AUTO: 0 THOUSAND/ΜL (ref 0–0.61)
EOSINOPHIL NFR BLD AUTO: 0 % (ref 0–6)
ERYTHROCYTE [DISTWIDTH] IN BLOOD BY AUTOMATED COUNT: 12.7 % (ref 11.6–15.1)
FERRITIN SERPL-MCNC: 527 NG/ML (ref 8–388)
GFR SERPL CREATININE-BSD FRML MDRD: 63 ML/MIN/1.73SQ M
GLUCOSE SERPL-MCNC: 144 MG/DL (ref 65–140)
HAV IGM SER QL: NORMAL
HBV CORE IGM SER QL: NORMAL
HBV SURFACE AG SER QL: NORMAL
HCT VFR BLD AUTO: 43.6 % (ref 36.5–49.3)
HCV AB SER QL: NORMAL
HEPARIN CF II AG PPP IA-MCNC: <0.1 IU/ML
HGB BLD-MCNC: 14.2 G/DL (ref 12–17)
IMM GRANULOCYTES # BLD AUTO: 0.11 THOUSAND/UL (ref 0–0.2)
IMM GRANULOCYTES NFR BLD AUTO: 1 % (ref 0–2)
L PNEUMO1 AG UR QL IA.RAPID: NEGATIVE
LYMPHOCYTES # BLD AUTO: 1.2 THOUSANDS/ΜL (ref 0.6–4.47)
LYMPHOCYTES NFR BLD AUTO: 10 % (ref 14–44)
MAGNESIUM SERPL-MCNC: 2.1 MG/DL (ref 1.9–2.7)
MCH RBC QN AUTO: 29.1 PG (ref 26.8–34.3)
MCHC RBC AUTO-ENTMCNC: 32.6 G/DL (ref 31.4–37.4)
MCV RBC AUTO: 89 FL (ref 82–98)
MONOCYTES # BLD AUTO: 0.19 THOUSAND/ΜL (ref 0.17–1.22)
MONOCYTES NFR BLD AUTO: 2 % (ref 4–12)
NEUTROPHILS # BLD AUTO: 10.82 THOUSANDS/ΜL (ref 1.85–7.62)
NEUTS SEG NFR BLD AUTO: 87 % (ref 43–75)
NRBC BLD AUTO-RTO: 0 /100 WBCS
NT-PROBNP SERPL-MCNC: 58 PG/ML
PHOSPHATE SERPL-MCNC: 4.2 MG/DL (ref 2.3–4.1)
PLATELET # BLD AUTO: 427 THOUSANDS/UL (ref 149–390)
PMV BLD AUTO: 8.5 FL (ref 8.9–12.7)
POTASSIUM SERPL-SCNC: 4.5 MMOL/L (ref 3.5–5.3)
PROCALCITONIN SERPL-MCNC: 0.05 NG/ML
PROT SERPL-MCNC: 6.9 G/DL (ref 6.4–8.4)
RBC # BLD AUTO: 4.88 MILLION/UL (ref 3.88–5.62)
S PNEUM AG UR QL: NEGATIVE
SODIUM SERPL-SCNC: 136 MMOL/L (ref 135–147)
WBC # BLD AUTO: 12.34 THOUSAND/UL (ref 4.31–10.16)

## 2022-06-23 PROCEDURE — 84100 ASSAY OF PHOSPHORUS: CPT | Performed by: PHYSICIAN ASSISTANT

## 2022-06-23 PROCEDURE — NC001 PR NO CHARGE: Performed by: NURSE PRACTITIONER

## 2022-06-23 PROCEDURE — 83735 ASSAY OF MAGNESIUM: CPT | Performed by: PHYSICIAN ASSISTANT

## 2022-06-23 PROCEDURE — 99238 HOSP IP/OBS DSCHRG MGMT 30/<: CPT | Performed by: INTERNAL MEDICINE

## 2022-06-23 PROCEDURE — 80053 COMPREHEN METABOLIC PANEL: CPT | Performed by: PHYSICIAN ASSISTANT

## 2022-06-23 PROCEDURE — 80074 ACUTE HEPATITIS PANEL: CPT | Performed by: NURSE PRACTITIONER

## 2022-06-23 PROCEDURE — 93970 EXTREMITY STUDY: CPT | Performed by: SURGERY

## 2022-06-23 PROCEDURE — 85025 COMPLETE CBC W/AUTO DIFF WBC: CPT | Performed by: PHYSICIAN ASSISTANT

## 2022-06-23 PROCEDURE — 93970 EXTREMITY STUDY: CPT

## 2022-06-23 PROCEDURE — 84145 PROCALCITONIN (PCT): CPT | Performed by: NURSE PRACTITIONER

## 2022-06-23 RX ORDER — AMLODIPINE BESYLATE 10 MG/1
10 TABLET ORAL DAILY
Qty: 30 TABLET | Refills: 0 | Status: SHIPPED | OUTPATIENT
Start: 2022-06-23 | End: 2022-07-22 | Stop reason: SDUPTHER

## 2022-06-23 RX ORDER — AMLODIPINE BESYLATE 10 MG/1
10 TABLET ORAL DAILY
Status: DISCONTINUED | OUTPATIENT
Start: 2022-06-23 | End: 2022-06-23 | Stop reason: HOSPADM

## 2022-06-23 RX ADMIN — AMLODIPINE BESYLATE 10 MG: 10 TABLET ORAL at 11:38

## 2022-06-23 RX ADMIN — ENOXAPARIN SODIUM 90 MG: 100 INJECTION, SOLUTION INTRAVENOUS; SUBCUTANEOUS at 08:35

## 2022-06-23 NOTE — ASSESSMENT & PLAN NOTE
· Reports 10D history of URI sx as noted above [facility]  · Wife reported URI sx that started around same time, but had resolved shortly after  · COVID (+) 06/22 - vaccinated, but not boosted  · Troponin 7, CK 43, NT-proBNP 58  · D-Dimer 12 98  · CRP 64 2  · Continue mild pathway (on 2L NC):  · Decadron 6 mg Daily D#2  · Therapeutic Lovenox 1 mg/kg - Anti-Xa level pending  · Not candidate for Remdesivir as sx onset 10 days ago  · Encourage I/S, deep breathing exercises  · Trend inflammatory markers  · Wean O2 for SpO2 > 92%

## 2022-06-23 NOTE — CASE MANAGEMENT
Case Management Assessment & Discharge Planning Note    Patient name Skyler Cintron  Location ICU 11/ICU  MRN [id number]  : 1950 Date 2022       Current Admission Date: 2022  Current Admission Diagnosis:Tongue swelling   Patient Active Problem List    Diagnosis Date Noted    Tongue swelling 2022    Sepsis ([facility] [address] ) 2022    COVID-19 virus infection 2022    Acute respiratory insufficiency 2022    Elevated d-dimer 2022    Benign prostatic hyperplasia without lower urinary tract symptoms 2021    Stage 3 chronic kidney disease ([facility] [address] ) 12/10/2019    BMI 25 0-25 9,adult 2019    Microalbuminuria 10/25/2018    Basal cell tumor 2017    Multiple thyroid nodules 2016    Asymptomatic proteinuria 10/05/2015    Palpitations 2013    Hypoparathyroidism ([facility] [address] ) 2012    Allergic rhinitis 2012    Benign colon polyp 2012    Generalized anxiety disorder 2012    GERD without esophagitis 2012    Hyperlipidemia 2012    Hypertension 2012    Hypocalcemia 2012    Migraine, unspecified, not intractable, without status migrainosus 2012      LOS (days): 1  Geometric Mean LOS (GMLOS) (days): 4 80  Days to GMLOS:4 1     OBJECTIVE:    Risk of Unplanned Readmission Score: 9 54         Current admission status: Inpatient       Preferred Pharmacy:   [facility] [address]  Phone: [phone] Fax: [phone]    Primary Care Provider: Bill Nugent DO    Primary Insurance: MEDICARE  Secondary Insurance: COMMERCIAL MISCELLANEOUS    ASSESSMENT:  Active Health Care Proxies     [place], [facility] Representative - Spouse   Primary Phone: [phone] (Mobile)  Home Phone: [phone]               Advance Directives  Does patient have a [address]?: No  Was patient offered paperwork?: Yes (declined)  Does patient currently have a Health Care decision maker?: Yes, please see Health Care Proxy section  Does patient have Advance Directives?: No  Was patient offered paperwork?: Yes (declined)  Primary Contact: ria-wife         Readmission Root Cause  30 Day Readmission: No    Patient Information  Admitted from[de-identified] Home  Mental Status: Alert  During Assessment patient was accompanied by: Not accompanied during assessment  Assessment information provided by[de-identified] Patient  Primary Caregiver: Self  Support Systems: Self, Spouse/significant other  [place] of Residence: [address] [place] [address] Avenue do you live in?: [facility] entry access options   Select all that apply : Stairs  Number of steps to enter home : 2  Do the steps have railings?: No  Type of Current Residence: [place]  In the last 12 months, was there a time when you were not able to pay the mortgage or rent on time?: No  In the last 12 months, how many places have you lived?: 1  In the last 12 months, was there a time when you did not have a steady place to sleep or slept in a shelter (including now)?: No  Homeless/housing insecurity resource given?: N/A  Living Arrangements: Lives w/ Spouse/significant other  Is patient a ?: Yes  Is patient active with [facility])?: No  Is patient service connected?: No    Activities of Daily Living Prior to Admission  Functional Status: Independent  Completes ADLs independently?: Yes  Ambulates independently?: Yes  Does patient use assisted devices?: No  Does patient currently own DME?: No  Does patient have a history of Outpatient Therapy (PT/OT)?: Yes  Does the patient have a history of Short-Term Rehab?: No  Does patient have a history of HHC?: No  Does patient currently have [facility]?: No         Patient Information Continued  Income Source: Pension/skilled nursing  Does patient have prescription coverage?: Yes  Within the past 12 months, you worried that your food would run out before you got the money to buy more : Never true  Within the past 12 months, the food you bought just didn't last and you didn't have money to get more : Never true  Food insecurity resource given?: N/A  Does patient receive dialysis treatments?: No  Does patient have a history of substance abuse?: No  Does patient have a history of Mental Health Diagnosis?: No         Means of Transportation  Means of Transport to Appts[de-identified] Drives Self  In the past 12 months, has lack of transportation kept you from medical appointments or from getting medications?: No  In the past 12 months, has lack of transportation kept you from meetings, work, or from getting things needed for daily living?: No  Was application for public transport provided?: N/A        DISCHARGE DETAILS:    Discharge planning discussed with[de-identified] patient        CM contacted family/caregiver?: No- see comments (declined )  Were Treatment Team discharge recommendations reviewed with patient/caregiver?: Yes  Did patient/caregiver verbalize understanding of patient care needs?: Yes  Were patient/caregiver advised of the risks associated with not following Treatment Team discharge recommendations?: Yes    Contacts  Contact Method: Phone  Reason/Outcome: Discharge [address]         [place] the [place] interested [place] [address] at discharge?: No    DME Referral Provided  Referral made for DME?: No         Would you like to participate in our [address] service program?  : No - Declined    Treatment Team Recommendation: Home  Discharge Destination Plan[de-identified] Home    Cm met with the patient to evaluate the patients prior function and living situation and any barriers to d/c and form a safe d/c plan  Cm also evaluated the patient for any services in the home or needs for services  CM also discussed with patient that their preferences will be taken into account/consideration  +covid  Spoke with pt via phone  Pt already being discharged home today   Nurse to review AVS  All follow up providers listed  Pt has no discharge needs and wife to transport home

## 2022-06-23 NOTE — PROGRESS NOTES
[facility] 44  Progress Note Paty Hinojosa [place] 1950, 67 y o  male MRN: [id number]  Unit/Bed#: ICU 11-01 Encounter: [id number]  Primary Care Provider: Jody Eduardo DO   Date and time admitted to hospital: 6/22/2022  3:39 PM    * Tongue swelling  Assessment & Plan  · Presented to ED on 06/22 for L-sided tongue swelling with slurred speech x1 day with accompanying URI sx x10D  · Suspect this is 2/2 worsening URI with concurrent COVID-19 rather than angioedema  · CT neck on admission with left submandibular sialoadenitis and surrounding submandibular and sublingual inflammation, but no abscess  · Received IM Epinephrine, IV Solu Medrol 80 mg, TXA in ED for perceived angioedema  · Will hold Lisinopril for now given initial concern for angioedema, but feel that this is less likely given patient's history/symptomology   · Continue IV Decadron per mild pathway in setting of COVID-19 - do not feel that increased dosing is necessary at this time given improved swelling on exam  · Would hold on IV Benadryl/Pepcid/FFP as angioedema less likely and swelling has already improved  · Monitor airway closely    Sepsis ([facility] [address] )  Assessment & Plan  · AEB tachycardia, tachypnea shortly after admission with leukocytosis  · Has 10D history of fever, laryngitis, malaise, cough, pharyngitis  · Suspect this is 2/2 URI with concurrent COVID-19 infection  · CT neck on admission without evidence of abscess  · CXR without consolidations/infiltrates  · COVID (+) on 06/22  · Blood cultures x2 pending  · UA without evidence of infection  · Urine strep/legionella negative  · Send sputum culture if able to collect sample  · Lactic acid 1 0  · Procalcitonin 0 06  · Currently receiving maintenance IVF  · Continue Ceftriaxone D#2  · Trend procalcitonin  · Monitor fever and WBC count  · PRN Tylenol for fever    COVID-19 virus infection  Assessment & Plan  · Reports 10D history of URI sx as noted above after [place] with [place]  · Wife reported URI sx that started around same time, but had resolved shortly after  · COVID (+) 06/22 - vaccinated, but not boosted  · Troponin 7, CK 43, NT-proBNP 58  · D-Dimer 12 98  · CRP 64 2  · Continue mild pathway (on 2L NC):  · Decadron 6 mg Daily D#2  · Therapeutic Lovenox 1 mg/kg - Anti-Xa level pending  · Not candidate for Remdesivir as sx onset 10 days ago  · Encourage I/S, deep breathing exercises  · Trend inflammatory markers  · Wean O2 for SpO2 > 92%    Acute respiratory insufficiency  Assessment & Plan  · In setting of URI, COVID-19  · Currently requiring 2L NC  · CXR without infiltrate/consolidations  · Continue mild pathway for COVID-19 and antibiotics as noted above  · Wean O2 for SpO2 > 92%    Elevated d-dimer  Assessment & Plan  · D-Dimer 12 98 on admission  · Suspect this is 2/2 COVID-19 viral infection vs DVT  · Do not feel this is 2/2 PE - cardiac markers unremarkable, denies SOB, only requiring minimal O2  · Bilateral LE Venous Duplex pending  · Continue therapeutic Lovenox 1 mg/kg per mild COVID-19 pathway  · Would consider CTA PE if develops worsening respiratory status  · Continue to trend    Benign prostatic hyperplasia without lower urinary tract symptoms  Assessment & Plan  · Takes Flomax at home - consider restarting if tongue swelling improves    Stage 3 chronic kidney disease [facility])  Assessment & Plan  Lab Results   Component Value Date    EGFR 61 06/22/2022    EGFR 50 03/01/2022    EGFR 53 01/05/2022    CREATININE 1 17 06/22/2022    CREATININE 1 38 (H) 03/01/2022    CREATININE 1 33 (H) 01/05/2022     · Creatinine 1 17 - currently at baseline (1 2-1 3)  · Continue IVF hydration  · Follows with nephrology as outpatient  · Avoid nephrotoxic agents and hypotension  · Trend renal indices  · Monitor I/O  · Daily weights    Hypoparathyroidism (HCC)  Assessment & Plan  · Takes Calcium, Vitamin D, Calcitrol at home - consider restarting if tongue swelling improves    Hypertension  Assessment & Plan  · Holding home Lisinopril  · Continue PRN IV Hydralazine for SBP > 160    Hyperlipidemia  Assessment & Plan  · Lipid panel from 2022:  ·   · HDL 54  · TRIG 144  · Does not appear to take statin - would likely benefit from starting this  · Cardiac diet when able    GERD without esophagitis  Assessment & Plan  · Does not appear to take any medications at home  · Monitor symptoms      ----------------------------------------------------------------------------------------  HPI/24hr events:     · Started on mild COVID-19 pathway  · Started therapeutic Lovenox for elevated D-Dimer/mild COVID-19 pathway  · Started on Ceftriaxone  · No acute events overnight    Patient appropriate for transfer out of the ICU today?: No  Disposition: Transfer to Med Surg with Telemetry   Code Status: Level 1 - Full Code  ---------------------------------------------------------------------------------------  SUBJECTIVE  "I hope I can go home today " Denies SOB, pain  Review of Systems  Review of systems was reviewed and negative unless stated above in HPI/24-hour events   ---------------------------------------------------------------------------------------  OBJECTIVE    Vitals   Vitals:    22 0100 22 0200 22 0300 22 0400   BP: 140/78 139/79 137/78 145/79   Pulse: 68 70 70 67   Resp: 16  19    Temp:       TempSrc:       SpO2: 94% 93% 93% 94%   Weight:       Height:         Temp (24hrs), Av 9 °F (37 2 °C), Min:98 1 °F (36 7 °C), Max:99 6 °F (37 6 °C)  Current: Temperature: 99 6 °F (37 6 °C)          Respiratory:  SpO2: SpO2: 94 %, SpO2 Activity: SpO2 Activity: At Rest, SpO2 Device: O2 Device: None (Room air)       Invasive/non-invasive ventilation settings   Respiratory  Report   Lab Data (Last 4 hours)    None         O2/Vent Data (Last 4 hours)    None                Physical Exam  Vitals and nursing note reviewed     Constitutional:       General: He is awake  He is not in acute distress  Interventions: Nasal cannula in place  HENT:      Head: Normocephalic and atraumatic  Mouth/Throat:      Mouth: Mucous membranes are dry  Eyes:      Extraocular Movements: Extraocular movements intact  Conjunctiva/sclera: Conjunctivae normal       Pupils: Pupils are equal, round, and reactive to light  Cardiovascular:      Rate and Rhythm: Normal rate and regular rhythm  Pulses: Normal pulses  Heart sounds: Normal heart sounds  No murmur heard  Pulmonary:      Effort: Pulmonary effort is normal       Breath sounds: Normal breath sounds  Abdominal:      General: Bowel sounds are normal  There is no distension  Palpations: Abdomen is soft  Tenderness: There is no abdominal tenderness  There is no guarding  Musculoskeletal:         General: Normal range of motion  Cervical back: Normal range of motion and neck supple  Right lower leg: No edema  Left lower leg: No edema  Skin:     General: Skin is warm and dry  Capillary Refill: Capillary refill takes less than 2 seconds  Neurological:      General: No focal deficit present  Mental Status: He is alert and oriented to person, place, and time  Psychiatric:         Mood and Affect: Mood normal          Behavior: Behavior normal  Behavior is cooperative  Thought Content:  Thought content normal          Judgment: Judgment normal        Laboratory and Diagnostics:  Results from last 7 days   Lab Units 06/22/22  1550   WBC Thousand/uL 13 40*   HEMOGLOBIN g/dL 14 3   HEMATOCRIT % 44 4   PLATELETS Thousands/uL 394*   NEUTROS PCT % 75   MONOS PCT % 9     Results from last 7 days   Lab Units 06/22/22  1550   SODIUM mmol/L 134*   POTASSIUM mmol/L 3 8   CHLORIDE mmol/L 97   CO2 mmol/L 29   ANION GAP mmol/L 8   BUN mg/dL 17   CREATININE mg/dL 1 17   CALCIUM mg/dL 8 5   GLUCOSE RANDOM mg/dL 102   ALT U/L 29   AST U/L 34   ALK PHOS U/L 67   ALBUMIN g/dL 3 3*   TOTAL BILIRUBIN mg/dL 0 56     Results from last 7 days   Lab Units 06/22/22  1550   MAGNESIUM mg/dL 2 0   PHOSPHORUS mg/dL 3 6      Results from last 7 days   Lab Units 06/22/22  1550   INR  1 09   PTT seconds 30          Results from last 7 days   Lab Units 06/22/22  1917   LACTIC ACID mmol/L 1 0     ABG:    VBG:    Results from last 7 days   Lab Units 06/22/22  1550   PROCALCITONIN ng/ml 0 06       Micro  Results from last 7 days   Lab Units 06/22/22 2016 06/22/22  1918   BLOOD CULTURE   --  Received in Microbiology Lab  Culture in Progress  Received in Microbiology Lab  Culture in Progress  LEGIONELLA URINARY ANTIGEN  Negative  --    STREP PNEUMONIAE ANTIGEN, URINE  Negative  --        EKG: sinus rhythym  Imaging: I have personally reviewed pertinent reports  and I have personally reviewed pertinent films in PACS    Intake and Output  I/O       06/21 0701  06/22 0700 06/22 0701  06/23 0700    P  O   240    I V  (mL/kg)  956 7 (11 9)    IV Piggyback  150    Total Intake(mL/kg)  1346 7 (16 8)    Urine (mL/kg/hr)  750    Total Output  750    Net  +596 7          Unmeasured Urine Occurrence  1 x          Height and Weights   Height: 6' 2" (188 cm)  IBW (Ideal Body Weight): 82 2 kg  Body mass index is 22 67 kg/m²  Weight (last 2 days)     Date/Time Weight    06/22/22 1840 80 1 (176 59)    06/22/22 1541 89 4 (197)            Nutrition       Diet Orders   (From admission, onward)             Start     Ordered    06/22/22 1924  Diet NPO; Sips with meds  Diet effective now        References:    Nutrtion Support Algorithm Enteral vs  Parenteral   Question Answer Comment   Diet Type NPO    NPO Except: Sips with meds    RD to adjust diet per protocol?  No        06/22/22 1923                  Active Medications  Scheduled Meds:  Current Facility-Administered Medications   Medication Dose Route Frequency Provider Last Rate    acetaminophen  975 mg Oral Q6H PRN TRISTIN Garrett      cefTRIAXone  1,000 mg Intravenous Q24H TRISTIN Bernard 1,000 mg (06/22/22 1904)    dexamethasone  6 mg Intravenous Q24H TRISTIN Garrett      enoxaparin  1 mg/kg Subcutaneous Q12H [address] TRISTIN Garrett      hydrALAZINE  5 mg Intravenous Q6H PRN Nuvia Greer PA-C       Continuous Infusions:     PRN Meds:   acetaminophen, 975 mg, Q6H PRN  hydrALAZINE, 5 mg, Q6H PRN        Invasive Devices Review  Invasive Devices  Report    Peripheral Intravenous Line  Duration           Peripheral IV 06/22/22 Left;Ventral (anterior) Forearm 1 day    Peripheral IV 06/22/22 Right Antecubital <1 day                Rationale for remaining devices: N/A  ---------------------------------------------------------------------------------------  Advance Directive and Living Will:      Power of :    POLST:    ---------------------------------------------------------------------------------------  Care Time Delivered:   Upon my evaluation, this patient had a high probability of imminent or life-threatening deterioration due to sepsis, COVID-19, tongue swelling, which required my direct attention, intervention, and personal management  I have personally provided 10 minutes (0501 to 0511) of critical care time, exclusive of procedures, teaching, family meetings, and any prior time recorded by providers other than myself  TRISTIN Kowalski      Portions of the record may have been created with voice recognition software  Occasional wrong word or "sound a like" substitutions may have occurred due to the inherent limitations of voice recognition software    Read the chart carefully and recognize, using context, where substitutions have occurred

## 2022-06-23 NOTE — PROGRESS NOTES
Pt  Arrived to unit just after 1830 with wife present  Pt  Ambulated around the room without issue  He has a hoarse voice and moist non-productive cough  His tongue looks to be normal in size with no real difference between left and right side  Pt  States that he feels like it is gone as well  Nursing then went to enter pt  Information into the computer and saw that pt  COVID test resulted as positive  Pt  Pat Chaney on contact/airborne precautions and notified provider Rola Kapadia, [facility] of finding  Pt's wife was at the bedside and was surprised though she also has had recent fevers and they were at dinner with a relative who tested positive a few days later  Pt 's wife updated to visitation restrictions and given phone numbers to contact  She states she will call her PCP in the am for more instructions  Pt  Otherwise had no complaints  Pt  Put on 2L supplemental Oxygen via NC per [facility] directions  VSS  SBAR given to Ray Sykes  At about 1955

## 2022-06-23 NOTE — ASSESSMENT & PLAN NOTE
· AEB tachycardia, tachypnea shortly after admission with leukocytosis  · Has 10D history of fever, laryngitis, malaise, cough, pharyngitis  · Suspect this is 2/2 URI with concurrent COVID-19 infection  · CT neck on admission without evidence of abscess  · CXR without consolidations/infiltrates  · COVID (+) on 06/22  · Blood cultures x2 pending  · UA without evidence of infection  · Urine strep/legionella negative  · Send sputum culture if able to collect sample  · Lactic acid 1 0  · Procalcitonin 0 06  · Currently receiving maintenance IVF  · Continue Ceftriaxone D#2  · Trend procalcitonin  · Monitor fever and WBC count  · PRN Tylenol for fever

## 2022-06-23 NOTE — ASSESSMENT & PLAN NOTE
· In setting of URI, COVID-19  · Currently requiring 2L NC  · CXR without infiltrate/consolidations  · Continue mild pathway for COVID-19 and antibiotics as noted above  · Wean O2 for SpO2 > 92%

## 2022-06-23 NOTE — ASSESSMENT & PLAN NOTE
· Presented to ED on 06/22 for L-sided tongue swelling with slurred speech x1 day with accompanying URI sx x10D  · Suspect this is 2/2 worsening URI with concurrent COVID-19 rather than angioedema  · CT neck on admission with left submandibular sialoadenitis and surrounding submandibular and sublingual inflammation, but no abscess  · Received IM Epinephrine, IV Solu Medrol 80 mg, TXA in ED for perceived angioedema  · Will hold Lisinopril for now given initial concern for angioedema, but feel that this is less likely given patient's history/symptomology   · Continue IV Decadron per mild pathway in setting of COVID-19 - do not feel that increased dosing is necessary at this time given improved swelling on exam  · Would hold on IV Benadryl/Pepcid/FFP as angioedema less likely and swelling has already improved  · Monitor airway closely

## 2022-06-23 NOTE — ASSESSMENT & PLAN NOTE
Lab Results   Component Value Date    EGFR 61 06/22/2022    EGFR 50 03/01/2022    EGFR 53 01/05/2022    CREATININE 1 17 06/22/2022    CREATININE 1 38 (H) 03/01/2022    CREATININE 1 33 (H) 01/05/2022     · Creatinine 1 17 - currently at baseline (1 2-1 3)  · Continue IVF hydration  · Follows with nephrology as outpatient  · Avoid nephrotoxic agents and hypotension  · Trend renal indices  · Monitor I/O  · Daily weights

## 2022-06-23 NOTE — NURSING NOTE
Patient is going to be discharged today, he is aware of same  He has non labored respirations on room air, SaO2 95% on same

## 2022-06-23 NOTE — ASSESSMENT & PLAN NOTE
· Lipid panel from 01/2022:  ·   · HDL 54  · TRIG 144  · Does not appear to take statin - would likely benefit from starting this  · Cardiac diet when able

## 2022-06-23 NOTE — DISCHARGE SUMMARY
Critical Care Discharge Summary - Medical   Becca Wynn [place] 67 y o  male MRN: [id number]    [facility] [address]   [facility] Room / Bed: ICU 11/ICU 11-01 Encounter: [id number]    BRIEF OVERVIEW    Admitting Provider: Bridger Guadarrama MD  Discharge Provider: Bridger Guadarrama MD    Discharge To: Home    Admission Date: 6/22/2022     Discharge Date: 6/23/2022    Primary Discharge Diagnosis  Principal Problem:    Tongue swelling  Active Problems:    Sepsis ([facility] [address] )    COVID-19 virus infection    Acute respiratory insufficiency    Elevated d-dimer    GERD without esophagitis    Hyperlipidemia    Hypertension    Hypoparathyroidism ([facility] [address] )    Stage 3 chronic kidney disease (HCC)    Benign prostatic hyperplasia without lower urinary tract symptoms      Other Problems Addressed: none    Consulting Providers   None       Therapeutic Operative Procedures Performed  None    Diagnostic Procedures Performed  6/22 CT soft tissue: Left submandibular sialoadenitis and surrounding submandibular and sublingual inflammation  No evidence of sialolithiasis, mass or abscess  6/22 CXR: No acute cardiopulmonary disease  6/22 LE duplex:     RIGHT LOWER LIMB: Limited  No gross evidence of acute deep vein thrombosis in the CFV, FV, Popliteal,  Gastrocnemius, Posterior Tibial and Peroneal Veins  LEFT LOWER LIMB: Limited  No gross evidence of acute deep vein thrombosis in the CFV, FV, Popliteal,  Gastrocnemius, Posterior Tibial and Peroneal Veins      Discharge Disposition: Final discharge disposition not confirmed  Discharged With Lines: no    Test Results Pending at Discharge: none    Outpatient Follow-Up  yes      Follow up: PCP  Date and time: call to schedule  Location: n/a  Follow up within next: 1 week      Follow up: nephrology  Date and time: call to schedule  Location: n/a  Follow up within next: 1 month    Active Issues Requiring Follow-up   yes     Issue: proteinuria and HTN- unable to take ACE-I  Responsible Individual: PCP and nephrology  What is Needed: new BP medication review- started on norvasc inpatient  Follow-up Appointments Arranged: No       Code Status: Level 1 - Full Code    Medications   See after visit summary for reconciled discharge medications provided to patient and family  Allergies  Allergies   Allergen Reactions    Pollen Extract Sneezing     Discharge Diet: regular diet  Activity restrictions: none    [address] [facility] Course  68 yo M with 10 days of URI symptoms presents to ED with left sided unilateral tongue swelling  Likely angioedema from ACE-I but given pharyngitis and laryngitis, CT soft tissue performed  No abscess  Admitted to ICU for airway watch given tongue swelling  Given cough and SOB, CXR and COVID test sent  Patient COVID positive  D-dimer elevated in setting of COVID  LE duplex negative  Procalcitonin negative x2  ABX stopped  Decadron x 24 hours  Tongue swelling improved  Able to tolerate oral diet  OOB on RA  OK for discharge to home  Lisinopril stopped and norvasc 10 mg started  F/U with PCP and nephrology to decide new anti-HTN regimen  Presenting Problem/History of Present Illness  Principal Problem:    Tongue swelling  Active Problems:    Sepsis ([address] )    COVID-19 virus infection    Acute respiratory insufficiency    Elevated d-dimer    GERD without esophagitis    Hyperlipidemia    Hypertension    Hypoparathyroidism ([address] )    Stage 3 chronic kidney disease (HCC)    Benign prostatic hyperplasia without lower urinary tract symptoms  Resolved Problems:    * No resolved hospital problems  *    Discharge Condition: good    Discharge  Statement   I spent 20 minutes minutes discharging the patient  This time was spent on the day of discharge  I had direct contact with the patient on the day of discharge  Additional documentation is required if more than 30 minutes were spent on discharge

## 2022-06-23 NOTE — ASSESSMENT & PLAN NOTE
fever, laryngitis, malaise, cough, pharyngitis x10D  · Continue unasyn IV while NPO  · CXR  · COVID test  · Supportive care

## 2022-06-23 NOTE — NURSING NOTE
Patient pleasant, states he is feeling much better  No tongue swelling noted  Complex physical assessment as noted, see chart for details

## 2022-06-23 NOTE — ASSESSMENT & PLAN NOTE
· D-Dimer 12 98 on admission  · Suspect this is 2/2 COVID-19 viral infection vs DVT  · Do not feel this is 2/2 PE - cardiac markers unremarkable, denies SOB, only requiring minimal O2  · Bilateral LE Venous Duplex pending  · Continue therapeutic Lovenox 1 mg/kg per mild COVID-19 pathway  · Would consider CTA PE if develops worsening respiratory status  · Continue to trend

## 2022-06-24 ENCOUNTER — TELEPHONE (OUTPATIENT)
Dept: NEPHROLOGY | Facility: CLINIC | Age: 72
End: 2022-06-24

## 2022-06-24 NOTE — TELEPHONE ENCOUNTER
Patients wife called stating she was to call in with B/P readings  6/23/22 @ 4:30 pm was 124/71, 8:45 pm was 112/69   Today @ 1:00 pm was 112/68

## 2022-06-27 NOTE — TELEPHONE ENCOUNTER
Those numbers look great  Please bring a log of blood pressures to visit with Karlie Lopez on 7/13  Can check just a few times a week to keep and eye on it  Let us know if any dizziness with a top number less than 115

## 2022-06-28 LAB
BACTERIA BLD CULT: NORMAL
BACTERIA BLD CULT: NORMAL

## 2022-06-29 NOTE — ED PROVIDER NOTES
History  Chief Complaint   Patient presents with    Tongue Swelling     Patient reports being treated for a sinus infection  Patient reports being started on an antibiotic yesterday and woke up today with left sided tongue swelling  Patient denies any difficulty breathing at this time      Patient is a 69-year-old male with history of tongue swelling in the past presents for evaluation of tongue swelling  Patient is on a ACE-inhibitor  He says over the past several hours he had acute worsening of left-sided tongue swelling  He says that he has a muffled voice because of this but denies trismus, drooling, inability to handle secretions  Denies dyspnea at this time  He denies chest pain, abdominal pain  He has been treated for sinus infection recently but the symptoms have progressed over the past several hours  He had an episode similar to this last year but did not seek care  Symptoms are moderate, progressive, constant without alleviating or exacerbating factors  He has not taken anything for symptoms  Prior to Admission Medications   Prescriptions Last Dose Informant Patient Reported? Taking?    Calcium Carbonate (CALCIUM 600 PO) 6/22/2022 at Unknown time Self Yes Yes   Sig: Take by mouth Twice per day   Cholecalciferol (VITAMIN D3) 1000 units CAPS 6/22/2022 at Unknown time Self Yes Yes   Sig: Take 1 capsule by mouth in the morning     Loratadine-Pseudoephedrine (ALAVERT ALLERGY/SINUS PO) Past Month at Unknown time  Yes Yes   Sig: Take 1 capsule by mouth if needed   Multiple Vitamin (MULTI-VITAMIN DAILY PO) 6/22/2022 at Unknown time Self Yes Yes   Sig: Take by mouth daily   Psyllium (Metamucil) 28 3 % POWD Past Month at Unknown time  Yes Yes   Sig: Take 1 Package by mouth in the morning     amoxicillin-clavulanate (AUGMENTIN) 875-125 mg per tablet 6/22/2022 at 0900  No Yes   Sig: Take 1 tablet by mouth every 12 (twelve) hours for 7 days   aspirin (ECOTRIN LOW STRENGTH) 81 mg EC tablet 6/22/2022 at Unknown time Self No Yes   Sig: Take 1 tablet (81 mg total) by mouth daily   calcitriol (ROCALTROL) 0 25 mcg capsule 6/22/2022 at Unknown time  No Yes   Sig: Take 1 capsule (0 25 mcg total) by mouth daily   lisinopril (ZESTRIL) 20 mg tablet 6/21/2022 at Unknown time  No Yes   Sig: Take 1 tablet (20 mg total) by mouth daily   polymyxin b-trimethoprim (POLYTRIM) ophthalmic solution 6/22/2022 at Unknown time  No Yes   Sig: Administer 1 drop into the left eye every 4 (four) hours   tamsulosin (FLOMAX) 0 4 mg 6/21/2022 at Unknown time  No Yes   Sig: Take 1 capsule (0 4 mg total) by mouth daily with dinner      Facility-Administered Medications: None       Past Medical History:   Diagnosis Date    Abdominal pain, acute, generalized     last assessed - 21Apr2017    Actinic keratosis     last assessed - [id number]    Chest pain     last assessed - [id number]    Chronic kidney disease, stage 3 ([facility] [address] )     Colon polyp     Disease of thyroid gland     Dysfunction of both eustachian tubes     suspect due to ETD  Recommend otc allergy meds and if fails then add flonase; last assessed - 65Ouc3233    Edema     Essential hypertension     Fatigue     last assessed - [id number]    Generalized anxiety disorder     GERD (gastroesophageal reflux disease)     Hematuria     last assessed - 73Ksa9762    Hypertension     Hypo-osmolality and hyponatremia     Hypocalcemia     Hypoparathyroidism ([facility] [address] )     Hypothyroidism     Lightheadedness     last assessed - 50SYQ2395    Lump in neck     ? cause  Maybe a localized allergic reaction, dental issue, doubt sialdenitis, ect  Empiric abx and one dose steroids  Continue benadryl  If worsens, to er or ent      Malaise and fatigue     last assessed - 40Xij0370    Nephropathy     last assessed - 53Kwh7954    Nocturia     Proteinuria     Shortness of breath     last assessed - 71WSD2011    Skin lesion     Resolved - 40YAW2198    Tinea corporis     last assessed - 67HTM2350 Past Surgical History:   Procedure Laterality Date    COLONOSCOPY W/ POLYPECTOMY  07/19/2019    HEAD & NECK SKIN LESION EXCISIONAL BIOPSY      excision of lesion scalp malignant - [facility]; last assessed - [id number]    SKIN BIOPSY      last assessed - 09Sep2014   [facility] Leong THYROID SURGERY      Biopsy thyroid using percutaneous core needle; last assessed - 09Sep2014    TONSILLECTOMY         Family History   Problem Relation Age of Onset    Hypertension Mother     Hypertension Father     Diabetes Father     Other Brother         Schwannomatosis    Stroke Family      I have reviewed and agree with the history as documented  E-Cigarette/Vaping    E-Cigarette Use Never User      E-Cigarette/Vaping Substances     Social History     Tobacco Use    Smoking status: Never Smoker    Smokeless tobacco: Never Used   Vaping Use    Vaping Use: Never used   Substance Use Topics    Alcohol use: Yes     Comment: rarely    Drug use: No       Review of Systems   Constitutional: Negative for fever  HENT: Negative for sore throat  Tongue swelling   Eyes: Negative for photophobia  Respiratory: Negative for shortness of breath  Cardiovascular: Negative for chest pain  Gastrointestinal: Negative for abdominal pain  Genitourinary: Negative for dysuria  Musculoskeletal: Negative for back pain  Skin: Negative for rash  Neurological: Negative for light-headedness  Hematological: Negative for adenopathy  Psychiatric/Behavioral: Negative for agitation  All other systems reviewed and are negative  Physical Exam  Physical Exam  Vitals reviewed  Constitutional:       General: He is not in acute distress  Appearance: He is well-developed  HENT:      Head: Normocephalic  Mouth/Throat:      Comments: Patient with left-sided tongue swelling, oropharynx otherwise clear  Eyes:      Pupils: Pupils are equal, round, and reactive to light     Cardiovascular:      Rate and Rhythm: Normal rate and regular rhythm  Heart sounds: Normal heart sounds  No murmur heard  No friction rub  No gallop  Pulmonary:      Effort: Pulmonary effort is normal       Breath sounds: Normal breath sounds  Comments: No stridor, no increased work of breathing lungs are otherwise clear  Abdominal:      General: Bowel sounds are normal  There is no distension  Palpations: Abdomen is soft  Tenderness: There is no abdominal tenderness  There is no guarding  Musculoskeletal:         General: Normal range of motion  Cervical back: Normal range of motion and neck supple  Skin:     Capillary Refill: Capillary refill takes less than 2 seconds  Neurological:      Mental Status: He is alert and oriented to person, place, and time  Cranial Nerves: No cranial nerve deficit  Sensory: No sensory deficit  Motor: No abnormal muscle tone  Psychiatric:         Behavior: Behavior normal          Thought Content:  Thought content normal          Judgment: Judgment normal          Vital Signs  ED Triage Vitals   Temperature Pulse Respirations Blood Pressure SpO2   06/22/22 1541 06/22/22 1541 06/22/22 1541 06/22/22 1541 06/22/22 1541   98 1 °F (36 7 °C) 94 17 166/98 96 %      Temp Source Heart Rate Source Patient Position - Orthostatic VS BP Location FiO2 (%)   06/22/22 1541 06/22/22 1715 06/22/22 1541 06/22/22 1541 --   Temporal Monitor Lying Left arm       Pain Score       06/22/22 1541       No Pain           Vitals:    06/23/22 0900 06/23/22 1000 06/23/22 1100 06/23/22 1139   BP: 140/78 145/87 148/81 149/87   Pulse: 75 88 87 92   Patient Position - Orthostatic VS: Lying Lying Lying          Visual Acuity  Visual Acuity    Flowsheet Row Most Recent Value   L Pupil Size (mm) 3   R Pupil Size (mm) 3   L Pupil Shape Round   R Pupil Shape Round          ED Medications  Medications   multi-electrolyte (PLASMALYTE-A/ISOLYTE-S PH 7 4) IV solution (0 mL/hr Intravenous Stopped 6/23/22 0310)   EPINEPHrine PF (ADRENALIN) 1 mg/mL injection 0 5 mg (0 5 mg Intramuscular Given 6/22/22 1548)   methylPREDNISolone sodium succinate (Solu-MEDROL) injection 80 mg (80 mg Intravenous Given 6/22/22 1551)   tranexamic Acid 1,000 mg in sodium chloride 0 9 % 100 mL IVPB (0 mg Intravenous Stopped 6/22/22 1643)   iohexol (OMNIPAQUE) 350 MG/ML injection (MULTI-DOSE) 80 mL (80 mL Intravenous Given 6/22/22 1733)       Diagnostic Studies  Results Reviewed     Procedure Component Value Units Date/Time    Heparin level [[id number]]  (Normal) Collected: 06/22/22 1550    Lab Status: Final result Specimen: Blood Updated: 06/23/22 1103     Heparin Anti-Xa <0 10 IU/mL     Narrative:      Unfractionated heparin (UFH) therapeutic range is 0 30 - 0 70 IU/mL        Low molecular weight heparin (LMWH):   *Lovenox (Enoparin): 0 60-1 1 IU/mL if subcutaneous twice daily administration                        0  50-1 5 IU/mL if subcutaneous once daily administration   *For other Lovenox dosing regimens and for other brands of LMWH, see 's guidelines  *To avoid increased bleeding risk in patients with renal impairment or severe obesity, heparin levels of >1 0 IU/mL should be avoided         Comprehensive metabolic panel [[id number]]  (Abnormal) Collected: 06/23/22 0446    Lab Status: Final result Specimen: Blood from Arm, Left Updated: 06/23/22 0530     Sodium 136 mmol/L      Potassium 4 5 mmol/L      Chloride 98 mmol/L      CO2 28 mmol/L      ANION GAP 10 mmol/L      BUN 20 mg/dL      Creatinine 1 14 mg/dL      Glucose 144 mg/dL      Calcium 8 2 mg/dL      Corrected Calcium 8 8 mg/dL      AST 25 U/L      ALT 29 U/L      Alkaline Phosphatase 72 U/L      Total Protein 6 9 g/dL      Albumin 3 3 g/dL      Total Bilirubin 0 51 mg/dL      eGFR 63 ml/min/1 73sq m     Narrative:      [place] guidelines for Chronic Kidney Disease (CKD):     Stage 1 with normal or high GFR (GFR > 90 mL/min/1 73 square meters)    Stage 2 Mild CKD (GFR = 60-89 mL/min/1 73 square meters)    Stage 3A Moderate CKD (GFR = 45-59 mL/min/1 73 square meters)    Stage 3B Moderate CKD (GFR = 30-44 mL/min/1 73 square meters)    Stage 4 Severe CKD (GFR = 15-29 mL/min/1 73 square meters)    Stage 5 End Stage CKD (GFR <15 mL/min/1 73 square meters)  Note: GFR calculation is accurate only with a steady state creatinine    Magnesium [[id number]]  (Normal) Collected: 06/23/22 0446    Lab Status: Final result Specimen: Blood from Arm, Left Updated: 06/23/22 0530     Magnesium 2 1 mg/dL     Phosphorus [[id number]]  (Abnormal) Collected: 06/23/22 0446    Lab Status: Final result Specimen: Blood from Arm, Left Updated: 06/23/22 0530     Phosphorus 4 2 mg/dL     CBC and differential [[id number]]  (Abnormal) Collected: 06/23/22 0446    Lab Status: Final result Specimen: Blood from Arm, Left Updated: 06/23/22 0509     WBC 12 34 Thousand/uL      RBC 4 88 Million/uL      Hemoglobin 14 2 g/dL      Hematocrit 43 6 %      MCV 89 fL      MCH 29 1 pg      MCHC 32 6 g/dL      RDW 12 7 %      MPV 8 5 fL      Platelets 242 Thousands/uL      nRBC 0 /100 WBCs      Neutrophils Relative 87 %      Immat GRANS % 1 %      Lymphocytes Relative 10 %      Monocytes Relative 2 %      Eosinophils Relative 0 %      Basophils Relative 0 %      Neutrophils Absolute 10 82 Thousands/µL      Immature Grans Absolute 0 11 Thousand/uL      Lymphocytes Absolute 1 20 Thousands/µL      Monocytes Absolute 0 19 Thousand/µL      Eosinophils Absolute 0 00 Thousand/µL      Basophils Absolute 0 02 Thousands/µL     NT-BNP PRO [[id number]]  (Normal) Collected: 06/22/22 1550    Lab Status: Final result Specimen: Blood from Arm, Right Updated: 06/23/22 0012     NT-proBNP 58 pg/mL     Ferritin [[id number]]  (Abnormal) Collected: 06/22/22 1550    Lab Status: Final result Specimen: Blood from Arm, Right Updated: 06/23/22 0012     Ferritin 527 ng/mL     D-dimer, quantitative [[id number]]  (Abnormal) Collected: 06/22/22 1550    Lab Status: Final result Specimen: Blood Updated: 06/22/22 1917     D-Dimer, Quant 12 98 ug/ml FEU     Narrative: In the evaluation for possible pulmonary embolism, in the appropriate (Well's Score of 4 or less) patient, the age adjusted d-dimer cutoff for this patient can be calculated as:    Age x 0 01 (in ug/mL) for Age-adjusted D-dimer exclusion threshold for a patient over 50 years      C-reactive protein [[id number]]  (Abnormal) Collected: 06/22/22 1550    Lab Status: Final result Specimen: Blood from Arm, Right Updated: 06/22/22 1912     CRP 64 2 mg/L     Procalcitonin [[id number]]  (Normal) Collected: 06/22/22 1550    Lab Status: Final result Specimen: Blood from Arm, Right Updated: 06/22/22 1912     Procalcitonin 0 06 ng/ml     CK (with reflex to MB) [[id number]]  (Normal) Collected: 06/22/22 1550    Lab Status: Final result Specimen: Blood from Arm, Right Updated: 06/22/22 1912     Total CK 43 U/L     Hepatic function panel [[id number]]  (Abnormal) Collected: 06/22/22 1550    Lab Status: Final result Specimen: Blood from Arm, Right Updated: 06/22/22 1909     Total Bilirubin 0 56 mg/dL      Bilirubin, Direct 0 02 mg/dL      Alkaline Phosphatase 67 U/L      AST 34 U/L      ALT 29 U/L      Total Protein 7 1 g/dL      Albumin 3 3 g/dL     Magnesium [[id number]]  (Normal) Collected: 06/22/22 1550    Lab Status: Final result Specimen: Blood from Arm, Right Updated: 06/22/22 1909     Magnesium 2 0 mg/dL     Phosphorus [[id number]]  (Normal) Collected: 06/22/22 1550    Lab Status: Final result Specimen: Blood from Arm, Right Updated: 06/22/22 1909     Phosphorus 3 6 mg/dL     Protime-INR [[id number]]  (Normal) Collected: 06/22/22 1550    Lab Status: Final result Specimen: Blood Updated: 06/22/22 1908     Protime 14 0 seconds      INR 1 09    APTT [[id number]]  (Normal) Collected: 06/22/22 1550    Lab Status: Final result Specimen: Blood Updated: 06/22/22 1908     PTT 30 seconds     COVID/FLU/RSV [[id number]]  (Abnormal) Collected: 06/22/22 1739    Lab Status: Final result Specimen: Nares from Nose Updated: 06/22/22 1822     SARS-CoV-2 Positive     INFLUENZA A PCR Negative     INFLUENZA B PCR Negative     RSV PCR Negative    Narrative:      FOR PEDIATRIC PATIENTS - copy/paste COVID Guidelines URL to browser: [email or web address]    SARS-CoV-2 assay is a Nucleic Acid Amplification assay intended for the  qualitative detection of nucleic acid from SARS-CoV-2 in nasopharyngeal  swabs  Results are for the presumptive identification of SARS-CoV-2 RNA  Positive results are indicative of infection with SARS-CoV-2, the virus  causing COVID-19, but do not rule out bacterial infection or co-infection  with other viruses  Laboratories within the [place] and its  territories are required to report all positive results to the appropriate  public health authorities  Negative results do not preclude SARS-CoV-2  infection and should not be used as the sole basis for treatment or other  patient management decisions  Negative results must be combined with  clinical observations, patient history, and epidemiological information  This test has not been FDA cleared or approved  This test has been authorized by FDA under an Emergency Use Authorization  (EUA)  This test is only authorized for the duration of time the  declaration that circumstances exist justifying the authorization of the  emergency use of an in vitro diagnostic tests for detection of SARS-CoV-2  virus and/or diagnosis of COVID-19 infection under section 564(b)(1) of  the Act, 21 U  S C  866FXT-1(F)(9), unless the authorization is terminated  or revoked sooner  The test has been validated but independent review by FDA  and CLIA is pending  Test performed using [employer] GeneXpert: This RT-PCR assay targets N2,  a region unique to SARS-CoV-2   A conserved region in the E-gene was chosen  for pan-Sarbecovirus detection which includes SARS-CoV-2      Basic metabolic panel [[id number]]  (Abnormal) Collected: 06/22/22 1550    Lab Status: Final result Specimen: Blood from Arm, Right Updated: 06/22/22 1618     Sodium 134 mmol/L      Potassium 3 8 mmol/L      Chloride 97 mmol/L      CO2 29 mmol/L      ANION GAP 8 mmol/L      BUN 17 mg/dL      Creatinine 1 17 mg/dL      Glucose 102 mg/dL      Calcium 8 5 mg/dL      eGFR 61 ml/min/1 73sq m     Narrative:      [place] guidelines for Chronic Kidney Disease (CKD):     Stage 1 with normal or high GFR (GFR > 90 mL/min/1 73 square meters)    Stage 2 Mild CKD (GFR = 60-89 mL/min/1 73 square meters)    Stage 3A Moderate CKD (GFR = 45-59 mL/min/1 73 square meters)    Stage 3B Moderate CKD (GFR = 30-44 mL/min/1 73 square meters)    Stage 4 Severe CKD (GFR = 15-29 mL/min/1 73 square meters)    Stage 5 End Stage CKD (GFR <15 mL/min/1 73 square meters)  Note: GFR calculation is accurate only with a steady state creatinine    CBC and differential [[id number]]  (Abnormal) Collected: 06/22/22 1550    Lab Status: Final result Specimen: Blood from Arm, Right Updated: 06/22/22 1604     WBC 13 40 Thousand/uL      RBC 4 94 Million/uL      Hemoglobin 14 3 g/dL      Hematocrit 44 4 %      MCV 90 fL      MCH 28 9 pg      MCHC 32 2 g/dL      RDW 13 1 %      MPV 8 2 fL      Platelets 893 Thousands/uL      nRBC 0 /100 WBCs      Neutrophils Relative 75 %      Immat GRANS % 1 %      Lymphocytes Relative 14 %      Monocytes Relative 9 %      Eosinophils Relative 1 %      Basophils Relative 0 %      Neutrophils Absolute 10 15 Thousands/µL      Immature Grans Absolute 0 07 Thousand/uL      Lymphocytes Absolute 1 88 Thousands/µL      Monocytes Absolute 1 14 Thousand/µL      Eosinophils Absolute 0 11 Thousand/µL      Basophils Absolute 0 05 Thousands/µL                  VAS lower limb venous duplex study, complete bilateral   Final Result by Cordelia Quezada MD (06/23 5448)      CT soft tissue neck w contrast   Final Result by Verna Ortega MD (06/22 1801)      Left submandibular sialoadenitis and surrounding submandibular and sublingual inflammation  No evidence of sialolithiasis, mass or abscess  Workstation performed: [id number]         XR chest portable   Final Result by Tila Lovell MD (06/22 2149)      No acute cardiopulmonary disease  Workstation performed: [id number]                    Procedures  Procedures         ED Course                            Initial Sepsis Screening     Row Name 06/22/22 8808                Is the patient's history suggestive of a new or worsening infection? Yes (Proceed)  -BA        Suspected source of infection pneumonia  URI  -BA        Are two or more of the following signs & symptoms of infection both present and new to the patient? Yes (Proceed)  -BA        Indicate SIRS criteria Tachycardia > 90 bpm;Leukocytosis (WBC > 70335 IJL); Tachypnea > 20 resp per min  -BA        If the answer is yes to both questions, suspicion of sepsis is present --        If severe sepsis is present AND tissue hypoperfusion perists in the hour after fluid resuscitation or lactate > 4, the patient meets criteria for SEPTIC SHOCK --        Are any of the following organ dysfunction criteria present within 6 hours of suspected infection and SIRS criteria that are NOT considered to be chronic conditions?  No  -BA        Organ dysfunction --  -BA        Date of presentation of severe sepsis --        Time of presentation of severe sepsis --        Tissue hypoperfusion persists in the hour after crystalloid fluid administration, evidenced, by either: --        Was hypotension present within one hour of the conclusion of crystalloid fluid administration? --        Date of presentation of septic shock --        Time of presentation of septic shock --              User Key  (r) = Recorded By, (t) = Taken By, (c) = Cosigned By    Initials Name Provider Type [address], CRNP Nurse Practitioner                SBIRT 20yo+    Flowsheet Row Most Recent Value   SBIRT (25 yo +)    In order to provide better care to our patients, we are screening all of our patients for alcohol and drug use  Would it be okay to ask you these screening questions? Yes Filed at: 06/22/2022 1550   Initial Alcohol Screen: US AUDIT-C     1  How often do you have a drink containing alcohol? 0 Filed at: 06/22/2022 1550   2  How many drinks containing alcohol do you have on a typical day you are drinking? 0 Filed at: 06/22/2022 1550   3a  Male UNDER 65: How often do you have five or more drinks on one occasion? 0 Filed at: 06/22/2022 1550   3b  FEMALE Any Age, or MALE 65+: How often do you have 4 or more drinks on one occassion? 0 Filed at: 06/22/2022 1550   Audit-C Score 0 Filed at: 06/22/2022 1550   MARYLOU: How many times in the past year have you    Used an illegal drug or used a prescription medication for non-medical reasons? Never Filed at: 06/22/2022 1550                    MDM  Number of Diagnoses or Management Options  Angioedema, initial encounter  Diagnosis management comments: Patient is a 76-year-old male presents for evaluation tongue swelling  Seems to be relatively rapid in progressive  Epinephrine did not have any affect  Concern for ACE-inhibitor induced angioedema, TXA given discussed with critical care, will admit for further workup and management  Disposition  Final diagnoses:   Angioedema, initial encounter     Time reflects when diagnosis was documented in both MDM as applicable and the Disposition within this note     Time User Action Codes Description Comment    6/22/2022  4:12 PM Gualberto Mcadams 61  3XXA] Angioedema, initial encounter     6/22/2022  5:17 PM Nuvia Greer Add [R22 0] Tongue swelling     6/23/2022 11:20 AM Matthew Ayala Add [I10] Primary hypertension       ED Disposition     ED Disposition   Admit    Condition   Stable    Date/Time Wed Jun 22, 2022  4:12 PM    Comment   Case was discussed with CC and the patient's admission status was agreed to be Admission Status: observation status to the service of Dr Deborah Flores              Follow-up Information     Follow up With Specialties Details Why Contact Info    Kofi Markham DO Internal Medicine Schedule an appointment as soon as possible for a visit in 1 week(s)  [address] [facility] [address]  [phone]      Nephrology  Schedule an appointment as soon as possible for a visit in 1 month(s)            Discharge Medication List as of 6/23/2022 12:04 PM      START taking these medications    Details   amLODIPine (NORVASC) 10 mg tablet Take 1 tablet (10 mg total) by mouth daily, Starting Thu 6/23/2022, Normal         CONTINUE these medications which have NOT CHANGED    Details   aspirin (ECOTRIN LOW STRENGTH) 81 mg EC tablet Take 1 tablet (81 mg total) by mouth daily, Starting Wed 12/11/2019, No Print      calcitriol (ROCALTROL) 0 25 mcg capsule Take 1 capsule (0 25 mcg total) by mouth daily, Starting Mon 4/25/2022, Normal      Calcium Carbonate (CALCIUM 600 PO) Take by mouth Twice per day, Historical Med      Cholecalciferol (VITAMIN D3) 1000 units CAPS Take 1 capsule by mouth in the morning  , Historical Med      Loratadine-Pseudoephedrine (ALAVERT ALLERGY/SINUS PO) Take 1 capsule by mouth if needed, Historical Med      Multiple Vitamin (MULTI-VITAMIN DAILY PO) Take by mouth daily, Historical Med      polymyxin b-trimethoprim (POLYTRIM) ophthalmic solution Administer 1 drop into the left eye every 4 (four) hours, Starting Tue 6/21/2022, Normal      Psyllium (Metamucil) 28 3 % POWD Take 1 Package by mouth in the morning  , Historical Med      tamsulosin (FLOMAX) 0 4 mg Take 1 capsule (0 4 mg total) by mouth daily with dinner, Starting Tue 6/7/2022, Normal         STOP taking these medications       amoxicillin-clavulanate (AUGMENTIN) 875-125 mg per tablet Comments:   Reason for Stopping:         lisinopril (ZESTRIL) 20 mg tablet Comments:   Reason for Stopping:               Outpatient Discharge Orders   Call provider for:  difficulty breathing, headache or visual disturbances       PDMP Review       Value Time User    PDMP Reviewed  Yes 6/23/2022 11:22 AM Shane Quinonez PA-C          ED Provider  Electronically Signed by           Rosana Hudson MD  06/29/22 4493

## 2022-06-29 NOTE — PHYSICIAN ADVISOR
Current patient class: Inpatient  The patient is currently on Hospital Day: 2 at [place] [address]      The patient was admitted to the hospital at  7:26 PM on 6/22/22 for the following diagnosis:  Tongue swelling [R22 0]  Angioedema, initial encounter [T78  3XXA]       There was documentation in the medical record of an expected length of stay of at least 2 midnights  The patient was therefore expected to satisfy the 2 midnight benchmark and given the 2 midnight presumption was appropriate for INPATIENT ADMISSION  Given this expectation of a satisfying stay, CMS instructs us that the patient is most often appropriate for inpatient admission under part A provided medical necessity is documented in the chart  After review of the relevant documentation, labs, vital signs and test results, the patient is appropriate for INPATIENT ADMISSION  Admission to the hospital as an inpatient is a complex decision making process which requires the practitioner to consider the patients presenting complaint, history and physical examination and all relevant testing  With this in mind, in this case, the patient was deemed appropriate for INPATIENT ADMISSION  After review of the documentation and testing available at the time of the admission, I concur with this clinical determination of medical necessity  For the reason noted, the patient was discharged before reaching 2 midnights as an inpatient  Unexpected early recovery  Rationale is as follows: The patient is a 67 yrs old Male who presented to the ED at 6/22/2022  3:39 PM with a chief complaint of Tongue Swelling (Patient reports being treated for a sinus infection  Patient reports being started on an antibiotic yesterday and woke up today with left sided tongue swelling  Patient denies any difficulty breathing at this time ) Pt with 10 days of URI symptoms   He was felt to likely have angioedema from ACE-I but given pharyngitis and laryngitis, CT soft tissue performed  No abscess  Admitted to ICU for airway watch and tongue swelling  Given cough and SOB, CXR and COVID test sent and COVID positive  D-dimer elevated in setting of COVID  LE duplex negative  He briefly required o2 nc for low o2 sats  Procalcitonin negative x2  He was initially started on IV antibiotics  He received decadron and tongue swelling improved and a ble to tolerate oral diet by hospital day 2  Given concern for airway compromise with covid requiring ICU monitoring, IP seemed appropriate upon admission with [facility]  The patients vitals on arrival were   ED Triage Vitals   Temperature Pulse Respirations Blood Pressure SpO2   06/22/22 1541 06/22/22 1541 06/22/22 1541 06/22/22 1541 06/22/22 1541   98 1 °F (36 7 °C) 94 17 166/98 96 %      Temp Source Heart Rate Source Patient Position - Orthostatic VS BP Location FiO2 (%)   06/22/22 1541 06/22/22 1715 06/22/22 1541 06/22/22 1541 --   Temporal Monitor Lying Left arm       Pain Score       06/22/22 1541       No Pain           Past Medical History:   Diagnosis Date    Abdominal pain, acute, generalized     last assessed - [id number]    Actinic keratosis     last assessed - 40Wso1779    Chest pain     last assessed - 74BUQ7677    Chronic kidney disease, stage 3 (HCC)     Colon polyp     Disease of thyroid gland     Dysfunction of both eustachian tubes     suspect due to ETD  Recommend otc allergy meds and if fails then add flonase; last assessed - 89Bcp3894    Edema     Essential hypertension     Fatigue     last assessed - 28OPT2677    Generalized anxiety disorder     GERD (gastroesophageal reflux disease)     Hematuria     last assessed - 99Aox8057    Hypertension     Hypo-osmolality and hyponatremia     Hypocalcemia     Hypoparathyroidism ([facility] [address] )     Hypothyroidism     Lightheadedness     last assessed - 41ONT6864    Lump in neck     ? cause  Maybe a localized allergic reaction, dental issue, doubt sialdenitis, ect  Empiric abx and one dose steroids  Continue benadryl  If worsens, to er or ent   Malaise and fatigue     last assessed - 21Apr2017    Nephropathy     last assessed - 16Sep2015    Nocturia     Proteinuria     Shortness of breath     last assessed - 87ROR3781    Skin lesion     Resolved - 44VYR1580    Tinea corporis     last assessed - 87ENJ3291     Past Surgical History:   Procedure Laterality Date    COLONOSCOPY W/ POLYPECTOMY  07/19/2019    HEAD & NECK SKIN LESION EXCISIONAL BIOPSY      excision of lesion scalp malignant - BCCA; last assessed - 84OHO6131    SKIN BIOPSY      last assessed - 09Sep2014   [facility] THYROID SURGERY      Biopsy thyroid using percutaneous core needle; last assessed - 09Sep2014    TONSILLECTOMY             Consults have been placed to:   None    Vitals:    06/23/22 0900 06/23/22 1000 06/23/22 1100 06/23/22 1139   BP: 140/78 145/87 148/81 149/87   BP Location: Left arm Left arm Left arm    Pulse: 75 88 87 92   Resp: 18 20 20 20   Temp:       TempSrc:       SpO2: 94% 95% 94% 93%   Weight:       Height:           Most recent labs:    No results for input(s): WBC, HGB, HCT, PLT, K, NA, CALCIUM, BUN, CREATININE, LIPASE, AMYLASE, INR, TROPONINI, CKTOTAL, AST, ALT, ALKPHOS, BILITOT in the last 72 hours  Scheduled Meds:  Continuous Infusions:No current facility-administered medications for this encounter  PRN Meds:      Surgical procedures (if appropriate):

## 2022-06-30 ENCOUNTER — RA CDI HCC (OUTPATIENT)
Dept: OTHER | Facility: HOSPITAL | Age: 72
End: 2022-06-30

## 2022-06-30 NOTE — PROGRESS NOTES
[address]  coding opportunities       Chart reviewed, no opportunity found: CHART REVIEWED, NO OPPORTUNITY FOUND        Patients Insurance     Medicare Insurance: Medicare

## 2022-07-06 ENCOUNTER — OFFICE VISIT (OUTPATIENT)
Dept: INTERNAL MEDICINE CLINIC | Facility: CLINIC | Age: 72
End: 2022-07-06
Payer: MEDICARE

## 2022-07-06 VITALS
WEIGHT: 193.6 LBS | DIASTOLIC BLOOD PRESSURE: 74 MMHG | SYSTOLIC BLOOD PRESSURE: 132 MMHG | HEART RATE: 68 BPM | HEIGHT: 74 IN | TEMPERATURE: 98.7 F | BODY MASS INDEX: 24.85 KG/M2 | OXYGEN SATURATION: 98 %

## 2022-07-06 DIAGNOSIS — I10 PRIMARY HYPERTENSION: ICD-10-CM

## 2022-07-06 DIAGNOSIS — H10.32 ACUTE BACTERIAL CONJUNCTIVITIS OF LEFT EYE: ICD-10-CM

## 2022-07-06 DIAGNOSIS — N18.31 STAGE 3A CHRONIC KIDNEY DISEASE (HCC): ICD-10-CM

## 2022-07-06 DIAGNOSIS — T78.3XXA ANGIOEDEMA, INITIAL ENCOUNTER: ICD-10-CM

## 2022-07-06 DIAGNOSIS — R22.0 TONGUE SWELLING: Primary | ICD-10-CM

## 2022-07-06 DIAGNOSIS — U07.1 COVID-19: ICD-10-CM

## 2022-07-06 PROCEDURE — 99495 TRANSJ CARE MGMT MOD F2F 14D: CPT | Performed by: INTERNAL MEDICINE

## 2022-07-06 RX ORDER — KETOROLAC TROMETHAMINE 5 MG/ML
1 SOLUTION OPHTHALMIC 4 TIMES DAILY
Qty: 5 ML | Refills: 0 | Status: SHIPPED | OUTPATIENT
Start: 2022-07-06 | End: 2022-10-27

## 2022-07-06 RX ORDER — MULTIVIT WITH MINERALS/LUTEIN
1000 TABLET ORAL DAILY
COMMUNITY
End: 2022-10-27

## 2022-07-06 NOTE — PROGRESS NOTES
Transition of Care  Follow-up After Hospitalization    Sofía Cintron 67 y o  male   Date:  7/6/2022    TCM Call (since 6/5/2022)     Date and time call was made  6/23/2022 12:03 PM    Hospital care reviewed  Records reviewed    Patient was hospitialized at  [address]    Date of Admission  06/22/22    Date of discharge  06/23/22    Diagnosis  Tongue Swelling    Disposition  Home    Were the patients medications reviewed and updated  Yes    Current Symptoms  None      TCM Call (since 6/5/2022)     Post hospital issues  None    Should patient be enrolled in anticoag monitoring? No    Scheduled for follow up? Yes    Did you obtain your prescribed medications  Yes    Do you need help managing your prescriptions or medications  No    Is transportation to your appointment needed  No    I have advised the patient to call PCP with any new or worsening symptoms  jose steele    Are you recieving any outpatient services  No    Are you recieving home care services  No    Are you using any community resources  No    Current waiver services  No    Have you fallen in the last 12 months  No    Interperter language line needed  No    Counseling  Patient    Counseling topics  Diagnostic results; Prognosis; Importance of RX compliance; Activities of daily living; patient and family education; Risk factor reduction; instructions for management        Hospital records were reviewed  Medications upon discharge reviewed/updated  Discharge Disposition:    Follow up visits with other specialists:       Assessment and Plan:    Franco Cardoso was seen today for transition of care management      Diagnoses and all orders for this visit:    Tongue swelling    Angioedema, initial encounter    Stage 3a chronic kidney disease (HCC)    Acute bacterial conjunctivitis of left eye  -     ketorolac (ACULAR) 0 5 % ophthalmic solution; Administer 1 drop into the left eye 4 (four) times a day    COVID-19 affecting pregnancy in first trimester    Primary hypertension          HPI:  COVID (+) patient that had originally reported to the ER after noting that his tongue had swelled  The patient was on Lisinopril and this was stopped  Incidentally the patient was checked for COVID and was noted to be (+)  The patient states that he has some mild conjunctivitis still  He was had a CT scan that noted Sialadenitis without stones with surrounding soft tissue swelling  The patient no longer takes Lisinorpil      ROS: Review of Systems   Constitutional: Positive for fatigue  Negative for chills and fever  HENT: Negative  Eyes: Positive for discharge and redness  Negative for pain and itching  Respiratory: Positive for chest tightness  Negative for cough and shortness of breath  Cardiovascular: Negative for chest pain and palpitations  Gastrointestinal: Negative for abdominal pain, constipation, diarrhea, nausea and vomiting  Genitourinary: Negative  Musculoskeletal: Negative for back pain and myalgias  Skin: Negative  Neurological: Negative  Psychiatric/Behavioral: Negative for dysphoric mood  The patient is not nervous/anxious  Past Medical History:   Diagnosis Date    Abdominal pain, acute, generalized     last assessed - 21Apr2017    Actinic keratosis     last assessed - 12Jun2013    Chest pain     last assessed - 74VCF6502    Chronic kidney disease, stage 3 ([facility] [address] )     Colon polyp     Disease of thyroid gland     Dysfunction of both eustachian tubes     suspect due to ETD   Recommend otc allergy meds and if fails then add flonase; last assessed - 06Aug2012    Edema     Essential hypertension     Fatigue     last assessed - 43CXC7051    Generalized anxiety disorder     GERD (gastroesophageal reflux disease)     Hematuria     last assessed - 02Aug2012    Hypertension     Hypo-osmolality and hyponatremia     Hypocalcemia     Hypoparathyroidism (HCC)     Hypothyroidism     Lightheadedness     last assessed - 57IRO7735    Lump in neck     ? cause  Maybe a localized allergic reaction, dental issue, doubt sialdenitis, ect  Empiric abx and one dose steroids  Continue benadryl  If worsens, to er or ent   Malaise and fatigue     last assessed - 86Xpb1308    Nephropathy     last assessed - 73Vqr6085    Nocturia     Proteinuria     Shortness of breath     last assessed - 08SMD3233    Skin lesion     Resolved - 97UGX1096    Tinea corporis     last assessed - 00VXY5177       Past Surgical History:   Procedure Laterality Date    COLONOSCOPY W/ POLYPECTOMY  07/19/2019    HEAD & NECK SKIN LESION EXCISIONAL BIOPSY      excision of lesion scalp malignant - BCCA; last assessed - 73KKI2020    SKIN BIOPSY      last assessed - 09Sep2014   [facility] THYROID SURGERY      Biopsy thyroid using percutaneous core needle; last assessed - 09Sep2014    TONSILLECTOMY         Social History     Socioeconomic History    Marital status: /Civil Union     Spouse name: None    Number of children: None    Years of education: None    Highest education level: None   Occupational History    Occupation: Retired     Comment:     Tobacco Use    Smoking status: Never Smoker    Smokeless tobacco: Never Used   Vaping Use    Vaping Use: Never used   Substance and Sexual Activity    Alcohol use: Yes     Comment: rarely    Drug use: No    Sexual activity: Not Currently   Other Topics Concern    None   Social History Narrative    Consumes on average 1 cup of regular coffee per day      Social Determinants of Health     Financial Resource Strain: Not on file   Food Insecurity: No Food Insecurity    Worried About Running Out of Food in the Last Year: Never true    Cat of Food in the Last Year: Never true   Transportation Needs: No Transportation Needs    Lack of Transportation (Medical): No    Lack of Transportation (Non-Medical):  No   Physical Activity: Not on file   Stress: Not on file   Social Connections: Not on file   Intimate Partner Violence: Not on file   Housing Stability: [address] Unable to Pay for Housing in the Last Year: No    Number of Places Lived in the Last Year: 1    Unstable Housing in the Last Year: No       Family History   Problem Relation Age of Onset    Hypertension Mother     Hypertension Father     Diabetes Father     Other Brother         Schwannomatosis    Stroke Family        Allergies   Allergen Reactions    Lisinopril Anaphylaxis    Pollen Extract Sneezing         Current Outpatient Medications:     amLODIPine (NORVASC) 10 mg tablet, Take 1 tablet (10 mg total) by mouth daily, Disp: 30 tablet, Rfl: 0    Ascorbic Acid (vitamin C) 1000 MG tablet, Take 1,000 mg by mouth daily, Disp: , Rfl:     aspirin (ECOTRIN LOW STRENGTH) 81 mg EC tablet, Take 1 tablet (81 mg total) by mouth daily, Disp: , Rfl:     calcitriol (ROCALTROL) 0 25 mcg capsule, Take 1 capsule (0 25 mcg total) by mouth daily, Disp: 90 capsule, Rfl: 1    Calcium Carbonate (CALCIUM 600 PO), Take by mouth Twice per day, Disp: , Rfl:     Cholecalciferol (VITAMIN D3) 1000 units CAPS, Take 1 capsule by mouth in the morning  , Disp: , Rfl:     ketorolac (ACULAR) 0 5 % ophthalmic solution, Administer 1 drop into the left eye 4 (four) times a day, Disp: 5 mL, Rfl: 0    Loratadine-Pseudoephedrine (ALAVERT ALLERGY/SINUS PO), Take 1 capsule by mouth if needed, Disp: , Rfl:     Multiple Vitamin (MULTI-VITAMIN DAILY PO), Take by mouth daily, Disp: , Rfl:     polymyxin b-trimethoprim (POLYTRIM) ophthalmic solution, Administer 1 drop into the left eye every 4 (four) hours, Disp: 10 mL, Rfl: 0    Psyllium (Metamucil) 28 3 % POWD, Take 1 Package by mouth as needed, Disp: , Rfl:     tamsulosin (FLOMAX) 0 4 mg, Take 1 capsule (0 4 mg total) by mouth daily with dinner, Disp: 90 capsule, Rfl: 3      Physical Exam:  /74   Pulse 68   Temp 98 7 °F (37 1 °C) (Tympanic)   Ht 6' 2" (1 88 m)   Wt 87 8 kg (193 lb 9 6 oz)   SpO2 98% BMI 24 86 kg/m²     Physical Exam        Labs:  Lab Results   Component Value Date    WBC 12 34 (H) 06/23/2022    HGB 14 2 06/23/2022    HCT 43 6 06/23/2022    MCV 89 06/23/2022     (H) 06/23/2022     Lab Results   Component Value Date     12/15/2015    K 4 5 06/23/2022    CL 98 06/23/2022    CO2 28 06/23/2022    ANIONGAP 7 12/15/2015    BUN 20 06/23/2022    CREATININE 1 14 06/23/2022    GLUCOSE 99 12/15/2015    GLUF 100 (H) 03/01/2022    CALCIUM 8 2 (L) 06/23/2022    CORRECTEDCA 8 8 06/23/2022    AST 25 06/23/2022    ALT 29 06/23/2022    ALKPHOS 72 06/23/2022    PROT 7 8 09/16/2015    BILITOT 0 51 09/16/2015    EGFR 63 06/23/2022

## 2022-07-06 NOTE — ASSESSMENT & PLAN NOTE
Lab Results   Component Value Date    EGFR 63 06/23/2022    EGFR 61 06/22/2022    EGFR 50 03/01/2022    CREATININE 1 14 06/23/2022    CREATININE 1 17 06/22/2022    CREATININE 1 38 (H) 03/01/2022   No change and continue to follow renal function

## 2022-07-06 NOTE — ASSESSMENT & PLAN NOTE
BP elevated today and no change in Amlodipine  Previously Lisinopril was discontinued in the hospital

## 2022-07-13 ENCOUNTER — OFFICE VISIT (OUTPATIENT)
Dept: NEPHROLOGY | Facility: CLINIC | Age: 72
End: 2022-07-13
Payer: MEDICARE

## 2022-07-13 VITALS
SYSTOLIC BLOOD PRESSURE: 110 MMHG | WEIGHT: 196 LBS | DIASTOLIC BLOOD PRESSURE: 74 MMHG | OXYGEN SATURATION: 96 % | BODY MASS INDEX: 25.15 KG/M2 | HEIGHT: 74 IN | HEART RATE: 93 BPM

## 2022-07-13 DIAGNOSIS — I12.9 BENIGN HYPERTENSION WITH CKD (CHRONIC KIDNEY DISEASE) STAGE III (HCC): ICD-10-CM

## 2022-07-13 DIAGNOSIS — Z88.8 ALLERGY TO ACE INHIBITORS: ICD-10-CM

## 2022-07-13 DIAGNOSIS — R22.0 TONGUE SWELLING: ICD-10-CM

## 2022-07-13 DIAGNOSIS — R80.1 PERSISTENT PROTEINURIA: ICD-10-CM

## 2022-07-13 DIAGNOSIS — N18.31 STAGE 3A CHRONIC KIDNEY DISEASE (HCC): Primary | ICD-10-CM

## 2022-07-13 DIAGNOSIS — N28.1 KIDNEY CYST, ACQUIRED: ICD-10-CM

## 2022-07-13 DIAGNOSIS — N18.30 BENIGN HYPERTENSION WITH CKD (CHRONIC KIDNEY DISEASE) STAGE III (HCC): ICD-10-CM

## 2022-07-13 PROCEDURE — 99214 OFFICE O/P EST MOD 30 MIN: CPT | Performed by: NURSE PRACTITIONER

## 2022-07-13 NOTE — PROGRESS NOTES
Nephrology   Office Follow-Up  Dylon Patel [place] 67 y o  male MRN: [id number]    Encounter: [id number]        Dav Smiley was seen in the [place] office today  All diagnoses and orders for visit:     1  Stage 3a chronic kidney disease ([address] )  · Baseline creatinine around 1 1-1 3 mg/dL dating back to 2016  Lisinopril was discontinued due to angioedema  See below regarding proteinuria and blood pressure  He will continue to focus on avoiding nephrotoxins, blood pressure control, low sodium diet with increased dietary potassium  Repeat lab work in November prior to appointment with Dr Brittney Bradshaw  Repeat kidney ultrasound for complex kidney cyst in August     -     Comprehensive metabolic panel; Future; Expected date: 11/01/2022   -     CBC and differential; Future; Expected date: 11/01/2022   -     Microalbumin / creatinine urine ratio; Future; Expected date: 11/01/2022   -     Urinalysis with microscopic; Future; Expected date: 11/01/2022   -     Protein / creatinine ratio, urine; Future; Expected date: 11/01/2022  2  Tongue swelling  · Attributed to lisinopril and this medication was discontinued  No edema noted today  3  Benign hypertension with CKD (chronic kidney disease) stage III (HCC)  · Amlodipine 10 mg daily started during hospital stay  Goal BP < 130/80 mmHg  If blood pressure consistently below goal will try to decrease to 5 mg daily to minimize any potential side effects from maximum dosed calcium channel blocker   4  Kidney cyst, acquired  · Repeat ultrasound next month  5  Persistent proteinuria  · Will monitor with removal of ace inhibitor  Will leave decision to initiate angiotensin receptor blocker versus other alternative to treat proteinuria to primary nephrologist expertise  6  Allergy to ACE inhibitors      HPI: Ten Burger is a 67 y o  male  Here for hospital follow-up      Pertinent medical problems include CKD 3a, BPH, hypertension, hyperlipidemia, hypoparathyroidism followed by endocrinology, proteinuria  Sanna Middleton was hospitalized in June of this year for angioedema attributed to lisinopril use and incidentally found COVID19 positive  Briefly treated with antibiotics and decadron  Amlodipine 10 mg daily initiated and discharged in stable condition with resolution of angioedema  Prior to hospitalization he was treated with Augmentin for suspected upper airway infection  Stable from a nephrology perspective and creatinine better than typical likely due to removal of ace inhibitor  Plan to decrease amlodipine to 5 mg daily if blood pressure consistently <125/70 mmHg at home to prevent side effects of maximum dose calcium channel blocker  Blood work and urine protein studies to be repeated in 3-4 months  Can consider angiotensin receptor blocker but will leave to primary nephrologist's expertise  Patient agreeable with plan  ROS:   Review of Systems   Constitutional: Negative for chills and fever  HENT: Negative for ear pain and sore throat  Eyes: Negative for pain and visual disturbance  Respiratory: Negative for cough and shortness of breath  Cardiovascular: Negative for chest pain and palpitations  Gastrointestinal: Negative for abdominal pain and vomiting  Genitourinary: Negative for dysuria and hematuria  Musculoskeletal: Negative for arthralgias and back pain  Skin: Negative for color change and rash  Neurological: Positive for weakness  Negative for seizures and syncope  All other systems reviewed and are negative        Allergies: Lisinopril and Pollen extract    Medications:   Current Outpatient Medications:     amLODIPine (NORVASC) 10 mg tablet, Take 1 tablet (10 mg total) by mouth daily, Disp: 30 tablet, Rfl: 0    Ascorbic Acid (vitamin C) 1000 MG tablet, Take 1,000 mg by mouth daily, Disp: , Rfl:     aspirin (ECOTRIN LOW STRENGTH) 81 mg EC tablet, Take 1 tablet (81 mg total) by mouth daily, Disp: , Rfl:     calcitriol (ROCALTROL) 0 25 mcg capsule, Take 1 capsule (0 25 mcg total) by mouth daily, Disp: 90 capsule, Rfl: 1    Calcium Carbonate (CALCIUM 600 PO), Take by mouth Twice per day, Disp: , Rfl:     Cholecalciferol (VITAMIN D3) 1000 units CAPS, Take 1 capsule by mouth in the morning  , Disp: , Rfl:     ketorolac (ACULAR) 0 5 % ophthalmic solution, Administer 1 drop into the left eye 4 (four) times a day, Disp: 5 mL, Rfl: 0    Loratadine-Pseudoephedrine (ALAVERT ALLERGY/SINUS PO), Take 1 capsule by mouth if needed, Disp: , Rfl:     Multiple Vitamin (MULTI-VITAMIN DAILY PO), Take by mouth daily, Disp: , Rfl:     Psyllium (Metamucil) 28 3 % POWD, Take 1 Package by mouth as needed, Disp: , Rfl:     tamsulosin (FLOMAX) 0 4 mg, Take 1 capsule (0 4 mg total) by mouth daily with dinner, Disp: 90 capsule, Rfl: 3    polymyxin b-trimethoprim (POLYTRIM) ophthalmic solution, Administer 1 drop into the left eye every 4 (four) hours (Patient not taking: Reported on 7/13/2022), Disp: 10 mL, Rfl: 0    Past Medical History:   Diagnosis Date    Abdominal pain, acute, generalized     last assessed - [id number]    Actinic keratosis     last assessed - 12Jun2013    Chest pain     last assessed - 10QJD6834    Chronic kidney disease, stage 3 (HCC)     Colon polyp     Disease of thyroid gland     Dysfunction of both eustachian tubes     suspect due to ETD  Recommend otc allergy meds and if fails then add flonase; last assessed - 94Vdt2998    Edema     Essential hypertension     Fatigue     last assessed - 73OPJ1510    Generalized anxiety disorder     GERD (gastroesophageal reflux disease)     Hematuria     last assessed - 93Jzs0742    Hypertension     Hypo-osmolality and hyponatremia     Hypocalcemia     Hypoparathyroidism ([address] )     Hypothyroidism     Lightheadedness     last assessed - 24TJO2389    Lump in neck     ? cause  Maybe a localized allergic reaction, dental issue, doubt sialdenitis, ect   Empiric abx and one dose steroids  Continue benadryl  If worsens, to er or ent   Malaise and fatigue     last assessed - 72Vrf6640    Nephropathy     last assessed - 76Szo3107    Nocturia     Proteinuria     Shortness of breath     last assessed - 82ELD0361    Skin lesion     Resolved - 81VJL4717    Tinea corporis     last assessed - 65LOD9644     Past Surgical History:   Procedure Laterality Date    COLONOSCOPY W/ POLYPECTOMY  07/19/2019    HEAD & NECK SKIN LESION EXCISIONAL BIOPSY      excision of lesion scalp malignant - BCCA; last assessed - 03AFR7191    SKIN BIOPSY      last assessed - 57Txs4878   [facility] THYROID SURGERY      Biopsy thyroid using percutaneous core needle; last assessed - 43Utd5993    TONSILLECTOMY       Family History   Problem Relation Age of Onset    Hypertension Mother     Hypertension Father     Diabetes Father     Other Brother         Schwannomatosis    Stroke Family       reports that he has never smoked  He has never used smokeless tobacco  He reports current alcohol use  He reports that he does not use drugs  Physical Exam:   Vitals:    07/13/22 0923   BP: 110/74   BP Location: Left arm   Patient Position: Sitting   Cuff Size: Standard   Pulse: 93   SpO2: 96%   Weight: 88 9 kg (196 lb)   Height: 6' 2" (1 88 m)     Body mass index is 25 16 kg/m²  General: conscious, cooperative, in no acute distress, appears stated age  Eyes: conjunctivae pale, anicteric sclerae  ENT: lips and mucous membranes moist  Neck: supple, no JVD, no masses  Chest:  essentially clear breath sounds bilaterally, no crackles, ronchus or wheezings  CVS: S1 & S2, normal rate, regular rhythm  Abdomen: soft, non-tender, non-distended, normoactive bowel sounds, rounded  Extremities: no edema of both legs  Skin: no rash   Neuro: awake, alert, oriented       Diagnostic Data:  Lab: I have personally reviewed pertinent lab results  ,   CBC:       CMP: No results found for: SODIUM, K, CL, CO2, ANIONGAP, BUN, CREATININE, GLUCOSE, CALCIUM, AST, ALT, ALKPHOS, PROT, BILITOT, EGFR,   PT/INR: No results found for: PT, INR,   Magnesium: No components found for: MAG,  Phosphorous: No results found for: PHOS    Patient Instructions   Check your blood pressure a few times a week and call in readings  Can decrease amlodipine to 5 mg daily if your blood pressure is consistently less than around 125/80  Lab work in November  Schedule kidney ultrasound for August      Portions of the record may have been created with voice recognition software  Occasional wrong word or "sound a like" substitutions may have occurred due to the inherent limitations of voice recognition software  Read the chart carefully and recognize, using context, where substitutions have occurred  If you have any questions, please contact the dictating provider

## 2022-07-22 DIAGNOSIS — I10 PRIMARY HYPERTENSION: ICD-10-CM

## 2022-07-22 RX ORDER — AMLODIPINE BESYLATE 10 MG/1
10 TABLET ORAL DAILY
Qty: 30 TABLET | Refills: 0 | Status: SHIPPED | OUTPATIENT
Start: 2022-07-22 | End: 2022-08-19 | Stop reason: SDUPTHER

## 2022-08-19 DIAGNOSIS — I10 PRIMARY HYPERTENSION: ICD-10-CM

## 2022-08-19 RX ORDER — AMLODIPINE BESYLATE 10 MG/1
10 TABLET ORAL DAILY
Qty: 30 TABLET | Refills: 5 | Status: SHIPPED | OUTPATIENT
Start: 2022-08-19

## 2022-08-22 ENCOUNTER — HOSPITAL ENCOUNTER (OUTPATIENT)
Dept: ULTRASOUND IMAGING | Facility: HOSPITAL | Age: 72
Discharge: HOME | End: 2022-08-22
Payer: MEDICARE

## 2022-08-22 DIAGNOSIS — N28.1 RENAL CYST, LEFT: ICD-10-CM

## 2022-08-22 PROCEDURE — 76770 US EXAM ABDO BACK WALL COMP: CPT

## 2022-08-26 DIAGNOSIS — R93.89 ABNORMAL ULTRASOUND OF PROSTATE: Primary | ICD-10-CM

## 2022-09-08 ENCOUNTER — OFFICE VISIT (OUTPATIENT)
Dept: INTERNAL MEDICINE CLINIC | Facility: CLINIC | Age: 72
End: 2022-09-08
Payer: MEDICARE

## 2022-09-08 ENCOUNTER — APPOINTMENT (OUTPATIENT)
Dept: LAB | Facility: CLINIC | Age: 72
End: 2022-09-08
Payer: MEDICARE

## 2022-09-08 VITALS
HEIGHT: 74 IN | OXYGEN SATURATION: 98 % | BODY MASS INDEX: 25.81 KG/M2 | WEIGHT: 201.13 LBS | HEART RATE: 91 BPM | DIASTOLIC BLOOD PRESSURE: 76 MMHG | SYSTOLIC BLOOD PRESSURE: 124 MMHG | TEMPERATURE: 97.2 F

## 2022-09-08 DIAGNOSIS — K59.00 CONSTIPATION, UNSPECIFIED CONSTIPATION TYPE: ICD-10-CM

## 2022-09-08 DIAGNOSIS — I10 PRIMARY HYPERTENSION: Primary | ICD-10-CM

## 2022-09-08 DIAGNOSIS — I12.9 BENIGN HYPERTENSION WITH CKD (CHRONIC KIDNEY DISEASE) STAGE III (HCC): ICD-10-CM

## 2022-09-08 DIAGNOSIS — N18.30 BENIGN HYPERTENSION WITH CKD (CHRONIC KIDNEY DISEASE) STAGE III (HCC): ICD-10-CM

## 2022-09-08 DIAGNOSIS — F41.1 GENERALIZED ANXIETY DISORDER: ICD-10-CM

## 2022-09-08 DIAGNOSIS — R80.9 MICROALBUMINURIA: ICD-10-CM

## 2022-09-08 DIAGNOSIS — E20.9 HYPOPARATHYROIDISM, UNSPECIFIED HYPOPARATHYROIDISM TYPE (HCC): ICD-10-CM

## 2022-09-08 DIAGNOSIS — H91.92 HEARING LOSS OF LEFT EAR, UNSPECIFIED HEARING LOSS TYPE: ICD-10-CM

## 2022-09-08 DIAGNOSIS — K21.9 GERD WITHOUT ESOPHAGITIS: ICD-10-CM

## 2022-09-08 DIAGNOSIS — E83.51 HYPOCALCEMIA: ICD-10-CM

## 2022-09-08 DIAGNOSIS — Z23 ENCOUNTER FOR VACCINATION: ICD-10-CM

## 2022-09-08 DIAGNOSIS — N18.31 STAGE 3A CHRONIC KIDNEY DISEASE (HCC): ICD-10-CM

## 2022-09-08 DIAGNOSIS — E78.2 MIXED HYPERLIPIDEMIA: ICD-10-CM

## 2022-09-08 PROBLEM — A41.9 SEPSIS (HCC): Status: RESOLVED | Noted: 2022-06-22 | Resolved: 2022-09-08

## 2022-09-08 PROBLEM — R22.0 TONGUE SWELLING: Status: RESOLVED | Noted: 2022-06-22 | Resolved: 2022-09-08

## 2022-09-08 PROBLEM — R06.89 ACUTE RESPIRATORY INSUFFICIENCY: Status: RESOLVED | Noted: 2022-06-22 | Resolved: 2022-09-08

## 2022-09-08 PROBLEM — R79.89 ELEVATED D-DIMER: Status: RESOLVED | Noted: 2022-06-22 | Resolved: 2022-09-08

## 2022-09-08 LAB
25(OH)D3 SERPL-MCNC: 89.6 NG/ML (ref 30–100)
ALBUMIN SERPL BCP-MCNC: 3.6 G/DL (ref 3.5–5)
ALP SERPL-CCNC: 72 U/L (ref 46–116)
ALT SERPL W P-5'-P-CCNC: 21 U/L (ref 12–78)
ANION GAP SERPL CALCULATED.3IONS-SCNC: 4 MMOL/L (ref 4–13)
AST SERPL W P-5'-P-CCNC: 20 U/L (ref 5–45)
BACTERIA UR QL AUTO: ABNORMAL /HPF
BASOPHILS # BLD AUTO: 0.07 THOUSANDS/ΜL (ref 0–0.1)
BASOPHILS NFR BLD AUTO: 1 % (ref 0–1)
BILIRUB SERPL-MCNC: 0.53 MG/DL (ref 0.2–1)
BILIRUB UR QL STRIP: NEGATIVE
BUN SERPL-MCNC: 16 MG/DL (ref 5–25)
CALCIUM SERPL-MCNC: 9.1 MG/DL (ref 8.3–10.1)
CHLORIDE SERPL-SCNC: 104 MMOL/L (ref 96–108)
CLARITY UR: CLEAR
CO2 SERPL-SCNC: 29 MMOL/L (ref 21–32)
COLOR UR: YELLOW
CREAT SERPL-MCNC: 1.39 MG/DL (ref 0.6–1.3)
CREAT UR-MCNC: 276 MG/DL
CREAT UR-MCNC: 276 MG/DL
EOSINOPHIL # BLD AUTO: 0.18 THOUSAND/ΜL (ref 0–0.61)
EOSINOPHIL NFR BLD AUTO: 3 % (ref 0–6)
ERYTHROCYTE [DISTWIDTH] IN BLOOD BY AUTOMATED COUNT: 13.5 % (ref 11.6–15.1)
GFR SERPL CREATININE-BSD FRML MDRD: 49 ML/MIN/1.73SQ M
GLUCOSE P FAST SERPL-MCNC: 96 MG/DL (ref 65–99)
GLUCOSE UR STRIP-MCNC: NEGATIVE MG/DL
HCT VFR BLD AUTO: 47.2 % (ref 36.5–49.3)
HGB BLD-MCNC: 15.2 G/DL (ref 12–17)
HGB UR QL STRIP.AUTO: NEGATIVE
HYALINE CASTS #/AREA URNS LPF: ABNORMAL /LPF
IMM GRANULOCYTES # BLD AUTO: 0.01 THOUSAND/UL (ref 0–0.2)
IMM GRANULOCYTES NFR BLD AUTO: 0 % (ref 0–2)
KETONES UR STRIP-MCNC: NEGATIVE MG/DL
LDLC SERPL DIRECT ASSAY-MCNC: 107 MG/DL (ref 0–100)
LEUKOCYTE ESTERASE UR QL STRIP: ABNORMAL
LYMPHOCYTES # BLD AUTO: 1.4 THOUSANDS/ΜL (ref 0.6–4.47)
LYMPHOCYTES NFR BLD AUTO: 23 % (ref 14–44)
MAGNESIUM SERPL-MCNC: 2.4 MG/DL (ref 1.6–2.6)
MCH RBC QN AUTO: 28.8 PG (ref 26.8–34.3)
MCHC RBC AUTO-ENTMCNC: 32.2 G/DL (ref 31.4–37.4)
MCV RBC AUTO: 89 FL (ref 82–98)
MICROALBUMIN UR-MCNC: 99.3 MG/L (ref 0–20)
MICROALBUMIN/CREAT 24H UR: 36 MG/G CREATININE (ref 0–30)
MONOCYTES # BLD AUTO: 0.5 THOUSAND/ΜL (ref 0.17–1.22)
MONOCYTES NFR BLD AUTO: 8 % (ref 4–12)
NEUTROPHILS # BLD AUTO: 4.07 THOUSANDS/ΜL (ref 1.85–7.62)
NEUTS SEG NFR BLD AUTO: 65 % (ref 43–75)
NITRITE UR QL STRIP: NEGATIVE
NON-SQ EPI CELLS URNS QL MICRO: ABNORMAL /HPF
NRBC BLD AUTO-RTO: 0 /100 WBCS
PH UR STRIP.AUTO: 5.5 [PH]
PHOSPHATE SERPL-MCNC: 3.8 MG/DL (ref 2.3–4.1)
PLATELET # BLD AUTO: 299 THOUSANDS/UL (ref 149–390)
PMV BLD AUTO: 9.6 FL (ref 8.9–12.7)
POTASSIUM SERPL-SCNC: 4.5 MMOL/L (ref 3.5–5.3)
PROT SERPL-MCNC: 7.8 G/DL (ref 6.4–8.4)
PROT UR STRIP-MCNC: ABNORMAL MG/DL
PROT UR-MCNC: 27 MG/DL
PROT/CREAT UR: 0.1 MG/G{CREAT} (ref 0–0.1)
PTH-INTACT SERPL-MCNC: <6.3 PG/ML (ref 18.4–80.1)
RBC # BLD AUTO: 5.28 MILLION/UL (ref 3.88–5.62)
RBC #/AREA URNS AUTO: ABNORMAL /HPF
SODIUM SERPL-SCNC: 137 MMOL/L (ref 135–147)
SP GR UR STRIP.AUTO: 1.02 (ref 1–1.03)
TRIGL SERPL-MCNC: 119 MG/DL
UROBILINOGEN UR STRIP-ACNC: <2 MG/DL
WBC # BLD AUTO: 6.23 THOUSAND/UL (ref 4.31–10.16)
WBC #/AREA URNS AUTO: ABNORMAL /HPF

## 2022-09-08 PROCEDURE — 90662 IIV NO PRSV INCREASED AG IM: CPT | Performed by: INTERNAL MEDICINE

## 2022-09-08 PROCEDURE — 84156 ASSAY OF PROTEIN URINE: CPT

## 2022-09-08 PROCEDURE — 85025 COMPLETE CBC W/AUTO DIFF WBC: CPT

## 2022-09-08 PROCEDURE — 83735 ASSAY OF MAGNESIUM: CPT

## 2022-09-08 PROCEDURE — 83721 ASSAY OF BLOOD LIPOPROTEIN: CPT

## 2022-09-08 PROCEDURE — 82043 UR ALBUMIN QUANTITATIVE: CPT

## 2022-09-08 PROCEDURE — G0008 ADMIN INFLUENZA VIRUS VAC: HCPCS | Performed by: INTERNAL MEDICINE

## 2022-09-08 PROCEDURE — 99214 OFFICE O/P EST MOD 30 MIN: CPT | Performed by: INTERNAL MEDICINE

## 2022-09-08 PROCEDURE — 82570 ASSAY OF URINE CREATININE: CPT

## 2022-09-08 PROCEDURE — 81001 URINALYSIS AUTO W/SCOPE: CPT

## 2022-09-08 PROCEDURE — 84100 ASSAY OF PHOSPHORUS: CPT

## 2022-09-08 PROCEDURE — 80053 COMPREHEN METABOLIC PANEL: CPT

## 2022-09-08 PROCEDURE — 82306 VITAMIN D 25 HYDROXY: CPT

## 2022-09-08 PROCEDURE — 84478 ASSAY OF TRIGLYCERIDES: CPT

## 2022-09-08 PROCEDURE — 83970 ASSAY OF PARATHORMONE: CPT

## 2022-09-08 PROCEDURE — 36415 COLL VENOUS BLD VENIPUNCTURE: CPT

## 2022-09-08 NOTE — PATIENT INSTRUCTIONS
Chronic Hypertension   AMBULATORY CARE:   Hypertension  is high blood pressure  Your blood pressure is the force of your blood moving against the walls of your arteries  Hypertension causes your blood pressure to get so high that your heart has to work much harder than normal  This can damage your heart  Even if you have hypertension for years, lifestyle changes, medicines, or both can help bring your blood pressure to normal   Call your local emergency number (911 in the [address] [facility]) or have someone call if:   You have chest pain  You have any of the following signs of a heart attack:      Squeezing, pressure, or pain in your chest    You may  also have any of the following:     Discomfort or pain in your back, neck, jaw, stomach, or arm    Shortness of breath    Nausea or vomiting    Lightheadedness or a sudden cold sweat    You become confused or have difficulty speaking  You suddenly feel lightheaded or have trouble breathing  Seek care immediately if:   You have a severe headache or vision loss  You have weakness in an arm or leg  Call your doctor or cardiologist if:   You feel faint, dizzy, confused, or drowsy  You have been taking your blood pressure medicine but your pressure is higher than your provider says it should be  You have questions or concerns about your condition or care  Treatment for chronic hypertension  may include medicine to lower your blood pressure and cholesterol levels  A low cholesterol level helps prevent heart disease and makes it easier to control your blood pressure  Heart disease can make your blood pressure harder to control  You may also need to make lifestyle changes  What you need to know about the stages of hypertension:       Normal blood pressure is 119/79 or lower   Your healthcare provider may only check your blood pressure each year if it stays at a normal level  Elevated blood pressure is 120/79 to 129/79   This is sometimes called prehypertension  Your healthcare provider may suggest lifestyle changes to help lower your blood pressure to a normal level  He or she may then check it again in 3 to 6 months  Stage 1 hypertension is 130/80  to 139/89   Your provider may recommend lifestyle changes, medication, and checks every 3 to 6 months until your blood pressure is controlled  Stage 2 hypertension is 140/90 or higher   Your provider will recommend lifestyle changes and have you take 2 kinds of hypertension medicines  You will also need to have your blood pressure checked monthly until it is controlled  Manage chronic hypertension:   Check your blood pressure at home  Avoid smoking, caffeine, and exercise at least 30 minutes before checking your blood pressure  Sit and rest for 5 minutes before you take your blood pressure  Extend your arm and support it on a flat surface  Your arm should be at the same level as your heart  Follow the directions that came with your blood pressure monitor  Check your blood pressure 2 times, 1 minute apart, before you take your medicine in the morning  Also check your blood pressure before your evening meal  Keep a record of your readings and bring it to your follow-up visits  Ask your healthcare provider what your blood pressure should be  Manage any other health conditions you have  Health conditions such as diabetes can increase your risk for hypertension  Follow your healthcare provider's instructions and take all your medicines as directed  Talk to your healthcare provider about any new health conditions you have recently developed  Ask about all medicines  Certain medicines can increase your blood pressure  Examples include oral birth control pills, decongestants, herbal supplements, and NSAIDs, such as ibuprofen  Your healthcare provider can tell you which medicines are safe for you to take  This includes prescription and over-the-counter medicines      Lifestyle changes you can make to lower your blood pressure: Your provider may want you to make more lifestyle changes if you are having trouble controlling your blood pressure  This may feel difficult over time, especially if you think you are making good changes but your pressure is still high  It might help to focus on one new change at a time  For example, try to add 1 more day of exercise, or exercise for an extra 10 minutes on 2 days  Small changes can make a big difference  Your healthcare provider can also refer you to specialists such as a dietitian who can help you make small changes  Your family members may be included in helping you learn to create lifestyle changes, such as the following:     Limit sodium (salt) as directed  Too much sodium can affect your fluid balance  Check labels to find low-sodium or no-salt-added foods  Some low-sodium foods use potassium salts for flavor  Too much potassium can also cause health problems  Your healthcare provider will tell you how much sodium and potassium are safe for you to have in a day  He or she may recommend that you limit sodium to 2,300 mg a day  Follow the meal plan recommended by your healthcare provider  A dietitian or your provider can give you more information on low-sodium plans or the DASH (Dietary Approaches to Stop Hypertension) eating plan  The DASH plan is low in sodium, processed sugar, unhealthy fats, and total fat  It is high in potassium, calcium, and fiber  These can be found in vegetables, fruit, and whole-grain foods  Be physically active throughout the day  Physical activity, such as exercise, can help control your blood pressure and your weight  Be physically active for at least 30 minutes per day, on most days of the week  Include aerobic activity, such as walking or riding a bicycle  Also include strength training at least 2 times each week  Your healthcare providers can help you create a physical activity plan  Decrease stress    This may help lower your blood pressure  Learn ways to relax, such as deep breathing or listening to music  Limit alcohol as directed  Alcohol can increase your blood pressure  A drink of alcohol is 12 ounces of beer, 5 ounces of wine, or 1½ ounces of liquor  Do not smoke  Nicotine and other chemicals in cigarettes and cigars can increase your blood pressure and also cause lung damage  Ask your healthcare provider for information if you currently smoke and need help to quit  E-cigarettes or smokeless tobacco still contain nicotine  Talk to your healthcare provider before you use these products  Follow up with your doctor or cardiologist as directed: You will need to return to have your blood pressure checked and to have other lab tests done  Write down your questions so you remember to ask them during your visits  © Copyright [employer] 2022 Information is for End User's use only and may not be sold, redistributed or otherwise used for commercial purposes  All illustrations and images included in CareNotes® are the copyrighted property of A D A M , Inc  or [facility] Jerrell Ma   The above information is an  only  It is not intended as medical advice for individual conditions or treatments  Talk to your doctor, nurse or pharmacist before following any medical regimen to see if it is safe and effective for you

## 2022-09-08 NOTE — PROGRESS NOTES
Assessment/Plan:  Problem List Items Addressed This Visit        Digestive    GERD without esophagitis       Endocrine    Hypoparathyroidism ([facility] [address] )    Relevant Orders    PTH, intact       Cardiovascular and Mediastinum    Hypertension - Primary    Benign hypertension with CKD (chronic kidney disease) stage III (HCC)       Nervous and Auditory    Hearing loss of left ear       Other    Microalbuminuria    Hypocalcemia    Hyperlipidemia    Relevant Orders    LDL cholesterol, direct    Triglycerides    Generalized anxiety disorder      Other Visit Diagnoses     Encounter for vaccination        Relevant Orders    influenza vaccine, high-dose, PF 0 5 mL    Constipation, unspecified constipation type               Diagnoses and all orders for this visit:    Primary hypertension    Benign hypertension with CKD (chronic kidney disease) stage III (HCC)    Hypoparathyroidism, unspecified hypoparathyroidism type ([facility] [address] )  -     PTH, intact; Future    GERD without esophagitis    Microalbuminuria    Hypocalcemia    Mixed hyperlipidemia  -     LDL cholesterol, direct; Future  -     Triglycerides; Future    Generalized anxiety disorder    Encounter for vaccination  -     influenza vaccine, high-dose, PF 0 5 mL    Hearing loss of left ear, unspecified hearing loss type    Constipation, unspecified constipation type      No problem-specific Assessment & Plan notes found for this encounter  A/P: Doing ok and will check labs  Will update his flu vaccine  Discussed seeing ENT defers hearing test and ENT since it is getting better  Would recommend checking as it could be a coincidence  Increase activity and restart the metamucil for the constipation    Continue current treatment and RTC four months for routine  Subjective:      Patient ID: Caron Cintron is a 67 y o  male  WM RTC for f/u HTN, hyperlipidemia, etc  Doing ok and no new issues  Remains active w/o difficulty and no falls  NO reflux  STAR is manageable   ROS positive for hearing loss s/p covid and has refused eval and some constipation  Daisy Childs Headaches and BPH controlled  Due for labs and vaccines  The following portions of the patient's history were reviewed and updated as appropriate:   He has a past medical history of Abdominal pain, acute, generalized, Actinic keratosis, Chest pain, Chronic kidney disease, stage 3 ([address] ), Colon polyp, Disease of thyroid gland, Dysfunction of both eustachian tubes, Edema, Essential hypertension, Fatigue, Generalized anxiety disorder, GERD (gastroesophageal reflux disease), Hematuria, Hypertension, Hypo-osmolality and hyponatremia, Hypocalcemia, Hypoparathyroidism ([address] ), Hypothyroidism, Lightheadedness, Lump in neck, Malaise and fatigue, Nephropathy, Nocturia, Proteinuria, Shortness of breath, Skin lesion, and Tinea corporis  ,  does not have any pertinent problems on file  ,   has a past surgical history that includes Skin biopsy; Thyroid surgery; Colonoscopy w/ polypectomy (07/19/2019); Head & neck skin lesion excisional biopsy; and Tonsillectomy  ,  family history includes Diabetes in his father; Hypertension in his father and mother; Other in his brother; Stroke in his family  ,   reports that he has never smoked  He has never used smokeless tobacco  He reports current alcohol use  He reports that he does not use drugs  ,  is allergic to lisinopril and pollen extract     Current Outpatient Medications   Medication Sig Dispense Refill    amLODIPine (NORVASC) 10 mg tablet Take 1 tablet (10 mg total) by mouth daily 30 tablet 5    aspirin (ECOTRIN LOW STRENGTH) 81 mg EC tablet Take 1 tablet (81 mg total) by mouth daily      calcitriol (ROCALTROL) 0 25 mcg capsule Take 1 capsule (0 25 mcg total) by mouth daily 90 capsule 1    Calcium Carbonate (CALCIUM 600 PO) Take by mouth Twice per day      Cholecalciferol (VITAMIN D3) 1000 units CAPS Take 1 capsule by mouth in the morning        ketorolac (ACULAR) 0 5 % ophthalmic solution Administer 1 drop into the left eye 4 (four) times a day 5 mL 0    Loratadine-Pseudoephedrine (ALAVERT ALLERGY/SINUS PO) Take 1 capsule by mouth if needed      Multiple Vitamin (MULTI-VITAMIN DAILY PO) Take by mouth daily      tamsulosin (FLOMAX) 0 4 mg Take 1 capsule (0 4 mg total) by mouth daily with dinner 90 capsule 3    Ascorbic Acid (vitamin C) 1000 MG tablet Take 1,000 mg by mouth daily (Patient not taking: Reported on 9/8/2022)      polymyxin b-trimethoprim (POLYTRIM) ophthalmic solution Administer 1 drop into the left eye every 4 (four) hours (Patient not taking: No sig reported) 10 mL 0    Psyllium (Metamucil) 28 3 % POWD Take 1 Package by mouth as needed (Patient not taking: Reported on 9/8/2022)       No current facility-administered medications for this visit  Review of Systems   Constitutional: Negative for activity change, chills, diaphoresis, fatigue and fever  HENT: Positive for hearing loss  Eyes: Negative for visual disturbance  Respiratory: Negative for cough, chest tightness, shortness of breath and wheezing  Cardiovascular: Negative for chest pain, palpitations and leg swelling  Gastrointestinal: Negative for abdominal pain, constipation, diarrhea, nausea and vomiting  Endocrine: Negative for cold intolerance and heat intolerance  Genitourinary: Negative for difficulty urinating, dysuria and frequency  Musculoskeletal: Negative for arthralgias, gait problem and myalgias  Neurological: Negative for dizziness, tremors, seizures, syncope, facial asymmetry, speech difficulty, weakness, light-headedness, numbness and headaches  Psychiatric/Behavioral: Negative for confusion, dysphoric mood and sleep disturbance  The patient is not nervous/anxious          PHQ-2/9 Depression Screening          Objective:  Vitals:    09/08/22 0910   BP: 124/76   Pulse: 91   Temp: (!) 97 2 °F (36 2 °C)   SpO2: 98%   Weight: 91 2 kg (201 lb 2 oz)   Height: 6' 2" (1 88 m)     Body mass index is 25 82 kg/m²  Physical Exam  Vitals and nursing note reviewed  Constitutional:       General: He is not in acute distress  Appearance: Normal appearance  He is not ill-appearing  HENT:      Head: Normocephalic and atraumatic  Mouth/Throat:      Mouth: Mucous membranes are moist    Eyes:      Extraocular Movements: Extraocular movements intact  Conjunctiva/sclera: Conjunctivae normal       Pupils: Pupils are equal, round, and reactive to light  Neck:      Vascular: No carotid bruit  Cardiovascular:      Rate and Rhythm: Normal rate and regular rhythm  Heart sounds: Normal heart sounds  Pulmonary:      Effort: Pulmonary effort is normal  No respiratory distress  Breath sounds: Normal breath sounds  No wheezing or rales  Abdominal:      General: Bowel sounds are normal  There is no distension  Palpations: Abdomen is soft  Tenderness: There is no abdominal tenderness  Musculoskeletal:      Cervical back: Neck supple  Right lower leg: No edema  Left lower leg: No edema  Neurological:      General: No focal deficit present  Mental Status: He is alert and oriented to person, place, and time  Mental status is at baseline  Psychiatric:         Mood and Affect: Mood normal          Behavior: Behavior normal          Thought Content:  Thought content normal          Judgment: Judgment normal

## 2022-09-26 ENCOUNTER — CONSULT (OUTPATIENT)
Dept: UROLOGY | Facility: CLINIC | Age: 72
End: 2022-09-26
Payer: MEDICARE

## 2022-09-26 VITALS
SYSTOLIC BLOOD PRESSURE: 120 MMHG | BODY MASS INDEX: 25.8 KG/M2 | HEART RATE: 72 BPM | DIASTOLIC BLOOD PRESSURE: 80 MMHG | HEIGHT: 74 IN | WEIGHT: 201 LBS

## 2022-09-26 DIAGNOSIS — R35.0 BENIGN PROSTATIC HYPERPLASIA WITH URINARY FREQUENCY: ICD-10-CM

## 2022-09-26 DIAGNOSIS — R93.89 ABNORMAL ULTRASOUND OF PROSTATE: ICD-10-CM

## 2022-09-26 DIAGNOSIS — N40.0 BENIGN PROSTATIC HYPERPLASIA WITHOUT LOWER URINARY TRACT SYMPTOMS: Primary | ICD-10-CM

## 2022-09-26 DIAGNOSIS — N28.1 KIDNEY CYST, ACQUIRED: ICD-10-CM

## 2022-09-26 DIAGNOSIS — Z12.5 PROSTATE CANCER SCREENING: ICD-10-CM

## 2022-09-26 DIAGNOSIS — N40.1 BENIGN PROSTATIC HYPERPLASIA WITH URINARY FREQUENCY: ICD-10-CM

## 2022-09-26 PROCEDURE — 99203 OFFICE O/P NEW LOW 30 MIN: CPT | Performed by: NURSE PRACTITIONER

## 2022-09-26 RX ORDER — TAMSULOSIN HYDROCHLORIDE 0.4 MG/1
0.4 CAPSULE ORAL 2 TIMES DAILY
Qty: 180 CAPSULE | Refills: 3 | Status: SHIPPED | OUTPATIENT
Start: 2022-09-26

## 2022-09-26 NOTE — ASSESSMENT & PLAN NOTE
· Increase flomax to 0 4 mg bid  · AUA 22  · Consider cysto for bladder outlet obstruction  · Consider sleep study for possible sleep apnea  · Follow up 3 months for recheck

## 2022-09-26 NOTE — PATIENT INSTRUCTIONS
BLADDER HEALTH    WHAT IS CONSIDERED NORMAL? The average bladder can hold about 2 cups of urine before it needs to be emptied  The normal range of voiding urine is 6 to 8 times during a 24 hour period  As we get older, our bladder capacity can get smaller and we may need to pass urine more frequently but usually not more than every 2 hours  Urine should flow easily without discomfort in a good, steady stream until the bladder is empty  No pushing or straining is necessary to empty the bladder  An urge is a signal that you feel as the bladder stretches to fill with urine  Urges can be felt even if the bladder is not full  Urges are not commands to go to the toilet, merely a signal and can be controlled  WHAT ARE GOOD BLADDER HABITS? Take your time when emptying your bladder  Dont strain or push to empty your bladder  Make sure you empty your bladder completely each time you pass urine  Do not rush the process  Consistently ignoring the urge to go (waiting more than 4 hours between toileting) or urinating too infrequently may be convenient but not healthy for your bladder  Avoid going to the toilet just in case or more often than every 2 hours  It is usually not necessary to go when you feel the first urge  Try to go only when your bladder is full  Urgency and frequency of urination can be improved by retraining the bladder and spacing your fluid intake throughout the day  Practice good toilet habits  Dont let your bladder control your life  TIPS TO MAINTAIN GOOD BLADDER HABITS  Maintain a good fluid intake  Depending on your body size and environment, drink 6 -8 cups (8 oz each) of fluid per day unless otherwise advised by your doctor  Not enough fluid creates a foul odor and dark color of the urine  Limit the amount of caffeine (coffee, cola, chocolate or tea) and citrus foods that you consume as these foods can be associated with increased sensation of urinary urgency and frequency  Limit the amount of alcohol you drink  Alcohol increases urine production and makes it difficult for the brain to coordinate bladder control  Avoid constipation by maintaining a balanced diet of dietary fiber  Cigarette smoking is also irritating to the bladder surface and is associated with bladder cancer  In addition, the coughing associated with smoking may lead to increased incontinent episodes because of the increased pressure  HOW DIET CAN AFFECT YOUR BLADDER  Although there is no particular "diet" that can cure bladder control, there are certain dietary suggestions you can use to help control the problem  There are 2 points to consider when evaluating how your habits and diet may affect your bladder:    Foods and fluids can irritate the bladder  Some foods and beverages are thought to contribute to bladder leakage and irritability  However their effect on the bladder is not completely understood and you may want to see if eliminating one or all of these items improves your bladder control  If you are unable to give them up completely, it is recommended that you use the following items in moderation:  Acidic beverages and foods (orange juice, grapefruit juice, lemonade etc)  Alcoholic beverages  Vinegar  Coffee (regular and decaf)  Tea (regular and decaf)  Caffeinated beverages  Carbonated beverages          Drinking enough and the right kinds of fluids  Many people with bladder control issues decrease their intake of liquids in hope that they will need to urinate less frequently or have less urinary leakage  You should not restrict fluids to control your bladder  While a decrease in liquid intake does result in a decrease in the volume of urine, the smaller amount of urine may be more highly concentrated  Highly concentrated, dark yellow urine is irritating to the bladder surface and may actually cause you to go to the bathroom more frequently        It also encourages the growth of bacteria, which may lead to infections resulting in incontinence  Substitutions for Bladder Irritants: water is always the best beverage choice  Grape and apple juice are thirst quenchers are good selections and are not as irritating to the bladder  Low acid fruits:  Pears, apricots, papaya, watermelon  For coffee drinkers: KAVA®, Postum®, Abril®, Kaffree Jacqui®  For tea drinkers:  non-citrus or herbal and sun brewed tea  BEHAVIORAL THERAPY FOR IMPROVED BLADDER CONTROL      1  Urge Control Techniques       A  Stop whatever you are doing and concentrate on paulino your pelvic floor muscles  B   Contract your pelvic floor muscles repetitively (as in your "flick" exercises)  C   Once the urgency has subsided, realize that sometimes the urgency is because you have a             full bladder and have to urinate  Other times the urgency is a false signal and you do not have a full bladder  D  If you have urgency triggered by running water, "key in the door," getting up from a sitting position, etc , contract your pelvic floor muscles prior to       initiating the activity  2   Daily Fluid Intake       A  Avoid drinking too little (less than 3 cups/day) or drinking too much (more than 6             cups/day)  B   Avoid caffeinated beverages  C   If voiding frequently at night, limit your liquid intake after 7 p m  3   Bowel Regularity--Constipation Makes Bladder Symptoms Worse       A  Drink enough liquids, eat plenty of high fiber food  B  Exercise       C  Avoid laxatives and enemas on a regular basis, this decreases the bowel's normal function  4   Voiding Schedules       A  Empty your bladder at 1½ to 2 hour intervals even if you may not have the urge to urinate             at that time  You may gradually increase the time between voidings to 30  minutes, until you can comfortably void every 3 hours  B    If you are voiding more frequently than every 1½ to 2 hours, resist the urge to go  by using urge control techniques (described in 1 )

## 2022-09-26 NOTE — PROGRESS NOTES
Assessment and plan:     Benign prostatic hyperplasia without lower urinary tract symptoms  · Increase flomax to 0 4 mg bid  · AUA 22  · Consider cysto for bladder outlet obstruction  · Consider sleep study for possible sleep apnea  · Follow up 3 months for recheck    Prostate cancer screening  · PSA 2 7  · Denies family history of prostate cancer  · PSA 1 year    Kidney cyst, acquired  · Following with nephrology          TRISTIN Mendez    History of Present Illness     Jules Dugan [place] is a 67 y o  new patient who presents for enlarged prostate  He had a recent ultrasound that revealed this  He was started on flomax and feels that this did not help much  He has nocturia x 5-6  He has intermittent stream, urgency and frequency  He drinks water (32 ounces), gator aid, iced tea ( 1 glass), coffee (1-2 cups)  Denies UTI hx  Had recent urine testing 9/8/2022 with out blood  He follows with nephrology for protein in the urine and kidney cyst  His creat is 1 39    Laboratory     Lab Results   Component Value Date    BUN 16 09/08/2022    CREATININE 1 39 (H) 09/08/2022       No components found for: GFR    Lab Results   Component Value Date    GLUCOSE 99 12/15/2015    CALCIUM 9 1 09/08/2022     12/15/2015    K 4 5 09/08/2022    CO2 29 09/08/2022     09/08/2022       Lab Results   Component Value Date    WBC 6 23 09/08/2022    HGB 15 2 09/08/2022    HCT 47 2 09/08/2022    MCV 89 09/08/2022     09/08/2022       Lab Results   Component Value Date    PSA 2 7 05/05/2022    PSA 3 0 04/06/2021    PSA 2 7 11/25/2019       No results found for this or any previous visit (from the past 1 hour(s))      @RESULT(URINEMICROSCOPIC)@    @RESULT(URINECULTURE)@    Radiology     RENAL ULTRASOUND 8/22/2022     INDICATION:   N28 1: Cyst of kidney, acquired      COMPARISON: Ultrasound Kidneys 4/3/2021     TECHNIQUE:   Ultrasound of the retroperitoneum was performed with a curvilinear transducer utilizing volumetric sweeps and still imaging techniques       FINDINGS:     KIDNEYS:  Symmetric and normal size  Right kidney:  12 1 x 6 7 x 4 9 cm  Volume 207 3 mL  Left kidney:  12 1 x 6 8 x 5 0 cm  Volume 213 7 mL     Right kidney  Normal echogenicity and contour  No mass is identified  Lower pole cyst measures 11 mm with punctate rim calcification  No hydronephrosis  No shadowing calculi  No perinephric fluid collections      Left kidney  Normal echogenicity and contour  No mass is identified  Mid to lower portion cyst with partial rim calcification measures 7 mm, not significantly changed from prior  No hydronephrosis  No shadowing calculi  No perinephric fluid collections      URETERS:  Nonvisualized      BLADDER:   Normally distended  No focal thickening or mass lesions  Bilateral ureteral jets not detected      Prostatomegaly measuring 6 0 x 5 2 x 5 1 cm with mass effect on the urinary bladder      IMPRESSION:     1  Small renal cysts with peripheral calcification      2  Prostatomegaly with mass effect on the urinary bladder             Review of Systems     Review of Systems   Constitutional: Negative for activity change, appetite change, chills, fatigue, fever and unexpected weight change  HENT: Negative for facial swelling  Eyes: Negative for discharge  Respiratory: Negative  Negative for cough and shortness of breath  Cardiovascular: Negative for chest pain and leg swelling  Gastrointestinal: Negative  Negative for abdominal distention, abdominal pain, constipation, diarrhea, nausea and vomiting  Endocrine: Negative  Genitourinary: Positive for frequency and urgency  Negative for decreased urine volume, difficulty urinating, dysuria, enuresis, flank pain, genital sores, hematuria, scrotal swelling and testicular pain  Nocturia x 5-6, intermittent stream   Musculoskeletal: Negative for back pain and myalgias  Skin: Negative for pallor and rash  Allergic/Immunologic: Negative  Negative for immunocompromised state  Neurological: Negative for facial asymmetry and speech difficulty  Psychiatric/Behavioral: Negative for agitation and confusion  AUA SYMPTOM SCORE    Flowsheet Row Most Recent Value   AUA SYMPTOM SCORE    How often have you had a sensation of not emptying your bladder completely after you finished urinating? 1   How often have you had to urinate again less than two hours after you finished urinating? 4   How often have you found you stopped and started again several times when you urinate? 5   How often have you found it difficult to postpone urination? 5   How often have you had a weak urinary stream? 2   How often have you had to push or strain to begin urination? 0   How many times did you most typically get up to urinate from the time you went to bed at night until the time you got up in the morning? 5   Quality of Life: If you were to spend the rest of your life with your urinary condition just the way it is now, how would you feel about that? 2   AUA SYMPTOM SCORE 22                Allergies     Allergies   Allergen Reactions    Lisinopril Anaphylaxis    Pollen Extract Sneezing       Physical Exam     Physical Exam  Vitals reviewed  Constitutional:       General: He is not in acute distress  Appearance: Normal appearance  He is normal weight  He is not ill-appearing, toxic-appearing or diaphoretic  HENT:      Head: Normocephalic and atraumatic  Eyes:      General: No scleral icterus  Cardiovascular:      Rate and Rhythm: Normal rate  Pulmonary:      Effort: Pulmonary effort is normal  No respiratory distress  Abdominal:      General: Abdomen is flat  There is no distension  Palpations: Abdomen is soft  Tenderness: There is no abdominal tenderness  There is no right CVA tenderness, left CVA tenderness, guarding or rebound  Genitourinary:     Penis: Circumcised  No hypospadias, erythema, tenderness, discharge, swelling or lesions  Testes:         Right: Mass, tenderness or swelling not present  Right testis is descended  Left: Mass, tenderness or swelling not present  Left testis is descended  Epididymis:      Right: Not inflamed  No tenderness  Left: Not inflamed  No tenderness  Comments: Prostate smooth, nontender without nodules or indurations  Musculoskeletal:         General: No swelling  Cervical back: Normal range of motion  Skin:     General: Skin is warm and dry  Coloration: Skin is not jaundiced or pale  Findings: No rash  Neurological:      General: No focal deficit present  Mental Status: He is alert and oriented to person, place, and time  Gait: Gait normal    Psychiatric:         Mood and Affect: Mood normal          Behavior: Behavior normal          Thought Content:  Thought content normal          Judgment: Judgment normal          Vital Signs     Vitals:    09/26/22 0902   BP: 120/80   Pulse: 72   Weight: 91 2 kg (201 lb)   Height: 6' 2" (1 88 m)       Current Medications       Current Outpatient Medications:     amLODIPine (NORVASC) 10 mg tablet, Take 1 tablet (10 mg total) by mouth daily, Disp: 30 tablet, Rfl: 5    aspirin (ECOTRIN LOW STRENGTH) 81 mg EC tablet, Take 1 tablet (81 mg total) by mouth daily, Disp: , Rfl:     calcitriol (ROCALTROL) 0 25 mcg capsule, Take 1 capsule (0 25 mcg total) by mouth daily, Disp: 90 capsule, Rfl: 1    Calcium Carbonate (CALCIUM 600 PO), Take by mouth Twice per day, Disp: , Rfl:     Cholecalciferol (VITAMIN D3) 1000 units CAPS, Take 1 capsule by mouth in the morning  , Disp: , Rfl:     Multiple Vitamin (MULTI-VITAMIN DAILY PO), Take by mouth daily, Disp: , Rfl:     Psyllium (Metamucil) 28 3 % POWD, Take 1 Package by mouth as needed, Disp: , Rfl:     tamsulosin (FLOMAX) 0 4 mg, Take 1 capsule (0 4 mg total) by mouth 2 (two) times a day, Disp: 180 capsule, Rfl: 3    Ascorbic Acid (vitamin C) 1000 MG tablet, Take 1,000 mg by mouth daily, Disp: , Rfl:     ketorolac (ACULAR) 0 5 % ophthalmic solution, Administer 1 drop into the left eye 4 (four) times a day, Disp: 5 mL, Rfl: 0    Loratadine-Pseudoephedrine (ALAVERT ALLERGY/SINUS PO), Take 1 capsule by mouth if needed (Patient not taking: Reported on 9/26/2022), Disp: , Rfl:     polymyxin b-trimethoprim (POLYTRIM) ophthalmic solution, Administer 1 drop into the left eye every 4 (four) hours, Disp: 10 mL, Rfl: 0    Active Problems     Patient Active Problem List   Diagnosis    Allergic rhinitis    Basal cell tumor    Benign colon polyp    Generalized anxiety disorder    GERD without esophagitis    Hyperlipidemia    Hypertension    Hypocalcemia    Hypoparathyroidism (HCC)    Migraine, unspecified, not intractable, without status migrainosus    Multiple thyroid nodules    Palpitations    Microalbuminuria    BMI 25 0-25 9,adult    Benign hypertension with CKD (chronic kidney disease) stage III (HCC)    Benign prostatic hyperplasia without lower urinary tract symptoms    COVID-19 virus infection    Allergy to ACE inhibitors    Kidney cyst, acquired    Persistent proteinuria    Hearing loss of left ear    Benign prostatic hyperplasia with urinary frequency    Prostate cancer screening       Past Medical History     Past Medical History:   Diagnosis Date    Abdominal pain, acute, generalized     last assessed - 21Apr2017    Actinic keratosis     last assessed - 12Jun2013    Chest pain     last assessed - [id number]    Chronic kidney disease, stage 3 (HCC)     Colon polyp     Disease of thyroid gland     Dysfunction of both eustachian tubes     suspect due to ETD   Recommend otc allergy meds and if fails then add flonase; last assessed - 06Aug2012    Edema     Essential hypertension     Fatigue     last assessed - [id number]    Generalized anxiety disorder     GERD (gastroesophageal reflux disease)     Hematuria     last assessed - 02Aug2012    Hypertension     Hypo-osmolality and hyponatremia     Hypocalcemia     Hypoparathyroidism ([facility] [address] )     Hypothyroidism     Lightheadedness     last assessed - [id number]    Lump in neck     ? cause  Maybe a localized allergic reaction, dental issue, doubt sialdenitis, ect  Empiric abx and one dose steroids  Continue benadryl  If worsens, to er or ent      Malaise and fatigue     last assessed - [id number]    Nephropathy     last assessed - [id number]    Nocturia     Proteinuria     Shortness of breath     last assessed - [id number]    Skin lesion     Resolved - [id number]    Tinea corporis     last assessed - [id number]       Surgical History     Past Surgical History:   Procedure Laterality Date    COLONOSCOPY W/ POLYPECTOMY  07/19/2019    HEAD & NECK SKIN LESION EXCISIONAL BIOPSY      excision of lesion scalp malignant - [facility]; last assessed - [id number]    SKIN BIOPSY      last assessed - 09Sep2014   Phyllis Leal THYROID SURGERY      Biopsy thyroid using percutaneous core needle; last assessed - 09Sep2014    TONSILLECTOMY         Family History     Family History   Problem Relation Age of Onset    Hypertension Mother     Hypertension Father     Diabetes Father     Other Brother         Schwannomatosis    Stroke Family        Social History     Social History     Social History     Tobacco Use   Smoking Status Never Smoker   Smokeless Tobacco Never Used       Past Surgical History:   Procedure Laterality Date    COLONOSCOPY W/ POLYPECTOMY  07/19/2019    HEAD & NECK SKIN LESION EXCISIONAL BIOPSY      excision of lesion scalp malignant - [facility]; last assessed - [id number]    SKIN BIOPSY      last assessed - 09Sep2014   Phyllis Leal THYROID SURGERY      Biopsy thyroid using percutaneous core needle; last assessed - 09Sep2014    TONSILLECTOMY           The following portions of the patient's history were reviewed and updated as appropriate: allergies, current medications, past family history, past medical history, past social history, past surgical history and problem list    Please note :  Voice dictation software has been used to create this document  There may be inadvertent transcription errors      [id number] [id number] [address]

## 2022-09-27 ENCOUNTER — TELEPHONE (OUTPATIENT)
Dept: INTERNAL MEDICINE CLINIC | Facility: CLINIC | Age: 72
End: 2022-09-27

## 2022-09-27 NOTE — TELEPHONE ENCOUNTER
----- Message from Bina Body, DO sent at 9/8/2022  9:29 AM EDT -----  Call pt in two weeks and see how the hearing is going

## 2022-10-17 DIAGNOSIS — E83.51 HYPOCALCEMIA: ICD-10-CM

## 2022-10-17 RX ORDER — CALCITRIOL 0.25 UG/1
0.25 CAPSULE, LIQUID FILLED ORAL DAILY
Qty: 90 CAPSULE | Refills: 1 | Status: SHIPPED | OUTPATIENT
Start: 2022-10-17

## 2022-10-24 ENCOUNTER — APPOINTMENT (OUTPATIENT)
Dept: LAB | Facility: CLINIC | Age: 72
End: 2022-10-24
Payer: MEDICARE

## 2022-10-24 DIAGNOSIS — Z12.5 PROSTATE CANCER SCREENING: ICD-10-CM

## 2022-10-24 DIAGNOSIS — N18.31 STAGE 3A CHRONIC KIDNEY DISEASE (HCC): ICD-10-CM

## 2022-10-24 LAB
ALBUMIN SERPL BCP-MCNC: 3.6 G/DL (ref 3.5–5)
ALP SERPL-CCNC: 74 U/L (ref 46–116)
ALT SERPL W P-5'-P-CCNC: 21 U/L (ref 12–78)
ANION GAP SERPL CALCULATED.3IONS-SCNC: 4 MMOL/L (ref 4–13)
AST SERPL W P-5'-P-CCNC: 18 U/L (ref 5–45)
BACTERIA UR QL AUTO: ABNORMAL /HPF
BASOPHILS # BLD AUTO: 0.1 THOUSANDS/ÂΜL (ref 0–0.1)
BASOPHILS NFR BLD AUTO: 2 % (ref 0–1)
BILIRUB SERPL-MCNC: 0.49 MG/DL (ref 0.2–1)
BILIRUB UR QL STRIP: NEGATIVE
BUN SERPL-MCNC: 19 MG/DL (ref 5–25)
CALCIUM SERPL-MCNC: 8.8 MG/DL (ref 8.3–10.1)
CHLORIDE SERPL-SCNC: 102 MMOL/L (ref 96–108)
CLARITY UR: CLEAR
CO2 SERPL-SCNC: 28 MMOL/L (ref 21–32)
COLOR UR: ABNORMAL
CREAT SERPL-MCNC: 1.51 MG/DL (ref 0.6–1.3)
CREAT UR-MCNC: 164 MG/DL
CREAT UR-MCNC: 164 MG/DL
EOSINOPHIL # BLD AUTO: 0.23 THOUSAND/ÂΜL (ref 0–0.61)
EOSINOPHIL NFR BLD AUTO: 3 % (ref 0–6)
ERYTHROCYTE [DISTWIDTH] IN BLOOD BY AUTOMATED COUNT: 13 % (ref 11.6–15.1)
GFR SERPL CREATININE-BSD FRML MDRD: 45 ML/MIN/1.73SQ M
GLUCOSE P FAST SERPL-MCNC: 102 MG/DL (ref 65–99)
GLUCOSE UR STRIP-MCNC: NEGATIVE MG/DL
HCT VFR BLD AUTO: 49.1 % (ref 36.5–49.3)
HGB BLD-MCNC: 15.8 G/DL (ref 12–17)
HGB UR QL STRIP.AUTO: NEGATIVE
IMM GRANULOCYTES # BLD AUTO: 0.01 THOUSAND/UL (ref 0–0.2)
IMM GRANULOCYTES NFR BLD AUTO: 0 % (ref 0–2)
KETONES UR STRIP-MCNC: NEGATIVE MG/DL
LEUKOCYTE ESTERASE UR QL STRIP: NEGATIVE
LYMPHOCYTES # BLD AUTO: 1.78 THOUSANDS/ÂΜL (ref 0.6–4.47)
LYMPHOCYTES NFR BLD AUTO: 26 % (ref 14–44)
MCH RBC QN AUTO: 29 PG (ref 26.8–34.3)
MCHC RBC AUTO-ENTMCNC: 32.2 G/DL (ref 31.4–37.4)
MCV RBC AUTO: 90 FL (ref 82–98)
MICROALBUMIN UR-MCNC: 97.7 MG/L (ref 0–20)
MICROALBUMIN/CREAT 24H UR: 60 MG/G CREATININE (ref 0–30)
MONOCYTES # BLD AUTO: 0.59 THOUSAND/ÂΜL (ref 0.17–1.22)
MONOCYTES NFR BLD AUTO: 9 % (ref 4–12)
NEUTROPHILS # BLD AUTO: 4.13 THOUSANDS/ÂΜL (ref 1.85–7.62)
NEUTS SEG NFR BLD AUTO: 60 % (ref 43–75)
NITRITE UR QL STRIP: NEGATIVE
NON-SQ EPI CELLS URNS QL MICRO: ABNORMAL /HPF
NRBC BLD AUTO-RTO: 0 /100 WBCS
PH UR STRIP.AUTO: 6 [PH]
PLATELET # BLD AUTO: 284 THOUSANDS/UL (ref 149–390)
PMV BLD AUTO: 9.7 FL (ref 8.9–12.7)
POTASSIUM SERPL-SCNC: 4.1 MMOL/L (ref 3.5–5.3)
PROT SERPL-MCNC: 7.9 G/DL (ref 6.4–8.4)
PROT UR STRIP-MCNC: ABNORMAL MG/DL
PROT UR-MCNC: 21 MG/DL
PROT/CREAT UR: 0.13 MG/G{CREAT} (ref 0–0.1)
PSA SERPL-MCNC: 2.8 NG/ML (ref 0–4)
RBC # BLD AUTO: 5.44 MILLION/UL (ref 3.88–5.62)
RBC #/AREA URNS AUTO: ABNORMAL /HPF
SODIUM SERPL-SCNC: 134 MMOL/L (ref 135–147)
SP GR UR STRIP.AUTO: 1.02 (ref 1–1.03)
UROBILINOGEN UR STRIP-ACNC: <2 MG/DL
WBC # BLD AUTO: 6.84 THOUSAND/UL (ref 4.31–10.16)
WBC #/AREA URNS AUTO: ABNORMAL /HPF

## 2022-10-24 PROCEDURE — 81001 URINALYSIS AUTO W/SCOPE: CPT

## 2022-10-24 PROCEDURE — 36415 COLL VENOUS BLD VENIPUNCTURE: CPT

## 2022-10-24 PROCEDURE — 82043 UR ALBUMIN QUANTITATIVE: CPT

## 2022-10-24 PROCEDURE — 80053 COMPREHEN METABOLIC PANEL: CPT

## 2022-10-24 PROCEDURE — 85025 COMPLETE CBC W/AUTO DIFF WBC: CPT

## 2022-10-24 PROCEDURE — 84156 ASSAY OF PROTEIN URINE: CPT

## 2022-10-24 PROCEDURE — 82570 ASSAY OF URINE CREATININE: CPT

## 2022-10-24 PROCEDURE — G0103 PSA SCREENING: HCPCS

## 2022-10-25 DIAGNOSIS — N18.31 STAGE 3A CHRONIC KIDNEY DISEASE (HCC): Primary | ICD-10-CM

## 2022-10-26 NOTE — PROGRESS NOTES
Established Patient Progress Note       Chief Complaint   Patient presents with   • hypoparathyroidism        History of Present Illness:     Elizabeth Nguyễn is a 67 y o  male with hypertension, hyperlipidemia, thyroid nodules, hypoparathyroidism of unknown etiology, hypocalcemia, and vitamin-D deficiency   Patient follows with Nephrology for chronic kidney disease stage 3 and proteinuria      Currently, patient is taking calcium, calcitriol, and vitamin-D   He takes calcitriol 0 25 mcg daily  Bryan Niki is taking calcium 600 mg twice per day, with vitamin-D   And vitamin-D 1000 units daily   He denies circumoral numbness or tingling, fatigue, or muscle cramps   He denies any episodes of kidney stones  Component      Latest Ref Rng & Units 9/8/2022 10/24/2022           9:45 AM  9:39 AM   Calcium      8 3 - 10 1 mg/dL 9 1 8 8     Last thyroid US completed on 6/9/2022  Patient denies any compressive symptoms  THYROID ULTRASOUND     INDICATION:    E04 2: Nontoxic multinodular goiter      COMPARISON:  None     TECHNIQUE:   Ultrasound of the thyroid was performed with a high frequency linear transducer in transverse and sagittal planes including volumetric imaging sweeps as well as traditional still imaging technique      FINDINGS:  Normal homogeneous smooth echotexture      Right lobe: 5 5 x 1 8 x 1 8 cm  Volume 8 3 mL  Left lobe:  4 9 x 1 3 x 1 8 cm  Volume 5 6 mL  Isthmus: 0 3  cm      Nodule #1  Image 1/21  RIGHT upper pole nodule measuring 0 7 x 0 4 x 0 5 cm  This nodule has decreased in size from prior  COMPOSITION:  2 points, solid or almost completely solid   ECHOGENICITY:  2 points, hypoechoic  SHAPE:  0 points, wider-than-tall  MARGIN: 0 points, smooth  ECHOGENIC FOCI:  None  TI-RADS Classification: TR 4 (4-6 points), FNA if > 1 5 cm  Follow if > 1cm      Nodule #2  Image 1/28  RIGHT lower pole nodule measuring 1 0 x 0 8 x 1 3 cm    Given differences in measuring technique, no significant change from prior  COMPOSITION:  2 points, solid or almost completely solid   ECHOGENICITY:  1 point, hyperechoic or isoechoic  SHAPE:  0 points, wider-than-tall  MARGIN: 0 points, ill-defined  ECHOGENIC FOCI:  0 points, none or large comet-tail artifacts  TI-RADS Classification: TR 3 (3 points), FNA if >2 5 cm  Follow if >1 5 cm  This nodule remains stable for greater than 5 years and has had a prior benign biopsy  If there is a high level of clinical concern, continued surveillance at 2 year intervals   could be considered, otherwise no additional workup recommended      Nodule #3  Image 1/41  Isthmus nodule measuring 0 7 x 0 4 x 0 8 cm  Given differences in measuring technique, no significant change from prior  COMPOSITION:  2 points, solid or almost completely solid   ECHOGENICITY:  2 points, hypoechoic  SHAPE:  0 points, wider-than-tall  MARGIN: 0 points, ill-defined  ECHOGENIC FOCI:  0 points, none or large comet-tail artifacts  TI-RADS Classification: TR 4 (4-6 points), FNA if > 1 5 cm  Follow if > 1cm      Nodule #4  Image 1/75  LEFT midgland nodule measuring 0 9 x 0 5 x 0 8 cm  COMPOSITION:  1 point, mixed cystic and solid  ECHOGENICITY:  2 points, hypoechoic  SHAPE:  0 points, wider-than-tall  MARGIN: 0 points, smooth  ECHOGENIC FOCI:  0 points, none or large comet-tail artifacts  TI-RADS Classification: TR 3 (3 points), FNA if >2 5 cm  Follow if >1 5 cm         There are additional nodules of lesser size and/or TI-RADS score  These do not necessitate additional evaluation based on ACR criteria       IMPRESSION:     No nodule meets current ACR criteria for requiring biopsy or followup ultrasounds     Patient Active Problem List   Diagnosis   • Allergic rhinitis   • Basal cell tumor   • Benign colon polyp   • Generalized anxiety disorder   • GERD without esophagitis   • Hyperlipidemia   • Hypertension   • Hypocalcemia   • Hypoparathyroidism (HCC)   • Migraine, unspecified, not intractable, without status migrainosus   • Multiple thyroid nodules   • Palpitations   • Microalbuminuria   • BMI 25 0-25 9,adult   • Benign hypertension with CKD (chronic kidney disease) stage III (HCC)   • Benign prostatic hyperplasia without lower urinary tract symptoms   • COVID-19 virus infection   • Allergy to ACE inhibitors   • Kidney cyst, acquired   • Persistent proteinuria   • Hearing loss of left ear   • Benign prostatic hyperplasia with urinary frequency   • Prostate cancer screening      Past Medical History:   Diagnosis Date   • Abdominal pain, acute, generalized     last assessed - 21Apr2017   • Actinic keratosis     last assessed - 12Jun2013   • Chest pain     last assessed - [id number]   • Chronic kidney disease, stage 3 (HCC)    • Colon polyp    • Disease of thyroid gland    • Dysfunction of both eustachian tubes     suspect due to ETD  Recommend otc allergy meds and if fails then add flonase; last assessed - 06Aug2012   • Edema    • Fatigue     last assessed - [id number]   • Generalized anxiety disorder    • GERD (gastroesophageal reflux disease)    • Hematuria     last assessed - 02Aug2012   • Hypertension    • Hypo-osmolality and hyponatremia    • Hypocalcemia    • Hypoparathyroidism ([address] )    • Lightheadedness     last assessed - [id number]   • Lump in neck     ? cause  Maybe a localized allergic reaction, dental issue, doubt sialdenitis, ect  Empiric abx and one dose steroids  Continue benadryl  If worsens, to er or ent     • Malaise and fatigue     last assessed - 21Apr2017   • Nephropathy     last assessed - 16Sep2015   • Nocturia    • Proteinuria    • Shortness of breath     last assessed - [id number]   • Skin lesion     Resolved - [id number]   • Tinea corporis     last assessed - [id number]      Past Surgical History:   Procedure Laterality Date   • COLONOSCOPY W/ POLYPECTOMY  07/19/2019   • HEAD & NECK SKIN LESION EXCISIONAL BIOPSY      excision of lesion scalp malignant - [facility]; last assessed - [id number]   • SKIN BIOPSY      last assessed - 09Sep2014   • THYROID SURGERY      Biopsy thyroid using percutaneous core needle; last assessed - 09Sep2014   • TONSILLECTOMY        Family History   Problem Relation Age of Onset   • Hypertension Mother    • Hypertension Father    • Diabetes Father    • Other Brother         Schwannomatosis   • Stroke Family      Social History     Tobacco Use   • Smoking status: Never Smoker   • Smokeless tobacco: Never Used   Substance Use Topics   • Alcohol use: Yes     Comment: rarely     Allergies   Allergen Reactions   • Lisinopril Anaphylaxis   • Pollen Extract Sneezing       Current Outpatient Medications:   •  amLODIPine (NORVASC) 10 mg tablet, Take 1 tablet (10 mg total) by mouth daily, Disp: 30 tablet, Rfl: 5  •  aspirin (ECOTRIN LOW STRENGTH) 81 mg EC tablet, Take 1 tablet (81 mg total) by mouth daily, Disp: , Rfl:   •  calcitriol (ROCALTROL) 0 25 mcg capsule, Take 1 capsule (0 25 mcg total) by mouth daily, Disp: 90 capsule, Rfl: 1  •  Calcium Carbonate (CALCIUM 600 PO), Take by mouth Twice per day, Disp: , Rfl:   •  Cholecalciferol (VITAMIN D3) 1000 units CAPS, Take 1 capsule by mouth in the morning  , Disp: , Rfl:   •  Multiple Vitamin (MULTI-VITAMIN DAILY PO), Take by mouth daily, Disp: , Rfl:   •  Psyllium (Metamucil) 28 3 % POWD, Take 1 Package by mouth as needed, Disp: , Rfl:   •  tamsulosin (FLOMAX) 0 4 mg, Take 1 capsule (0 4 mg total) by mouth 2 (two) times a day, Disp: 180 capsule, Rfl: 3  •  Loratadine-Pseudoephedrine (ALAVERT ALLERGY/SINUS PO), Take 1 capsule by mouth if needed (Patient not taking: No sig reported), Disp: , Rfl:     Review of Systems   Constitutional: Negative for activity change, appetite change, fatigue and unexpected weight change  HENT: Negative for dental problem, sore throat, trouble swallowing and voice change  Eyes: Negative for visual disturbance     Respiratory: Negative for cough, chest tightness and shortness of breath  Cardiovascular: Negative for chest pain, palpitations and leg swelling  Gastrointestinal: Negative for constipation, diarrhea, nausea and vomiting  Endocrine: Negative for polydipsia, polyphagia and polyuria  Genitourinary: Positive for frequency  Musculoskeletal: Negative for arthralgias, back pain, gait problem and myalgias  Skin: Negative for wound  Allergic/Immunologic: Positive for environmental allergies  Negative for food allergies  Neurological: Negative for dizziness, weakness, light-headedness, numbness and headaches  Psychiatric/Behavioral: Negative for decreased concentration, dysphoric mood and sleep disturbance  The patient is not nervous/anxious  Physical Exam:  Body mass index is 25 94 kg/m²  /82   Pulse 76   Ht 6' 2" (1 88 m)   Wt 91 6 kg (202 lb)   SpO2 96%   BMI 25 94 kg/m²    Wt Readings from Last 3 Encounters:   10/28/22 91 6 kg (202 lb)   09/26/22 91 2 kg (201 lb)   09/08/22 91 2 kg (201 lb 2 oz)       Physical Exam  Vitals reviewed  Constitutional:       General: He is not in acute distress  Appearance: He is well-developed  He is not ill-appearing  HENT:      Head: Normocephalic and atraumatic  Eyes:      Pupils: Pupils are equal, round, and reactive to light  Neck:      Thyroid: No thyromegaly  Cardiovascular:      Rate and Rhythm: Normal rate and regular rhythm  Pulses: Normal pulses  Heart sounds: Normal heart sounds  Pulmonary:      Effort: Pulmonary effort is normal       Breath sounds: Normal breath sounds  Abdominal:      General: Bowel sounds are normal  There is no distension  Palpations: Abdomen is soft  Tenderness: There is no abdominal tenderness  Musculoskeletal:      Cervical back: Normal range of motion and neck supple  Right lower leg: No edema  Left lower leg: No edema  Lymphadenopathy:      Cervical: No cervical adenopathy  Skin:     General: Skin is warm and dry  Capillary Refill: Capillary refill takes less than 2 seconds  Neurological:      Mental Status: He is alert and oriented to person, place, and time  Gait: Gait normal    Psychiatric:         Mood and Affect: Mood normal          Behavior: Behavior normal          Labs:       Lab Results   Component Value Date    CREATININE 1 51 (H) 10/24/2022    CREATININE 1 39 (H) 09/08/2022    CREATININE 1 14 06/23/2022    BUN 19 10/24/2022     12/15/2015    K 4 1 10/24/2022     10/24/2022    CO2 28 10/24/2022     eGFR   Date Value Ref Range Status   10/24/2022 45 ml/min/1 73sq m Final       Lab Results   Component Value Date    CHOL 226 09/16/2015    HDL 54 01/05/2022    TRIG 119 09/08/2022       Lab Results   Component Value Date    ALT 21 10/24/2022    AST 18 10/24/2022    ALKPHOS 74 10/24/2022    BILITOT 0 51 09/16/2015       Lab Results   Component Value Date    FREET4 1 13 02/21/2020         Impression & Plan:    Problem List Items Addressed This Visit        Endocrine    Hypoparathyroidism ([facility] [address] ) - Primary     Calcium levels are stable  Continue current regimen  Check 24 hour urine Ca+ along with standard labs prior to next appointment  Last Vit D was on the higher end of normal, repeat to r/o over supplementation  Relevant Orders    Comprehensive metabolic panel    Calcium, urine, 24 hour Lab Collect    Creatinine, urine, 24 hour Lab Collect    Vitamin D 25 hydroxy    Multiple thyroid nodules     Patient denies compressive symptoms  Thyroid nodules stable  No need for further surveillance at this time  Cardiovascular and Mediastinum    Hypertension     BP elevated today at 140/82  Last two measurements were stable with SBP in the 120s  Continue current regimen  Orders Placed This Encounter   Procedures   • Comprehensive metabolic panel     This is a patient instruction: Patient fasting for 8 hours or longer recommended       Standing Status:   Future     Standing Expiration Date:   10/28/2023   • Calcium, urine, 24 hour Lab Collect     Standing Status:   Future     Standing Expiration Date:   10/28/2023   • Creatinine, urine, 24 hour Lab Collect     Standing Status:   Future     Standing Expiration Date:   10/28/2023   • Vitamin D 25 hydroxy     Standing Status:   Future     Standing Expiration Date:   10/28/2023       There are no Patient Instructions on file for this visit  Discussed with the patient and all questioned fully answered  He will call me if any problems arise  Follow-up appointment in 6 months       Counseled patient on diagnostic results, prognosis, risk and benefit of treatment options, instruction for management, importance of treatment compliance, Risk  factor reduction and impressions      TRISTIN Persaud

## 2022-10-28 ENCOUNTER — OFFICE VISIT (OUTPATIENT)
Dept: ENDOCRINOLOGY | Facility: CLINIC | Age: 72
End: 2022-10-28
Payer: MEDICARE

## 2022-10-28 VITALS
HEART RATE: 76 BPM | SYSTOLIC BLOOD PRESSURE: 140 MMHG | HEIGHT: 74 IN | OXYGEN SATURATION: 96 % | DIASTOLIC BLOOD PRESSURE: 82 MMHG | BODY MASS INDEX: 25.93 KG/M2 | WEIGHT: 202 LBS

## 2022-10-28 DIAGNOSIS — E04.2 MULTIPLE THYROID NODULES: ICD-10-CM

## 2022-10-28 DIAGNOSIS — E20.9 HYPOPARATHYROIDISM, UNSPECIFIED HYPOPARATHYROIDISM TYPE (HCC): Primary | ICD-10-CM

## 2022-10-28 DIAGNOSIS — I10 PRIMARY HYPERTENSION: ICD-10-CM

## 2022-10-28 PROCEDURE — 99214 OFFICE O/P EST MOD 30 MIN: CPT | Performed by: NURSE PRACTITIONER

## 2022-10-28 NOTE — ASSESSMENT & PLAN NOTE
Calcium levels are stable  Continue current regimen  Check 24 hour urine Ca+ along with standard labs prior to next appointment  Last Vit D was on the higher end of normal, repeat to r/o over supplementation  I personally performed the service described in the documentation recorded by the scribe in my presence, and it accurately and completely records my words and actions.

## 2022-10-28 NOTE — ASSESSMENT & PLAN NOTE
Patient denies compressive symptoms  Thyroid nodules stable  No need for further surveillance at this time

## 2022-10-28 NOTE — ASSESSMENT & PLAN NOTE
BP elevated today at 140/82  Last two measurements were stable with SBP in the 120s  Continue current regimen

## 2022-11-15 ENCOUNTER — APPOINTMENT (OUTPATIENT)
Dept: LAB | Facility: CLINIC | Age: 72
End: 2022-11-15

## 2022-11-15 DIAGNOSIS — E20.9 HYPOPARATHYROIDISM, UNSPECIFIED HYPOPARATHYROIDISM TYPE (HCC): ICD-10-CM

## 2022-11-15 DIAGNOSIS — N18.31 STAGE 3A CHRONIC KIDNEY DISEASE (HCC): ICD-10-CM

## 2022-11-15 LAB
ANION GAP SERPL CALCULATED.3IONS-SCNC: 8 MMOL/L (ref 4–13)
BUN SERPL-MCNC: 16 MG/DL (ref 5–25)
CALCIUM SERPL-MCNC: 9 MG/DL (ref 8.3–10.1)
CHLORIDE SERPL-SCNC: 102 MMOL/L (ref 96–108)
CO2 SERPL-SCNC: 27 MMOL/L (ref 21–32)
CREAT SERPL-MCNC: 1.29 MG/DL (ref 0.6–1.3)
GFR SERPL CREATININE-BSD FRML MDRD: 54 ML/MIN/1.73SQ M
GLUCOSE P FAST SERPL-MCNC: 104 MG/DL (ref 65–99)
POTASSIUM SERPL-SCNC: 4 MMOL/L (ref 3.5–5.3)
SODIUM SERPL-SCNC: 137 MMOL/L (ref 135–147)

## 2022-11-22 ENCOUNTER — OFFICE VISIT (OUTPATIENT)
Dept: NEPHROLOGY | Facility: CLINIC | Age: 72
End: 2022-11-22

## 2022-11-22 VITALS
BODY MASS INDEX: 26.05 KG/M2 | HEART RATE: 85 BPM | OXYGEN SATURATION: 95 % | DIASTOLIC BLOOD PRESSURE: 64 MMHG | SYSTOLIC BLOOD PRESSURE: 118 MMHG | WEIGHT: 203 LBS | HEIGHT: 74 IN

## 2022-11-22 DIAGNOSIS — N40.0 BENIGN PROSTATIC HYPERPLASIA WITHOUT LOWER URINARY TRACT SYMPTOMS: ICD-10-CM

## 2022-11-22 DIAGNOSIS — R80.9 MICROALBUMINURIA: ICD-10-CM

## 2022-11-22 DIAGNOSIS — N18.31 STAGE 3A CHRONIC KIDNEY DISEASE (HCC): Primary | ICD-10-CM

## 2022-11-22 DIAGNOSIS — N18.30 BENIGN HYPERTENSION WITH CKD (CHRONIC KIDNEY DISEASE) STAGE III (HCC): ICD-10-CM

## 2022-11-22 DIAGNOSIS — E20.9 HYPOPARATHYROIDISM, UNSPECIFIED HYPOPARATHYROIDISM TYPE (HCC): ICD-10-CM

## 2022-11-22 DIAGNOSIS — N28.1 KIDNEY CYST, ACQUIRED: ICD-10-CM

## 2022-11-22 DIAGNOSIS — I12.9 BENIGN HYPERTENSION WITH CKD (CHRONIC KIDNEY DISEASE) STAGE III (HCC): ICD-10-CM

## 2022-11-22 RX ORDER — FINASTERIDE 5 MG/1
5 TABLET, FILM COATED ORAL DAILY
Qty: 90 TABLET | Refills: 1 | Status: SHIPPED | OUTPATIENT
Start: 2022-11-22

## 2022-11-22 NOTE — ASSESSMENT & PLAN NOTE
Lab Results   Component Value Date    EGFR 54 11/15/2022    EGFR 45 10/24/2022    EGFR 49 09/08/2022    CREATININE 1 29 11/15/2022    CREATININE 1 51 (H) 10/24/2022    CREATININE 1 39 (H) 09/08/2022       Blood pressure is well controlled at this time, unfortunately, the patient had his lisinopril discontinued due to potential angioedema associated with medication  At the same time, of note the patient was on antibiotics, however since discontinuation of lisinopril there is not been a repeat episode

## 2022-11-22 NOTE — ASSESSMENT & PLAN NOTE
As mentioned above, patient continues to experience mild albuminuria however, agreeable to be on lisinopril  Will consider other medications in the future, such as an SGLT -2 inhibitor, will defer at this time as the patient has prostate enlargement and may undergo surgical procedures or other medication adjustments, and a not wish to potentially complicate matters medically with the addition of this medication at this time

## 2022-11-22 NOTE — ASSESSMENT & PLAN NOTE
Lab Results   Component Value Date    EGFR 54 11/15/2022    EGFR 45 10/24/2022    EGFR 49 09/08/2022    CREATININE 1 29 11/15/2022    CREATININE 1 51 (H) 10/24/2022    CREATININE 1 39 (H) 09/08/2022       Kidney function has improved back to typical baseline for the patient  In retrospect, he may have been experiencing bladder outlet obstruction, likely improved with the use of Flomax  Continue avoid potential nephrotoxins at this time

## 2022-11-22 NOTE — ASSESSMENT & PLAN NOTE
Patient had his Flomax increased to 0 4 mg twice daily by Urology  Unfortunately, the patient continues to experience significant nocturia at 6-7 nightly  Will also place the patient on finasteride for now, and defer to our Urology colleagues for further management according to their recommendations  Patient may ultimately need to have a procedure to assist with his significant nocturia which is impeding his quality of life

## 2022-11-22 NOTE — PROGRESS NOTES
[facility] [place], [place]    Name: Shakira Cintron  YOB: 1950      Assessment/Plan:    Stage 3a chronic kidney disease [facility])  Lab Results   Component Value Date    EGFR 54 11/15/2022    EGFR 45 10/24/2022    EGFR 49 09/08/2022    CREATININE 1 29 11/15/2022    CREATININE 1 51 (H) 10/24/2022    CREATININE 1 39 (H) 09/08/2022       Kidney function has improved back to typical baseline for the patient  In retrospect, he may have been experiencing bladder outlet obstruction, likely improved with the use of Flomax  Continue avoid potential nephrotoxins at this time  Kidney cyst, acquired    Patient appears have benign-appearing cysts, no further workup indicated at this time  Benign hypertension with CKD (chronic kidney disease) stage III [facility])  Lab Results   Component Value Date    EGFR 54 11/15/2022    EGFR 45 10/24/2022    EGFR 49 09/08/2022    CREATININE 1 29 11/15/2022    CREATININE 1 51 (H) 10/24/2022    CREATININE 1 39 (H) 09/08/2022       Blood pressure is well controlled at this time, unfortunately, the patient had his lisinopril discontinued due to potential angioedema associated with medication  At the same time, of note the patient was on antibiotics, however since discontinuation of lisinopril there is not been a repeat episode  Benign prostatic hyperplasia without lower urinary tract symptoms    Patient had his Flomax increased to 0 4 mg twice daily by Urology  Unfortunately, the patient continues to experience significant nocturia at 6-7 nightly  Will also place the patient on finasteride for now, and defer to our Urology colleagues for further management according to their recommendations  Patient may ultimately need to have a procedure to assist with his significant nocturia which is impeding his quality of life      Microalbuminuria    As mentioned above, patient continues to experience mild albuminuria however, agreeable to be on lisinopril  Will consider other medications in the future, such as an SGLT -2 inhibitor, will defer at this time as the patient has prostate enlargement and may undergo surgical procedures or other medication adjustments, and a not wish to potentially complicate matters medically with the addition of this medication at this time  Problem List Items Addressed This Visit        Endocrine    Hypoparathyroidism [facility])       Cardiovascular and Mediastinum    Benign hypertension with CKD (chronic kidney disease) stage III [facility])     Lab Results   Component Value Date    EGFR 54 11/15/2022    EGFR 45 10/24/2022    EGFR 49 09/08/2022    CREATININE 1 29 11/15/2022    CREATININE 1 51 (H) 10/24/2022    CREATININE 1 39 (H) 09/08/2022       Blood pressure is well controlled at this time, unfortunately, the patient had his lisinopril discontinued due to potential angioedema associated with medication  At the same time, of note the patient was on antibiotics, however since discontinuation of lisinopril there is not been a repeat episode  Genitourinary    Benign prostatic hyperplasia without lower urinary tract symptoms       Patient had his Flomax increased to 0 4 mg twice daily by Urology  Unfortunately, the patient continues to experience significant nocturia at 6-7 nightly  Will also place the patient on finasteride for now, and defer to our Urology colleagues for further management according to their recommendations  Patient may ultimately need to have a procedure to assist with his significant nocturia which is impeding his quality of life  Relevant Medications    finasteride (PROSCAR) 5 mg tablet    Kidney cyst, acquired       Patient appears have benign-appearing cysts, no further workup indicated at this time           Stage 3a chronic kidney disease [facility]) - Primary     Lab Results   Component Value Date    EGFR 54 11/15/2022    EGFR 45 10/24/2022    EGFR 49 09/08/2022    CREATININE 1 29 11/15/2022    CREATININE 1 51 (H) 10/24/2022    CREATININE 1 39 (H) 09/08/2022       Kidney function has improved back to typical baseline for the patient  In retrospect, he may have been experiencing bladder outlet obstruction, likely improved with the use of Flomax  Continue avoid potential nephrotoxins at this time  Relevant Orders    Microalbumin / creatinine urine ratio    Urinalysis with microscopic    Comprehensive metabolic panel    Phosphorus    PTH, intact    Magnesium       Other    Microalbuminuria       As mentioned above, patient continues to experience mild albuminuria however, agreeable to be on lisinopril  Will consider other medications in the future, such as an SGLT -2 inhibitor, will defer at this time as the patient has prostate enlargement and may undergo surgical procedures or other medication adjustments, and a not wish to potentially complicate matters medically with the addition of this medication at this time  Patient is stable from the renal standpoint, new medication issue it is finasteride due to continued issues with nocturia at 6-7 nightly  Patient will be following up with Urology in about 1 month and will defer to them regarding further adjustments to medications for potential procedures according to their recommendations  We will see the patient back for regular appointment in 6 months, with blood work to be done prior to that appointment  Subjective:      Patient ID: Thdaria Cintron is a 67 y o  male  Patient presents for follow-up appointment  We reviewed the patient's labs in detail, creatinine noted to be at 1 29 mg/ dL places estimated GFR 54 mL/ minute  There are no significant electrolyte abnormalities noted  Patient is taking all medications as prescribed with no specific side effects at this time  Patient's main complaint at this time is nocturia, typically 6-7 times a night    Although last night he claims that he had an atypically good evening of only 3 times  Patient's all urology for his enlarged prostate, Flomax was increased to twice daily, with mild improvement in the patient's nocturia but no significant improvement  Hypertension  This is a chronic problem  The current episode started more than 1 year ago  The problem is unchanged  The problem is controlled  Pertinent negatives include no chest pain, orthopnea or peripheral edema  There are no associated agents to hypertension  Risk factors for coronary artery disease include male gender  Past treatments include lifestyle changes and calcium channel blockers  There are no compliance problems  Hypertensive end-organ damage includes kidney disease  Identifiable causes of hypertension include chronic renal disease  The following portions of the patient's history were reviewed and updated as appropriate: allergies, current medications, past family history, past medical history, past social history, past surgical history and problem list     Review of Systems   Cardiovascular: Negative for chest pain and orthopnea  All other systems reviewed and are negative          Social History     Socioeconomic History   • Marital status: /Civil Union     Spouse name: None   • Number of children: None   • Years of education: None   • Highest education level: None   Occupational History   • Occupation: Retired     Comment:     Tobacco Use   • Smoking status: Never   • Smokeless tobacco: Never   Vaping Use   • Vaping Use: Never used   Substance and Sexual Activity   • Alcohol use: Yes     Comment: rarely   • Drug use: No   • Sexual activity: Not Currently   Other Topics Concern   • None   Social History Narrative    Consumes on average 1 cup of regular coffee per day      Social Determinants of Health     Financial Resource Strain: Not on file   Food Insecurity: No Food Insecurity   • Worried About Running Out of Food in the Last Year: Never true   • Ran Out of Food in the Last Year: Never true   Transportation Needs: No Transportation Needs   • Lack of Transportation (Medical): No   • Lack of Transportation (Non-Medical): No   Physical Activity: Not on file   Stress: Not on file   Social Connections: Not on file   Intimate Partner Violence: Not on file   Housing Stability: Low Risk    • Unable to Pay for Housing in the Last Year: No   • Number of Places Lived in the Last Year: 1   • Unstable Housing in the Last Year: No     Past Medical History:   Diagnosis Date   • Abdominal pain, acute, generalized     last assessed - 21Apr2017   • Actinic keratosis     last assessed - 12Jun2013   • Chest pain     last assessed - [id number]   • Chronic kidney disease, stage 3 ([facility] [address] )    • Colon polyp    • Disease of thyroid gland    • Dysfunction of both eustachian tubes     suspect due to ETD  Recommend otc allergy meds and if fails then add flonase; last assessed - 06Aug2012   • Edema    • Fatigue     last assessed - [id number]   • Generalized anxiety disorder    • GERD (gastroesophageal reflux disease)    • Hematuria     last assessed - 02Aug2012   • Hypertension    • Hypo-osmolality and hyponatremia    • Hypocalcemia    • [facility]    • Lightheadedness     last assessed - [id number]   • Lump in neck     ? cause  Maybe a localized allergic reaction, dental issue, doubt sialdenitis, ect  Empiric abx and one dose steroids  Continue benadryl  If worsens, to er or ent     • Malaise and fatigue     last assessed - 21Apr2017   • Nephropathy     last assessed - [id number]   • Nocturia    • Proteinuria    • Shortness of breath     last assessed - [id number]   • Skin lesion     Resolved - [id number]   • Tinea corporis     last assessed - [id number]     Past Surgical History:   Procedure Laterality Date   • COLONOSCOPY W/ POLYPECTOMY  07/19/2019   • HEAD & NECK SKIN LESION EXCISIONAL BIOPSY      excision of lesion scalp malignant - [facility]; last assessed - [id number]   • SKIN BIOPSY      last assessed - [id number]   • THYROID SURGERY      Biopsy thyroid using percutaneous core needle; last assessed - 09Sep2014   • TONSILLECTOMY         Current Outpatient Medications:   •  amLODIPine (NORVASC) 10 mg tablet, Take 1 tablet (10 mg total) by mouth daily, Disp: 30 tablet, Rfl: 5  •  aspirin (ECOTRIN LOW STRENGTH) 81 mg EC tablet, Take 1 tablet (81 mg total) by mouth daily, Disp: , Rfl:   •  calcitriol (ROCALTROL) 0 25 mcg capsule, Take 1 capsule (0 25 mcg total) by mouth daily, Disp: 90 capsule, Rfl: 1  •  Calcium Carbonate (CALCIUM 600 PO), Take by mouth Twice per day, Disp: , Rfl:   •  Cholecalciferol (VITAMIN D3) 1000 units CAPS, Take 1 capsule by mouth in the morning  , Disp: , Rfl:   •  finasteride (PROSCAR) 5 mg tablet, Take 1 tablet (5 mg total) by mouth daily, Disp: 90 tablet, Rfl: 1  •  Multiple Vitamin (MULTI-VITAMIN DAILY PO), Take by mouth daily, Disp: , Rfl:   •  Psyllium (Metamucil) 28 3 % POWD, Take 1 Package by mouth as needed, Disp: , Rfl:   •  tamsulosin (FLOMAX) 0 4 mg, Take 1 capsule (0 4 mg total) by mouth 2 (two) times a day, Disp: 180 capsule, Rfl: 3    Lab Results   Component Value Date     12/15/2015    SODIUM 137 11/15/2022    K 4 0 11/15/2022     11/15/2022    CO2 27 11/15/2022    ANIONGAP 7 12/15/2015    AGAP 8 11/15/2022    BUN 16 11/15/2022    CREATININE 1 29 11/15/2022    GLUC 144 (H) 06/23/2022    GLUF 104 (H) 11/15/2022    CALCIUM 9 0 11/15/2022    AST 18 10/24/2022    ALT 21 10/24/2022    ALKPHOS 74 10/24/2022    PROT 7 8 09/16/2015    TP 7 9 10/24/2022    BILITOT 0 51 09/16/2015    TBILI 0 49 10/24/2022    EGFR 54 11/15/2022     Lab Results   Component Value Date    WBC 6 84 10/24/2022    HGB 15 8 10/24/2022    HCT 49 1 10/24/2022    MCV 90 10/24/2022     10/24/2022     Lab Results   Component Value Date    CHOLESTEROL 223 (H) 01/05/2022    CHOLESTEROL 218 (H) 09/29/2020    CHOLESTEROL 198 07/18/2019     Lab Results   Component Value Date    HDL 54 01/05/2022    HDL 53 09/29/2020    HDL 44 07/18/2019     Lab Results   Component Value Date    LDLCALC 140 (H) 01/05/2022    LDLCALC 138 (H) 09/29/2020    LDLCALC 126 (H) 07/18/2019     Lab Results   Component Value Date    TRIG 119 09/08/2022    TRIG 144 01/05/2022    TRIG 156 (H) 04/27/2021     No results found for: [place], [place]  Lab Results   Component Value Date    CBI1YWSWWYFP 0 936 02/21/2020     Lab Results   Component Value Date    PTH <6 3 (L) 09/08/2022    CALCIUM 9 0 11/15/2022    CAION 1 06 12/12/2012    PHOS 3 8 09/08/2022     No results found for: SPEP, UPEP  No results found for: GERSON [id number]        Objective:      /64 (BP Location: Left arm, Patient Position: Sitting, Cuff Size: Standard)   Pulse 85   Ht 6' 2" (1 88 m)   Wt 92 1 kg (203 lb)   SpO2 95%   BMI 26 06 kg/m²          Physical Exam  Vitals reviewed  Constitutional:       General: He is not in acute distress  Appearance: He is well-developed and well-nourished  HENT:      Head: Normocephalic and atraumatic  Eyes:      Conjunctiva/sclera: Conjunctivae normal    Cardiovascular:      Rate and Rhythm: Normal rate and regular rhythm  Pulmonary:      Effort: Pulmonary effort is normal       Breath sounds: Normal breath sounds  Abdominal:      Palpations: Abdomen is soft  Musculoskeletal:         General: Swelling (mild left LE edema) present  No edema  Cervical back: Neck supple  Skin:     General: Skin is warm  Findings: No rash  Neurological:      Mental Status: He is alert and oriented to person, place, and time  Cranial Nerves: No cranial nerve deficit     Psychiatric:         Mood and Affect: Mood and affect normal          Behavior: Behavior normal

## 2022-11-25 PROBLEM — Z12.5 PROSTATE CANCER SCREENING: Status: RESOLVED | Noted: 2022-09-26 | Resolved: 2022-11-25

## 2022-12-30 ENCOUNTER — OFFICE VISIT (OUTPATIENT)
Dept: UROLOGY | Facility: CLINIC | Age: 72
End: 2022-12-30

## 2022-12-30 VITALS
WEIGHT: 204 LBS | SYSTOLIC BLOOD PRESSURE: 118 MMHG | DIASTOLIC BLOOD PRESSURE: 80 MMHG | BODY MASS INDEX: 26.18 KG/M2 | HEIGHT: 74 IN | HEART RATE: 66 BPM

## 2022-12-30 DIAGNOSIS — Z12.5 PROSTATE CANCER SCREENING: ICD-10-CM

## 2022-12-30 DIAGNOSIS — R35.1 BENIGN PROSTATIC HYPERPLASIA WITH NOCTURIA: ICD-10-CM

## 2022-12-30 DIAGNOSIS — N40.1 BENIGN PROSTATIC HYPERPLASIA WITH NOCTURIA: ICD-10-CM

## 2022-12-30 DIAGNOSIS — R35.0 BENIGN PROSTATIC HYPERPLASIA WITH URINARY FREQUENCY: ICD-10-CM

## 2022-12-30 DIAGNOSIS — N40.0 BENIGN PROSTATIC HYPERPLASIA WITHOUT LOWER URINARY TRACT SYMPTOMS: Primary | ICD-10-CM

## 2022-12-30 DIAGNOSIS — N40.1 BENIGN PROSTATIC HYPERPLASIA WITH URINARY FREQUENCY: ICD-10-CM

## 2022-12-30 LAB — POST-VOID RESIDUAL VOLUME, ML POC: 9 ML

## 2022-12-30 NOTE — PROGRESS NOTES
Assessment and plan:     Benign prostatic hyperplasia with nocturia  · AUA 8  · Ongoing nocturia x5-6  · Recently started finasteride 5 mg started by nephrology  • Continue tamsulosin 0 4 mg twice a day  • Consider cysto for bladder outlet obstruction  • Consider sleep study for possible sleep apnea  · Follow up 3 months for recheck    Kidney cyst, acquired  · Following with nephrology    Prostate cancer screening  • PSA 2 7  • Denies family history of prostate cancer  • PSA 1 year    TRISTIN [place]    History of Present Illness     Michael Cintron is a 67 y o  who presents for follow up of enlarged prostate  He had a recent ultrasound that revealed this  He was started on flomax and feels that this did not help much  He has nocturia x 5-6  He has intermittent stream, urgency and frequency  He drinks water (32 ounces), gator aid, iced tea ( 1 glass), coffee (1-2 cups)  Denies UTI hx  Had recent urine testing 9/8/2022 with out blood  He has ongoing nocturia x4-6 per night  He does not feel that her urinates much during the day, he was started on finasteride 5 mg by nephrology on 11/22/2022   He drinks a glass of water at supper and a glass of prune juice around 7 pm  He occasionally snores      He follows with nephrology for protein in the urine and kidney cyst  His creat is 1 39    Laboratory     Lab Results   Component Value Date    BUN 16 11/15/2022    CREATININE 1 29 11/15/2022       No components found for: GFR    Lab Results   Component Value Date    GLUCOSE 99 12/15/2015    CALCIUM 9 0 11/15/2022     12/15/2015    K 4 0 11/15/2022    CO2 27 11/15/2022     11/15/2022       Lab Results   Component Value Date    WBC 6 84 10/24/2022    HGB 15 8 10/24/2022    HCT 49 1 10/24/2022    MCV 90 10/24/2022     10/24/2022       Lab Results   Component Value Date    PSA 2 8 10/24/2022    PSA 2 7 05/05/2022    PSA 3 0 04/06/2021       Recent Results (from the past 1 hour(s))   POCT Measure PVR Collection Time: 12/30/22  9:06 AM   Result Value Ref Range    POST-VOID RESIDUAL VOLUME, ML POC 9 mL       @RESULT(URINEMICROSCOPIC)@    @RESULT(URINECULTURE)@    Radiology     RENAL ULTRASOUND 8/22/2022     INDICATION:   N28 1: Cyst of kidney, acquired      COMPARISON: Ultrasound Kidneys 4/3/2021     TECHNIQUE:   Ultrasound of the retroperitoneum was performed with a curvilinear transducer utilizing volumetric sweeps and still imaging techniques       FINDINGS:     KIDNEYS:  Symmetric and normal size  Right kidney:  12 1 x 6 7 x 4 9 cm  Volume 207 3 mL  Left kidney:  12 1 x 6 8 x 5 0 cm  Volume 213 7 mL     Right kidney  Normal echogenicity and contour  No mass is identified  Lower pole cyst measures 11 mm with punctate rim calcification  No hydronephrosis  No shadowing calculi  No perinephric fluid collections      Left kidney  Normal echogenicity and contour  No mass is identified  Mid to lower portion cyst with partial rim calcification measures 7 mm, not significantly changed from prior  No hydronephrosis  No shadowing calculi  No perinephric fluid collections      URETERS:  Nonvisualized      BLADDER:   Normally distended  No focal thickening or mass lesions  Bilateral ureteral jets not detected      Prostatomegaly measuring 6 0 x 5 2 x 5 1 cm with mass effect on the urinary bladder      IMPRESSION:     1  Small renal cysts with peripheral calcification      2  Prostatomegaly with mass effect on the urinary bladder       Review of Systems     Review of Systems   Constitutional: Negative for activity change, appetite change, chills, fatigue, fever and unexpected weight change  HENT: Negative for facial swelling  Eyes: Negative for discharge  Respiratory: Negative  Negative for cough and shortness of breath  Cardiovascular: Negative for chest pain and leg swelling  Gastrointestinal: Negative    Negative for abdominal distention, abdominal pain, constipation, diarrhea, nausea and vomiting  Takes metamucil daily   Endocrine: Negative  Genitourinary: Negative  Negative for decreased urine volume, difficulty urinating, dysuria, enuresis, flank pain, frequency, genital sores, hematuria and urgency  Nocturia x5-6   Musculoskeletal: Negative for back pain and myalgias  Skin: Negative for pallor and rash  Allergic/Immunologic: Negative  Negative for immunocompromised state  Neurological: Negative for facial asymmetry and speech difficulty  Psychiatric/Behavioral: Negative for agitation and confusion  AUA SYMPTOM SCORE    Flowsheet Row Most Recent Value   AUA SYMPTOM SCORE    How often have you had a sensation of not emptying your bladder completely after you finished urinating? 0   How often have you had to urinate again less than two hours after you finished urinating? 2   How often have you found you stopped and started again several times when you urinate? 1   How often have you found it difficult to postpone urination? 1   How often have you had a weak urinary stream? 0   How often have you had to push or strain to begin urination? 0   How many times did you most typically get up to urinate from the time you went to bed at night until the time you got up in the morning? 4   Quality of Life: If you were to spend the rest of your life with your urinary condition just the way it is now, how would you feel about that? 2   AUA SYMPTOM SCORE 8                Allergies     Allergies   Allergen Reactions   • Lisinopril Anaphylaxis   • Pollen Extract Sneezing       Physical Exam     Physical Exam  Vitals reviewed  Constitutional:       General: He is not in acute distress  Appearance: Normal appearance  He is normal weight  He is not ill-appearing, toxic-appearing or diaphoretic  HENT:      Head: Normocephalic and atraumatic  Eyes:      General: No scleral icterus  Cardiovascular:      Rate and Rhythm: Normal rate     Pulmonary:      Effort: Pulmonary effort is normal  No respiratory distress  Abdominal:      General: Abdomen is flat  There is no distension  Palpations: Abdomen is soft  Musculoskeletal:         General: No swelling  Cervical back: Normal range of motion  Skin:     General: Skin is warm and dry  Coloration: Skin is not jaundiced or pale  Findings: No rash  Neurological:      General: No focal deficit present  Mental Status: He is alert and oriented to person, place, and time  Gait: Gait normal    Psychiatric:         Mood and Affect: Mood normal          Behavior: Behavior normal          Thought Content:  Thought content normal          Judgment: Judgment normal          Vital Signs     Vitals:    12/30/22 0856   BP: 118/80   BP Location: Left arm   Patient Position: Sitting   Cuff Size: Large   Pulse: 66   Weight: 92 5 kg (204 lb)   Height: 6' 2" (1 88 m)       Current Medications       Current Outpatient Medications:   •  amLODIPine (NORVASC) 10 mg tablet, Take 1 tablet (10 mg total) by mouth daily, Disp: 30 tablet, Rfl: 5  •  aspirin (ECOTRIN LOW STRENGTH) 81 mg EC tablet, Take 1 tablet (81 mg total) by mouth daily, Disp: , Rfl:   •  calcitriol (ROCALTROL) 0 25 mcg capsule, Take 1 capsule (0 25 mcg total) by mouth daily, Disp: 90 capsule, Rfl: 1  •  Calcium Carbonate (CALCIUM 600 PO), Take by mouth Twice per day, Disp: , Rfl:   •  Cholecalciferol (VITAMIN D3) 1000 units CAPS, Take 1 capsule by mouth in the morning  , Disp: , Rfl:   •  finasteride (PROSCAR) 5 mg tablet, Take 1 tablet (5 mg total) by mouth daily, Disp: 90 tablet, Rfl: 1  •  Multiple Vitamin (MULTI-VITAMIN DAILY PO), Take by mouth daily, Disp: , Rfl:   •  Psyllium (Metamucil) 28 3 % POWD, Take 1 Package by mouth as needed, Disp: , Rfl:   •  tamsulosin (FLOMAX) 0 4 mg, Take 1 capsule (0 4 mg total) by mouth 2 (two) times a day, Disp: 180 capsule, Rfl: 3    Active Problems     Patient Active Problem List   Diagnosis   • Allergic rhinitis   • Basal cell tumor   • Benign colon polyp   • Generalized anxiety disorder   • GERD without esophagitis   • Hyperlipidemia   • Hypertension   • Hypocalcemia   • [facility] )   • Migraine, unspecified, not intractable, without status migrainosus   • Multiple thyroid nodules   • Palpitations   • Microalbuminuria   • BMI 25 0-25 9,adult   • Benign hypertension with CKD (chronic kidney disease) stage III (HCC)   • Benign prostatic hyperplasia with nocturia   • COVID-19 virus infection   • Allergy to ACE inhibitors   • Kidney cyst, acquired   • Persistent proteinuria   • Hearing loss of left ear   • Benign prostatic hyperplasia with urinary frequency   • Prostate cancer screening   • Stage 3a chronic kidney disease ([facility] )       Past Medical History     Past Medical History:   Diagnosis Date   • Abdominal pain, acute, generalized     last assessed - 21Apr2017   • Actinic keratosis     last assessed - 12Jun2013   • Chest pain     last assessed - [id number]   • Chronic kidney disease, stage 3 (HCC)    • Colon polyp    • Disease of thyroid gland    • Dysfunction of both eustachian tubes     suspect due to ETD  Recommend otc allergy meds and if fails then add flonase; last assessed - 06Aug2012   • Edema    • Fatigue     last assessed - [id number]   • Generalized anxiety disorder    • GERD (gastroesophageal reflux disease)    • Hematuria     last assessed - [id number]   • Hypertension    • Hypo-osmolality and hyponatremia    • Hypocalcemia    • Hypoparathyroidism ([facility] )    • Lightheadedness     last assessed - [id number]   • Lump in neck     ? cause  Maybe a localized allergic reaction, dental issue, doubt sialdenitis, ect  Empiric abx and one dose steroids  Continue benadryl  If worsens, to er or ent     • Malaise and fatigue     last assessed - 21Apr2017   • Nephropathy     last assessed - [id number]   • Nocturia    • Proteinuria    • Shortness of breath     last assessed - [id number]   • Skin lesion     Resolved - [id number]   • Tinea corporis     last assessed - [id number]       Surgical History     Past Surgical History:   Procedure Laterality Date   • COLONOSCOPY W/ POLYPECTOMY  07/19/2019   • HEAD & NECK SKIN LESION EXCISIONAL BIOPSY      excision of lesion scalp malignant - [facility]; last assessed - [id number]   • SKIN BIOPSY      last assessed - [id number]   • THYROID SURGERY      Biopsy thyroid using percutaneous core needle; last assessed - 09Sep2014   • TONSILLECTOMY         Family History     Family History   Problem Relation Age of Onset   • Hypertension Mother    • Hypertension Father    • Diabetes Father    • Other Brother         Schwannomatosis   • Stroke Family        Social History     Social History     Social History     Tobacco Use   Smoking Status Never   Smokeless Tobacco Never       Past Surgical History:   Procedure Laterality Date   • COLONOSCOPY W/ POLYPECTOMY  07/19/2019   • HEAD & NECK SKIN LESION EXCISIONAL BIOPSY      excision of lesion scalp malignant - BCCA; last assessed - [id number]   • SKIN BIOPSY      last assessed - [id number]   • THYROID SURGERY      Biopsy thyroid using percutaneous core needle; last assessed - 09Sep2014   • TONSILLECTOMY           The following portions of the patient's history were reviewed and updated as appropriate: allergies, current medications, past family history, past medical history, past social history, past surgical history and problem list    Please note :  Voice dictation software has been used to create this document  There may be inadvertent transcription errors      [id number] Shadow

## 2022-12-30 NOTE — PATIENT INSTRUCTIONS
BLADDER HEALTH    WHAT IS CONSIDERED NORMAL? The average bladder can hold about 2 cups of urine before it needs to be emptied  The normal range of voiding urine is 6 to 8 times during a 24 hour period  As we get older, our bladder capacity can get smaller and we may need to pass urine more frequently but usually not more than every 2 hours  Urine should flow easily without discomfort in a good, steady stream until the bladder is empty  No pushing or straining is necessary to empty the bladder  An urge is a signal that you feel as the bladder stretches to fill with urine  Urges can be felt even if the bladder is not full  Urges are not commands to go to the toilet, merely a signal and can be controlled  WHAT ARE GOOD BLADDER HABITS? Take your time when emptying your bladder  Don’t strain or push to empty your bladder  Make sure you empty your bladder completely each time you pass urine  Do not rush the process  Consistently ignoring the urge to go (waiting more than 4 hours between toileting) or urinating too infrequently may be convenient but not healthy for your bladder  Avoid going to the toilet “just in case” or more often than every 2 hours  It is usually not necessary to go when you feel the first urge  Try to go only when your bladder is full  Urgency and frequency of urination can be improved by retraining the bladder and spacing your fluid intake throughout the day  Practice good toilet habits  Don’t let your bladder control your life  TIPS TO MAINTAIN GOOD BLADDER HABITS  Maintain a good fluid intake  Depending on your body size and environment, drink 6 -8 cups (8 oz each) of fluid per day unless otherwise advised by your doctor  Not enough fluid creates a foul odor and dark color of the urine  Limit the amount of caffeine (coffee, cola, chocolate or tea) and citrus foods that you consume as these foods can be associated with increased sensation of urinary urgency and frequency  Limit the amount of alcohol you drink  Alcohol increases urine production and makes it difficult for the brain to coordinate bladder control  Avoid constipation by maintaining a balanced diet of dietary fiber  Cigarette smoking is also irritating to the bladder surface and is associated with bladder cancer  In addition, the coughing associated with smoking may lead to increased incontinent episodes because of the increased pressure  HOW DIET CAN AFFECT YOUR BLADDER  Although there is no particular "diet" that can cure bladder control, there are certain dietary suggestions you can use to help control the problem  There are 2 points to consider when evaluating how your habits and diet may affect your bladder:    Foods and fluids can irritate the bladder  Some foods and beverages are thought to contribute to bladder leakage and irritability  However their effect on the bladder is not completely understood and you may want to see if eliminating one or all of these items improves your bladder control  If you are unable to give them up completely, it is recommended that you use the following items in moderation:  Acidic beverages and foods (orange juice, grapefruit juice, lemonade etc)  Alcoholic beverages  Vinegar  Coffee (regular and decaf)  Tea (regular and decaf)  Caffeinated beverages  Carbonated beverages          Drinking enough and the right kinds of fluids  Many people with bladder control issues decrease their intake of liquids in hope that they will need to urinate less frequently or have less urinary leakage  You should not restrict fluids to control your bladder  While a decrease in liquid intake does result in a decrease in the volume of urine, the smaller amount of urine may be more highly concentrated  Highly concentrated, dark yellow urine is irritating to the bladder surface and may actually cause you to go to the bathroom more frequently        It also encourages the growth of bacteria, which may lead to infections resulting in incontinence  Substitutions for Bladder Irritants: water is always the best beverage choice  Grape and apple juice are thirst quenchers are good selections and are not as irritating to the bladder  Low acid fruits:  Pears, apricots, papaya, watermelon  For coffee drinkers: KAVA®, Postum®, Abril®, Kaffree Jacqui®  For tea drinkers:  non-citrus or herbal and sun brewed tea  BEHAVIORAL THERAPY FOR IMPROVED BLADDER CONTROL      1  Urge Control Techniques       A  Stop whatever you are doing and concentrate on paulino your pelvic floor muscles  B   Contract your pelvic floor muscles repetitively (as in your "flick" exercises)  C   Once the urgency has subsided, realize that sometimes the urgency is because you have a             full bladder and have to urinate  Other times the urgency is a false signal and you do not have a full bladder  D  If you have urgency triggered by running water, "key in the door," getting up from a sitting position, etc , contract your pelvic floor muscles prior to       initiating the activity  2   Daily Fluid Intake       A  Avoid drinking too little (less than 3 cups/day) or drinking too much (more than 6             cups/day)  B   Avoid caffeinated beverages  C   If voiding frequently at night, limit your liquid intake after 7 p m  3   Bowel Regularity--Constipation Makes Bladder Symptoms Worse       A  Drink enough liquids, eat plenty of high fiber food  B  Exercise       C  Avoid laxatives and enemas on a regular basis, this decreases the bowel's normal function  4   Voiding Schedules       A  Empty your bladder at 1½ to 2 hour intervals even if you may not have the urge to urinate             at that time  You may gradually increase the time between voidings to 30  minutes, until you can comfortably void every 3 hours  B    If you are voiding more frequently than every 1½ to 2 hours, resist the urge to go  by using urge control techniques (described in 1 )  Enlarged Prostate (BPH)   WHAT YOU NEED TO KNOW:   What do I need to know about an enlarged prostate (BPH)? An enlarged prostate (BPH) is a common condition in older adults  BPH develops because the number of prostate cells increases (hyperplasia) or the cells get bigger (hypertrophy)  The prostate wraps around the urethra  An enlarged prostate can press on the urethra  This may cause problems with storing urine or emptying your bladder completely  What are the signs and symptoms of BPH? Urinating 8 or more times each day    A feeling of not fully emptying your bladder when you urinate    An urgent need to urinate that you could not put off, or urinating again within 2 hours    Being woken from sleep because you needed to urinate    Trouble starting your urine flow, or a need to push or strain to get it to start    Urine that stops and starts several times when you urinate    A weak urine stream, or dribbling after you urinate    How is BPH diagnosed? A digital rectal exam  is used to check the size of your prostate  Your healthcare provider will insert a gloved finger into your rectum  The provider will be able to feel your prostate  The size of your prostate may be checked with ultrasound pictures  Urine tests  may show infection, or strength and amount of urine flow  You may need to record how often and how much you urinate for 24 hours  Blood tests  may show kidney problems or your PSA level  PSA is a substance produced by your prostate  PSA levels increase when you have BPH  A postvoid residual volume test  is used to measure the amount of urine left in your bladder after you urinate  How is BPH treated? Watchful waiting  means you do not receive treatment right away  Your signs and symptoms will be monitored over time to see if they get worse   You may be asked to keep a record and bring it to follow-up visits  This record may include when you urinate, how easy or difficult it was, and any changes in urination  Medicines  may be used to relax the muscles in your prostate and bladder  This may help you urinate more easily  You may also need medicine that helps shrink, or slow the growth of your prostate  A procedure  may be used to place a stent into your urethra to hold it open  A stent is a short, tiny mesh tube  A prostatic urethral lift, or UroLift, may be used to hold the prostate away from the urethra  This makes the urethra wider so urine can pass through more easily  Surgery  may be used to relieve your symptoms if other treatments do not work  Extra tissue that is causing your symptoms may be destroyed  All or part of your prostate may be removed during another type of surgery  What can I do to manage my symptoms? Urinate on a regular schedule  This will train your bladder to hold urine longer  A larger amount of urine may make it easier to urinate  Drink less liquid during the day  Do not have liquid for several hours before you go to bed at night  Do not drink large amounts of any liquid at one time  Limit alcohol and caffeine  These can irritate your bladder and make your symptoms worse  Eat less salt  Salt can cause fluid buildup and make it harder to urinate  Examples of salty foods are chips, cured meats, and canned soups  Do not use table salt  Elevate your legs if you have swelling  Elevate (raise) your legs above the level of your heart  This can relieve swelling caused by fluid buildup  You may not have to get up in the night to urinate  Exercise regularly  Exercise can help improve your symptoms  Ask your healthcare provider what a healthy weight for you is  Aim to get at least 30 minutes of exercise on most days of the week  When should I call my doctor? You see blood in your urine  You are not able to urinate      Your bladder feels very full and painful  You have new or worsening symptoms  You have a fever  You have questions or concerns about your condition or care  CARE AGREEMENT:   You have the right to help plan your care  Learn about your health condition and how it may be treated  Discuss treatment options with your healthcare providers to decide what care you want to receive  You always have the right to refuse treatment  The above information is an  only  It is not intended as medical advice for individual conditions or treatments  Talk to your doctor, nurse or pharmacist before following any medical regimen to see if it is safe and effective for you  © Copyright [employer] 2022 Information is for End User's use only and may not be sold, redistributed or otherwise used for commercial purposes   All illustrations and images included in CareNotes® are the copyrighted property of [employer]  or [address] [place], [place] [address]honorio sabine

## 2022-12-30 NOTE — ASSESSMENT & PLAN NOTE
· AUA 8  · Ongoing nocturia x5-6  · Recently started finasteride 5 mg started by nephrology  • Continue tamsulosin 0 4 mg twice a day  • Consider cysto for bladder outlet obstruction  • Consider sleep study for possible sleep apnea  · Follow up 3 months for recheck

## 2023-01-17 ENCOUNTER — OFFICE VISIT (OUTPATIENT)
Dept: INTERNAL MEDICINE CLINIC | Facility: CLINIC | Age: 73
End: 2023-01-17

## 2023-01-17 ENCOUNTER — APPOINTMENT (OUTPATIENT)
Dept: LAB | Facility: CLINIC | Age: 73
End: 2023-01-17

## 2023-01-17 VITALS
TEMPERATURE: 97.4 F | HEART RATE: 64 BPM | SYSTOLIC BLOOD PRESSURE: 132 MMHG | RESPIRATION RATE: 18 BRPM | HEIGHT: 74 IN | WEIGHT: 204.13 LBS | DIASTOLIC BLOOD PRESSURE: 78 MMHG | BODY MASS INDEX: 26.2 KG/M2

## 2023-01-17 DIAGNOSIS — N18.31 STAGE 3A CHRONIC KIDNEY DISEASE (HCC): ICD-10-CM

## 2023-01-17 DIAGNOSIS — N18.30 BENIGN HYPERTENSION WITH CKD (CHRONIC KIDNEY DISEASE) STAGE III (HCC): Primary | ICD-10-CM

## 2023-01-17 DIAGNOSIS — N40.1 BENIGN PROSTATIC HYPERPLASIA WITH NOCTURIA: ICD-10-CM

## 2023-01-17 DIAGNOSIS — E78.2 MIXED HYPERLIPIDEMIA: ICD-10-CM

## 2023-01-17 DIAGNOSIS — Z13.1 SCREENING FOR DIABETES MELLITUS (DM): ICD-10-CM

## 2023-01-17 DIAGNOSIS — F41.1 GENERALIZED ANXIETY DISORDER: ICD-10-CM

## 2023-01-17 DIAGNOSIS — E20.9 HYPOPARATHYROIDISM, UNSPECIFIED HYPOPARATHYROIDISM TYPE (HCC): ICD-10-CM

## 2023-01-17 DIAGNOSIS — G43.909 MIGRAINE WITHOUT STATUS MIGRAINOSUS, NOT INTRACTABLE, UNSPECIFIED MIGRAINE TYPE: ICD-10-CM

## 2023-01-17 DIAGNOSIS — I12.9 BENIGN HYPERTENSION WITH CKD (CHRONIC KIDNEY DISEASE) STAGE III (HCC): Primary | ICD-10-CM

## 2023-01-17 DIAGNOSIS — R79.9 ABNORMAL FINDING OF BLOOD CHEMISTRY, UNSPECIFIED: ICD-10-CM

## 2023-01-17 DIAGNOSIS — Z23 ENCOUNTER FOR VACCINATION: ICD-10-CM

## 2023-01-17 DIAGNOSIS — R35.1 BENIGN PROSTATIC HYPERPLASIA WITH NOCTURIA: ICD-10-CM

## 2023-01-17 DIAGNOSIS — K21.9 GERD WITHOUT ESOPHAGITIS: ICD-10-CM

## 2023-01-17 PROBLEM — R35.0 BENIGN PROSTATIC HYPERPLASIA WITH URINARY FREQUENCY: Status: RESOLVED | Noted: 2022-09-26 | Resolved: 2023-01-17

## 2023-01-17 PROBLEM — Z12.5 PROSTATE CANCER SCREENING: Status: RESOLVED | Noted: 2022-09-26 | Resolved: 2023-01-17

## 2023-01-17 LAB
CHOLEST SERPL-MCNC: 204 MG/DL
EST. AVERAGE GLUCOSE BLD GHB EST-MCNC: 105 MG/DL
HBA1C MFR BLD: 5.3 %
HDLC SERPL-MCNC: 52 MG/DL
LDLC SERPL CALC-MCNC: 113 MG/DL (ref 0–100)
TRIGL SERPL-MCNC: 193 MG/DL
TSH SERPL DL<=0.05 MIU/L-ACNC: 1.17 UIU/ML (ref 0.45–4.5)

## 2023-01-17 NOTE — PROGRESS NOTES
BMI Counseling: There is no height or weight on file to calculate BMI  The BMI is above normal  Nutrition recommendations include decreasing portion sizes and encouraging healthy choices of fruits and vegetables  Exercise recommendations include moderate physical activity 150 minutes/week  No pharmacotherapy was ordered  Rationale for BMI follow-up plan is due to patient being overweight or obese  Assessment/Plan:  Problem List Items Addressed This Visit        Digestive    GERD without esophagitis       Endocrine    Hypoparathyroidism ([facility] [address] )    Relevant Orders    TSH, 3rd generation with Free T4 reflex       Cardiovascular and Mediastinum    Migraine, unspecified, not intractable, without status migrainosus    Benign hypertension with CKD (chronic kidney disease) stage III (HCC) - Primary       Genitourinary    Stage 3a chronic kidney disease (HCC)       Other    Hyperlipidemia    Relevant Orders    Lipid Panel with Direct LDL reflex    Generalized anxiety disorder    Benign prostatic hyperplasia with nocturia   Other Visit Diagnoses     Encounter for vaccination        Screening for diabetes mellitus (DM)        Relevant Orders    Hemoglobin A1C    Abnormal finding of blood chemistry, unspecified        Relevant Orders    Hemoglobin A1C           Diagnoses and all orders for this visit:    Benign hypertension with CKD (chronic kidney disease) stage III (HCC)    Migraine without status migrainosus, not intractable, unspecified migraine type    Hypoparathyroidism, unspecified hypoparathyroidism type (HCC)  -     TSH, 3rd generation with Free T4 reflex; Future    GERD without esophagitis    Stage 3a chronic kidney disease (HCC)    Generalized anxiety disorder    Mixed hyperlipidemia  -     Lipid Panel with Direct LDL reflex;  Future    Benign prostatic hyperplasia with nocturia    Encounter for vaccination    Screening for diabetes mellitus (DM)  -     Hemoglobin A1C; Future    Abnormal finding of blood chemistry, unspecified  -     Hemoglobin A1C; Future      No problem-specific Assessment & Plan notes found for this encounter  A/P: Doing well and will check labs  Already had his flu vaccine  Continue current treatment and RTC four months for routine  Subjective:      Patient ID: Cynthia Cintron is a 67 y o  male  WM RTC for fu HTN, hypoparathyroidism, etc  Doing ok and no new issues  Remains active w/o difficulty and no falls  Headaches controlled  No reflux  No urinary changes  STAR is manageable  Due for labs and vaccines  The following portions of the patient's history were reviewed and updated as appropriate:   He has a past medical history of Abdominal pain, acute, generalized, Actinic keratosis, Chest pain, Chronic kidney disease, stage 3 ([address] ), Colon polyp, Disease of thyroid gland, Dysfunction of both eustachian tubes, Edema, Fatigue, Generalized anxiety disorder, GERD (gastroesophageal reflux disease), Hematuria, Hypertension, Hypo-osmolality and hyponatremia, Hypocalcemia, Hypoparathyroidism ([address] ), Lightheadedness, Lump in neck, Malaise and fatigue, Nephropathy, Nocturia, Proteinuria, Shortness of breath, Skin lesion, and Tinea corporis  ,  does not have any pertinent problems on file  ,   has a past surgical history that includes Skin biopsy; Thyroid surgery; Colonoscopy w/ polypectomy (07/19/2019); Head & neck skin lesion excisional biopsy; and Tonsillectomy  ,  family history includes Diabetes in his father; Hypertension in his father and mother; Other in his brother; Stroke in his family  ,   reports that he has never smoked  He has never used smokeless tobacco  He reports current alcohol use  He reports that he does not use drugs  ,  is allergic to lisinopril and pollen extract     Current Outpatient Medications   Medication Sig Dispense Refill   • amLODIPine (NORVASC) 10 mg tablet Take 1 tablet (10 mg total) by mouth daily 30 tablet 5   • aspirin (ECOTRIN LOW STRENGTH) 81 mg EC tablet Take 1 tablet (81 mg total) by mouth daily     • calcitriol (ROCALTROL) 0 25 mcg capsule Take 1 capsule (0 25 mcg total) by mouth daily 90 capsule 1   • Calcium Carbonate (CALCIUM 600 PO) Take by mouth Twice per day     • Cholecalciferol (VITAMIN D3) 1000 units CAPS Take 1 capsule by mouth in the morning       • finasteride (PROSCAR) 5 mg tablet Take 1 tablet (5 mg total) by mouth daily 90 tablet 1   • Multiple Vitamin (MULTI-VITAMIN DAILY PO) Take by mouth daily     • Psyllium (Metamucil) 28 3 % POWD Take 1 Package by mouth as needed     • tamsulosin (FLOMAX) 0 4 mg Take 1 capsule (0 4 mg total) by mouth 2 (two) times a day 180 capsule 3     No current facility-administered medications for this visit  Review of Systems   Constitutional: Negative for activity change, chills, diaphoresis, fatigue and fever  HENT: Negative  Eyes: Negative for visual disturbance  Respiratory: Negative for cough, chest tightness, shortness of breath and wheezing  Cardiovascular: Negative for chest pain, palpitations and leg swelling  Gastrointestinal: Negative for abdominal pain, constipation, diarrhea, nausea and vomiting  Endocrine: Negative for cold intolerance and heat intolerance  Genitourinary: Negative for difficulty urinating, dysuria and frequency  Musculoskeletal: Negative for arthralgias, gait problem and myalgias  Neurological: Negative for dizziness, seizures, syncope, weakness, light-headedness and headaches  Psychiatric/Behavioral: Negative for confusion, dysphoric mood and sleep disturbance  The patient is not nervous/anxious          PHQ-2/9 Depression Screening    Little interest or pleasure in doing things: 0 - not at all  Feeling down, depressed, or hopeless: 0 - not at all  PHQ-2 Score: 0  PHQ-2 Interpretation: Negative depression screen        Objective:  Vitals:    01/17/23 0852   BP: 132/78   Pulse: 64   Resp: 18   Temp: (!) 97 4 °F (36 3 °C)   Weight: 92 6 kg (204 lb 2 oz)   Height: 6' 2" (1 88 m) Body mass index is 26 21 kg/m²  Physical Exam  Vitals and nursing note reviewed  Constitutional:       General: He is not in acute distress  Appearance: Normal appearance  He is not ill-appearing  HENT:      Head: Normocephalic and atraumatic  Mouth/Throat:      Mouth: Mucous membranes are moist    Eyes:      Extraocular Movements: Extraocular movements intact  Conjunctiva/sclera: Conjunctivae normal       Pupils: Pupils are equal, round, and reactive to light  Neck:      Vascular: No carotid bruit  Cardiovascular:      Rate and Rhythm: Normal rate and regular rhythm  Heart sounds: Normal heart sounds  Pulmonary:      Effort: Pulmonary effort is normal  No respiratory distress  Breath sounds: Normal breath sounds  No wheezing, rhonchi or rales  Abdominal:      General: Bowel sounds are normal  There is no distension  Palpations: Abdomen is soft  Tenderness: There is no abdominal tenderness  Musculoskeletal:      Cervical back: Neck supple  Right lower leg: No edema  Left lower leg: No edema  Neurological:      General: No focal deficit present  Mental Status: He is alert and oriented to person, place, and time  Mental status is at baseline  Psychiatric:         Mood and Affect: Mood normal          Behavior: Behavior normal          Thought Content: Thought content normal          Judgment: Judgment normal          BMI Counseling: Body mass index is 26 21 kg/m²  The BMI is above normal  Nutrition recommendations include reducing portion sizes, decreasing overall calorie intake, reducing intake of saturated fat and trans fat and reducing intake of cholesterol  Exercise recommendations include moderate aerobic physical activity for 150 minutes/week

## 2023-01-17 NOTE — PATIENT INSTRUCTIONS
Chronic Hypertension   AMBULATORY CARE:   Hypertension  is high blood pressure  Your blood pressure is the force of your blood moving against the walls of your arteries  Hypertension causes your blood pressure to get so high that your heart has to work much harder than normal  This can damage your heart  Even if you have hypertension for years, lifestyle changes, medicines, or both can help bring your blood pressure to normal   Call your local emergency number (911 in the [address] [facility]) or have someone call if:   1  You have chest pain  2  You have any of the following signs of a heart attack:      1  Squeezing, pressure, or pain in your chest    2  You may  also have any of the following:     § Discomfort or pain in your back, neck, jaw, stomach, or arm    § Shortness of breath    § Nausea or vomiting    § Lightheadedness or a sudden cold sweat    3  You become confused or have difficulty speaking  4  You suddenly feel lightheaded or have trouble breathing  Seek care immediately if:   · You have a severe headache or vision loss  · You have weakness in an arm or leg  Call your doctor or cardiologist if:   · You feel faint, dizzy, confused, or drowsy  · You have been taking your blood pressure medicine but your pressure is higher than your provider says it should be  · You have questions or concerns about your condition or care  Treatment for chronic hypertension  may include medicine to lower your blood pressure and cholesterol levels  A low cholesterol level helps prevent heart disease and makes it easier to control your blood pressure  Heart disease can make your blood pressure harder to control  You may also need to make lifestyle changes  What you need to know about the stages of hypertension:       · Normal blood pressure is 119/79 or lower   Your healthcare provider may only check your blood pressure each year if it stays at a normal level  · Elevated blood pressure is 120/79 to 129/79    This is sometimes called prehypertension  Your healthcare provider may suggest lifestyle changes to help lower your blood pressure to a normal level  He or she may then check it again in 3 to 6 months  · Stage 1 hypertension is 130/80  to 139/89   Your provider may recommend lifestyle changes, medication, and checks every 3 to 6 months until your blood pressure is controlled  · Stage 2 hypertension is 140/90 or higher   Your provider will recommend lifestyle changes and have you take 2 kinds of hypertension medicines  You will also need to have your blood pressure checked monthly until it is controlled  Manage chronic hypertension:   · Check your blood pressure at home  Avoid smoking, caffeine, and exercise at least 30 minutes before checking your blood pressure  Sit and rest for 5 minutes before you take your blood pressure  Extend your arm and support it on a flat surface  Your arm should be at the same level as your heart  Follow the directions that came with your blood pressure monitor  Check your blood pressure 2 times, 1 minute apart, before you take your medicine in the morning  Also check your blood pressure before your evening meal  Keep a record of your readings and bring it to your follow-up visits  Ask your healthcare provider what your blood pressure should be  · Manage any other health conditions you have  Health conditions such as diabetes can increase your risk for hypertension  Follow your healthcare provider's instructions and take all your medicines as directed  Talk to your healthcare provider about any new health conditions you have recently developed  · Ask about all medicines  Certain medicines can increase your blood pressure  Examples include oral birth control pills, decongestants, herbal supplements, and NSAIDs, such as ibuprofen  Your healthcare provider can tell you which medicines are safe for you to take   This includes prescription and over-the-counter medicines  Lifestyle changes you can make to lower your blood pressure: Your provider may want you to make more lifestyle changes if you are having trouble controlling your blood pressure  This may feel difficult over time, especially if you think you are making good changes but your pressure is still high  It might help to focus on one new change at a time  For example, try to add 1 more day of exercise, or exercise for an extra 10 minutes on 2 days  Small changes can make a big difference  Your healthcare provider can also refer you to specialists such as a dietitian who can help you make small changes  Your family members may be included in helping you learn to create lifestyle changes, such as the following:     · Limit sodium (salt) as directed  Too much sodium can affect your fluid balance  Check labels to find low-sodium or no-salt-added foods  Some low-sodium foods use potassium salts for flavor  Too much potassium can also cause health problems  Your healthcare provider will tell you how much sodium and potassium are safe for you to have in a day  He or she may recommend that you limit sodium to 2,300 mg a day  · Follow the meal plan recommended by your healthcare provider  A dietitian or your provider can give you more information on low-sodium plans or the DASH (Dietary Approaches to Stop Hypertension) eating plan  The DASH plan is low in sodium, processed sugar, unhealthy fats, and total fat  It is high in potassium, calcium, and fiber  These can be found in vegetables, fruit, and whole-grain foods  · Be physically active throughout the day  Physical activity, such as exercise, can help control your blood pressure and your weight  Be physically active for at least 30 minutes per day, on most days of the week  Include aerobic activity, such as walking or riding a bicycle  Also include strength training at least 2 times each week   Your healthcare providers can help you create a physical activity plan  · Decrease stress  This may help lower your blood pressure  Learn ways to relax, such as deep breathing or listening to music  · Limit alcohol as directed  Alcohol can increase your blood pressure  A drink of alcohol is 12 ounces of beer, 5 ounces of wine, or 1½ ounces of liquor  · Do not smoke  Nicotine and other chemicals in cigarettes and cigars can increase your blood pressure and also cause lung damage  Ask your healthcare provider for information if you currently smoke and need help to quit  E-cigarettes or smokeless tobacco still contain nicotine  Talk to your healthcare provider before you use these products  Follow up with your doctor or cardiologist as directed: You will need to return to have your blood pressure checked and to have other lab tests done  Write down your questions so you remember to ask them during your visits  © Copyright [employer] 2022 Information is for End User's use only and may not be sold, redistributed or otherwise used for commercial purposes  All illustrations and images included in CareNotes® are the copyrighted property of A D A M , Inc  or [facility]   The above information is an  only  It is not intended as medical advice for individual conditions or treatments  Talk to your doctor, nurse or pharmacist before following any medical regimen to see if it is safe and effective for you  Weight Management   AMBULATORY CARE:   Why it is important to manage your weight:  Being overweight increases your risk of health conditions such as heart disease, high blood pressure, type 2 diabetes, and certain types of cancer  It can also increase your risk for osteoarthritis, sleep apnea, and other respiratory problems  Aim for a slow, steady weight loss  Even a small amount of weight loss can lower your risk of health problems    Risks of being overweight:  Extra weight can cause many health problems, including the following:  · Diabetes (high blood sugar level)    · High blood pressure or high cholesterol    · Heart disease    · Stroke    · Gallbladder or liver disease    · Cancer of the colon, breast, prostate, liver, or kidney    · Sleep apnea    · Arthritis or gout    Screening  is done to check for health conditions before you have signs or symptoms  If you are 28to 79years old, your blood sugar level may be checked every 3 years for signs of prediabetes or diabetes  Your healthcare provider will check your blood pressure at each visit  High blood pressure can lead to a stroke or other problems  Your provider may check for signs of heart disease, cancer, or other health problems  How to lose weight safely:  A safe and healthy way to lose weight is to eat fewer calories and get regular exercise  · You can lose up about 1 pound a week by decreasing the number of calories you eat by 500 calories each day  You can decrease calories by eating smaller portion sizes or by cutting out high-calorie foods  Read labels to find out how many calories are in the foods you eat  · You can also burn calories with exercise such as walking, swimming, or biking  You will be more likely to keep weight off if you make these changes part of your lifestyle  Exercise at least 30 minutes per day on most days of the week  You can also fit in more physical activity by taking the stairs instead of the elevator or parking farther away from stores  Ask your healthcare provider about the best exercise plan for you  Healthy meal plan for weight management:  A healthy meal plan includes a variety of foods, contains fewer calories, and helps you stay healthy  A healthy meal plan includes the following:     · Eat whole-grain foods more often  A healthy meal plan should contain fiber  Fiber is the part of grains, fruits, and vegetables that is not broken down by your body   Whole-grain foods are healthy and provide extra fiber in your diet  Some examples of whole-grain foods are whole-wheat breads and pastas, oatmeal, brown rice, and bulgur  · Eat a variety of vegetables every day  Include dark, leafy greens such as spinach, kale, [place] greens, and mustard greens  Eat yellow and orange vegetables such as carrots, sweet potatoes, and winter squash  · Eat a variety of fruits every day  Choose fresh or canned fruit (canned in its own juice or light syrup) instead of juice  Fruit juice has very little or no fiber  · Eat low-fat dairy foods  Drink fat-free (skim) milk or 1% milk  Eat fat-free yogurt and low-fat cottage cheese  Try low-fat cheeses such as mozzarella and other reduced-fat cheeses  · Choose meat and other protein foods that are low in fat  Choose beans or other legumes such as split peas or lentils  Choose fish, skinless poultry (chicken or turkey), or lean cuts of red meat (beef or pork)  Before you cook meat or poultry, cut off any visible fat  · Use less fat and oil  Try baking foods instead of frying them  Add less fat, such as margarine, sour cream, regular salad dressing and mayonnaise to foods  Eat fewer high-fat foods  Some examples of high-fat foods include french fries, doughnuts, ice cream, and cakes  · Eat fewer sweets  Limit foods and drinks that are high in sugar  This includes candy, cookies, regular soda, and sweetened drinks  Ways to decrease calories:   · Eat smaller portions  ? Use a small plate with smaller servings  ? Do not eat second helpings  ? When you eat at a restaurant, ask for a box and place half of your meal in the box before you eat  ? Share an entrée with someone else  · Replace high-calorie snacks with healthy, low-calorie snacks  ? Choose fresh fruit, vegetables, fat-free rice cakes, or air-popped popcorn instead of potato chips, nuts, or chocolate  ? Choose water or calorie-free drinks instead of soda or sweetened drinks      · Do not shop for groceries when you are hungry  You may be more likely to make unhealthy food choices  Take a grocery list of healthy foods and shop after you have eaten  · Eat regular meals  Do not skip meals  Skipping meals can lead to overeating later in the day  This can make it harder for you to lose weight  Eat a healthy snack in place of a meal if you do not have time to eat a regular meal  Talk with a dietitian to help you create a meal plan and schedule that is right for you  Other things to consider as you try to lose weight:   · Be aware of situations that may give you the urge to overeat, such as eating while watching television  Find ways to avoid these situations  For example, read a book, go for a walk, or do crafts  · Meet with a weight loss support group or friends who are also trying to lose weight  This may help you stay motivated to continue working on your weight loss goals  © Copyright [employer] 2022 Information is for End User's use only and may not be sold, redistributed or otherwise used for commercial purposes  All illustrations and images included in CareNotes® are the copyrighted property of A D A M , Inc  or [address] [place] [address] [facility]  The above information is an  only  It is not intended as medical advice for individual conditions or treatments  Talk to your doctor, nurse or pharmacist before following any medical regimen to see if it is safe and effective for you  Low Fat Diet   AMBULATORY CARE:   A low-fat diet  is an eating plan that is low in total fat, unhealthy fat, and cholesterol  You may need to follow a low-fat diet if you have trouble digesting or absorbing fat  You may also need to follow this diet if you have high cholesterol  You can also lower your cholesterol by increasing the amount of fiber in your diet  Soluble fiber is a type of fiber that helps to decrease cholesterol levels  Different types of fat in food:   · Limit unhealthy fats    A diet that is high in cholesterol, saturated fat, and trans fat may cause unhealthy cholesterol levels  Unhealthy cholesterol levels increase your risk of heart disease  ? Cholesterol:  Limit intake of cholesterol to less than 200 mg per day  Cholesterol is found in meat, eggs, and dairy  ? Saturated fat:  Limit saturated fat to less than 7% of your total daily calories  Ask your dietitian how many calories you need each day  Saturated fat is found in butter, cheese, ice cream, whole milk, and palm oil  Saturated fat is also found in meat, such as beef, pork, chicken skin, and processed meats  Processed meats include sausage, hot dogs, and bologna  ? Trans fat:  Avoid trans fat as much as possible  Trans fat is used in fried and baked foods  Foods that say trans fat free on the label may still have up to 0 5 grams of trans fat per serving  · Include healthy fats  Replace foods that are high in saturated and trans fat with foods high in healthy fats  This may help to decrease high cholesterol levels  ? Monounsaturated fats: These are found in avocados, nuts, and vegetable oils, such as olive, canola, and sunflower oil  ? Polyunsaturated fats: These can be found in vegetable oils, such as soybean or corn oil  Omega-3 fats can help to decrease the risk of heart disease  Omega-3 fats are found in fish, such as salmon, herring, trout, and tuna  Omega-3 fats can also be found in plant foods, such as walnuts, flaxseed, soybeans, and canola oil  Foods to limit or avoid:   · Grains:      ? Snacks that are made with partially hydrogenated oils, such as chips, regular crackers, and butter-flavored popcorn    ? High-fat baked goods, such as biscuits, croissants, doughnuts, pies, cookies, and pastries    · Dairy:      ? Whole milk, 2% milk, and yogurt and ice cream made with whole milk    ?  Half and half creamer, heavy cream, and whipping cream    ? Cheese, cream cheese, and sour cream    · Meats and proteins: ? High-fat cuts of meat (T-bone steak, regular hamburger, and ribs)    ? Fried meat, poultry (turkey and chicken), and fish    ? Poultry (chicken and turkey) with skin    ? Cold cuts (salami or bologna), hot dogs, camara, and sausage    ? Whole eggs and egg yolks    · Vegetables and fruits with added fat:      ? Fried vegetables or vegetables in butter or high-fat sauces, such as cream or cheese sauces    ? Fried fruit or fruit served with butter or cream    · Fats:      ? Butter, stick margarine, and shortening    ? Coconut, palm oil, and palm kernel oil    Foods to include:   · Grains:      ? Whole-grain breads, cereals, pasta, and brown rice    ? Low-fat crackers and pretzels    · Vegetables and fruits:      ? Fresh, frozen, or canned vegetables (no salt or low-sodium)    ? Fresh, frozen, dried, or canned fruit (canned in light syrup or fruit juice)    ? Avocado    · Low-fat dairy products:      ? Nonfat (skim) or 1% milk    ? Nonfat or low-fat cheese, yogurt, and cottage cheese    · Meats and proteins:      ? Chicken or turkey with no skin    ? Baked or broiled fish    ? Lean beef and pork (loin, round, extra lean hamburger)    ? Beans and peas, unsalted nuts, soy products    ? Egg whites and substitutes    ? Seeds and nuts    · Fats:      ? Unsaturated oil, such as canola, olive, peanut, soybean, or sunflower oil    ? Soft or liquid margarine and vegetable oil spread    ? Low-fat salad dressing    Other ways to decrease fat:   · Read food labels before you buy foods  Choose foods that have less than 30% of calories from fat  Choose low-fat or fat-free dairy products  Remember that fat free does not mean calorie free  These foods still contain calories, and too many calories can lead to weight gain  · Trim fat from meat and avoid fried food  Trim all visible fat from meat before you cook it  Remove the skin from poultry  Do not adame meat, fish, or poultry  Bake, roast, boil, or broil these foods instead  Avoid fried foods  Eat a baked potato instead of [place] fries  Steam vegetables instead of sautéing them in butter  · Add less fat to foods  Use imitation camara bits on salads and baked potatoes instead of regular camara bits  Use fat-free or low-fat salad dressings instead of regular dressings  Use low-fat or nonfat butter-flavored topping instead of regular butter or margarine on popcorn and other foods  Ways to decrease fat in recipes:  Replace high-fat ingredients with low-fat or nonfat ones  This may cause baked goods to be drier than usual  You may need to use nonfat cooking spray on pans to prevent food from sticking  You also may need to change the amount of other ingredients, such as water, in the recipe  Try the following:  · Use low-fat or light margarine instead of regular margarine or shortening  · Use lean ground turkey breast or chicken, or lean ground beef (less than 5% fat) instead of hamburger  · Add 1 teaspoon of canola oil to 8 ounces of skim milk instead of using cream or half and half  · Use grated zucchini, carrots, or apples in breads instead of coconut  · Use blenderized, low-fat cottage cheese, plain tofu, or low-fat ricotta cheese instead of cream cheese  · Use 1 egg white and 1 teaspoon of canola oil, or use ¼ cup (2 ounces) of fat-free egg substitute instead of a whole egg  · Replace half of the oil that is called for in a recipe with applesauce when you bake  Use 3 tablespoons of cocoa powder and 1 tablespoon of canola oil instead of a square of baking chocolate  How to increase fiber:  Eat enough high-fiber foods to get 20 to 30 grams of fiber every day  Slowly increase your fiber intake to avoid stomach cramps, gas, and other problems  · Eat 3 ounces of whole-grain foods each day  An ounce is about 1 slice of bread  Eat whole-grain breads, such as whole-wheat bread   Whole wheat, whole-wheat flour, or other whole grains should be listed as the first ingredient on the food label  Replace white flour with whole-grain flour or use half of each in recipes  Whole-grain flour is heavier than white flour, so you may have to add more yeast or baking powder  · Eat a high-fiber cereal for breakfast   Oatmeal is a good source of soluble fiber  Look for cereals that have bran or fiber in the name  Choose whole-grain products, such as brown rice, barley, and whole-wheat pasta  · Eat more beans, peas, and lentils  For example, add beans to soups or salads  Eat at least 5 cups of fruits and vegetables each day  Eat fruits and vegetables with the peel because the peel is high in fiber  © Copyright [employer] 2022 Information is for End User's use only and may not be sold, redistributed or otherwise used for commercial purposes  All illustrations and images included in CareNotes® are the copyrighted property of A [employer] A M , Inc  or [address] [place] [address]  The above information is an  only  It is not intended as medical advice for individual conditions or treatments  Talk to your doctor, nurse or pharmacist before following any medical regimen to see if it is safe and effective for you  Heart Healthy Diet   AMBULATORY CARE:   A heart healthy diet  is an eating plan low in unhealthy fats and sodium (salt)  The plan is high in healthy fats and fiber  A heart healthy diet helps improve your cholesterol levels and lowers your risk for heart disease and stroke  A dietitian will teach you how to read and understand food labels  Heart healthy diet guidelines to follow:   · Choose foods that contain healthy fats  ? Unsaturated fats  include monounsaturated and polyunsaturated fats  Unsaturated fat is found in foods such as soybean, canola, olive, corn, and safflower oils  It is also found in soft tub margarine that is made with liquid vegetable oil  ? Omega-3 fat  is found in certain fish, such as salmon, tuna, and trout, and in walnuts and flaxseed  Eat fish high in omega-3 fats at least 2 times a week  · Get 20 to 30 grams of fiber each day  Fruits, vegetables, whole-grain foods, and legumes (cooked beans) are good sources of fiber  · Limit or do not have unhealthy fats  ? Cholesterol  is found in animal foods, such as eggs and lobster, and in dairy products made from whole milk  Limit cholesterol to less than 200 mg each day  ? Saturated fat  is found in meats, such as camara and hamburger  It is also found in chicken or turkey skin, whole milk, and butter  Limit saturated fat to less than 7% of your total daily calories  ? Trans fat  is found in packaged foods, such as potato chips and cookies  It is also in hard margarine, some fried foods, and shortening  Do not eat foods that contain trans fats  · Limit sodium as directed  You may be told to limit sodium to 2,000 to 2,300 mg each day  Choose low-sodium or no-salt-added foods  Add little or no salt to food you prepare  Use herbs and spices in place of salt  Include the following in your heart healthy plan:  Ask your dietitian or healthcare provider how many servings to have from each of the following food groups:  · Grains:      ? Whole-wheat breads, cereals, and pastas, and brown rice    ? Low-fat, low-sodium crackers and chips    · Vegetables:      ? Broccoli, green beans, green peas, and spinach    ? Collards, kale, and lima beans    ? Carrots, sweet potatoes, tomatoes, and peppers    ? Canned vegetables with no salt added    · Fruits:      ? Bananas, peaches, pears, and pineapple    ? Grapes, raisins, and dates    ? Oranges, tangerines, grapefruit, orange juice, and grapefruit juice    ? Apricots, mangoes, melons, and papaya    ? Raspberries and strawberries    ? Canned fruit with no added sugar    · Low-fat dairy:      ? Nonfat (skim) milk, 1% milk, and low-fat almond, cashew, or soy milks fortified with calcium    ?  Low-fat cheese, regular or frozen yogurt, and cottage cheese    · Meats and proteins:      ? Lean cuts of beef and pork (loin, leg, round), skinless chicken and turkey    ? Legumes, soy products, egg whites, or nuts    Limit or do not include the following in your heart healthy plan:   · Unhealthy fats and oils:      ? Whole or 2% milk, cream cheese, sour cream, or cheese    ? High-fat cuts of beef (T-bone steaks, ribs), chicken or turkey with skin, and organ meats such as liver    ? Butter, stick margarine, shortening, and cooking oils such as coconut or palm oil    · Foods and liquids high in sodium:      ? Packaged foods, such as frozen dinners, cookies, macaroni and cheese, and cereals with more than 300 mg of sodium per serving    ? Vegetables with added sodium, such as instant potatoes, vegetables with added sauces, or regular canned vegetables    ? Cured or smoked meats, such as hot dogs, camara, and sausage    ? High-sodium ketchup, barbecue sauce, salad dressing, pickles, olives, soy sauce, or miso    · Foods and liquids high in sugar:      ? Candy, cake, cookies, pies, or doughnuts    ? Soft drinks (soda), sports drinks, or sweetened tea    ? Canned or dry mixes for cakes, soups, sauces, or gravies    Other healthy heart guidelines:   · Do not smoke  Nicotine and other chemicals in cigarettes and cigars can cause lung and heart damage  Ask your healthcare provider for information if you currently smoke and need help to quit  E-cigarettes or smokeless tobacco still contain nicotine  Talk to your healthcare provider before you use these products  · Limit or do not drink alcohol as directed  Alcohol can damage your heart and raise your blood pressure  Your healthcare provider may give you specific daily and weekly limits  The general recommended limit is 1 drink a day for women 21 or older and for men 72 or older  Do not have more than 3 drinks in a day or 7 in a week  The recommended limit is 2 drinks a day for men 24to 59years of age   Do not have more than 4 drinks in a day or 14 in a week  A drink of alcohol is 12 ounces of beer, 5 ounces of wine, or 1½ ounces of liquor  · Exercise regularly  Exercise can help you maintain a healthy weight and improve your blood pressure and cholesterol levels  Regular exercise can also decrease your risk for heart problems  Ask your healthcare provider about the best exercise plan for you  Do not start an exercise program without asking your healthcare provider  Follow up with your doctor or cardiologist as directed:  Write down your questions so you remember to ask them during your visits  © Copyright [employer] 2022 Information is for End User's use only and may not be sold, redistributed or otherwise used for commercial purposes  All illustrations and images included in CareNotes® are the copyrighted property of [employer]  or Renee Petersen  The above information is an  only  It is not intended as medical advice for individual conditions or treatments  Talk to your doctor, nurse or pharmacist before following any medical regimen to see if it is safe and effective for you

## 2023-02-13 DIAGNOSIS — I10 PRIMARY HYPERTENSION: ICD-10-CM

## 2023-02-13 RX ORDER — AMLODIPINE BESYLATE 10 MG/1
10 TABLET ORAL DAILY
Qty: 30 TABLET | Refills: 5 | Status: SHIPPED | OUTPATIENT
Start: 2023-02-13

## 2023-03-31 ENCOUNTER — OFFICE VISIT (OUTPATIENT)
Dept: UROLOGY | Facility: CLINIC | Age: 73
End: 2023-03-31

## 2023-03-31 VITALS
SYSTOLIC BLOOD PRESSURE: 124 MMHG | WEIGHT: 198 LBS | BODY MASS INDEX: 25.41 KG/M2 | HEART RATE: 82 BPM | HEIGHT: 74 IN | OXYGEN SATURATION: 98 % | DIASTOLIC BLOOD PRESSURE: 76 MMHG

## 2023-03-31 DIAGNOSIS — N40.1 BENIGN PROSTATIC HYPERPLASIA WITH NOCTURIA: Primary | ICD-10-CM

## 2023-03-31 DIAGNOSIS — N40.0 BENIGN PROSTATIC HYPERPLASIA WITHOUT LOWER URINARY TRACT SYMPTOMS: ICD-10-CM

## 2023-03-31 DIAGNOSIS — R35.1 BENIGN PROSTATIC HYPERPLASIA WITH NOCTURIA: Primary | ICD-10-CM

## 2023-03-31 DIAGNOSIS — Z12.5 PROSTATE CANCER SCREENING: ICD-10-CM

## 2023-03-31 LAB — POST-VOID RESIDUAL VOLUME, ML POC: 10 ML

## 2023-03-31 RX ORDER — FINASTERIDE 5 MG/1
5 TABLET, FILM COATED ORAL DAILY
Qty: 90 TABLET | Refills: 3 | Status: SHIPPED | OUTPATIENT
Start: 2023-03-31

## 2023-03-31 NOTE — PATIENT INSTRUCTIONS
BLADDER HEALTH    WHAT IS CONSIDERED NORMAL? The average bladder can hold about 2 cups of urine before it needs to be emptied  The normal range of voiding urine is 6 to 8 times during a 24 hour period  As we get older, our bladder capacity can get smaller and we may need to pass urine more frequently but usually not more than every 2 hours  Urine should flow easily without discomfort in a good, steady stream until the bladder is empty  No pushing or straining is necessary to empty the bladder  An urge is a signal that you feel as the bladder stretches to fill with urine  Urges can be felt even if the bladder is not full  Urges are not commands to go to the toilet, merely a signal and can be controlled  WHAT ARE GOOD BLADDER HABITS? Take your time when emptying your bladder  Don’t strain or push to empty your bladder  Make sure you empty your bladder completely each time you pass urine  Do not rush the process  Consistently ignoring the urge to go (waiting more than 4 hours between toileting) or urinating too infrequently may be convenient but not healthy for your bladder  Avoid going to the toilet “just in case” or more often than every 2 hours  It is usually not necessary to go when you feel the first urge  Try to go only when your bladder is full  Urgency and frequency of urination can be improved by retraining the bladder and spacing your fluid intake throughout the day  Practice good toilet habits  Don’t let your bladder control your life  TIPS TO MAINTAIN GOOD BLADDER HABITS  Maintain a good fluid intake  Depending on your body size and environment, drink 6 -8 cups (8 oz each) of fluid per day unless otherwise advised by your doctor  Not enough fluid creates a foul odor and dark color of the urine  Limit the amount of caffeine (coffee, cola, chocolate or tea) and citrus foods that you consume as these foods can be associated with increased sensation of urinary urgency and frequency  "    Limit the amount of alcohol you drink  Alcohol increases urine production and makes it difficult for the brain to coordinate bladder control  Avoid constipation by maintaining a balanced diet of dietary fiber  Cigarette smoking is also irritating to the bladder surface and is associated with bladder cancer  In addition, the coughing associated with smoking may lead to increased incontinent episodes because of the increased pressure  HOW DIET CAN AFFECT YOUR BLADDER  Although there is no particular \"diet\" that can cure bladder control, there are certain dietary suggestions you can use to help control the problem  There are 2 points to consider when evaluating how your habits and diet may affect your bladder:    Foods and fluids can irritate the bladder  Some foods and beverages are thought to contribute to bladder leakage and irritability  However their effect on the bladder is not completely understood and you may want to see if eliminating one or all of these items improves your bladder control  If you are unable to give them up completely, it is recommended that you use the following items in moderation:  Acidic beverages and foods (orange juice, grapefruit juice, lemonade etc)  Alcoholic beverages  Vinegar  Coffee (regular and decaf)  Tea (regular and decaf)  Caffeinated beverages  Carbonated beverages          Drinking enough and the right kinds of fluids  Many people with bladder control issues decrease their intake of liquids in hope that they will need to urinate less frequently or have less urinary leakage  You should not restrict fluids to control your bladder  While a decrease in liquid intake does result in a decrease in the volume of urine, the smaller amount of urine may be more highly concentrated  Highly concentrated, dark yellow urine is irritating to the bladder surface and may actually cause you to go to the bathroom more frequently        It also encourages the growth " "of bacteria, which may lead to infections resulting in incontinence  Substitutions for Bladder Irritants: water is always the best beverage choice  Grape and apple juice are thirst quenchers are good selections and are not as irritating to the bladder  Low acid fruits:  Pears, apricots, papaya, watermelon  For coffee drinkers: KAVA®, Postum®, Abril®, Kaffree Jacqui®  For tea drinkers:  non-citrus or herbal and sun brewed tea    BEHAVIORAL THERAPY FOR IMPROVED BLADDER CONTROL      1  Urge Control Techniques       A  Stop whatever you are doing and concentrate on paulino your pelvic floor muscles  B   Contract your pelvic floor muscles repetitively (as in your \"flick\" exercises)  C   Once the urgency has subsided, realize that sometimes the urgency is because you have a             full bladder and have to urinate  Other times the urgency is a false signal and you do not have a full bladder  D  If you have urgency triggered by running water, \"key in the door,\" getting up from a sitting position, etc , contract your pelvic floor muscles prior to       initiating the activity  2   Daily Fluid Intake       A  Avoid drinking too little (less than 3 cups/day) or drinking too much (more than 6             cups/day)  B   Avoid caffeinated beverages  C   If voiding frequently at night, limit your liquid intake after 7 p m  3   Bowel Regularity--Constipation Makes Bladder Symptoms Worse       A  Drink enough liquids, eat plenty of high fiber food  B  Exercise       C  Avoid laxatives and enemas on a regular basis, this decreases the bowel's normal function  4   Voiding Schedules       A  Empty your bladder at 1½ to 2 hour intervals even if you may not have the urge to urinate             at that time  You may gradually increase the time between voidings to 30  minutes, until you can comfortably void every 3 hours  B    If you are voiding more frequently than " every 1½ to 2 hours, resist the urge to go  by using urge control techniques (described in 1 )

## 2023-03-31 NOTE — ASSESSMENT & PLAN NOTE
• AUA 7  • Ongoing nocturia x3  • continue finasteride 5 mg   • Continue tamsulosin 0 4 mg twice a day  • Consider cysto for bladder outlet obstruction  • Declined sleep study for possible sleep apnea  • Follow up 1 year

## 2023-03-31 NOTE — PROGRESS NOTES
Assessment and plan:     Benign prostatic hyperplasia with nocturia  • AUA 7  • Ongoing nocturia x3  • continue finasteride 5 mg   • Continue tamsulosin 0 4 mg twice a day  • Consider cysto for bladder outlet obstruction  • Declined sleep study for possible sleep apnea  • Follow up 1 year    Prostate cancer screening  • PSA 2 7  • Denies family history of prostate cancer  • PSA 1 year      TRISTIN Brush    History of Present Illness     Demond Cintron is a 67 y o  who presents for follow up of enlarged prostate  He had a recent ultrasound that revealed this  He was started on flomax and feels that this did not help much  He has nocturia x 5-6  He has intermittent stream, urgency and frequency  He drinks water (32 ounces), gator aid, iced tea ( 1 glass), coffee (1-2 cups)  Denies UTI hx  Had recent urine testing 9/8/2022 with out blood  He has ongoing nocturia x4-6 per night  He does not feel that her urinates much during the day, he was started on finasteride 5 mg by nephrology on 11/22/2022  He drinks a glass of water at supper and a glass of prune juice around 7 pm  He occasionally snores  He gets up for varying reasons (wood fire etc)  He is not interested in sleep evaluation or FONSECA evaluation with surgical intervention   Not interested in medication for OAB due to side effects      He follows with nephrology for protein in the urine and kidney cyst  His creat is 1 39    Laboratory     Lab Results   Component Value Date    BUN 16 11/15/2022    CREATININE 1 29 11/15/2022       No components found for: GFR    Lab Results   Component Value Date    GLUCOSE 99 12/15/2015    CALCIUM 9 0 11/15/2022     12/15/2015    K 4 0 11/15/2022    CO2 27 11/15/2022     11/15/2022       Lab Results   Component Value Date    WBC 6 84 10/24/2022    HGB 15 8 10/24/2022    HCT 49 1 10/24/2022    MCV 90 10/24/2022     10/24/2022       Lab Results   Component Value Date    PSA 2 8 10/24/2022    PSA 2 7 05/05/2022    PSA 3 0 04/06/2021       Recent Results (from the past 1 hour(s))   POCT Measure PVR    Collection Time: 03/31/23  9:43 AM   Result Value Ref Range    POST-VOID RESIDUAL VOLUME, ML POC 10 mL       @RESULT(URINEMICROSCOPIC)@    @RESULT(URINECULTURE)@    Radiology     RENAL ULTRASOUND 8/22/2022     INDICATION:   N28 1: Cyst of kidney, acquired      COMPARISON: Ultrasound Kidneys 4/3/2021     TECHNIQUE:   Ultrasound of the retroperitoneum was performed with a curvilinear transducer utilizing volumetric sweeps and still imaging techniques       FINDINGS:     KIDNEYS:  Symmetric and normal size  Right kidney:  12 1 x 6 7 x 4 9 cm  Volume 207 3 mL  Left kidney:  12 1 x 6 8 x 5 0 cm  Volume 213 7 mL     Right kidney  Normal echogenicity and contour  No mass is identified  Lower pole cyst measures 11 mm with punctate rim calcification  No hydronephrosis  No shadowing calculi  No perinephric fluid collections      Left kidney  Normal echogenicity and contour  No mass is identified  Mid to lower portion cyst with partial rim calcification measures 7 mm, not significantly changed from prior  No hydronephrosis  No shadowing calculi  No perinephric fluid collections      URETERS:  Nonvisualized      BLADDER:   Normally distended  No focal thickening or mass lesions  Bilateral ureteral jets not detected      Prostatomegaly measuring 6 0 x 5 2 x 5 1 cm with mass effect on the urinary bladder      IMPRESSION:     1  Small renal cysts with peripheral calcification      2  Prostatomegaly with mass effect on the urinary bladder  Review of Systems     Review of Systems   Constitutional: Negative for activity change, appetite change, chills, fatigue, fever and unexpected weight change  HENT: Negative for facial swelling  Eyes: Negative for discharge  Respiratory: Negative  Negative for cough and shortness of breath  Cardiovascular: Negative for chest pain and leg swelling     Gastrointestinal: Negative  Negative for abdominal distention, abdominal pain, constipation, diarrhea, nausea and vomiting  Endocrine: Negative  Genitourinary: Negative  Negative for decreased urine volume, difficulty urinating, dysuria, enuresis, flank pain, frequency, genital sores, hematuria and urgency  Nocturia x3   Musculoskeletal: Negative for back pain and myalgias  Skin: Negative for pallor and rash  Allergic/Immunologic: Negative  Negative for immunocompromised state  Neurological: Negative for facial asymmetry and speech difficulty  Psychiatric/Behavioral: Negative for agitation and confusion  AUA SYMPTOM SCORE    Flowsheet Row Most Recent Value   AUA SYMPTOM SCORE    How often have you had a sensation of not emptying your bladder completely after you finished urinating? 0   How often have you had to urinate again less than two hours after you finished urinating? 2   How often have you found you stopped and started again several times when you urinate? 1   How often have you found it difficult to postpone urination? 1   How often have you had a weak urinary stream? 0   How often have you had to push or strain to begin urination? 0   How many times did you most typically get up to urinate from the time you went to bed at night until the time you got up in the morning? 3   Quality of Life: If you were to spend the rest of your life with your urinary condition just the way it is now, how would you feel about that? 1   AUA SYMPTOM SCORE 7                Allergies     Allergies   Allergen Reactions   • Lisinopril Anaphylaxis   • Pollen Extract Sneezing       Physical Exam     Physical Exam  Vitals reviewed  Constitutional:       General: He is not in acute distress  Appearance: Normal appearance  He is normal weight  He is not ill-appearing, toxic-appearing or diaphoretic  HENT:      Head: Normocephalic and atraumatic  Eyes:      General: No scleral icterus    Cardiovascular:      Rate "and Rhythm: Normal rate  Pulmonary:      Effort: Pulmonary effort is normal  No respiratory distress  Abdominal:      General: Abdomen is flat  There is no distension  Musculoskeletal:         General: No swelling  Cervical back: Normal range of motion  Skin:     General: Skin is warm and dry  Coloration: Skin is not jaundiced or pale  Findings: No rash  Neurological:      General: No focal deficit present  Mental Status: He is alert and oriented to person, place, and time  Gait: Gait normal    Psychiatric:         Mood and Affect: Mood normal          Behavior: Behavior normal          Thought Content:  Thought content normal          Judgment: Judgment normal          Vital Signs     Vitals:    03/31/23 0939   BP: 124/76   Pulse: 82   SpO2: 98%   Weight: 89 8 kg (198 lb)   Height: 6' 2\" (1 88 m)       Current Medications       Current Outpatient Medications:   •  amLODIPine (NORVASC) 10 mg tablet, Take 1 tablet (10 mg total) by mouth daily, Disp: 30 tablet, Rfl: 5  •  aspirin (ECOTRIN LOW STRENGTH) 81 mg EC tablet, Take 1 tablet (81 mg total) by mouth daily, Disp: , Rfl:   •  calcitriol (ROCALTROL) 0 25 mcg capsule, Take 1 capsule (0 25 mcg total) by mouth daily, Disp: 90 capsule, Rfl: 1  •  Calcium Carbonate (CALCIUM 600 PO), Take by mouth Twice per day, Disp: , Rfl:   •  Cholecalciferol (VITAMIN D3) 1000 units CAPS, Take 1 capsule by mouth in the morning  , Disp: , Rfl:   •  finasteride (PROSCAR) 5 mg tablet, Take 1 tablet (5 mg total) by mouth daily, Disp: 90 tablet, Rfl: 3  •  Multiple Vitamin (MULTI-VITAMIN DAILY PO), Take by mouth daily, Disp: , Rfl:   •  Psyllium (Metamucil) 28 3 % POWD, Take 1 Package by mouth as needed, Disp: , Rfl:   •  tamsulosin (FLOMAX) 0 4 mg, Take 1 capsule (0 4 mg total) by mouth 2 (two) times a day, Disp: 180 capsule, Rfl: 3    Active Problems     Patient Active Problem List   Diagnosis   • Allergic rhinitis   • Basal cell tumor   • Benign colon " polyp   • Generalized anxiety disorder   • GERD without esophagitis   • Hyperlipidemia   • Hypertension   • Hypocalcemia   • Hypoparathyroidism ([facility] [address] )   • Migraine, unspecified, not intractable, without status migrainosus   • Multiple thyroid nodules   • Palpitations   • Microalbuminuria   • BMI 25 0-25 9,adult   • Benign hypertension with CKD (chronic kidney disease) stage III (HCC)   • Benign prostatic hyperplasia with nocturia   • COVID-19 virus infection   • Allergy to ACE inhibitors   • Kidney cyst, acquired   • Persistent proteinuria   • Hearing loss of left ear   • Prostate cancer screening   • Stage 3a chronic kidney disease ([facility] [address] )       Past Medical History     Past Medical History:   Diagnosis Date   • Abdominal pain, acute, generalized     last assessed - 21Apr2017   • Actinic keratosis     last assessed - 12Jun2013   • Chest pain     last assessed - [id number]   • Chronic kidney disease, stage 3 (HCC)    • Colon polyp    • Disease of thyroid gland    • Dysfunction of both eustachian tubes     suspect due to ETD  Recommend otc allergy meds and if fails then add flonase; last assessed - [id number]   • Edema    • Fatigue     last assessed - [id number]   • Generalized anxiety disorder    • GERD (gastroesophageal reflux disease)    • Hematuria     last assessed - [id number]   • Hypertension    • Hypo-osmolality and hyponatremia    • Hypocalcemia    • Hypoparathyroidism ([facility] [address] )    • Lightheadedness     last assessed - [id number]   • Lump in neck     ? cause  Maybe a localized allergic reaction, dental issue, doubt sialdenitis, ect  Empiric abx and one dose steroids  Continue benadryl  If worsens, to er or ent     • Malaise and fatigue     last assessed - 21Apr2017   • Nephropathy     last assessed - [id number]   • Nocturia    • Proteinuria    • Shortness of breath     last assessed - [id number]   • Skin lesion     Resolved - [id number]   • Tinea corporis     last assessed - [id number]       Surgical History     Past Surgical History:   Procedure Laterality Date   • COLONOSCOPY W/ POLYPECTOMY  07/19/2019   • HEAD & NECK SKIN LESION EXCISIONAL BIOPSY      excision of lesion scalp malignant - [facility]; last assessed - [id number]   • SKIN BIOPSY      last assessed - [id number]   • THYROID SURGERY      Biopsy thyroid using percutaneous core needle; last assessed - 09Sep2014   • TONSILLECTOMY         Family History     Family History   Problem Relation Age of Onset   • Hypertension Mother    • Hypertension Father    • Diabetes Father    • Other Brother         Schwannomatosis   • Stroke Family        Social History     Social History     Social History     Tobacco Use   Smoking Status Never   • Passive exposure: Past   Smokeless Tobacco Never       Past Surgical History:   Procedure Laterality Date   • COLONOSCOPY W/ POLYPECTOMY  07/19/2019   • HEAD & NECK SKIN LESION EXCISIONAL BIOPSY      excision of lesion scalp malignant - [facility]; last assessed - [id number]   • SKIN BIOPSY      last assessed - [id number]   • THYROID SURGERY      Biopsy thyroid using percutaneous core needle; last assessed - 09Sep2014   • TONSILLECTOMY           The following portions of the patient's history were reviewed and updated as appropriate: allergies, current medications, past family history, past medical history, past social history, past surgical history and problem list    Please note :  Voice dictation software has been used to create this document  There may be inadvertent transcription errors      [id number] [id number] [address]

## 2023-05-01 ENCOUNTER — RA CDI HCC (OUTPATIENT)
Dept: OTHER | Facility: HOSPITAL | Age: 73
End: 2023-05-01

## 2023-05-02 ENCOUNTER — LAB (OUTPATIENT)
Dept: LAB | Facility: CLINIC | Age: 73
End: 2023-05-02

## 2023-05-02 DIAGNOSIS — E20.9 HYPOPARATHYROIDISM, UNSPECIFIED HYPOPARATHYROIDISM TYPE (HCC): ICD-10-CM

## 2023-05-02 DIAGNOSIS — Z12.5 PROSTATE CANCER SCREENING: ICD-10-CM

## 2023-05-02 DIAGNOSIS — N18.31 STAGE 3A CHRONIC KIDNEY DISEASE (HCC): ICD-10-CM

## 2023-05-03 LAB
25(OH)D3 SERPL-MCNC: 58.7 NG/ML (ref 30–100)
ALBUMIN SERPL BCP-MCNC: 3.7 G/DL (ref 3.5–5)
ALP SERPL-CCNC: 71 U/L (ref 46–116)
ALT SERPL W P-5'-P-CCNC: 19 U/L (ref 12–78)
ANION GAP SERPL CALCULATED.3IONS-SCNC: 4 MMOL/L (ref 4–13)
AST SERPL W P-5'-P-CCNC: 18 U/L (ref 5–45)
BILIRUB SERPL-MCNC: 0.65 MG/DL (ref 0.2–1)
BUN SERPL-MCNC: 15 MG/DL (ref 5–25)
CALCIUM 24H UR-MCNC: 358.4 MG/24 HRS (ref 42–353)
CALCIUM SERPL-MCNC: 8.6 MG/DL (ref 8.3–10.1)
CHLORIDE SERPL-SCNC: 102 MMOL/L (ref 96–108)
CO2 SERPL-SCNC: 28 MMOL/L (ref 21–32)
CREAT 24H UR-MRATE: 1.4 G/24HR (ref 0.8–1.8)
CREAT SERPL-MCNC: 1.3 MG/DL (ref 0.6–1.3)
GFR SERPL CREATININE-BSD FRML MDRD: 54 ML/MIN/1.73SQ M
GLUCOSE P FAST SERPL-MCNC: 98 MG/DL (ref 65–99)
MAGNESIUM SERPL-MCNC: 2.2 MG/DL (ref 1.6–2.6)
PHOSPHATE SERPL-MCNC: 3.5 MG/DL (ref 2.3–4.1)
POTASSIUM SERPL-SCNC: 3.8 MMOL/L (ref 3.5–5.3)
PROT SERPL-MCNC: 7.6 G/DL (ref 6.4–8.4)
PSA SERPL-MCNC: 1.6 NG/ML (ref 0–4)
PTH-INTACT SERPL-MCNC: <6.3 PG/ML (ref 18.4–80.1)
SODIUM SERPL-SCNC: 134 MMOL/L (ref 135–147)
SPECIMEN VOL UR: 3200 ML
SPECIMEN VOL UR: 3200 ML

## 2023-05-08 ENCOUNTER — OFFICE VISIT (OUTPATIENT)
Dept: NEPHROLOGY | Facility: CLINIC | Age: 73
End: 2023-05-08

## 2023-05-08 VITALS
DIASTOLIC BLOOD PRESSURE: 70 MMHG | WEIGHT: 201 LBS | HEART RATE: 88 BPM | HEIGHT: 74 IN | SYSTOLIC BLOOD PRESSURE: 122 MMHG | OXYGEN SATURATION: 99 % | BODY MASS INDEX: 25.8 KG/M2

## 2023-05-08 DIAGNOSIS — I12.9 BENIGN HYPERTENSION WITH CKD (CHRONIC KIDNEY DISEASE) STAGE III (HCC): ICD-10-CM

## 2023-05-08 DIAGNOSIS — N18.30 BENIGN HYPERTENSION WITH CKD (CHRONIC KIDNEY DISEASE) STAGE III (HCC): ICD-10-CM

## 2023-05-08 DIAGNOSIS — N18.31 STAGE 3A CHRONIC KIDNEY DISEASE (HCC): Primary | ICD-10-CM

## 2023-05-08 DIAGNOSIS — N28.1 KIDNEY CYST, ACQUIRED: ICD-10-CM

## 2023-05-08 DIAGNOSIS — E20.9 HYPOPARATHYROIDISM, UNSPECIFIED HYPOPARATHYROIDISM TYPE (HCC): ICD-10-CM

## 2023-05-08 NOTE — ASSESSMENT & PLAN NOTE
Lab Results   Component Value Date    EGFR 54 05/02/2023    EGFR 54 11/15/2022    EGFR 45 10/24/2022    CREATININE 1 30 05/02/2023    CREATININE 1 29 11/15/2022    CREATININE 1 51 (H) 10/24/2022     Patient blood pressure is stable at this time, continue to monitor with amlodipine 10 mg daily  Continue to encourage low-sodium diet

## 2023-05-08 NOTE — ASSESSMENT & PLAN NOTE
Lab Results   Component Value Date    EGFR 54 05/02/2023    EGFR 54 11/15/2022    EGFR 45 10/24/2022    CREATININE 1 30 05/02/2023    CREATININE 1 29 11/15/2022    CREATININE 1 51 (H) 10/24/2022     Creatinine remains at around 1 3 mg/dL    Continue to avoid nephrotoxins and maximize care in general

## 2023-05-08 NOTE — ASSESSMENT & PLAN NOTE
As mentioned previously, no further work-up indicated as the patient has benign cyst   Monitor only as indicated

## 2023-05-08 NOTE — PROGRESS NOTES
[facility] [address]    Name: Ji Talbot Trainer  YOB: 1950      Assessment/Plan:    Stage 3a chronic kidney disease [facility])  Lab Results   Component Value Date    EGFR 54 05/02/2023    EGFR 54 11/15/2022    EGFR 45 10/24/2022    CREATININE 1 30 05/02/2023    CREATININE 1 29 11/15/2022    CREATININE 1 51 (H) 10/24/2022     Creatinine remains at around 1 3 mg/dL  Continue to avoid nephrotoxins and maximize care in general     Kidney cyst, acquired  As mentioned previously, no further work-up indicated as the patient has benign cyst   Monitor only as indicated    Hypoparathyroidism ([address] )  Continue follow-up according to endocrinology  Most recent 24-hour urine noting elevated amount of calcium  Serum calcium levels were okay  Continue modification of calcitriol according to endocrinology recommendations as well  Benign hypertension with CKD (chronic kidney disease) stage III [facility])  Lab Results   Component Value Date    EGFR 54 05/02/2023    EGFR 54 11/15/2022    EGFR 45 10/24/2022    CREATININE 1 30 05/02/2023    CREATININE 1 29 11/15/2022    CREATININE 1 51 (H) 10/24/2022     Patient blood pressure is stable at this time, continue to monitor with amlodipine 10 mg daily  Continue to encourage low-sodium diet  Problem List Items Addressed This Visit        Endocrine    Hypoparathyroidism [facility])     Continue follow-up according to endocrinology  Most recent 24-hour urine noting elevated amount of calcium  Serum calcium levels were okay  Continue modification of calcitriol according to endocrinology recommendations as well              Cardiovascular and Mediastinum    Benign hypertension with CKD (chronic kidney disease) stage III [facility])     Lab Results   Component Value Date    EGFR 54 05/02/2023    EGFR 54 11/15/2022    EGFR 45 10/24/2022    CREATININE 1 30 05/02/2023    CREATININE 1 29 11/15/2022    CREATININE 1 51 (H) 10/24/2022 Patient blood pressure is stable at this time, continue to monitor with amlodipine 10 mg daily  Continue to encourage low-sodium diet  Genitourinary    Kidney cyst, acquired     As mentioned previously, no further work-up indicated as the patient has benign cyst   Monitor only as indicated         Stage 3a chronic kidney disease [facility]) - Primary     Lab Results   Component Value Date    EGFR 54 05/02/2023    EGFR 54 11/15/2022    EGFR 45 10/24/2022    CREATININE 1 30 05/02/2023    CREATININE 1 29 11/15/2022    CREATININE 1 51 (H) 10/24/2022     Creatinine remains at around 1 3 mg/dL  Continue to avoid nephrotoxins and maximize care in general          Relevant Orders    Comprehensive metabolic panel    Microalbumin / creatinine urine ratio    Urinalysis with microscopic    CBC and differential    Magnesium    Phosphorus    PTH, intact       Patient is stable from the renal standpoint, we will see him back for regular appointment in 6 months  Follow-up labs to be done prior to that appoint provided during this visit  Subjective:      Patient ID: Jameel Cintron is a 67 y o  male  Patient presents for follow-up appointment  Reviewed the patient's labs in detail, most recent labs noted creatinine 1 3 mg/dL, estimate GFR 54 mL/min, with no significant electrolyte abnormalities at this time  Denies use of nonsteroid inflammatory medications  Patient taking all medications as prescribed with no specific side effects at this time  Hypertension  This is a chronic problem  The current episode started more than 1 year ago  The problem is unchanged  The problem is controlled  Pertinent negatives include no chest pain, orthopnea or peripheral edema  There are no associated agents to hypertension  Risk factors for coronary artery disease include male gender  Past treatments include lifestyle changes, diuretics, calcium channel blockers, angiotensin blockers and beta blockers   There are no compliance problems  Hypertensive end-organ damage includes kidney disease  Identifiable causes of hypertension include chronic renal disease  The following portions of the patient's history were reviewed and updated as appropriate: allergies, current medications, past family history, past medical history, past social history, past surgical history and problem list     Review of Systems   Cardiovascular: Negative for chest pain and orthopnea  All other systems reviewed and are negative  Social History     Socioeconomic History   • Marital status: /Civil Union     Spouse name: None   • Number of children: None   • Years of education: None   • Highest education level: None   Occupational History   • Occupation: Retired     Comment:     Tobacco Use   • Smoking status: Never     Passive exposure: Past   • Smokeless tobacco: Never   Vaping Use   • Vaping Use: Never used   Substance and Sexual Activity   • Alcohol use: Yes     Comment: Occasionally drink   • Drug use: No   • Sexual activity: Yes     Partners: Female   Other Topics Concern   • None   Social History Narrative    Consumes on average 1 cup of regular coffee per day      Social Determinants of Health     Financial Resource Strain: Not on file   Food Insecurity: No Food Insecurity   • Worried About Running Out of Food in the Last Year: Never true   • Ran Out of Food in the Last Year: Never true   Transportation Needs: No Transportation Needs   • Lack of Transportation (Medical): No   • Lack of Transportation (Non-Medical):  No   Physical Activity: Not on file   Stress: Not on file   Social Connections: Not on file   Intimate Partner Violence: Not on file   Housing Stability: Low Risk    • Unable to Pay for Housing in the Last Year: No   • Number of Places Lived in the Last Year: 1   • Unstable Housing in the Last Year: No     Past Medical History:   Diagnosis Date   • Abdominal pain, acute, generalized     last assessed - 21Apr2017   • Actinic keratosis     last assessed - 12Jun2013   • Chest pain     last assessed - [id number]   • Chronic kidney disease, stage 3 (HCC)    • Colon polyp    • Disease of thyroid gland    • Dysfunction of both eustachian tubes     suspect due to ETD  Recommend otc allergy meds and if fails then add flonase; last assessed - 06Aug2012   • Edema    • Fatigue     last assessed - [id number]   • Generalized anxiety disorder    • GERD (gastroesophageal reflux disease)    • Hematuria     last assessed - 02Aug2012   • Hypertension    • Hypo-osmolality and hyponatremia    • Hypocalcemia    • Hypoparathyroidism ([address] )    • Lightheadedness     last assessed - [id number]   • Lump in neck     ? cause  Maybe a localized allergic reaction, dental issue, doubt sialdenitis, ect  Empiric abx and one dose steroids  Continue benadryl  If worsens, to er or ent     • Malaise and fatigue     last assessed - 21Apr2017   • Nephropathy     last assessed - 16Sep2015   • Nocturia    • Proteinuria    • Shortness of breath     last assessed - [id number]   • Skin lesion     Resolved - [id number]   • Tinea corporis     last assessed - [id number]     Past Surgical History:   Procedure Laterality Date   • COLONOSCOPY W/ POLYPECTOMY  07/19/2019   • HEAD & NECK SKIN LESION EXCISIONAL BIOPSY      excision of lesion scalp malignant - BCCA; last assessed - [id number]   • SKIN BIOPSY      last assessed - 09Sep2014   • THYROID SURGERY      Biopsy thyroid using percutaneous core needle; last assessed - 09Sep2014   • TONSILLECTOMY         Current Outpatient Medications:   •  amLODIPine (NORVASC) 10 mg tablet, Take 1 tablet (10 mg total) by mouth daily, Disp: 30 tablet, Rfl: 5  •  aspirin (ECOTRIN LOW STRENGTH) 81 mg EC tablet, Take 1 tablet (81 mg total) by mouth daily, Disp: , Rfl:   •  calcitriol (ROCALTROL) 0 25 mcg capsule, Take 1 capsule (0 25 mcg total) by mouth daily, Disp: 90 capsule, Rfl: 1  •  Calcium Carbonate (CALCIUM 600 PO), Take by mouth Twice per day, Disp: , Rfl:   •  Cholecalciferol (VITAMIN D3) 1000 units CAPS, Take 1 capsule by mouth in the morning  , Disp: , Rfl:   •  finasteride (PROSCAR) 5 mg tablet, Take 1 tablet (5 mg total) by mouth daily, Disp: 90 tablet, Rfl: 3  •  Multiple Vitamin (MULTI-VITAMIN DAILY PO), Take by mouth daily, Disp: , Rfl:   •  Psyllium (Metamucil) 28 3 % POWD, Take 1 Package by mouth as needed, Disp: , Rfl:   •  tamsulosin (FLOMAX) 0 4 mg, Take 1 capsule (0 4 mg total) by mouth 2 (two) times a day, Disp: 180 capsule, Rfl: 3    Lab Results   Component Value Date     12/15/2015    SODIUM 134 (L) 05/02/2023    K 3 8 05/02/2023     05/02/2023    CO2 28 05/02/2023    ANIONGAP 7 12/15/2015    AGAP 4 05/02/2023    BUN 15 05/02/2023    CREATININE 1 30 05/02/2023    GLUC 144 (H) 06/23/2022    GLUF 98 05/02/2023    CALCIUM 8 6 05/02/2023    AST 18 05/02/2023    ALT 19 05/02/2023    ALKPHOS 71 05/02/2023    PROT 7 8 09/16/2015    TP 7 6 05/02/2023    BILITOT 0 51 09/16/2015    TBILI 0 65 05/02/2023    EGFR 54 05/02/2023     Lab Results   Component Value Date    WBC 6 84 10/24/2022    HGB 15 8 10/24/2022    HCT 49 1 10/24/2022    MCV 90 10/24/2022     10/24/2022     Lab Results   Component Value Date    CHOLESTEROL 204 (H) 01/17/2023    CHOLESTEROL 223 (H) 01/05/2022    CHOLESTEROL 218 (H) 09/29/2020     Lab Results   Component Value Date    HDL 52 01/17/2023    HDL 54 01/05/2022    HDL 53 09/29/2020     Lab Results   Component Value Date    LDLCALC 113 (H) 01/17/2023    LDLCALC 140 (H) 01/05/2022    LDLCALC 138 (H) 09/29/2020     Lab Results   Component Value Date    TRIG 193 (H) 01/17/2023    TRIG 119 09/08/2022    TRIG 144 01/05/2022     No results found for: [place], [place]  Lab Results   Component Value Date    ZYT3VCWTKTDY 1 170 01/17/2023     Lab Results   Component Value Date    PTH <6 3 (L) 05/02/2023    CALCIUM 8 6 05/02/2023    CAION 1 06 12/12/2012    PHOS 3 5 05/02/2023     No results found for: SPEP, "UPEP  No results found for: GERSON [id number]        Objective:      /70   Pulse 88   Ht 6' 2\" (1 88 m)   Wt 91 2 kg (201 lb)   SpO2 99%   BMI 25 81 kg/m²          Physical Exam  Vitals reviewed  Constitutional:       General: He is not in acute distress  Appearance: He is well-developed  HENT:      Head: Normocephalic and atraumatic  Eyes:      Conjunctiva/sclera: Conjunctivae normal    Cardiovascular:      Rate and Rhythm: Normal rate and regular rhythm  Pulmonary:      Effort: Pulmonary effort is normal       Breath sounds: Normal breath sounds  Abdominal:      Palpations: Abdomen is soft  Musculoskeletal:      Cervical back: Neck supple  Skin:     General: Skin is warm  Findings: No rash  Neurological:      Mental Status: He is alert and oriented to person, place, and time  Cranial Nerves: No cranial nerve deficit     Psychiatric:         Behavior: Behavior normal          "

## 2023-05-08 NOTE — ASSESSMENT & PLAN NOTE
Continue follow-up according to endocrinology  Most recent 24-hour urine noting elevated amount of calcium  Serum calcium levels were okay  Continue modification of calcitriol according to endocrinology recommendations as well

## 2023-05-09 NOTE — PROGRESS NOTES
Established Patient Progress Note       Chief Complaint   Patient presents with   • Hypothyroidism        Impression & Plan:    Problem List Items Addressed This Visit        Endocrine    Hypoparathyroidism ([address] ) - Primary     Given elevated urine Ca+, reduce calcitriol 0 25 mcg to every other day  Repeat CMP and 24 hour urine collection in 3 months  Reviewed s/s of hypocalcemia  Patient is to notify me with any of these  Continue remainder of regimen  F/U in 6 months  Relevant Orders    Comprehensive metabolic panel Clinic Collect    Calcium, urine, 24 hour Clinic Collect    Creatinine, urine, 24 hour Lab Collect       Other    Hypocalcemia     Stable  Continue 600 mg of calcium BID  Continue 1,000 iu of Vit D daily  Relevant Medications    calcitriol (ROCALTROL) 0 25 mcg capsule       Orders Placed This Encounter   Procedures   • Comprehensive metabolic panel Clinic Collect     This is a patient instruction: Patient fasting for 8 hours or longer recommended  • Calcium, urine, 24 hour Clinic Collect   • Creatinine, urine, 24 hour Lab Collect     Standing Status:   Future     Standing Expiration Date:   5/10/2024       History of Present Illness:     Frances Jones is a 75 y o  male with hypertension, hyperlipidemia, thyroid nodules, hypoparathyroidism of unknown etiology, hypocalcemia, and vitamin-D deficiency   Patient follows with Nephrology for chronic kidney disease stage 3 and proteinuria  Patient completed a series of labs on May 2, 2023  These demonstrated a low PTH, a normal serum calcium, normal vitamin D  He is supplementing with 1,200 mg of Ca+ daily, 1,000 iu of Vit D daily  He is taking 0 25 mcg of calcitriol daily  His 24-hour urine did demonstrate an elevated calcium of 358 4 mg per 24 hours  Patient denies flank pain and hematuria  Reports feeling well     Component      Latest Ref Rng & Units 5/2/2023           9:46 AM   Calcium      8 3 - 10 1 mg/dL 8 6   AST      5 - 45 U/L 18   ALT      12 - 78 U/L 19   Alkaline Phosphatase      46 - 116 U/L 71   Total Protein      6 4 - 8 4 g/dL 7 6   Albumin      3 5 - 5 0 g/dL 3 7   TOTAL BILIRUBIN      0 20 - 1 00 mg/dL 0 65   eGFR      ml/min/1 73sq m 54     Component      Latest Ref Rng & Units 10/2/2020 5/2/2023           9:10 AM  9:46 AM   24 HR URINE VOLUME      mL 2,975 3,200   CALCIUM 24 HOUR URINE      42 - 353 mg/24 hrs 211 225 358 4 (H)       Patient Active Problem List   Diagnosis   • Allergic rhinitis   • Basal cell tumor   • Benign colon polyp   • Generalized anxiety disorder   • GERD without esophagitis   • Hyperlipidemia   • Hypertension   • Hypocalcemia   • Hypoparathyroidism (HCC)   • Migraine, unspecified, not intractable, without status migrainosus   • Multiple thyroid nodules   • Palpitations   • Microalbuminuria   • BMI 25 0-25 9,adult   • Benign hypertension with CKD (chronic kidney disease) stage III (HCC)   • Benign prostatic hyperplasia with nocturia   • COVID-19 virus infection   • Allergy to ACE inhibitors   • Kidney cyst, acquired   • Persistent proteinuria   • Hearing loss of left ear   • Prostate cancer screening   • Stage 3a chronic kidney disease ([facility] [address] )      Past Medical History:   Diagnosis Date   • Abdominal pain, acute, generalized     last assessed - 21Apr2017   • Actinic keratosis     last assessed - 12Jun2013   • Chest pain     last assessed - [id number]   • Chronic kidney disease, stage 3 (HCC)    • Colon polyp    • Disease of thyroid gland    • Dysfunction of both eustachian tubes     suspect due to ETD   Recommend otc allergy meds and if fails then add flonase; last assessed - 06Aug2012   • Edema    • Fatigue     last assessed - [id number]   • Generalized anxiety disorder    • GERD (gastroesophageal reflux disease)    • Hematuria     last assessed - 02Aug2012   • Hypertension    • Hypo-osmolality and hyponatremia    • Hypocalcemia    • Hypoparathyroidism (HCC)    • Lightheadedness     last assessed - [id number]   • Lump in neck     ? cause  Maybe a localized allergic reaction, dental issue, doubt sialdenitis, ect  Empiric abx and one dose steroids  Continue benadryl  If worsens, to er or ent     • Malaise and fatigue     last assessed - 37Bgl3751   • Nephropathy     last assessed - [id number]   • Nocturia    • Proteinuria    • Shortness of breath     last assessed - [id number]   • Skin lesion     Resolved - [id number]   • Tinea corporis     last assessed - [id number]      Past Surgical History:   Procedure Laterality Date   • COLONOSCOPY W/ POLYPECTOMY  07/19/2019   • HEAD & NECK SKIN LESION EXCISIONAL BIOPSY      excision of lesion scalp malignant - BCCA; last assessed - [id number]   • SKIN BIOPSY      last assessed - 09Sep2014   • THYROID SURGERY      Biopsy thyroid using percutaneous core needle; last assessed - 09Sep2014   • TONSILLECTOMY        Family History   Problem Relation Age of Onset   • Hypertension Mother    • Hypertension Father    • Diabetes Father    • Other Brother         Schwannomatosis   • Stroke Family      Social History     Tobacco Use   • Smoking status: Never     Passive exposure: Past   • Smokeless tobacco: Never   Substance Use Topics   • Alcohol use: Yes     Comment: Occasionally drink     Allergies   Allergen Reactions   • Lisinopril Anaphylaxis   • Pollen Extract Sneezing       Current Outpatient Medications:   •  amLODIPine (NORVASC) 10 mg tablet, Take 1 tablet (10 mg total) by mouth daily, Disp: 30 tablet, Rfl: 5  •  aspirin (ECOTRIN LOW STRENGTH) 81 mg EC tablet, Take 1 tablet (81 mg total) by mouth daily, Disp: , Rfl:   •  calcitriol (ROCALTROL) 0 25 mcg capsule, Take 1 capsule (0 25 mcg total) by mouth every other day, Disp: 90 capsule, Rfl: 1  •  Calcium Carbonate (CALCIUM 600 PO), Take by mouth Twice per day, Disp: , Rfl:   •  Cholecalciferol (VITAMIN D3) 1000 units CAPS, Take 1 capsule by mouth in the morning  , Disp: , Rfl:   •  finasteride (PROSCAR) 5 mg tablet, Take 1 tablet (5 "mg total) by mouth daily, Disp: 90 tablet, Rfl: 3  •  Multiple Vitamin (MULTI-VITAMIN DAILY PO), Take by mouth daily, Disp: , Rfl:   •  Psyllium (Metamucil) 28 3 % POWD, Take 1 Package by mouth as needed, Disp: , Rfl:   •  tamsulosin (FLOMAX) 0 4 mg, Take 1 capsule (0 4 mg total) by mouth 2 (two) times a day, Disp: 180 capsule, Rfl: 3    Review of Systems   Constitutional: Negative for activity change, appetite change, fatigue and unexpected weight change  HENT: Negative for dental problem, sore throat, trouble swallowing and voice change  Eyes: Negative for visual disturbance  Respiratory: Negative for cough, chest tightness and shortness of breath  Cardiovascular: Negative for chest pain, palpitations and leg swelling  Gastrointestinal: Negative for constipation, diarrhea, nausea and vomiting  Endocrine: Negative for polydipsia, polyphagia and polyuria  Genitourinary: Negative for flank pain, frequency and hematuria  Musculoskeletal: Positive for arthralgias  Negative for back pain, gait problem and myalgias  Skin: Negative for wound  Allergic/Immunologic: Positive for environmental allergies  Negative for food allergies  Neurological: Negative for dizziness, weakness, light-headedness, numbness and headaches  Psychiatric/Behavioral: Negative for decreased concentration, dysphoric mood and sleep disturbance  The patient is not nervous/anxious  Physical Exam:  Body mass index is 25 78 kg/m²  /68 (BP Location: Left arm, Patient Position: Sitting, Cuff Size: Standard)   Pulse 83   Temp (!) 97 2 °F (36 2 °C) (Temporal)   Ht 6' 2\" (1 88 m)   Wt 91 1 kg (200 lb 12 8 oz)   SpO2 98%   BMI 25 78 kg/m²    Wt Readings from Last 3 Encounters:   05/10/23 91 1 kg (200 lb 12 8 oz)   05/08/23 91 2 kg (201 lb)   03/31/23 89 8 kg (198 lb)       Physical Exam  Vitals reviewed  Constitutional:       General: He is not in acute distress  Appearance: He is well-developed   He is not " ill-appearing  HENT:      Head: Normocephalic and atraumatic  Eyes:      Pupils: Pupils are equal, round, and reactive to light  Neck:      Thyroid: No thyromegaly  Cardiovascular:      Rate and Rhythm: Normal rate and regular rhythm  Pulses: Normal pulses  Heart sounds: Normal heart sounds  Pulmonary:      Effort: Pulmonary effort is normal       Breath sounds: Normal breath sounds  Abdominal:      General: Bowel sounds are normal  There is no distension  Palpations: Abdomen is soft  Tenderness: There is no abdominal tenderness  There is no right CVA tenderness or left CVA tenderness  Musculoskeletal:      Cervical back: Normal range of motion and neck supple  Right lower leg: No edema  Left lower leg: No edema  Lymphadenopathy:      Cervical: No cervical adenopathy  Skin:     General: Skin is warm and dry  Capillary Refill: Capillary refill takes less than 2 seconds  Neurological:      Mental Status: He is alert and oriented to person, place, and time  Gait: Gait normal    Psychiatric:         Mood and Affect: Mood normal          Behavior: Behavior normal          Labs:     Lab Results   Component Value Date    CREATININE 1 30 05/02/2023    CREATININE 1 29 11/15/2022    CREATININE 1 51 (H) 10/24/2022    BUN 15 05/02/2023     12/15/2015    K 3 8 05/02/2023     05/02/2023    CO2 28 05/02/2023     eGFR   Date Value Ref Range Status   05/02/2023 54 ml/min/1 73sq m Final       Lab Results   Component Value Date    CHOL 226 09/16/2015    HDL 52 01/17/2023    TRIG 193 (H) 01/17/2023       Lab Results   Component Value Date    ALT 19 05/02/2023    AST 18 05/02/2023    ALKPHOS 71 05/02/2023    BILITOT 0 51 09/16/2015       Lab Results   Component Value Date    FREET4 1 13 02/21/2020       There are no Patient Instructions on file for this visit  Discussed with the patient and all questioned fully answered   He will call me if any problems arise     Follow-up appointment in 6 months       Counseled patient on diagnostic results, prognosis, risk and benefit of treatment options, instruction for management, importance of treatment compliance, Risk  factor reduction and impressions    TRISTIN Ballard

## 2023-05-10 ENCOUNTER — OFFICE VISIT (OUTPATIENT)
Dept: ENDOCRINOLOGY | Facility: CLINIC | Age: 73
End: 2023-05-10

## 2023-05-10 VITALS
HEART RATE: 83 BPM | HEIGHT: 74 IN | BODY MASS INDEX: 25.77 KG/M2 | SYSTOLIC BLOOD PRESSURE: 122 MMHG | DIASTOLIC BLOOD PRESSURE: 68 MMHG | OXYGEN SATURATION: 98 % | TEMPERATURE: 97.2 F | WEIGHT: 200.8 LBS

## 2023-05-10 DIAGNOSIS — E83.51 HYPOCALCEMIA: ICD-10-CM

## 2023-05-10 DIAGNOSIS — E20.9 HYPOPARATHYROIDISM, UNSPECIFIED HYPOPARATHYROIDISM TYPE (HCC): Primary | ICD-10-CM

## 2023-05-10 RX ORDER — CALCITRIOL 0.25 UG/1
0.25 CAPSULE, LIQUID FILLED ORAL EVERY OTHER DAY
Qty: 90 CAPSULE | Refills: 1 | Status: SHIPPED | OUTPATIENT
Start: 2023-05-10

## 2023-05-10 NOTE — ASSESSMENT & PLAN NOTE
Given elevated urine Ca+, reduce calcitriol 0 25 mcg to every other day  Repeat CMP and 24 hour urine collection in 3 months  Reviewed s/s of hypocalcemia  Patient is to notify me with any of these  Continue remainder of regimen  F/U in 6 months

## 2023-05-18 ENCOUNTER — OFFICE VISIT (OUTPATIENT)
Dept: INTERNAL MEDICINE CLINIC | Facility: CLINIC | Age: 73
End: 2023-05-18

## 2023-05-18 ENCOUNTER — APPOINTMENT (OUTPATIENT)
Dept: LAB | Facility: CLINIC | Age: 73
End: 2023-05-18

## 2023-05-18 VITALS
RESPIRATION RATE: 12 BRPM | HEIGHT: 74 IN | BODY MASS INDEX: 26 KG/M2 | OXYGEN SATURATION: 95 % | HEART RATE: 79 BPM | DIASTOLIC BLOOD PRESSURE: 78 MMHG | WEIGHT: 202.6 LBS | TEMPERATURE: 96.8 F | SYSTOLIC BLOOD PRESSURE: 114 MMHG

## 2023-05-18 DIAGNOSIS — F41.1 GENERALIZED ANXIETY DISORDER: ICD-10-CM

## 2023-05-18 DIAGNOSIS — E20.9 HYPOPARATHYROIDISM, UNSPECIFIED HYPOPARATHYROIDISM TYPE (HCC): ICD-10-CM

## 2023-05-18 DIAGNOSIS — G43.909 MIGRAINE WITHOUT STATUS MIGRAINOSUS, NOT INTRACTABLE, UNSPECIFIED MIGRAINE TYPE: ICD-10-CM

## 2023-05-18 DIAGNOSIS — N40.1 BENIGN PROSTATIC HYPERPLASIA WITH NOCTURIA: ICD-10-CM

## 2023-05-18 DIAGNOSIS — E78.2 MIXED HYPERLIPIDEMIA: ICD-10-CM

## 2023-05-18 DIAGNOSIS — Z00.00 MEDICARE ANNUAL WELLNESS VISIT, SUBSEQUENT: ICD-10-CM

## 2023-05-18 DIAGNOSIS — I12.9 BENIGN HYPERTENSION WITH CKD (CHRONIC KIDNEY DISEASE) STAGE III (HCC): Primary | ICD-10-CM

## 2023-05-18 DIAGNOSIS — R35.1 BENIGN PROSTATIC HYPERPLASIA WITH NOCTURIA: ICD-10-CM

## 2023-05-18 DIAGNOSIS — N18.31 STAGE 3A CHRONIC KIDNEY DISEASE (HCC): ICD-10-CM

## 2023-05-18 DIAGNOSIS — K21.9 GERD WITHOUT ESOPHAGITIS: ICD-10-CM

## 2023-05-18 DIAGNOSIS — N18.30 BENIGN HYPERTENSION WITH CKD (CHRONIC KIDNEY DISEASE) STAGE III (HCC): Primary | ICD-10-CM

## 2023-05-18 PROBLEM — Z12.5 PROSTATE CANCER SCREENING: Status: RESOLVED | Noted: 2022-09-26 | Resolved: 2023-05-18

## 2023-05-18 LAB
BACTERIA UR QL AUTO: ABNORMAL /HPF
BILIRUB UR QL STRIP: NEGATIVE
CLARITY UR: CLEAR
COLOR UR: ABNORMAL
CREAT UR-MCNC: 58.4 MG/DL
GLUCOSE UR STRIP-MCNC: NEGATIVE MG/DL
HGB UR QL STRIP.AUTO: NEGATIVE
KETONES UR STRIP-MCNC: NEGATIVE MG/DL
LEUKOCYTE ESTERASE UR QL STRIP: NEGATIVE
MICROALBUMIN UR-MCNC: 95.1 MG/L (ref 0–20)
MICROALBUMIN/CREAT 24H UR: 163 MG/G CREATININE (ref 0–30)
NITRITE UR QL STRIP: NEGATIVE
NON-SQ EPI CELLS URNS QL MICRO: ABNORMAL /HPF
PH UR STRIP.AUTO: 6.5 [PH]
PROT UR STRIP-MCNC: ABNORMAL MG/DL
RBC #/AREA URNS AUTO: ABNORMAL /HPF
SP GR UR STRIP.AUTO: 1.01 (ref 1–1.03)
UROBILINOGEN UR STRIP-ACNC: <2 MG/DL
WBC #/AREA URNS AUTO: ABNORMAL /HPF

## 2023-05-18 NOTE — PATIENT INSTRUCTIONS
Chronic Hypertension   AMBULATORY CARE:   Hypertension is considered chronic  when it continues for 3 months or longer  Hypertension that continues causes your heart to work much harder than normal, which may lead to heart damage  Even if you have hypertension for years, lifestyle changes, medicines, or both may help lower your blood pressure  Call your local emergency number (911 in the [address] [facility]) or have someone call if:   • You have chest pain  • You have any of the following signs of a heart attack:      ? Squeezing, pressure, or pain in your chest    ? You may  also have any of the following:     - Discomfort or pain in your back, neck, jaw, stomach, or arm    - Shortness of breath    - Nausea or vomiting    - Lightheadedness or a sudden cold sweat    • You become confused or have difficulty speaking  • You suddenly feel lightheaded or have trouble breathing  Seek care immediately if:   • You have a severe headache or vision loss  • You have weakness in an arm or leg  Call your doctor or cardiologist if:   • You feel faint, dizzy, confused, or drowsy  • You have been taking your blood pressure medicine but your pressure is higher than your provider says it should be  • You have questions or concerns about your condition or care  Treatment for chronic hypertension  may include medicine to lower your blood pressure and cholesterol levels  A low cholesterol level helps prevent heart disease and makes it easier to control your blood pressure  Heart disease can make your blood pressure harder to control  You may also need to make lifestyle changes  What you need to know about the stages of hypertension:  Your healthcare provider will give you a blood pressure goal based on your age, health, and risk for cardiovascular disease  The following are general guidelines on the stages of hypertension:  • Normal blood pressure is 119/79 or lower    Your provider may only check your blood pressure each year if it stays at a normal level  • Elevated blood pressure is 120/79 to 129/79   This is sometimes called prehypertension  Your provider may suggest lifestyle changes to help lower your blood pressure to a normal level  He or she may then check it again in 3 to 6 months  • Stage 1 hypertension is 130/80  to 139/89   Your provider may recommend lifestyle changes, medication, and checks every 3 to 6 months until your blood pressure is controlled  • Stage 2 hypertension is 140/90 or higher   Your provider will recommend lifestyle changes and have you take 2 kinds of hypertension medicines  You will also need to have your blood pressure checked monthly until it is controlled  Manage chronic hypertension:   • Check your blood pressure at home  Do not smoke, have caffeine, or exercise for at least 30 minutes before you check your blood pressure  Sit and rest for 5 minutes before you check your blood pressure  Extend your arm and support it on a flat surface  Your arm should be at the same level as your heart  Follow the directions that came with your blood pressure monitor  Check your blood pressure 2 times, 1 minute apart, before you take your medicine in the morning  Also check your blood pressure before your evening meal  Keep a record of your readings and bring it to your follow-up visits  Your healthcare provider may use the readings to make changes to your treatment plan  • Manage any other health conditions you have  Health conditions such as diabetes can increase your risk for hypertension  Follow your provider's instructions and take all your medicines as directed  Talk to your provider about any new health conditions you have recently developed  • Ask about all medicines  Certain medicines can increase your blood pressure  Examples include oral birth control pills, decongestants, herbal supplements, and NSAIDs, such as ibuprofen   Your provider can tell you which medicines are safe for you to take  This includes prescription and over-the-counter medicines  Lifestyle changes you can make to lower your blood pressure: Your provider may want you to make more lifestyle changes if you are having trouble controlling your blood pressure  This may feel difficult over time, especially if you think you are making good changes but your pressure is still high  It might help to focus on one new change at a time  For example, try to add 1 more day of exercise, or exercise for an extra 10 minutes on 2 days  Small changes can make a big difference  Your healthcare provider can also refer you to specialists such as a dietitian who can help you make small changes  Your family members may be included in helping you learn to create lifestyle changes, such as the following:     • Limit sodium (salt) as directed  Too much sodium can affect your fluid balance  Check labels to find low-sodium or no-salt-added foods  Some low-sodium foods use potassium salts for flavor  Too much potassium can also cause health problems  Your provider will tell you how much sodium and potassium are safe for you to have in a day  He or she may recommend that you limit sodium to 2,300 mg a day  • Follow the meal plan recommended by your provider  A dietitian or your provider can give you more information on low-sodium plans or the DASH (Dietary Approaches to Stop Hypertension) eating plan  The DASH plan is low in sodium, processed sugar, unhealthy fats, and total fat  It is high in potassium, calcium, and fiber  These can be found in vegetables, fruit, and whole-grain foods  • Be physically active throughout the day  Physical activity, such as exercise, can help control your blood pressure and your weight  Be physically active for at least 30 minutes per day, on most days of the week  Include aerobic activity, such as walking or riding a bicycle  Also include strength training at least 2 times each week   Your provider can help you create a physical activity plan  • Decrease stress  This may help lower your blood pressure  Learn ways to relax, such as deep breathing or listening to music  • Limit alcohol as directed  Alcohol can increase your blood pressure  A drink of alcohol is 12 ounces of beer, 5 ounces of wine, or 1½ ounces of liquor  Your provider can help you set daily and weekly drink limits  He or she may recommend no alcohol if your blood pressure stays higher than goal even with medicine or other measures  Ask your provider for information if you need help to quit  • Do not smoke  Nicotine and other chemicals in cigarettes and cigars can increase your blood pressure and also cause lung damage  Ask your provider for information if you currently smoke and need help to quit  E-cigarettes or smokeless tobacco still contain nicotine  Talk to your provider before you use these products  Follow up with your doctor or cardiologist as directed: You will need to return to have your blood pressure checked and to have other lab tests done  Write down your questions so you remember to ask them during your visits  © Copyright [employer] 2022 Information is for End User's use only and may not be sold, redistributed or otherwise used for commercial purposes  The above information is an  only  It is not intended as medical advice for individual conditions or treatments  Talk to your doctor, nurse or pharmacist before following any medical regimen to see if it is safe and effective for you  Medicare Preventive Visit Patient Instructions  Thank you for completing your Welcome to Medicare Visit or Medicare Annual Wellness Visit today  Your next wellness visit will be due in one year (5/18/2024)  The screening/preventive services that you may require over the next 5-10 years are detailed below  Some tests may not apply to you based off risk factors and/or age   Screening tests ordered at today's visit but not completed yet may show as past due  Also, please note that scanned in results may not display below  Preventive Screenings:  Service Recommendations Previous Testing/Comments   Colorectal Cancer Screening  · Colonoscopy    · Fecal Occult Blood Test (FOBT)/Fecal Immunochemical Test (FIT)  · Fecal DNA/Cologuard Test  · Flexible Sigmoidoscopy Age: 39-70 years old   Colonoscopy: every 10 years (May be performed more frequently if at higher risk)  OR  FOBT/FIT: every 1 year  OR  Cologuard: every 3 years  OR  Sigmoidoscopy: every 5 years  Screening may be recommended earlier than age 39 if at higher risk for colorectal cancer  Also, an individualized decision between you and your healthcare provider will decide whether screening between the ages of 74-80 would be appropriate  Colonoscopy: 07/19/2019  FOBT/FIT: Not on file  Cologuard: Not on file  Sigmoidoscopy: Not on file    Screening Current     Prostate Cancer Screening Individualized decision between patient and health care provider in men between ages of 53-78   Medicare will cover every 12 months beginning on the day after your 50th birthday PSA: 1 6 ng/mL     Screening Current     Hepatitis C Screening Once for adults born between 1945 and 1965  More frequently in patients at high risk for Hepatitis C Hep C Antibody: 06/23/2022    Screening Current   Diabetes Screening 1-2 times per year if you're at risk for diabetes or have pre-diabetes Fasting glucose: 98 mg/dL (5/2/2023)  A1C: 5 3 % (1/17/2023)  Screening Current   Cholesterol Screening Once every 5 years if you don't have a lipid disorder  May order more often based on risk factors  Lipid panel: 01/17/2023  Screening Not Indicated  History Lipid Disorder      Other Preventive Screenings Covered by Medicare:  1  Abdominal Aortic Aneurysm (AAA) Screening: covered once if your at risk   You're considered to be at risk if you have a family history of AAA or a male between the age of 73-68 who smoking at least 100 cigarettes in your lifetime  2  Lung Cancer Screening: covers low dose CT scan once per year if you meet all of the following conditions: (1) Age 50-69; (2) No signs or symptoms of lung cancer; (3) Current smoker or have quit smoking within the last 15 years; (4) You have a tobacco smoking history of at least 20 pack years (packs per day x number of years you smoked); (5) You get a written order from a healthcare provider  3  Glaucoma Screening: covered annually if you're considered high risk: (1) You have diabetes OR (2) Family history of glaucoma OR (3)  aged 48 and older OR (3)  American aged 72 and older  3  Osteoporosis Screening: covered every 2 years if you meet one of the following conditions: (1) Have a vertebral abnormality; (2) On glucocorticoid therapy for more than 3 months; (3) Have primary hyperparathyroidism; (4) On osteoporosis medications and need to assess response to drug therapy  5  HIV Screening: covered annually if you're between the age of 12-76  Also covered annually if you are younger than 13 and older than 72 with risk factors for HIV infection  For pregnant patients, it is covered up to 3 times per pregnancy      Immunizations:  Immunization Recommendations   Influenza Vaccine Annual influenza vaccination during flu season is recommended for all persons aged >= 6 months who do not have contraindications   Pneumococcal Vaccine   * Pneumococcal conjugate vaccine = PCV13 (Prevnar 13), PCV15 (Vaxneuvance), PCV20 (Prevnar 20)  * Pneumococcal polysaccharide vaccine = PPSV23 (Pneumovax) Adults 25-60 years old: 1-3 doses may be recommended based on certain risk factors  Adults 72 years old: 1-2 doses may be recommended based off what pneumonia vaccine you previously received   Hepatitis B Vaccine 3 dose series if at intermediate or high risk (ex: diabetes, end stage renal disease, liver disease)   Tetanus (Td) Vaccine - COST NOT COVERED BY MEDICARE PART B Following completion of primary series, a booster dose should be given every 10 years to maintain immunity against tetanus  Td may also be given as tetanus wound prophylaxis  Tdap Vaccine - COST NOT COVERED BY MEDICARE PART B Recommended at least once for all adults  For pregnant patients, recommended with each pregnancy  Shingles Vaccine (Shingrix) - COST NOT COVERED BY MEDICARE PART B  2 shot series recommended in those aged 48 and above     Health Maintenance Due:      Topic Date Due   • Colorectal Cancer Screening  07/19/2029   • Hepatitis C Screening  Completed     Immunizations Due:      Topic Date Due   • COVID-19 Vaccine (3 - Booster for Moderna series) 07/05/2021     Advance Directives   What are advance directives? Advance directives are legal documents that state your wishes and plans for medical care  These plans are made ahead of time in case you lose your ability to make decisions for yourself  Advance directives can apply to any medical decision, such as the treatments you want, and if you want to donate organs  What are the types of advance directives? There are many types of advance directives, and each state has rules about how to use them  You may choose a combination of any of the following:  · Living will: This is a written record of the treatment you want  You can also choose which treatments you do not want, which to limit, and which to stop at a certain time  This includes surgery, medicine, IV fluid, and tube feedings  · Durable power of  for healthcare [facility]): This is a written record that states who you want to make healthcare choices for you when you are unable to make them for yourself  This person, called a proxy, is usually a family member or a friend  You may choose more than 1 proxy  · Do not resuscitate (DNR) order:  A DNR order is used in case your heart stops beating or you stop breathing  It is a request not to have certain forms of treatment, such as CPR   A DNR order may be included in other types of advance directives  · Medical directive: This covers the care that you want if you are in a coma, near death, or unable to make decisions for yourself  You can list the treatments you want for each condition  Treatment may include pain medicine, surgery, blood transfusions, dialysis, IV or tube feedings, and a ventilator (breathing machine)  · Values history: This document has questions about your views, beliefs, and how you feel and think about life  This information can help others choose the care that you would choose  Why are advance directives important? An advance directive helps you control your care  Although spoken wishes may be used, it is better to have your wishes written down  Spoken wishes can be misunderstood, or not followed  Treatments may be given even if you do not want them  An advance directive may make it easier for your family to make difficult choices about your care  Weight Management   Why it is important to manage your weight:  Being overweight increases your risk of health conditions such as heart disease, high blood pressure, type 2 diabetes, and certain types of cancer  It can also increase your risk for osteoarthritis, sleep apnea, and other respiratory problems  Aim for a slow, steady weight loss  Even a small amount of weight loss can lower your risk of health problems  How to lose weight safely:  A safe and healthy way to lose weight is to eat fewer calories and get regular exercise  You can lose up about 1 pound a week by decreasing the number of calories you eat by 500 calories each day  Healthy meal plan for weight management:  A healthy meal plan includes a variety of foods, contains fewer calories, and helps you stay healthy  A healthy meal plan includes the following:  · Eat whole-grain foods more often  A healthy meal plan should contain fiber   Fiber is the part of grains, fruits, and vegetables that is not broken down by your body  Whole-grain foods are healthy and provide extra fiber in your diet  Some examples of whole-grain foods are whole-wheat breads and pastas, oatmeal, brown rice, and bulgur  · Eat a variety of vegetables every day  Include dark, leafy greens such as spinach, kale, israel greens, and mustard greens  Eat yellow and orange vegetables such as carrots, sweet potatoes, and winter squash  · Eat a variety of fruits every day  Choose fresh or canned fruit (canned in its own juice or light syrup) instead of juice  Fruit juice has very little or no fiber  · Eat low-fat dairy foods  Drink fat-free (skim) milk or 1% milk  Eat fat-free yogurt and low-fat cottage cheese  Try low-fat cheeses such as mozzarella and other reduced-fat cheeses  · Choose meat and other protein foods that are low in fat  Choose beans or other legumes such as split peas or lentils  Choose fish, skinless poultry (chicken or turkey), or lean cuts of red meat (beef or pork)  Before you cook meat or poultry, cut off any visible fat  · Use less fat and oil  Try baking foods instead of frying them  Add less fat, such as margarine, sour cream, regular salad dressing and mayonnaise to foods  Eat fewer high-fat foods  Some examples of high-fat foods include french fries, doughnuts, ice cream, and cakes  · Eat fewer sweets  Limit foods and drinks that are high in sugar  This includes candy, cookies, regular soda, and sweetened drinks  Exercise:  Exercise at least 30 minutes per day on most days of the week  Some examples of exercise include walking, biking, dancing, and swimming  You can also fit in more physical activity by taking the stairs instead of the elevator or parking farther away from stores  Ask your healthcare provider about the best exercise plan for you  © Copyright [employer] 2018 Information is for End User's use only and may not be sold, redistributed or otherwise used for commercial purposes   All illustrations and images included in CareNotes® are the copyrighted property of [employer]  or [facility] Preventive Visit Patient Instructions  Thank you for completing your Welcome to Medicare Visit or Medicare Annual Wellness Visit today  Your next wellness visit will be due in one year (5/18/2024)  The screening/preventive services that you may require over the next 5-10 years are detailed below  Some tests may not apply to you based off risk factors and/or age  Screening tests ordered at today's visit but not completed yet may show as past due  Also, please note that scanned in results may not display below  Preventive Screenings:  Service Recommendations Previous Testing/Comments   Colorectal Cancer Screening  · Colonoscopy    · Fecal Occult Blood Test (FOBT)/Fecal Immunochemical Test (FIT)  · Fecal DNA/Cologuard Test  · Flexible Sigmoidoscopy Age: 39-70 years old   Colonoscopy: every 10 years (May be performed more frequently if at higher risk)  OR  FOBT/FIT: every 1 year  OR  Cologuard: every 3 years  OR  Sigmoidoscopy: every 5 years  Screening may be recommended earlier than age 39 if at higher risk for colorectal cancer  Also, an individualized decision between you and your healthcare provider will decide whether screening between the ages of 74-80 would be appropriate   Colonoscopy: 07/19/2019  FOBT/FIT: Not on file  Cologuard: Not on file  Sigmoidoscopy: Not on file    Screening Current     Prostate Cancer Screening Individualized decision between patient and health care provider in men between ages of 53-78   Medicare will cover every 12 months beginning on the day after your 50th birthday PSA: 1 6 ng/mL     Screening Current     Hepatitis C Screening Once for adults born between 1945 and 1965  More frequently in patients at high risk for Hepatitis C Hep C Antibody: 06/23/2022    Screening Current   Diabetes Screening 1-2 times per year if you're at risk for diabetes or have pre-diabetes Fasting glucose: 98 mg/dL (5/2/2023)  A1C: 5 3 % (1/17/2023)  Screening Current   Cholesterol Screening Once every 5 years if you don't have a lipid disorder  May order more often based on risk factors  Lipid panel: 01/17/2023  Screening Not Indicated  History Lipid Disorder      Other Preventive Screenings Covered by Medicare:  6  Abdominal Aortic Aneurysm (AAA) Screening: covered once if your at risk  You're considered to be at risk if you have a family history of AAA or a male between the age of 73-68 who smoking at least 100 cigarettes in your lifetime  7  Lung Cancer Screening: covers low dose CT scan once per year if you meet all of the following conditions: (1) Age 50-69; (2) No signs or symptoms of lung cancer; (3) Current smoker or have quit smoking within the last 15 years; (4) You have a tobacco smoking history of at least 20 pack years (packs per day x number of years you smoked); (5) You get a written order from a healthcare provider  8  Glaucoma Screening: covered annually if you're considered high risk: (1) You have diabetes OR (2) Family history of glaucoma OR (3)  aged 48 and older OR (3)  American aged 72 and older  5  Osteoporosis Screening: covered every 2 years if you meet one of the following conditions: (1) Have a vertebral abnormality; (2) On glucocorticoid therapy for more than 3 months; (3) Have primary hyperparathyroidism; (4) On osteoporosis medications and need to assess response to drug therapy  10  HIV Screening: covered annually if you're between the age of 12-76  Also covered annually if you are younger than 13 and older than 72 with risk factors for HIV infection  For pregnant patients, it is covered up to 3 times per pregnancy      Immunizations:  Immunization Recommendations   Influenza Vaccine Annual influenza vaccination during flu season is recommended for all persons aged >= 6 months who do not have contraindications   Pneumococcal Vaccine   * Pneumococcal conjugate vaccine = PCV13 (Prevnar 13), PCV15 (Vaxneuvance), PCV20 (Prevnar 20)  * Pneumococcal polysaccharide vaccine = PPSV23 (Pneumovax) Adults 2364 years old: 1-3 doses may be recommended based on certain risk factors  Adults 72 years old: 1-2 doses may be recommended based off what pneumonia vaccine you previously received   Hepatitis B Vaccine 3 dose series if at intermediate or high risk (ex: diabetes, end stage renal disease, liver disease)   Tetanus (Td) Vaccine - COST NOT COVERED BY MEDICARE PART B Following completion of primary series, a booster dose should be given every 10 years to maintain immunity against tetanus  Td may also be given as tetanus wound prophylaxis  Tdap Vaccine - COST NOT COVERED BY MEDICARE PART B Recommended at least once for all adults  For pregnant patients, recommended with each pregnancy  Shingles Vaccine (Shingrix) - COST NOT COVERED BY MEDICARE PART B  2 shot series recommended in those aged 48 and above     Health Maintenance Due:      Topic Date Due   • Colorectal Cancer Screening  07/19/2029   • Hepatitis C Screening  Completed     Immunizations Due:      Topic Date Due   • COVID-19 Vaccine (3 - Booster for Moderna series) 07/05/2021     Advance Directives   What are advance directives? Advance directives are legal documents that state your wishes and plans for medical care  These plans are made ahead of time in case you lose your ability to make decisions for yourself  Advance directives can apply to any medical decision, such as the treatments you want, and if you want to donate organs  What are the types of advance directives? There are many types of advance directives, and each state has rules about how to use them  You may choose a combination of any of the following:  · Living will: This is a written record of the treatment you want  You can also choose which treatments you do not want, which to limit, and which to stop at a certain time   This includes surgery, medicine, IV fluid, and tube feedings  · Durable power of  for healthcare [facility]): This is a written record that states who you want to make healthcare choices for you when you are unable to make them for yourself  This person, called a proxy, is usually a family member or a friend  You may choose more than 1 proxy  · Do not resuscitate (DNR) order:  A DNR order is used in case your heart stops beating or you stop breathing  It is a request not to have certain forms of treatment, such as CPR  A DNR order may be included in other types of advance directives  · Medical directive: This covers the care that you want if you are in a coma, near death, or unable to make decisions for yourself  You can list the treatments you want for each condition  Treatment may include pain medicine, surgery, blood transfusions, dialysis, IV or tube feedings, and a ventilator (breathing machine)  · Values history: This document has questions about your views, beliefs, and how you feel and think about life  This information can help others choose the care that you would choose  Why are advance directives important? An advance directive helps you control your care  Although spoken wishes may be used, it is better to have your wishes written down  Spoken wishes can be misunderstood, or not followed  Treatments may be given even if you do not want them  An advance directive may make it easier for your family to make difficult choices about your care  Weight Management   Why it is important to manage your weight:  Being overweight increases your risk of health conditions such as heart disease, high blood pressure, type 2 diabetes, and certain types of cancer  It can also increase your risk for osteoarthritis, sleep apnea, and other respiratory problems  Aim for a slow, steady weight loss  Even a small amount of weight loss can lower your risk of health problems    How to lose weight safely:  A safe and healthy way to lose weight is to eat fewer calories and get regular exercise  You can lose up about 1 pound a week by decreasing the number of calories you eat by 500 calories each day  Healthy meal plan for weight management:  A healthy meal plan includes a variety of foods, contains fewer calories, and helps you stay healthy  A healthy meal plan includes the following:  · Eat whole-grain foods more often  A healthy meal plan should contain fiber  Fiber is the part of grains, fruits, and vegetables that is not broken down by your body  Whole-grain foods are healthy and provide extra fiber in your diet  Some examples of whole-grain foods are whole-wheat breads and pastas, oatmeal, brown rice, and bulgur  · Eat a variety of vegetables every day  Include dark, leafy greens such as spinach, kale, israel greens, and mustard greens  Eat yellow and orange vegetables such as carrots, sweet potatoes, and winter squash  · Eat a variety of fruits every day  Choose fresh or canned fruit (canned in its own juice or light syrup) instead of juice  Fruit juice has very little or no fiber  · Eat low-fat dairy foods  Drink fat-free (skim) milk or 1% milk  Eat fat-free yogurt and low-fat cottage cheese  Try low-fat cheeses such as mozzarella and other reduced-fat cheeses  · Choose meat and other protein foods that are low in fat  Choose beans or other legumes such as split peas or lentils  Choose fish, skinless poultry (chicken or turkey), or lean cuts of red meat (beef or pork)  Before you cook meat or poultry, cut off any visible fat  · Use less fat and oil  Try baking foods instead of frying them  Add less fat, such as margarine, sour cream, regular salad dressing and mayonnaise to foods  Eat fewer high-fat foods  Some examples of high-fat foods include french fries, doughnuts, ice cream, and cakes  · Eat fewer sweets  Limit foods and drinks that are high in sugar   This includes candy, cookies, regular soda, and sweetened drinks  Exercise:  Exercise at least 30 minutes per day on most days of the week  Some examples of exercise include walking, biking, dancing, and swimming  You can also fit in more physical activity by taking the stairs instead of the elevator or parking farther away from stores  Ask your healthcare provider about the best exercise plan for you  © Copyright [employer] 2018 Information is for End User's use only and may not be sold, redistributed or otherwise used for commercial purposes   All illustrations and images included in CareNotes® are the copyrighted property of A D A M , Inc  or [address] [place] [address] [employer]

## 2023-05-18 NOTE — PROGRESS NOTES
Assessment and Plan:     Problem List Items Addressed This Visit        Digestive    GERD without esophagitis       Endocrine    Hypoparathyroidism ([facility] [address] )       Cardiovascular and Mediastinum    Migraine, unspecified, not intractable, without status migrainosus    Benign hypertension with CKD (chronic kidney disease) stage III (HCC) - Primary       Genitourinary    Stage 3a chronic kidney disease (HCC)       Other    Hyperlipidemia    Generalized anxiety disorder    BMI 25 0-25 9,adult    Benign prostatic hyperplasia with nocturia   Other Visit Diagnoses     Medicare annual wellness visit, subsequent               Preventive health issues were discussed with patient, and age appropriate screening tests were ordered as noted in patient's After Visit Summary  Personalized health advice and appropriate referrals for health education or preventive services given if needed, as noted in patient's After Visit Summary       History of Present Illness:     Patient presents for a Medicare Wellness Visit    HPI   Patient Care Team:  Candace Wise DO as PCP - General (Internal Medicine)  Rabia Isaacs as PCP - Endocrinology (Endocrinology)  Terrell Kang MD  [address] [place] [address] as Nurse Practitioner (Urology)  Rabia Isaacs as Nurse Practitioner (Endocrinology)  Hai Handy DO (Nephrology)  Hai Handy DO (Nephrology)     Review of Systems:     Review of Systems     Problem List:     Patient Active Problem List   Diagnosis   • Allergic rhinitis   • Basal cell tumor   • Benign colon polyp   • Generalized anxiety disorder   • GERD without esophagitis   • Hyperlipidemia   • Hypertension   • Hypocalcemia   • Hypoparathyroidism ([facility] [address] )   • Migraine, unspecified, not intractable, without status migrainosus   • Multiple thyroid nodules   • Palpitations   • Microalbuminuria   • BMI 25 0-25 9,adult   • Benign hypertension with CKD (chronic kidney disease) stage III (HCC)   • Benign prostatic hyperplasia with nocturia   • COVID-19 virus infection   • Allergy to ACE inhibitors   • Kidney cyst, acquired   • Persistent proteinuria   • Hearing loss of left ear   • Stage 3a chronic kidney disease [facility])      Past Medical and Surgical History:     Past Medical History:   Diagnosis Date   • Abdominal pain, acute, generalized     last assessed - 21Apr2017   • Actinic keratosis     last assessed - 12Jun2013   • Chest pain     last assessed - [id number]   • Chronic kidney disease, stage 3 (HCC)    • Colon polyp    • Disease of thyroid gland    • Dysfunction of both eustachian tubes     suspect due to ETD  Recommend otc allergy meds and if fails then add flonase; last assessed - 06Aug2012   • Edema    • Fatigue     last assessed - [id number]   • Generalized anxiety disorder    • GERD (gastroesophageal reflux disease)    • Hematuria     last assessed - 02Aug2012   • Hypertension    • Hypo-osmolality and hyponatremia    • Hypocalcemia    • Hypoparathyroidism ([address] )    • Lightheadedness     last assessed - [id number]   • Lump in neck     ? cause  Maybe a localized allergic reaction, dental issue, doubt sialdenitis, ect  Empiric abx and one dose steroids  Continue benadryl  If worsens, to er or ent     • Malaise and fatigue     last assessed - 21Apr2017   • Nephropathy     last assessed - [id number]   • Nocturia    • Proteinuria    • Shortness of breath     last assessed - [id number]   • Skin lesion     Resolved - [id number]   • Tinea corporis     last assessed - [id number]     Past Surgical History:   Procedure Laterality Date   • COLONOSCOPY W/ POLYPECTOMY  07/19/2019   • HEAD & NECK SKIN LESION EXCISIONAL BIOPSY      excision of lesion scalp malignant - [facility]; last assessed - [id number]   • SKIN BIOPSY      last assessed - 09Sep2014   • THYROID SURGERY      Biopsy thyroid using percutaneous core needle; last assessed - 09Sep2014   • TONSILLECTOMY        Family History:     Family History   Problem Relation Age of Onset   • Hypertension Mother    • Hypertension Father    • Diabetes Father    • Other Brother         Schwannomatosis   • Stroke Family       Social History:     Social History     Socioeconomic History   • Marital status: /Civil Union     Spouse name: None   • Number of children: None   • Years of education: None   • Highest education level: None   Occupational History   • Occupation: Retired     Comment:     Tobacco Use   • Smoking status: Never     Passive exposure: Past   • Smokeless tobacco: Never   Vaping Use   • Vaping Use: Never used   Substance and Sexual Activity   • Alcohol use: Yes     Comment: Occasionally drink   • Drug use: No   • Sexual activity: Yes     Partners: Female   Other Topics Concern   • None   Social History Narrative    Consumes on average 1 cup of regular coffee per day      Social Determinants of Health     Financial Resource Strain: Low Risk    • Difficulty of Paying Living Expenses: Not very hard   Food Insecurity: No Food Insecurity   • Worried About Running Out of Food in the Last Year: Never true   • Ran Out of Food in the Last Year: Never true   Transportation Needs: No Transportation Needs   • Lack of Transportation (Medical): No   • Lack of Transportation (Non-Medical):  No   Physical Activity: Not on file   Stress: Not on file   Social Connections: Not on file   Intimate Partner Violence: Not on file   Housing Stability: Low Risk    • Unable to Pay for Housing in the Last Year: No   • Number of Places Lived in the Last Year: 1   • Unstable Housing in the Last Year: No      Medications and Allergies:     Current Outpatient Medications   Medication Sig Dispense Refill   • amLODIPine (NORVASC) 10 mg tablet Take 1 tablet (10 mg total) by mouth daily 30 tablet 5   • aspirin (ECOTRIN LOW STRENGTH) 81 mg EC tablet Take 1 tablet (81 mg total) by mouth daily     • calcitriol (ROCALTROL) 0 25 mcg capsule Take 1 capsule (0 25 mcg total) by mouth every other day 90 capsule 1   • Calcium Carbonate (CALCIUM 600 PO) Take by mouth Twice per day     • Cholecalciferol (VITAMIN D3) 1000 units CAPS Take 1 capsule by mouth in the morning       • finasteride (PROSCAR) 5 mg tablet Take 1 tablet (5 mg total) by mouth daily 90 tablet 3   • Multiple Vitamin (MULTI-VITAMIN DAILY PO) Take by mouth daily     • Psyllium (Metamucil) 28 3 % POWD Take 1 Package by mouth as needed     • tamsulosin (FLOMAX) 0 4 mg Take 1 capsule (0 4 mg total) by mouth 2 (two) times a day 180 capsule 3     No current facility-administered medications for this visit  Allergies   Allergen Reactions   • Lisinopril Anaphylaxis   • Pollen Extract Sneezing      Immunizations:     Immunization History   Administered Date(s) Administered   • COVID-19 MODERNA VACC 0 5 ML IM 04/09/2021, 05/10/2021   • INFLUENZA 10/24/2018, 09/08/2022   • Influenza Split High Dose Preservative Free IM 10/24/2018   • Influenza, high dose seasonal 0 7 mL 10/24/2019, 09/29/2020, 10/12/2021, 09/08/2022   • Pneumococcal Conjugate 13-Valent 05/31/2017   • Pneumococcal Polysaccharide PPV23 07/08/2014   • Td (adult), adsorbed 09/08/1999   • Tdap 01/01/2009, 06/19/2020   • Zoster 04/16/2012      Health Maintenance:         Topic Date Due   • Colorectal Cancer Screening  07/19/2029   • Hepatitis C Screening  Completed         Topic Date Due   • COVID-19 Vaccine (3 - Booster for Johnita Sultana series) 07/05/2021      Medicare Screening Tests and Risk Assessments:     Nitin Augustin is here for his Subsequent Wellness visit  Last Medicare Wellness visit information reviewed, patient interviewed and updates made to the record today  Health Risk Assessment:   Patient rates overall health as very good  Patient feels that their physical health rating is same  Patient is very satisfied with their life  Eyesight was rated as same  Hearing was rated as same  Patient feels that their emotional and mental health rating is same  Patients states they are never, rarely angry   Patient states they are sometimes unusually tired/fatigued  Pain experienced in the last 7 days has been none  Patient states that he has experienced no weight loss or gain in last 6 months  Depression Screening:   PHQ-2 Score: 0      Fall Risk Screening: In the past year, patient has experienced: no history of falling in past year      Home Safety:  Patient does not have trouble with stairs inside or outside of their home  Patient has working smoke alarms and has working carbon monoxide detector  Home safety hazards include: none  Nutrition:   Current diet is Regular  Medications:   Patient is currently taking over-the-counter supplements  OTC medications include: see medication list  Patient is able to manage medications  Activities of Daily Living (ADLs)/Instrumental Activities of Daily Living (IADLs):   Walk and transfer into and out of bed and chair?: Yes  Dress and groom yourself?: Yes    Bathe or shower yourself?: Yes    Feed yourself?  Yes  Do your laundry/housekeeping?: Yes  Manage your money, pay your bills and track your expenses?: Yes  Make your own meals?: Yes    Do your own shopping?: Yes    Previous Hospitalizations:   Any hospitalizations or ED visits within the last 12 months?: No      Advance Care Planning:   Living will: Yes    Durable POA for healthcare: No    Advanced directive: Yes    Advanced directive counseling given: Yes    Five wishes given: No    Patient declined ACP directive: No    End of Life Decisions reviewed with patient: Yes    Provider agrees with end of life decisions: Yes      Cognitive Screening:   Provider or family/friend/caregiver concerned regarding cognition?: No    PREVENTIVE SCREENINGS      Cardiovascular Screening:    General: Screening Not Indicated, History Lipid Disorder and Screening Current      Diabetes Screening:     General: Screening Current      Colorectal Cancer Screening:     General: Screening Current      Prostate Cancer Screening:    General: "Screening Current      Osteoporosis Screening:    General: Screening Current      Abdominal Aortic Aneurysm (AAA) Screening:    Risk factors include: age between 73-69 yo        Lung Cancer Screening:     General: Screening Not Indicated      Hepatitis C Screening:    General: Screening Current    Screening, Brief Intervention, and Referral to Treatment (SBIRT)    Screening  Typical number of drinks in a day: 0  Typical number of drinks in a week: 0  Interpretation: Low risk drinking behavior  Single Item Drug Screening:  How often have you used an illegal drug (including marijuana) or a prescription medication for non-medical reasons in the past year? never    Single Item Drug Screen Score: 0  Interpretation: Negative screen for possible drug use disorder    Other Counseling Topics:   Car/seat belt/driving safety, sunscreen and calcium and vitamin D intake and regular weightbearing exercise  No results found  Physical Exam:     /78   Pulse 79   Temp (!) 96 8 °F (36 °C)   Resp 12   Ht 6' 2\" (1 88 m)   Wt 91 9 kg (202 lb 9 6 oz)   SpO2 95%   BMI 26 01 kg/m²     Physical Exam   A/P: Doing well and no falls reported  Denies depression and feels safe at home  Diverse diet  No problems operating a MV and uses seat belts  Has a living will and POA  No DME or referrals needed today  RTC one year for medicare wellness       Hudson Escobar DO    "

## 2023-05-18 NOTE — PROGRESS NOTES
Depression Screening and Follow-up Plan: Patient was screened for depression during today's encounter  They screened negative with a PHQ-2 score of 0  Assessment/Plan:  Problem List Items Addressed This Visit        Digestive    GERD without esophagitis       Endocrine    Hypoparathyroidism ([facility] )       Cardiovascular and Mediastinum    Migraine, unspecified, not intractable, without status migrainosus    Benign hypertension with CKD (chronic kidney disease) stage III (HCC) - Primary       Genitourinary    Stage 3a chronic kidney disease ([facility] )       Other    Hyperlipidemia    Generalized anxiety disorder    BMI 25 0-25 9,adult    Benign prostatic hyperplasia with nocturia   Other Visit Diagnoses     Medicare annual wellness visit, subsequent               Diagnoses and all orders for this visit:    Benign hypertension with CKD (chronic kidney disease) stage III (HCC)    Migraine without status migrainosus, not intractable, unspecified migraine type    Hypoparathyroidism, unspecified hypoparathyroidism type ([facility] )    GERD without esophagitis    Stage 3a chronic kidney disease ([facility] )    Benign prostatic hyperplasia with nocturia    BMI 25 0-25 9,adult    Generalized anxiety disorder    Mixed hyperlipidemia    Medicare annual wellness visit, subsequent      No problem-specific Assessment & Plan notes found for this encounter  A/P: Doing ok and labs are up to date  Appreciate specialist input  Continue current treatment and RTC four months for routine  Subjective:      Patient ID: Merna Sever Trainer is a 75 y o  male  WM RTC for f/u HTN, GERD, etc  Doing ok and no new issues  Just seen by derm, nephro, and endo  Calcitrol adjusted  Remains active w/o difficulty and no falls  No reflux  Headaches and chronic pain are manageable  STAR is controlled  Labs are up to date         The following portions of the patient's history were reviewed and updated as appropriate:   He has a past medical history of Abdominal pain, acute, generalized, Actinic keratosis, Chest pain, Chronic kidney disease, stage 3 (HCC), Colon polyp, Disease of thyroid gland, Dysfunction of both eustachian tubes, Edema, Fatigue, Generalized anxiety disorder, GERD (gastroesophageal reflux disease), Hematuria, Hypertension, Hypo-osmolality and hyponatremia, Hypocalcemia, Hypoparathyroidism ([address] ), Lightheadedness, Lump in neck, Malaise and fatigue, Nephropathy, Nocturia, Proteinuria, Shortness of breath, Skin lesion, and Tinea corporis  ,  does not have any pertinent problems on file  ,   has a past surgical history that includes Skin biopsy; Thyroid surgery; Colonoscopy w/ polypectomy (07/19/2019); Head & neck skin lesion excisional biopsy; and Tonsillectomy  ,  family history includes Diabetes in his father; Hypertension in his father and mother; Other in his brother; Stroke in his family  ,   reports that he has never smoked  He has been exposed to tobacco smoke  He has never used smokeless tobacco  He reports current alcohol use  He reports that he does not use drugs  ,  is allergic to lisinopril and pollen extract     Current Outpatient Medications   Medication Sig Dispense Refill   • amLODIPine (NORVASC) 10 mg tablet Take 1 tablet (10 mg total) by mouth daily 30 tablet 5   • aspirin (ECOTRIN LOW STRENGTH) 81 mg EC tablet Take 1 tablet (81 mg total) by mouth daily     • calcitriol (ROCALTROL) 0 25 mcg capsule Take 1 capsule (0 25 mcg total) by mouth every other day 90 capsule 1   • Calcium Carbonate (CALCIUM 600 PO) Take by mouth Twice per day     • Cholecalciferol (VITAMIN D3) 1000 units CAPS Take 1 capsule by mouth in the morning       • finasteride (PROSCAR) 5 mg tablet Take 1 tablet (5 mg total) by mouth daily 90 tablet 3   • Multiple Vitamin (MULTI-VITAMIN DAILY PO) Take by mouth daily     • Psyllium (Metamucil) 28 3 % POWD Take 1 Package by mouth as needed     • tamsulosin (FLOMAX) 0 4 mg Take 1 capsule (0 4 mg total) by mouth 2 (two) times a day 180 "capsule 3     No current facility-administered medications for this visit  Review of Systems   Constitutional: Negative for activity change, chills, diaphoresis, fatigue and fever  HENT: Negative  Eyes: Negative for visual disturbance  Respiratory: Negative for cough, chest tightness, shortness of breath and wheezing  Cardiovascular: Negative for chest pain, palpitations and leg swelling  Gastrointestinal: Negative for abdominal pain, constipation, diarrhea, nausea and vomiting  Endocrine: Negative for cold intolerance and heat intolerance  Genitourinary: Negative for difficulty urinating, dysuria and frequency  Musculoskeletal: Negative for arthralgias, gait problem and myalgias  Neurological: Negative for dizziness, seizures, syncope, weakness, light-headedness and headaches  Psychiatric/Behavioral: Negative for confusion, dysphoric mood and sleep disturbance  The patient is not nervous/anxious  PHQ-2/9 Depression Screening    Little interest or pleasure in doing things: 0 - not at all  Feeling down, depressed, or hopeless: 0 - not at all  PHQ-2 Score: 0  PHQ-2 Interpretation: Negative depression screen        Objective:  Vitals:    05/18/23 0912   BP: 114/78   Pulse: 79   Resp: 12   Temp: (!) 96 8 °F (36 °C)   SpO2: 95%   Weight: 91 9 kg (202 lb 9 6 oz)   Height: 6' 2\" (1 88 m)     Body mass index is 26 01 kg/m²  Physical Exam  Vitals and nursing note reviewed  Constitutional:       General: He is not in acute distress  Appearance: Normal appearance  He is not ill-appearing  HENT:      Head: Normocephalic and atraumatic  Mouth/Throat:      Mouth: Mucous membranes are moist    Eyes:      Extraocular Movements: Extraocular movements intact  Conjunctiva/sclera: Conjunctivae normal       Pupils: Pupils are equal, round, and reactive to light  Neck:      Vascular: No carotid bruit  Cardiovascular:      Rate and Rhythm: Normal rate and regular rhythm        " Heart sounds: Normal heart sounds  No murmur heard  Pulmonary:      Effort: Pulmonary effort is normal  No respiratory distress  Breath sounds: Normal breath sounds  No wheezing, rhonchi or rales  Abdominal:      General: Bowel sounds are normal  There is no distension  Palpations: Abdomen is soft  Tenderness: There is no abdominal tenderness  Musculoskeletal:      Cervical back: Neck supple  Right lower leg: No edema  Left lower leg: No edema  Neurological:      General: No focal deficit present  Mental Status: He is alert and oriented to person, place, and time  Mental status is at baseline  Psychiatric:         Mood and Affect: Mood normal          Behavior: Behavior normal          Thought Content:  Thought content normal          Judgment: Judgment normal

## 2023-07-27 DIAGNOSIS — N40.1 BENIGN PROSTATIC HYPERPLASIA WITH URINARY FREQUENCY: ICD-10-CM

## 2023-07-27 DIAGNOSIS — R35.0 BENIGN PROSTATIC HYPERPLASIA WITH URINARY FREQUENCY: ICD-10-CM

## 2023-07-27 RX ORDER — TAMSULOSIN HYDROCHLORIDE 0.4 MG/1
0.4 CAPSULE ORAL 2 TIMES DAILY
Qty: 180 CAPSULE | Refills: 3 | Status: SHIPPED | OUTPATIENT
Start: 2023-07-27

## 2023-08-21 ENCOUNTER — TELEPHONE (OUTPATIENT)
Dept: INTERNAL MEDICINE CLINIC | Facility: CLINIC | Age: 73
End: 2023-08-21

## 2023-08-21 DIAGNOSIS — I10 PRIMARY HYPERTENSION: ICD-10-CM

## 2023-08-21 RX ORDER — AMLODIPINE BESYLATE 10 MG/1
10 TABLET ORAL DAILY
Qty: 30 TABLET | Refills: 5 | Status: SHIPPED | OUTPATIENT
Start: 2023-08-21

## 2023-09-22 ENCOUNTER — OFFICE VISIT (OUTPATIENT)
Dept: INTERNAL MEDICINE CLINIC | Facility: CLINIC | Age: 73
End: 2023-09-22
Payer: MEDICARE

## 2023-09-22 VITALS
OXYGEN SATURATION: 97 % | BODY MASS INDEX: 25.28 KG/M2 | SYSTOLIC BLOOD PRESSURE: 150 MMHG | HEART RATE: 92 BPM | HEIGHT: 74 IN | TEMPERATURE: 99.2 F | DIASTOLIC BLOOD PRESSURE: 82 MMHG | WEIGHT: 197 LBS

## 2023-09-22 DIAGNOSIS — E20.9 HYPOPARATHYROIDISM, UNSPECIFIED HYPOPARATHYROIDISM TYPE (HCC): ICD-10-CM

## 2023-09-22 DIAGNOSIS — G43.909 MIGRAINE WITHOUT STATUS MIGRAINOSUS, NOT INTRACTABLE, UNSPECIFIED MIGRAINE TYPE: ICD-10-CM

## 2023-09-22 DIAGNOSIS — I10 PRIMARY HYPERTENSION: Primary | ICD-10-CM

## 2023-09-22 DIAGNOSIS — F41.1 GENERALIZED ANXIETY DISORDER: ICD-10-CM

## 2023-09-22 DIAGNOSIS — Z23 ENCOUNTER FOR VACCINATION: ICD-10-CM

## 2023-09-22 DIAGNOSIS — N18.31 STAGE 3A CHRONIC KIDNEY DISEASE (HCC): ICD-10-CM

## 2023-09-22 DIAGNOSIS — J06.9 UPPER RESPIRATORY TRACT INFECTION, UNSPECIFIED TYPE: ICD-10-CM

## 2023-09-22 DIAGNOSIS — N40.1 BENIGN PROSTATIC HYPERPLASIA WITH NOCTURIA: ICD-10-CM

## 2023-09-22 DIAGNOSIS — K21.9 GERD WITHOUT ESOPHAGITIS: ICD-10-CM

## 2023-09-22 DIAGNOSIS — R35.1 BENIGN PROSTATIC HYPERPLASIA WITH NOCTURIA: ICD-10-CM

## 2023-09-22 DIAGNOSIS — E78.2 MIXED HYPERLIPIDEMIA: ICD-10-CM

## 2023-09-22 PROBLEM — U07.1 COVID-19 VIRUS INFECTION: Status: RESOLVED | Noted: 2022-06-22 | Resolved: 2023-09-22

## 2023-09-22 LAB
SARS-COV-2 AG UPPER RESP QL IA: NEGATIVE
VALID CONTROL: NORMAL

## 2023-09-22 PROCEDURE — 99214 OFFICE O/P EST MOD 30 MIN: CPT | Performed by: INTERNAL MEDICINE

## 2023-09-22 PROCEDURE — 87811 SARS-COV-2 COVID19 W/OPTIC: CPT | Performed by: INTERNAL MEDICINE

## 2023-09-22 NOTE — PATIENT INSTRUCTIONS
Chronic Hypertension   AMBULATORY CARE:   Hypertension is considered chronic  when it continues for 3 months or longer. Hypertension that continues causes your heart to work much harder than normal, which may lead to heart damage. Even if you have hypertension for years, lifestyle changes, medicines, or both may help lower your blood pressure. Call your local emergency number [phone] in the ) or have someone call if:   You have chest pain. You have any of the following signs of a heart attack:      Squeezing, pressure, or pain in your chest    You may  also have any of the following:     Discomfort or pain in your back, neck, jaw, stomach, or arm    Shortness of breath    Nausea or vomiting    Lightheadedness or a sudden cold sweat    You become confused or have difficulty speaking. You suddenly feel lightheaded or have trouble breathing. Seek care immediately if:   You have a severe headache or vision loss. You have weakness in an arm or leg. Call your doctor or cardiologist if:   You feel faint, dizzy, confused, or drowsy. You have been taking your blood pressure medicine but your pressure is higher than your provider says it should be. You have questions or concerns about your condition or care. Treatment for chronic hypertension  may include medicine to lower your blood pressure and cholesterol levels. A low cholesterol level helps prevent heart disease and makes it easier to control your blood pressure. Heart disease can make your blood pressure harder to control. You may also need to make lifestyle changes. What you need to know about the stages of hypertension:  Your healthcare provider will give you a blood pressure goal based on your age, health, and risk for cardiovascular disease. The following are general guidelines on the stages of hypertension:  Normal blood pressure is 119/79 or lower .  Your provider may only check your blood pressure each year if it stays at a normal level.    Elevated blood pressure is 120/79 to 129/79 . This is sometimes called prehypertension. Your provider may suggest lifestyle changes to help lower your blood pressure to a normal level. He or she may then check it again in 3 to 6 months. Stage 1 hypertension is 130/80  to 139/89 . Your provider may recommend lifestyle changes, medication, and checks every 3 to 6 months until your blood pressure is controlled. Stage 2 hypertension is 140/90 or higher . Your provider will recommend lifestyle changes and have you take 2 kinds of hypertension medicines. You will also need to have your blood pressure checked monthly until it is controlled. Manage chronic hypertension:   Check your blood pressure at home. Do not smoke, have caffeine, or exercise for at least 30 minutes before you check your blood pressure. Sit and rest for 5 minutes before you check your blood pressure. Extend your arm and support it on a flat surface. Your arm should be at the same level as your heart. Follow the directions that came with your blood pressure monitor. Check your blood pressure 2 times, 1 minute apart, before you take your medicine in the morning. Also check your blood pressure before your evening meal. Keep a record of your readings and bring it to your follow-up visits. Your healthcare provider may use the readings to make changes to your treatment plan. Manage any other health conditions you have. Health conditions such as diabetes can increase your risk for hypertension. Follow your provider's instructions and take all your medicines as directed. Talk to your provider about any new health conditions you have recently developed. Ask about all medicines. Certain medicines can increase your blood pressure. Examples include oral birth control pills, decongestants, herbal supplements, and NSAIDs, such as ibuprofen. Your provider can tell you which medicines are safe for you to take.  This includes prescription and over-the-counter medicines. Lifestyle changes you can make to lower your blood pressure: Your provider may want you to make more lifestyle changes if you are having trouble controlling your blood pressure. This may feel difficult over time, especially if you think you are making good changes but your pressure is still high. It might help to focus on one new change at a time. For example, try to add 1 more day of exercise, or exercise for an extra 10 minutes on 2 days. Small changes can make a big difference. Your healthcare provider can also refer you to specialists such as a dietitian who can help you make small changes. Your family members may be included in helping you learn to create lifestyle changes, such as the following:     Limit sodium (salt) as directed. Too much sodium can affect your fluid balance. Check labels to find low-sodium or no-salt-added foods. Some low-sodium foods use potassium salts for flavor. Too much potassium can also cause health problems. Your provider will tell you how much sodium and potassium are safe for you to have in a day. He or she may recommend that you limit sodium to 2,300 mg a day. Follow the meal plan recommended by your provider. A dietitian or your provider can give you more information on low-sodium plans or the DASH (Dietary Approaches to Stop Hypertension) eating plan. The DASH plan is low in sodium, processed sugar, unhealthy fats, and total fat. It is high in potassium, calcium, and fiber. These can be found in vegetables, fruit, and whole-grain foods. Be physically active throughout the day. Physical activity, such as exercise, can help control your blood pressure and your weight. Be physically active for at least 30 minutes per day, on most days of the week. Include aerobic activity, such as walking or riding a bicycle. Also include strength training at least 2 times each week.  Your provider can help you create a physical activity plan. Decrease stress. This may help lower your blood pressure. Learn ways to relax, such as deep breathing or listening to music. Limit alcohol as directed. Alcohol can increase your blood pressure. A drink of alcohol is 12 ounces of beer, 5 ounces of wine, or 1½ ounces of liquor. Your provider can help you set daily and weekly drink limits. He or she may recommend no alcohol if your blood pressure stays higher than goal even with medicine or other measures. Ask your provider for information if you need help to quit. Do not smoke. Nicotine and other chemicals in cigarettes and cigars can increase your blood pressure and also cause lung damage. Ask your provider for information if you currently smoke and need help to quit. E-cigarettes or smokeless tobacco still contain nicotine. Talk to your provider before you use these products. Follow up with your doctor or cardiologist as directed: You will need to return to have your blood pressure checked and to have other lab tests done. Write down your questions so you remember to ask them during your visits. © Copyright [facility] 2023 Information is for End User's use only and may not be sold, redistributed or otherwise used for commercial purposes. The above information is an  only. It is not intended as medical advice for individual conditions or treatments. Talk to your doctor, nurse or pharmacist before following any medical regimen to see if it is safe and effective for you.

## 2023-09-22 NOTE — PROGRESS NOTES
Assessment/Plan:  Problem List Items Addressed This Visit        Digestive    GERD without esophagitis       Endocrine    Hypoparathyroidism ([address])       Cardiovascular and Mediastinum    Migraine, unspecified, not intractable, without status migrainosus    Hypertension - Primary    Relevant Orders    CBC and differential       Genitourinary    Stage 3a chronic kidney disease ([address])    Relevant Orders    CBC and differential       Other    Hyperlipidemia    Relevant Orders    LDL cholesterol, direct    Triglycerides    Generalized anxiety disorder    BMI 25.0-25.9,adult    Benign prostatic hyperplasia with nocturia   Other Visit Diagnoses     Encounter for vaccination        Upper respiratory tract infection, unspecified type        Relevant Orders    POCT Rapid Covid Ag (Completed)           Diagnoses and all orders for this visit:    Primary hypertension  -     CBC and differential; Future    GERD without esophagitis    Hypoparathyroidism, unspecified hypoparathyroidism type (HCC)    Migraine without status migrainosus, not intractable, unspecified migraine type    Stage 3a chronic kidney disease ([address])  -     CBC and differential; Future    Generalized anxiety disorder    Mixed hyperlipidemia  -     LDL cholesterol, direct; Future  -     Triglycerides; Future    Benign prostatic hyperplasia with nocturia    BMI 25.0-25.9,adult    Encounter for vaccination    Upper respiratory tract infection, unspecified type  -     POCT Rapid Covid Ag        No problem-specific Assessment & Plan notes found for this encounter. A/P: Doing ok and will check labs. BP is up, but suspect due to URI. Appreciate specialists input. Discussed vaccines and defers. Will treat URI with OTC tylenol, cough med, and mucinex. . In office covid was negative. Continue current treatment and RTC four months for routine. Subjective:      Patient ID: Jacquetta Habermann Trainer is a 75 y.o. male.      RTC for f/u HTN, HLD, etc. Doing ok and no new issues except for several days of URI s/s. No known covid exposures. Notes headache and nasal congestion, but no fevers. Slight cough and congestion. No SOB. . Remains active w/o difficulty and no falls. No reflux. Migraines and chronic pain are manageable. STAR is controlled. Due for labs and vaccines. The following portions of the patient's history were reviewed and updated as appropriate:   He has a past medical history of Abdominal pain, acute, generalized, Actinic keratosis, Chest pain, Chronic kidney disease, stage 3 ([address]), Colon polyp, Disease of thyroid gland, Dysfunction of both eustachian tubes, Edema, Fatigue, Generalized anxiety disorder, GERD (gastroesophageal reflux disease), Hematuria, Hypertension, Hypo-osmolality and hyponatremia, Hypocalcemia, Hypoparathyroidism ([address]), Lightheadedness, Lump in neck, Malaise and fatigue, Nephropathy, Nocturia, Proteinuria, Shortness of breath, Skin lesion, and Tinea corporis. ,  does not have any pertinent problems on file. ,   has a past surgical history that includes Skin biopsy; Thyroid surgery; Colonoscopy w/ polypectomy (07/19/2019); Head & neck skin lesion excisional biopsy; and Tonsillectomy. ,  family history includes Diabetes in his father; Hypertension in his father and mother; Other in his brother; Stroke in his family. ,   reports that he has never smoked. He has been exposed to tobacco smoke. He has never used smokeless tobacco. He reports current alcohol use. He reports that he does not use drugs. ,  is allergic to lisinopril and pollen extract. .  Current Outpatient Medications   Medication Sig Dispense Refill   • amLODIPine (NORVASC) 10 mg tablet Take 1 tablet (10 mg total) by mouth daily 30 tablet 5   • aspirin (ECOTRIN LOW STRENGTH) 81 mg EC tablet Take 1 tablet (81 mg total) by mouth daily     • calcitriol (ROCALTROL) 0.25 mcg capsule Take 1 capsule (0.25 mcg total) by mouth every other day 90 capsule 1   • Calcium Carbonate (CALCIUM 600 PO) Take by mouth Twice per day     • Cholecalciferol (VITAMIN D3) 1000 units CAPS Take 1 capsule by mouth in the morning       • finasteride (PROSCAR) 5 mg tablet Take 1 tablet (5 mg total) by mouth daily 90 tablet 3   • Multiple Vitamin (MULTI-VITAMIN DAILY PO) Take by mouth daily     • Psyllium (Metamucil) 28.3 % POWD Take 1 Package by mouth as needed     • tamsulosin (FLOMAX) 0.4 mg Take 1 capsule (0.4 mg total) by mouth 2 (two) times a day 180 capsule 3     No current facility-administered medications for this visit. Review of Systems   Constitutional: Positive for activity change, appetite change and fatigue. Negative for chills, diaphoresis and fever. HENT: Positive for congestion, postnasal drip, rhinorrhea and sinus pressure. Negative for ear discharge, ear pain, facial swelling, sinus pain and sore throat. Eyes: Negative for visual disturbance. Respiratory: Negative for cough, chest tightness, shortness of breath and wheezing. Cardiovascular: Negative for chest pain, palpitations and leg swelling. Gastrointestinal: Negative for abdominal pain, constipation, diarrhea, nausea and vomiting. Endocrine: Negative for cold intolerance and heat intolerance. Genitourinary: Negative for difficulty urinating, dysuria and frequency. Musculoskeletal: Negative for arthralgias, gait problem and myalgias. Neurological: Negative for dizziness, seizures, syncope, weakness, light-headedness, numbness and headaches. Psychiatric/Behavioral: Negative for confusion, dysphoric mood and sleep disturbance. The patient is not nervous/anxious. PHQ-2/9 Depression Screening          Objective:  Vitals:    09/22/23 0856   BP: 150/82   BP Location: Left arm   Patient Position: Sitting   Pulse: 92   Temp: 99.2 °F (37.3 °C)   SpO2: 97%   Weight: 89.4 kg (197 lb)   Height: 6' 2" (1.88 m)     Body mass index is 25.29 kg/m². Physical Exam  Vitals and nursing note reviewed.    Constitutional:       General: He is not in acute distress. Appearance: Normal appearance. He is not ill-appearing. HENT:      Head: Normocephalic and atraumatic. Right Ear: Tympanic membrane, ear canal and external ear normal. There is no impacted cerumen. Left Ear: Tympanic membrane, ear canal and external ear normal. There is no impacted cerumen. Nose: Congestion and rhinorrhea present. Mouth/Throat:      Mouth: Mucous membranes are moist.   Eyes:      Extraocular Movements: Extraocular movements intact. Conjunctiva/sclera: Conjunctivae normal.      Pupils: Pupils are equal, round, and reactive to light. Neck:      Vascular: No carotid bruit. Cardiovascular:      Rate and Rhythm: Normal rate and regular rhythm. Heart sounds: Normal heart sounds. No murmur heard. Pulmonary:      Effort: Pulmonary effort is normal. No respiratory distress. Breath sounds: Normal breath sounds. No wheezing, rhonchi or rales. Abdominal:      General: Bowel sounds are normal. There is no distension. Palpations: Abdomen is soft. Tenderness: There is no abdominal tenderness. Musculoskeletal:      Cervical back: Neck supple. Right lower leg: No edema. Left lower leg: No edema. Neurological:      General: No focal deficit present. Mental Status: He is alert and oriented to person, place, and time. Mental status is at baseline. Psychiatric:         Mood and Affect: Mood normal.         Behavior: Behavior normal.         Thought Content:  Thought content normal.         Judgment: Judgment normal.

## 2023-10-02 ENCOUNTER — APPOINTMENT (OUTPATIENT)
Dept: LAB | Facility: CLINIC | Age: 73
End: 2023-10-02
Payer: MEDICARE

## 2023-10-02 ENCOUNTER — IMMUNIZATIONS (OUTPATIENT)
Dept: INTERNAL MEDICINE CLINIC | Facility: CLINIC | Age: 73
End: 2023-10-02
Payer: MEDICARE

## 2023-10-02 DIAGNOSIS — N18.31 STAGE 3A CHRONIC KIDNEY DISEASE (HCC): ICD-10-CM

## 2023-10-02 DIAGNOSIS — E78.2 MIXED HYPERLIPIDEMIA: ICD-10-CM

## 2023-10-02 DIAGNOSIS — Z23 ENCOUNTER FOR IMMUNIZATION: Primary | ICD-10-CM

## 2023-10-02 DIAGNOSIS — I10 PRIMARY HYPERTENSION: ICD-10-CM

## 2023-10-02 LAB
ALBUMIN SERPL BCP-MCNC: 4 G/DL (ref 3.5–5)
ALP SERPL-CCNC: 69 U/L (ref 34–104)
ALT SERPL W P-5'-P-CCNC: 13 U/L (ref 7–52)
ANION GAP SERPL CALCULATED.3IONS-SCNC: 8 MMOL/L
AST SERPL W P-5'-P-CCNC: 21 U/L (ref 13–39)
BASOPHILS # BLD AUTO: 0.09 THOUSANDS/ÂΜL (ref 0–0.1)
BASOPHILS NFR BLD AUTO: 1 % (ref 0–1)
BILIRUB SERPL-MCNC: 0.54 MG/DL (ref 0.2–1)
BUN SERPL-MCNC: 14 MG/DL (ref 5–25)
CALCIUM SERPL-MCNC: 8.5 MG/DL (ref 8.4–10.2)
CHLORIDE SERPL-SCNC: 98 MMOL/L (ref 96–108)
CO2 SERPL-SCNC: 31 MMOL/L (ref 21–32)
CREAT SERPL-MCNC: 1.2 MG/DL (ref 0.6–1.3)
EOSINOPHIL # BLD AUTO: 0.19 THOUSAND/ÂΜL (ref 0–0.61)
EOSINOPHIL NFR BLD AUTO: 3 % (ref 0–6)
ERYTHROCYTE [DISTWIDTH] IN BLOOD BY AUTOMATED COUNT: 13.2 % (ref 11.6–15.1)
GFR SERPL CREATININE-BSD FRML MDRD: 59 ML/MIN/1.73SQ M
GLUCOSE P FAST SERPL-MCNC: 89 MG/DL (ref 65–99)
HCT VFR BLD AUTO: 47.8 % (ref 36.5–49.3)
HGB BLD-MCNC: 15.4 G/DL (ref 12–17)
IMM GRANULOCYTES # BLD AUTO: 0.01 THOUSAND/UL (ref 0–0.2)
IMM GRANULOCYTES NFR BLD AUTO: 0 % (ref 0–2)
LDLC SERPL DIRECT ASSAY-MCNC: 103 MG/DL (ref 0–100)
LYMPHOCYTES # BLD AUTO: 2.24 THOUSANDS/ÂΜL (ref 0.6–4.47)
LYMPHOCYTES NFR BLD AUTO: 31 % (ref 14–44)
MCH RBC QN AUTO: 28.9 PG (ref 26.8–34.3)
MCHC RBC AUTO-ENTMCNC: 32.2 G/DL (ref 31.4–37.4)
MCV RBC AUTO: 90 FL (ref 82–98)
MONOCYTES # BLD AUTO: 0.63 THOUSAND/ÂΜL (ref 0.17–1.22)
MONOCYTES NFR BLD AUTO: 9 % (ref 4–12)
NEUTROPHILS # BLD AUTO: 3.98 THOUSANDS/ÂΜL (ref 1.85–7.62)
NEUTS SEG NFR BLD AUTO: 56 % (ref 43–75)
NRBC BLD AUTO-RTO: 0 /100 WBCS
PLATELET # BLD AUTO: 300 THOUSANDS/UL (ref 149–390)
PMV BLD AUTO: 9.4 FL (ref 8.9–12.7)
POTASSIUM SERPL-SCNC: 4.4 MMOL/L (ref 3.5–5.3)
PROT SERPL-MCNC: 7.3 G/DL (ref 6.4–8.4)
RBC # BLD AUTO: 5.33 MILLION/UL (ref 3.88–5.62)
SODIUM SERPL-SCNC: 137 MMOL/L (ref 135–147)
TRIGL SERPL-MCNC: 225 MG/DL
WBC # BLD AUTO: 7.14 THOUSAND/UL (ref 4.31–10.16)

## 2023-10-02 PROCEDURE — G0008 ADMIN INFLUENZA VIRUS VAC: HCPCS | Performed by: INTERNAL MEDICINE

## 2023-10-02 PROCEDURE — 90662 IIV NO PRSV INCREASED AG IM: CPT | Performed by: INTERNAL MEDICINE

## 2023-10-02 PROCEDURE — 36415 COLL VENOUS BLD VENIPUNCTURE: CPT

## 2023-10-02 PROCEDURE — 85025 COMPLETE CBC W/AUTO DIFF WBC: CPT

## 2023-10-02 PROCEDURE — 84478 ASSAY OF TRIGLYCERIDES: CPT

## 2023-10-02 PROCEDURE — 83721 ASSAY OF BLOOD LIPOPROTEIN: CPT

## 2023-10-24 ENCOUNTER — APPOINTMENT (OUTPATIENT)
Dept: LAB | Facility: CLINIC | Age: 73
End: 2023-10-24
Payer: MEDICARE

## 2023-10-24 LAB
CALCIUM 24H UR-MCNC: 390.6 MG/24 HRS (ref 100–300)
CREAT 24H UR-MRATE: 1.8 G/24HR (ref 0.8–1.8)
SPECIMEN VOL UR: 3100 ML
SPECIMEN VOL UR: 3100 ML

## 2023-10-31 ENCOUNTER — OFFICE VISIT (OUTPATIENT)
Dept: NEPHROLOGY | Facility: CLINIC | Age: 73
End: 2023-10-31
Payer: MEDICARE

## 2023-10-31 ENCOUNTER — APPOINTMENT (OUTPATIENT)
Age: 73
End: 2023-10-31
Payer: MEDICARE

## 2023-10-31 VITALS
DIASTOLIC BLOOD PRESSURE: 84 MMHG | HEIGHT: 74 IN | WEIGHT: 197 LBS | SYSTOLIC BLOOD PRESSURE: 122 MMHG | BODY MASS INDEX: 25.28 KG/M2

## 2023-10-31 DIAGNOSIS — E20.9 HYPOPARATHYROIDISM, UNSPECIFIED HYPOPARATHYROIDISM TYPE (HCC): ICD-10-CM

## 2023-10-31 DIAGNOSIS — N18.30 BENIGN HYPERTENSION WITH CKD (CHRONIC KIDNEY DISEASE) STAGE III (HCC): ICD-10-CM

## 2023-10-31 DIAGNOSIS — N28.1 KIDNEY CYST, ACQUIRED: ICD-10-CM

## 2023-10-31 DIAGNOSIS — N18.31 STAGE 3A CHRONIC KIDNEY DISEASE (HCC): Primary | ICD-10-CM

## 2023-10-31 DIAGNOSIS — N18.31 STAGE 3A CHRONIC KIDNEY DISEASE (HCC): ICD-10-CM

## 2023-10-31 DIAGNOSIS — I12.9 BENIGN HYPERTENSION WITH CKD (CHRONIC KIDNEY DISEASE) STAGE III (HCC): ICD-10-CM

## 2023-10-31 LAB
ALBUMIN SERPL BCP-MCNC: 4.1 G/DL (ref 3.5–5)
ALP SERPL-CCNC: 64 U/L (ref 34–104)
ALT SERPL W P-5'-P-CCNC: 13 U/L (ref 7–52)
ANION GAP SERPL CALCULATED.3IONS-SCNC: 9 MMOL/L
AST SERPL W P-5'-P-CCNC: 19 U/L (ref 13–39)
BACTERIA UR QL AUTO: ABNORMAL /HPF
BASOPHILS # BLD AUTO: 0.08 THOUSANDS/ÂΜL (ref 0–0.1)
BASOPHILS NFR BLD AUTO: 1 % (ref 0–1)
BILIRUB SERPL-MCNC: 0.39 MG/DL (ref 0.2–1)
BILIRUB UR QL STRIP: NEGATIVE
BUN SERPL-MCNC: 17 MG/DL (ref 5–25)
CALCIUM SERPL-MCNC: 8.9 MG/DL (ref 8.4–10.2)
CHLORIDE SERPL-SCNC: 97 MMOL/L (ref 96–108)
CLARITY UR: CLEAR
CO2 SERPL-SCNC: 29 MMOL/L (ref 21–32)
COLOR UR: YELLOW
CREAT SERPL-MCNC: 1.19 MG/DL (ref 0.6–1.3)
CREAT UR-MCNC: 151.4 MG/DL
EOSINOPHIL # BLD AUTO: 0.12 THOUSAND/ÂΜL (ref 0–0.61)
EOSINOPHIL NFR BLD AUTO: 2 % (ref 0–6)
ERYTHROCYTE [DISTWIDTH] IN BLOOD BY AUTOMATED COUNT: 13.5 % (ref 11.6–15.1)
GFR SERPL CREATININE-BSD FRML MDRD: 59 ML/MIN/1.73SQ M
GLUCOSE SERPL-MCNC: 96 MG/DL (ref 65–140)
GLUCOSE UR STRIP-MCNC: NEGATIVE MG/DL
HCT VFR BLD AUTO: 46 % (ref 36.5–49.3)
HGB BLD-MCNC: 15.7 G/DL (ref 12–17)
HGB UR QL STRIP.AUTO: NEGATIVE
IMM GRANULOCYTES # BLD AUTO: 0.02 THOUSAND/UL (ref 0–0.2)
IMM GRANULOCYTES NFR BLD AUTO: 0 % (ref 0–2)
KETONES UR STRIP-MCNC: NEGATIVE MG/DL
LEUKOCYTE ESTERASE UR QL STRIP: NEGATIVE
LYMPHOCYTES # BLD AUTO: 2.73 THOUSANDS/ÂΜL (ref 0.6–4.47)
LYMPHOCYTES NFR BLD AUTO: 37 % (ref 14–44)
MAGNESIUM SERPL-MCNC: 2.1 MG/DL (ref 1.9–2.7)
MCH RBC QN AUTO: 30.3 PG (ref 26.8–34.3)
MCHC RBC AUTO-ENTMCNC: 34.1 G/DL (ref 31.4–37.4)
MCV RBC AUTO: 89 FL (ref 82–98)
MICROALBUMIN UR-MCNC: 114.7 MG/L
MICROALBUMIN/CREAT 24H UR: 76 MG/G CREATININE (ref 0–30)
MONOCYTES # BLD AUTO: 0.62 THOUSAND/ÂΜL (ref 0.17–1.22)
MONOCYTES NFR BLD AUTO: 8 % (ref 4–12)
NEUTROPHILS # BLD AUTO: 3.86 THOUSANDS/ÂΜL (ref 1.85–7.62)
NEUTS SEG NFR BLD AUTO: 52 % (ref 43–75)
NITRITE UR QL STRIP: NEGATIVE
NON-SQ EPI CELLS URNS QL MICRO: ABNORMAL /HPF
NRBC BLD AUTO-RTO: 0 /100 WBCS
PH UR STRIP.AUTO: 6 [PH]
PHOSPHATE SERPL-MCNC: 3.9 MG/DL (ref 2.3–4.1)
PLATELET # BLD AUTO: 282 THOUSANDS/UL (ref 149–390)
PMV BLD AUTO: 9.7 FL (ref 8.9–12.7)
POTASSIUM SERPL-SCNC: 4.4 MMOL/L (ref 3.5–5.3)
PROT SERPL-MCNC: 7.6 G/DL (ref 6.4–8.4)
PROT UR STRIP-MCNC: ABNORMAL MG/DL
PTH-INTACT SERPL-MCNC: 6 PG/ML (ref 12–88)
RBC # BLD AUTO: 5.18 MILLION/UL (ref 3.88–5.62)
RBC #/AREA URNS AUTO: ABNORMAL /HPF
SODIUM SERPL-SCNC: 135 MMOL/L (ref 135–147)
SP GR UR STRIP.AUTO: 1.02 (ref 1–1.03)
UROBILINOGEN UR STRIP-ACNC: <2 MG/DL
WBC # BLD AUTO: 7.43 THOUSAND/UL (ref 4.31–10.16)
WBC #/AREA URNS AUTO: ABNORMAL /HPF

## 2023-10-31 PROCEDURE — 82043 UR ALBUMIN QUANTITATIVE: CPT

## 2023-10-31 PROCEDURE — 81001 URINALYSIS AUTO W/SCOPE: CPT

## 2023-10-31 PROCEDURE — 85025 COMPLETE CBC W/AUTO DIFF WBC: CPT

## 2023-10-31 PROCEDURE — 83970 ASSAY OF PARATHORMONE: CPT

## 2023-10-31 PROCEDURE — 99214 OFFICE O/P EST MOD 30 MIN: CPT | Performed by: INTERNAL MEDICINE

## 2023-10-31 PROCEDURE — 83735 ASSAY OF MAGNESIUM: CPT

## 2023-10-31 PROCEDURE — 82570 ASSAY OF URINE CREATININE: CPT

## 2023-10-31 PROCEDURE — 36415 COLL VENOUS BLD VENIPUNCTURE: CPT

## 2023-10-31 PROCEDURE — 84100 ASSAY OF PHOSPHORUS: CPT

## 2023-10-31 PROCEDURE — 80053 COMPREHEN METABOLIC PANEL: CPT

## 2023-10-31 NOTE — ASSESSMENT & PLAN NOTE
Following with endocrinology. Calcitriol is 0.25 mcg every other day. 24-hr urine for calcium is just under 400 mg, PTH remains undetectable at this time.

## 2023-10-31 NOTE — ASSESSMENT & PLAN NOTE
Lab Results   Component Value Date    EGFR 59 10/02/2023    EGFR 54 05/02/2023    EGFR 54 11/15/2022    CREATININE 1.20 10/02/2023    CREATININE 1.30 05/02/2023    CREATININE 1.29 11/15/2022     Blood pressure is stable at this time, no changes to medications, encourage low sodium diet.

## 2023-10-31 NOTE — PROGRESS NOTES
[facility] [address] [place] [address]    Name: Jacobo Cintron  YOB: 1950      Assessment/Plan:    Stage 3a chronic kidney disease [facility])  Lab Results   Component Value Date    EGFR 59 10/02/2023    EGFR 54 05/02/2023    EGFR 54 11/15/2022    CREATININE 1.20 10/02/2023    CREATININE 1.30 05/02/2023    CREATININE 1.29 11/15/2022     Patient kidney function stable, continue to optimize care and avoid nephrotoxins. Kidney cyst, acquired  No further work up indicated at this time. Benign hypertension with CKD (chronic kidney disease) stage III [facility])  Lab Results   Component Value Date    EGFR 59 10/02/2023    EGFR 54 05/02/2023    EGFR 54 11/15/2022    CREATININE 1.20 10/02/2023    CREATININE 1.30 05/02/2023    CREATININE 1.29 11/15/2022     Blood pressure is stable at this time, no changes to medications, encourage low sodium diet. Hypoparathyroidism [facility])  Following with endocrinology. Calcitriol is 0.25 mcg every other day. 24-hr urine for calcium is just under 400 mg, PTH remains undetectable at this time. Problem List Items Addressed This Visit          Endocrine    Hypoparathyroidism [facility])     Following with endocrinology. Calcitriol is 0.25 mcg every other day. 24-hr urine for calcium is just under 400 mg, PTH remains undetectable at this time. Cardiovascular and Mediastinum    Benign hypertension with CKD (chronic kidney disease) stage III [facility])     Lab Results   Component Value Date    EGFR 59 10/02/2023    EGFR 54 05/02/2023    EGFR 54 11/15/2022    CREATININE 1.20 10/02/2023    CREATININE 1.30 05/02/2023    CREATININE 1.29 11/15/2022   Blood pressure is stable at this time, no changes to medications, encourage low sodium diet. Genitourinary    Kidney cyst, acquired     No further work up indicated at this time.          Stage 3a chronic kidney disease ([address]) - Primary     Lab Results   Component Value Date    EGFR 61 10/02/2023    EGFR 54 05/02/2023    EGFR 54 11/15/2022    CREATININE 1.20 10/02/2023    CREATININE 1.30 05/02/2023    CREATININE 1.29 11/15/2022   Patient kidney function stable, continue to optimize care and avoid nephrotoxins. Patient is stable from the renal standpoint, we will see him for a regular appointment in about 6 months. Labs that are due for this appointment will be done shortly after this visit. Subjective:      Patient ID: Mahnaz Cintron is a 75 y.o. male. Patient presents for follow up. Patient's most recent creatinine 1.2 mg/dL with an eGFR of 59 ml/min. There were no significant electrolyte abnormalities noted. He is taking all medications as prescribed with no specific side-effects. He denies use of NSAIDs. Hypertension  This is a chronic problem. The current episode started more than 1 year ago. The problem is unchanged. The problem is controlled. Pertinent negatives include no chest pain, orthopnea or peripheral edema. There are no associated agents to hypertension. Risk factors for coronary artery disease include male gender. Past treatments include lifestyle changes and calcium channel blockers. There are no compliance problems. Hypertensive end-organ damage includes kidney disease. Identifiable causes of hypertension include chronic renal disease. The following portions of the patient's history were reviewed and updated as appropriate: allergies, current medications, past family history, past medical history, past social history, past surgical history and problem list.    Review of Systems   Cardiovascular:  Negative for chest pain and orthopnea. All other systems reviewed and are negative.         Social History     Socioeconomic History    Marital status: /Civil Union     Spouse name: None    Number of children: None    Years of education: None    Highest education level: None   Occupational History    Occupation: Retired     Comment: Truck     Tobacco Use    Smoking status: Never     Passive exposure: Past    Smokeless tobacco: Never   Vaping Use    Vaping Use: Never used   Substance and Sexual Activity    Alcohol use: Yes     Comment: Occasionally drink    Drug use: No    Sexual activity: Yes     Partners: Female   Other Topics Concern    None   Social History Narrative    Consumes on average 1 cup of regular coffee per day      Social Determinants of Health     Financial Resource Strain: Low Risk  (5/18/2023)    Overall Financial Resource Strain (CARDIA)     Difficulty of Paying Living Expenses: Not very hard   Food Insecurity: No Food Insecurity (6/23/2022)    Hunger Vital Sign     Worried About Running Out of Food in the Last Year: Never true     Ran Out of Food in the Last Year: Never true   Transportation Needs: No Transportation Needs (5/18/2023)    PRAPARE - Transportation     Lack of Transportation (Medical): No     Lack of Transportation (Non-Medical): No   Physical Activity: Not on file   Stress: Not on file   Social Connections: Not on file   Intimate Partner Violence: Not on file   Housing Stability: Low Risk  (6/23/2022)    Housing Stability Vital Sign     Unable to Pay for Housing in the Last Year: No     Number of Places Lived in the Last Year: 1     Unstable Housing in the Last Year: No     Past Medical History:   Diagnosis Date    Abdominal pain, acute, generalized     last assessed - 21Apr2017    Actinic keratosis     last assessed - 12Jun2013    Chest pain     last assessed - 29Nov2012    Chronic kidney disease, stage 3 ([address])     Colon polyp     Disease of thyroid gland     Dysfunction of both eustachian tubes     suspect due to ETD.  Recommend otc allergy meds and if fails then add flonase; last assessed - 06Aug2012    Edema     Fatigue     last assessed - [id number]    Generalized anxiety disorder     GERD (gastroesophageal reflux disease)     Hematuria     last assessed - 02Aug2012    Hypertension     Hypo-osmolality and hyponatremia     Hypocalcemia     Hypoparathyroidism ([address])     Lightheadedness     last assessed - [id number]    Lump in neck     ? cause. Maybe a localized allergic reaction, dental issue, doubt sialdenitis, ect. Empiric abx and one dose steroids. Continue benadryl. If worsens, to er or ent.     Malaise and fatigue     last assessed - [id number]    Nephropathy     last assessed - [id number]    Nocturia     Proteinuria     Shortness of breath     last assessed - 04Aoz1506    Skin lesion     Resolved - [id number]    Tinea corporis     last assessed - 27YFA7732     Past Surgical History:   Procedure Laterality Date    COLONOSCOPY W/ POLYPECTOMY  07/19/2019    HEAD & NECK SKIN LESION EXCISIONAL BIOPSY      excision of lesion scalp malignant - BCCA; last assessed - [id number]    SKIN BIOPSY      last assessed - [id number]    THYROID SURGERY      Biopsy thyroid using percutaneous core needle; last assessed - 09Sep2014    TONSILLECTOMY         Current Outpatient Medications:     amLODIPine (NORVASC) 10 mg tablet, Take 1 tablet (10 mg total) by mouth daily, Disp: 30 tablet, Rfl: 5    aspirin (ECOTRIN LOW STRENGTH) 81 mg EC tablet, Take 1 tablet (81 mg total) by mouth daily, Disp: , Rfl:     calcitriol (ROCALTROL) 0.25 mcg capsule, Take 1 capsule (0.25 mcg total) by mouth every other day, Disp: 90 capsule, Rfl: 1    Calcium Carbonate (CALCIUM 600 PO), Take by mouth Twice per day, Disp: , Rfl:     Cholecalciferol (VITAMIN D3) 1000 units CAPS, Take 1 capsule by mouth in the morning  , Disp: , Rfl:     finasteride (PROSCAR) 5 mg tablet, Take 1 tablet (5 mg total) by mouth daily, Disp: 90 tablet, Rfl: 3    Multiple Vitamin (MULTI-VITAMIN DAILY PO), Take by mouth daily, Disp: , Rfl:     Psyllium (Metamucil) 28.3 % POWD, Take 1 Package by mouth as needed, Disp: , Rfl:     tamsulosin (FLOMAX) 0.4 mg, Take 1 capsule (0.4 mg total) by mouth 2 (two) times a day, Disp: 180 capsule, Rfl: 3    Lab Results   Component Value Date     12/15/2015    SODIUM 137 10/02/2023    K 4.4 10/02/2023    CL 98 10/02/2023    CO2 31 10/02/2023    ANIONGAP 7 12/15/2015    AGAP 8 10/02/2023    BUN 14 10/02/2023    CREATININE 1.20 10/02/2023    GLUC 144 (H) 06/23/2022    GLUF 89 10/02/2023    CALCIUM 8.5 10/02/2023    AST 21 10/02/2023    ALT 13 10/02/2023    ALKPHOS 69 10/02/2023    PROT 7.8 09/16/2015    TP 7.3 10/02/2023    BILITOT 0.51 09/16/2015    TBILI 0.54 10/02/2023    EGFR 59 10/02/2023     Lab Results   Component Value Date    WBC 7.14 10/02/2023    HGB 15.4 10/02/2023    HCT 47.8 10/02/2023    MCV 90 10/02/2023     10/02/2023     Lab Results   Component Value Date    CHOLESTEROL 204 (H) 01/17/2023    CHOLESTEROL 223 (H) 01/05/2022    CHOLESTEROL 218 (H) 09/29/2020     Lab Results   Component Value Date    HDL 52 01/17/2023    HDL 54 01/05/2022    HDL 53 09/29/2020     Lab Results   Component Value Date    LDLCALC 113 (H) 01/17/2023    LDLCALC 140 (H) 01/05/2022    LDLCALC 138 (H) 09/29/2020     Lab Results   Component Value Date    TRIG 225 (H) 10/02/2023    TRIG 193 (H) 01/17/2023    TRIG 119 09/08/2022     No results found for: "CHOLHDL"  Lab Results   Component Value Date    [id number] 1.170 01/17/2023     Lab Results   Component Value Date    PTH <6.3 (L) 05/02/2023    CALCIUM 8.5 10/02/2023    CAION 1.06 12/12/2012    PHOS 3.5 05/02/2023     No results found for: "SPEP", "UPEP"  No results found for: "[place]", "[id number]"        Objective:      /84 (BP Location: Left arm, Patient Position: Sitting, Cuff Size: Standard)   Ht 6' 2" (1.88 m)   Wt 89.4 kg (197 lb)   BMI 25.29 kg/m²          Physical Exam  Vitals reviewed. Constitutional:       General: He is not in acute distress. Appearance: He is well-developed. HENT:      Head: Normocephalic and atraumatic. Eyes:      Conjunctiva/sclera: Conjunctivae normal.   Cardiovascular:      Rate and Rhythm: Normal rate and regular rhythm.    Pulmonary:      Effort: Pulmonary effort is normal.      Breath sounds: Normal breath sounds. Abdominal:      Palpations: Abdomen is soft. Musculoskeletal:      Cervical back: Neck supple. Skin:     General: Skin is warm. Findings: No rash. Neurological:      Mental Status: He is alert and oriented to person, place, and time. Cranial Nerves: No cranial nerve deficit.    Psychiatric:         Behavior: Behavior normal.

## 2023-10-31 NOTE — ASSESSMENT & PLAN NOTE
Lab Results   Component Value Date    EGFR 59 10/02/2023    EGFR 54 05/02/2023    EGFR 54 11/15/2022    CREATININE 1.20 10/02/2023    CREATININE 1.30 05/02/2023    CREATININE 1.29 11/15/2022     Patient kidney function stable, continue to optimize care and avoid nephrotoxins.

## 2023-11-01 DIAGNOSIS — N18.31 STAGE 3A CHRONIC KIDNEY DISEASE (HCC): Primary | ICD-10-CM

## 2023-11-10 ENCOUNTER — OFFICE VISIT (OUTPATIENT)
Dept: ENDOCRINOLOGY | Facility: CLINIC | Age: 73
End: 2023-11-10
Payer: MEDICARE

## 2023-11-10 VITALS
DIASTOLIC BLOOD PRESSURE: 80 MMHG | HEIGHT: 74 IN | TEMPERATURE: 97.8 F | HEART RATE: 75 BPM | RESPIRATION RATE: 18 BRPM | SYSTOLIC BLOOD PRESSURE: 120 MMHG | WEIGHT: 198 LBS | BODY MASS INDEX: 25.41 KG/M2 | OXYGEN SATURATION: 96 %

## 2023-11-10 DIAGNOSIS — R82.994 HYPERCALCIURIA: ICD-10-CM

## 2023-11-10 DIAGNOSIS — E20.9 HYPOPARATHYROIDISM, UNSPECIFIED HYPOPARATHYROIDISM TYPE (HCC): Primary | ICD-10-CM

## 2023-11-10 DIAGNOSIS — I12.9 BENIGN HYPERTENSION WITH CKD (CHRONIC KIDNEY DISEASE) STAGE III (HCC): ICD-10-CM

## 2023-11-10 DIAGNOSIS — N18.30 BENIGN HYPERTENSION WITH CKD (CHRONIC KIDNEY DISEASE) STAGE III (HCC): ICD-10-CM

## 2023-11-10 PROCEDURE — 99214 OFFICE O/P EST MOD 30 MIN: CPT | Performed by: NURSE PRACTITIONER

## 2023-11-10 NOTE — PROGRESS NOTES
Established Patient Progress Note       Chief Complaint   Patient presents with    hypoparathyroidism        Impression & Plan:    Problem List Items Addressed This Visit          Endocrine    Hypoparathyroidism ([address]) - Primary     Continue calcitriol 0.25 mcg every other day. Reduce calcium carbonate to 600 mg daily. Start HCTZ to reduce hypercalciuria. Check BMP, PTH, and Vit D prior to next appointment. Relevant Medications    hydrochlorothiazide (HYDRODIURIL) 12.5 mg tablet    Other Relevant Orders    Basic metabolic panel Lab Collect       Cardiovascular and Mediastinum    Benign hypertension with CKD (chronic kidney disease) stage III [facility])     Lab Results   Component Value Date    EGFR 59 10/31/2023    EGFR 59 10/02/2023    EGFR 54 05/02/2023    CREATININE 1.19 10/31/2023    CREATININE 1.20 10/02/2023    CREATININE 1.30 05/02/2023   Well controlled at 120/80. Continue current regimen. Relevant Medications    hydrochlorothiazide (HYDRODIURIL) 12.5 mg tablet       Genitourinary    Hypercalciuria     Start low dose of HCTZ. Reviewed MOA as well as potential s/e, namely dehydration. Remain well hydrated with water. Evaluate serum Ca+ with next labs prior to f/u appointment. Relevant Medications    hydrochlorothiazide (HYDRODIURIL) 12.5 mg tablet       Orders Placed This Encounter   Procedures    Basic metabolic panel Lab Collect     This is a patient instruction: Patient fasting for 8 hours or longer recommended. Standing Status:   Future     Standing Expiration Date:   11/10/2024       History of Present Illness:     Zachariah Betancourt is a 75 y.o. male with hypertension, hyperlipidemia, thyroid nodules, hypoparathyroidism of unknown etiology, hypocalcemia, and vitamin-D deficiency. Patient follows with Nephrology for chronic kidney disease stage 3 and proteinuria.      At patient's last appointment on 5/10/2023, calcitriol was reduced to 0.25 mcg every other day due to elevated urine calcium. He is also taking 600 mg of calcium twice daily and 1000 IUs of vitamin D daily. Despite reduction in calcitriol, patient continues to have elevated calcium in his urine. PTH remains low. Calcium is 8.9 mg/dL. Patient reports feeling well. Denies symptoms of hypocalcemia. Denies symptoms of nephrolithiasis. Patient Active Problem List   Diagnosis    Allergic rhinitis    Basal cell tumor    Benign colon polyp    Generalized anxiety disorder    GERD without esophagitis    Hyperlipidemia    Hypertension    Hypocalcemia    Hypoparathyroidism (HCC)    Migraine, unspecified, not intractable, without status migrainosus    Multiple thyroid nodules    Palpitations    Microalbuminuria    BMI 25.0-25.9,adult    Benign hypertension with CKD (chronic kidney disease) stage III (HCC)    Benign prostatic hyperplasia with nocturia    Allergy to ACE inhibitors    Kidney cyst, acquired    Persistent proteinuria    Hearing loss of left ear    Stage 3a chronic kidney disease (HCC)    Hypercalciuria      Past Medical History:   Diagnosis Date    Abdominal pain, acute, generalized     last assessed - [id number]    Actinic keratosis     last assessed - 12Jun2013    Chest pain     last assessed - 15Pbq6679    Chronic kidney disease, stage 3 (HCC)     Colon polyp     Disease of thyroid gland     Dysfunction of both eustachian tubes     suspect due to ETD. Recommend otc allergy meds and if fails then add flonase; last assessed - 45Gcf0983    Edema     Fatigue     last assessed - 89KGQ0532    Generalized anxiety disorder     GERD (gastroesophageal reflux disease)     Hematuria     last assessed - 64Gzh1067    Hypertension     Hypo-osmolality and hyponatremia     Hypocalcemia     Hypoparathyroidism ([address])     Lightheadedness     last assessed - 47VND9625    Lump in neck     ? cause. Maybe a localized allergic reaction, dental issue, doubt sialdenitis, ect. Empiric abx and one dose steroids. Continue benadryl.  If worsens, to er or ent.     Malaise and fatigue     last assessed - 04Gcs7975    Nephropathy     last assessed - 64Gua5631    Nocturia     Proteinuria     Shortness of breath     last assessed - 07Dcl1163    Skin lesion     Resolved - 03TYY9537    Tinea corporis     last assessed - 43CJB7227      Past Surgical History:   Procedure Laterality Date    COLONOSCOPY W/ POLYPECTOMY  07/19/2019    HEAD & NECK SKIN LESION EXCISIONAL BIOPSY      excision of lesion scalp malignant - BCCA; last assessed - [id number]    SKIN BIOPSY      last assessed - [id number]    THYROID SURGERY      Biopsy thyroid using percutaneous core needle; last assessed - 34Czp6044    TONSILLECTOMY        Family History   Problem Relation Age of Onset    Hypertension Mother     Hypertension Father     Diabetes Father     Other Brother         Schwannomatosis    Stroke Family      Social History     Tobacco Use    Smoking status: Never     Passive exposure: Past    Smokeless tobacco: Never   Substance Use Topics    Alcohol use: Yes     Comment: Occasionally drink     Allergies   Allergen Reactions    Lisinopril Anaphylaxis    Pollen Extract Sneezing       Current Outpatient Medications:     amLODIPine (NORVASC) 10 mg tablet, Take 1 tablet (10 mg total) by mouth daily, Disp: 30 tablet, Rfl: 5    aspirin (ECOTRIN LOW STRENGTH) 81 mg EC tablet, Take 1 tablet (81 mg total) by mouth daily, Disp: , Rfl:     calcitriol (ROCALTROL) 0.25 mcg capsule, Take 1 capsule (0.25 mcg total) by mouth every other day, Disp: 90 capsule, Rfl: 1    Calcium Carbonate (CALCIUM 600 PO), Take by mouth Twice per day, Disp: , Rfl:     Cholecalciferol (VITAMIN D3) 1000 units CAPS, Take 1 capsule by mouth in the morning  , Disp: , Rfl:     finasteride (PROSCAR) 5 mg tablet, Take 1 tablet (5 mg total) by mouth daily, Disp: 90 tablet, Rfl: 3    hydrochlorothiazide (HYDRODIURIL) 12.5 mg tablet, Take 1 tablet (12.5 mg total) by mouth daily, Disp: 90 tablet, Rfl: 1    Multiple Vitamin (MULTI-VITAMIN DAILY PO), Take by mouth daily, Disp: , Rfl:     Psyllium (Metamucil) 28.3 % POWD, Take 1 Package by mouth as needed, Disp: , Rfl:     tamsulosin (FLOMAX) 0.4 mg, Take 1 capsule (0.4 mg total) by mouth 2 (two) times a day, Disp: 180 capsule, Rfl: 3    Review of Systems   Constitutional:  Negative for activity change, appetite change, fatigue and unexpected weight change. HENT:  Negative for dental problem, sore throat, trouble swallowing and voice change. Eyes:  Negative for visual disturbance. Respiratory:  Negative for cough, chest tightness and shortness of breath. Cardiovascular:  Negative for chest pain, palpitations and leg swelling. Gastrointestinal:  Negative for constipation, diarrhea, nausea and vomiting. Endocrine: Negative for polydipsia, polyphagia and polyuria. Genitourinary:  Negative for frequency. Musculoskeletal:  Negative for arthralgias, back pain, gait problem and myalgias. Skin:  Negative for wound. Allergic/Immunologic: Positive for environmental allergies. Negative for food allergies. Neurological:  Negative for dizziness, weakness, light-headedness, numbness and headaches. Psychiatric/Behavioral:  Negative for decreased concentration, dysphoric mood and sleep disturbance. The patient is not nervous/anxious. Physical Exam:  Body mass index is 25.42 kg/m². /80 (BP Location: Left arm, Patient Position: Sitting, Cuff Size: Standard)   Pulse 75   Temp 97.8 °F (36.6 °C) (Temporal)   Resp 18   Ht 6' 2" (1.88 m)   Wt 89.8 kg (198 lb)   SpO2 96%   BMI 25.42 kg/m²    Wt Readings from Last 3 Encounters:   11/10/23 89.8 kg (198 lb)   10/31/23 89.4 kg (197 lb)   09/22/23 89.4 kg (197 lb)       Physical Exam  Vitals reviewed. Constitutional:       General: He is not in acute distress. Appearance: He is well-developed. He is not ill-appearing. HENT:      Head: Normocephalic and atraumatic.    Eyes:      Pupils: Pupils are equal, round, and reactive to light.   Neck:      Thyroid: No thyromegaly. Cardiovascular:      Rate and Rhythm: Normal rate and regular rhythm. Pulses: Normal pulses. Heart sounds: Normal heart sounds. Pulmonary:      Effort: Pulmonary effort is normal.      Breath sounds: Normal breath sounds. Abdominal:      General: Bowel sounds are normal. There is no distension. Palpations: Abdomen is soft. Tenderness: There is no abdominal tenderness. Musculoskeletal:      Cervical back: Normal range of motion and neck supple. Right lower leg: No edema. Left lower leg: No edema. Lymphadenopathy:      Cervical: No cervical adenopathy. Skin:     General: Skin is warm and dry. Capillary Refill: Capillary refill takes less than 2 seconds. Neurological:      Mental Status: He is alert and oriented to person, place, and time. Gait: Gait normal.   Psychiatric:         Mood and Affect: Mood normal.         Behavior: Behavior normal.         Labs:     Lab Results   Component Value Date    CREATININE 1.19 10/31/2023    CREATININE 1.20 10/02/2023    CREATININE 1.30 05/02/2023    BUN 17 10/31/2023     12/15/2015    K 4.4 10/31/2023    CL 97 10/31/2023    CO2 29 10/31/2023     eGFR   Date Value Ref Range Status   10/31/2023 59 ml/min/1.73sq m Final       Lab Results   Component Value Date    CHOL 226 09/16/2015    HDL 52 01/17/2023    TRIG 225 (H) 10/02/2023       Lab Results   Component Value Date    ALT 13 10/31/2023    AST 19 10/31/2023    ALKPHOS 64 10/31/2023    BILITOT 0.51 09/16/2015       Lab Results   Component Value Date    FREET4 1.13 02/21/2020       Patient Instructions   Continue calcitriol every other day. Reduce over the counter calcium to 600 mg daily. Discussed with the patient and all questioned fully answered. He will call me if any problems arise. Follow-up appointment in 6 months.      Counseled patient on diagnostic results, prognosis, risk and benefit of treatment options, instruction for management, importance of treatment compliance, Risk  factor reduction and impressions    TRISTIN Saeed

## 2023-11-14 PROBLEM — R82.994 HYPERCALCIURIA: Status: ACTIVE | Noted: 2023-11-14

## 2023-11-14 RX ORDER — HYDROCHLOROTHIAZIDE 12.5 MG/1
12.5 TABLET ORAL DAILY
Qty: 90 TABLET | Refills: 1 | Status: SHIPPED | OUTPATIENT
Start: 2023-11-14

## 2023-11-14 NOTE — ASSESSMENT & PLAN NOTE
Lab Results   Component Value Date    EGFR 59 10/31/2023    EGFR 59 10/02/2023    EGFR 54 05/02/2023    CREATININE 1.19 10/31/2023    CREATININE 1.20 10/02/2023    CREATININE 1.30 05/02/2023   Well controlled at 120/80. Continue current regimen. No updated INR results via [facility]/no upcoming appointment noted.    Will continue to monitor/call patient if needed.

## 2023-11-14 NOTE — ASSESSMENT & PLAN NOTE
Start low dose of HCTZ. Reviewed MOA as well as potential s/e, namely dehydration. Remain well hydrated with water. Evaluate serum Ca+ with next labs prior to f/u appointment.

## 2023-11-14 NOTE — ASSESSMENT & PLAN NOTE
Continue calcitriol 0.25 mcg every other day. Reduce calcium carbonate to 600 mg daily. Start HCTZ to reduce hypercalciuria. Check BMP, PTH, and Vit D prior to next appointment.

## 2023-11-15 ENCOUNTER — TELEPHONE (OUTPATIENT)
Dept: ENDOCRINOLOGY | Facility: CLINIC | Age: 73
End: 2023-11-15

## 2023-11-15 NOTE — TELEPHONE ENCOUNTER
----- Message from Lashawn Dejesus, [address] [facility] sent at 11/14/2023 11:15 AM EST -----  Regarding: Recommendation  Please call patient to let him know that after further consideration of the high calcium in the urine, I recommend starting a very low dose of hydrochlorothiazide. This will protect the kidneys and reduce calcium excretion in the urine. I have prescribed 12.5 mg to be taken daily. This is the lowest dose. This is a mild diuretic (water pill). Please be sure to remain well hydrated with water. If patient or his wife have any other questions, please let me know.     Geovanna Gao

## 2024-01-22 ENCOUNTER — TELEPHONE (OUTPATIENT)
Dept: INTERNAL MEDICINE CLINIC | Facility: CLINIC | Age: 74
End: 2024-01-22

## 2024-01-22 ENCOUNTER — OFFICE VISIT (OUTPATIENT)
Dept: INTERNAL MEDICINE CLINIC | Facility: CLINIC | Age: 74
End: 2024-01-22
Payer: MEDICARE

## 2024-01-22 VITALS
BODY MASS INDEX: 25.6 KG/M2 | WEIGHT: 199.5 LBS | DIASTOLIC BLOOD PRESSURE: 70 MMHG | TEMPERATURE: 97.4 F | SYSTOLIC BLOOD PRESSURE: 136 MMHG | OXYGEN SATURATION: 99 % | HEART RATE: 83 BPM | HEIGHT: 74 IN

## 2024-01-22 DIAGNOSIS — N18.31 STAGE 3A CHRONIC KIDNEY DISEASE (HCC): ICD-10-CM

## 2024-01-22 DIAGNOSIS — G43.909 MIGRAINE WITHOUT STATUS MIGRAINOSUS, NOT INTRACTABLE, UNSPECIFIED MIGRAINE TYPE: ICD-10-CM

## 2024-01-22 DIAGNOSIS — E78.2 MIXED HYPERLIPIDEMIA: ICD-10-CM

## 2024-01-22 DIAGNOSIS — I10 PRIMARY HYPERTENSION: Primary | ICD-10-CM

## 2024-01-22 DIAGNOSIS — Z23 ENCOUNTER FOR VACCINATION: ICD-10-CM

## 2024-01-22 DIAGNOSIS — K21.9 GERD WITHOUT ESOPHAGITIS: ICD-10-CM

## 2024-01-22 DIAGNOSIS — E83.51 HYPOCALCEMIA: ICD-10-CM

## 2024-01-22 DIAGNOSIS — F41.1 GENERALIZED ANXIETY DISORDER: ICD-10-CM

## 2024-01-22 DIAGNOSIS — N40.1 BENIGN PROSTATIC HYPERPLASIA WITH NOCTURIA: ICD-10-CM

## 2024-01-22 DIAGNOSIS — R35.1 BENIGN PROSTATIC HYPERPLASIA WITH NOCTURIA: ICD-10-CM

## 2024-01-22 PROCEDURE — 99214 OFFICE O/P EST MOD 30 MIN: CPT | Performed by: INTERNAL MEDICINE

## 2024-01-22 NOTE — PROGRESS NOTES
Depression Screening and Follow-up Plan: Patient was screened for depression during today's encounter. They screened negative with a PHQ-2 score of 0.    Assessment/Plan:  Problem List Items Addressed This Visit          Digestive    GERD without esophagitis       Cardiovascular and Mediastinum    Migraine, unspecified, not intractable, without status migrainosus    Hypertension - Primary       Genitourinary    Stage 3a chronic kidney disease (HCC)       Other    Hypocalcemia    Hyperlipidemia    Generalized anxiety disorder    Benign prostatic hyperplasia with nocturia     Other Visit Diagnoses       Encounter for vaccination                 Diagnoses and all orders for this visit:    Primary hypertension    Migraine without status migrainosus, not intractable, unspecified migraine type    GERD without esophagitis    Stage 3a chronic kidney disease (HCC)    Benign prostatic hyperplasia with nocturia    Generalized anxiety disorder    Mixed hyperlipidemia    Hypocalcemia    Encounter for vaccination        No problem-specific Assessment & Plan notes found for this encounter.    A/P: Doing ok and labs are up to date. Vaccines are up to date. Appreciate specialist input. Continue current treatment and RTC four months for routine.     Subjective:      Patient ID: Qamar Cintron is a 73 y.o. male.    WM RTC for f/u HTN, CKD, etc. Doing ok and no new issues. Remains active w/o difficulty and no falls. No reflux. CHornic pain and migraines are manageable. STAR is controlled. Due for vaccines. Labs are up to date        The following portions of the patient's history were reviewed and updated as appropriate:   He has a past medical history of Abdominal pain, acute, generalized, Actinic keratosis, Chest pain, Chronic kidney disease, stage 3 (HCC), Colon polyp, Disease of thyroid gland, Dysfunction of both eustachian tubes, Edema, Fatigue, Generalized anxiety disorder, GERD (gastroesophageal reflux disease), Hematuria,  Hypertension, Hypo-osmolality and hyponatremia, Hypocalcemia, Hypoparathyroidism (HCC), Lightheadedness, Lump in neck, Malaise and fatigue, Nephropathy, Nocturia, Proteinuria, Shortness of breath, Skin lesion, and Tinea corporis.,  does not have any pertinent problems on file.,   has a past surgical history that includes Skin biopsy; Thyroid surgery; Colonoscopy w/ polypectomy (07/19/2019); Head & neck skin lesion excisional biopsy; and Tonsillectomy.,  family history includes Diabetes in his father; Hypertension in his father and mother; Other in his brother; Stroke in his family.,   reports that he has never smoked. He has been exposed to tobacco smoke. He has never used smokeless tobacco. He reports current alcohol use. He reports that he does not use drugs.,  is allergic to lisinopril and pollen extract..  Current Outpatient Medications   Medication Sig Dispense Refill    amLODIPine (NORVASC) 10 mg tablet Take 1 tablet (10 mg total) by mouth daily 30 tablet 5    aspirin (ECOTRIN LOW STRENGTH) 81 mg EC tablet Take 1 tablet (81 mg total) by mouth daily      calcitriol (ROCALTROL) 0.25 mcg capsule Take 1 capsule (0.25 mcg total) by mouth every other day 90 capsule 1    Calcium Carbonate (CALCIUM 600 PO) Take by mouth Twice per day      Cholecalciferol (VITAMIN D3) 1000 units CAPS Take 1 capsule by mouth in the morning        finasteride (PROSCAR) 5 mg tablet Take 1 tablet (5 mg total) by mouth daily 90 tablet 3    hydrochlorothiazide (HYDRODIURIL) 12.5 mg tablet Take 1 tablet (12.5 mg total) by mouth daily 90 tablet 1    Multiple Vitamin (MULTI-VITAMIN DAILY PO) Take by mouth daily      Psyllium (Metamucil) 28.3 % POWD Take 1 Package by mouth as needed      tamsulosin (FLOMAX) 0.4 mg Take 1 capsule (0.4 mg total) by mouth 2 (two) times a day 180 capsule 3     No current facility-administered medications for this visit.       Review of Systems   Constitutional:  Negative for activity change, chills, diaphoresis,  "fatigue and fever.   HENT: Negative.     Eyes:  Negative for visual disturbance.   Respiratory:  Negative for cough, chest tightness, shortness of breath and wheezing.    Cardiovascular:  Negative for chest pain, palpitations and leg swelling.   Gastrointestinal:  Negative for abdominal pain, constipation, diarrhea, nausea and vomiting.   Endocrine: Negative for cold intolerance and heat intolerance.   Genitourinary:  Negative for difficulty urinating, dysuria and frequency.   Musculoskeletal:  Negative for arthralgias, gait problem and myalgias.   Neurological:  Negative for dizziness, seizures, syncope, weakness, light-headedness and headaches.   Psychiatric/Behavioral:  Negative for confusion, dysphoric mood and sleep disturbance. The patient is not nervous/anxious.        PHQ-2/9 Depression Screening    Little interest or pleasure in doing things: 0 - not at all  Feeling down, depressed, or hopeless: 0 - not at all  PHQ-2 Score: 0  PHQ-2 Interpretation: Negative depression screen        Objective:  Vitals:    01/22/24 0947   BP: 136/70   Pulse: 83   Temp: (!) 97.4 °F (36.3 °C)   SpO2: 99%   Weight: 90.5 kg (199 lb 8 oz)   Height: 6' 2\" (1.88 m)     Body mass index is 25.61 kg/m².     Physical Exam  Vitals and nursing note reviewed.   Constitutional:       General: He is not in acute distress.     Appearance: Normal appearance. He is not ill-appearing.   HENT:      Head: Normocephalic and atraumatic.      Mouth/Throat:      Mouth: Mucous membranes are moist.   Eyes:      Extraocular Movements: Extraocular movements intact.      Conjunctiva/sclera: Conjunctivae normal.      Pupils: Pupils are equal, round, and reactive to light.   Neck:      Vascular: No carotid bruit.   Cardiovascular:      Rate and Rhythm: Normal rate and regular rhythm.      Heart sounds: Normal heart sounds. No murmur heard.     No friction rub.   Pulmonary:      Effort: Pulmonary effort is normal. No respiratory distress.      Breath sounds: " Normal breath sounds. No wheezing, rhonchi or rales.   Abdominal:      General: Bowel sounds are normal. There is no distension.      Palpations: Abdomen is soft.      Tenderness: There is no abdominal tenderness.   Musculoskeletal:      Cervical back: Neck supple.      Right lower leg: No edema.      Left lower leg: No edema.   Neurological:      General: No focal deficit present.      Mental Status: He is alert and oriented to person, place, and time. Mental status is at baseline.   Psychiatric:         Mood and Affect: Mood normal.         Behavior: Behavior normal.         Thought Content: Thought content normal.         Judgment: Judgment normal.

## 2024-02-07 DIAGNOSIS — E83.51 HYPOCALCEMIA: ICD-10-CM

## 2024-02-07 RX ORDER — CALCITRIOL 0.25 UG/1
0.25 CAPSULE, LIQUID FILLED ORAL EVERY OTHER DAY
Qty: 90 CAPSULE | Refills: 1 | Status: SHIPPED | OUTPATIENT
Start: 2024-02-07

## 2024-02-19 DIAGNOSIS — I10 PRIMARY HYPERTENSION: ICD-10-CM

## 2024-02-19 RX ORDER — AMLODIPINE BESYLATE 10 MG/1
10 TABLET ORAL DAILY
Qty: 30 TABLET | Refills: 2 | Status: SHIPPED | OUTPATIENT
Start: 2024-02-19

## 2024-04-10 ENCOUNTER — TELEPHONE (OUTPATIENT)
Dept: NEPHROLOGY | Facility: CLINIC | Age: 74
End: 2024-04-10

## 2024-04-10 NOTE — TELEPHONE ENCOUNTER
Called and spoke to patient's wife.  She will make sure Qamar has his lab/urine studies done for upcoming appt.

## 2024-04-11 ENCOUNTER — APPOINTMENT (OUTPATIENT)
Dept: LAB | Facility: CLINIC | Age: 74
End: 2024-04-11
Payer: MEDICARE

## 2024-04-11 DIAGNOSIS — N18.31 STAGE 3A CHRONIC KIDNEY DISEASE (HCC): ICD-10-CM

## 2024-04-11 DIAGNOSIS — E20.9 HYPOPARATHYROIDISM, UNSPECIFIED HYPOPARATHYROIDISM TYPE (HCC): ICD-10-CM

## 2024-04-11 LAB
25(OH)D3 SERPL-MCNC: 71.7 NG/ML (ref 30–100)
ALBUMIN SERPL BCP-MCNC: 4 G/DL (ref 3.5–5)
ALP SERPL-CCNC: 60 U/L (ref 34–104)
ALT SERPL W P-5'-P-CCNC: 13 U/L (ref 7–52)
ANION GAP SERPL CALCULATED.3IONS-SCNC: 10 MMOL/L (ref 4–13)
AST SERPL W P-5'-P-CCNC: 22 U/L (ref 13–39)
BACTERIA UR QL AUTO: ABNORMAL /HPF
BASOPHILS # BLD AUTO: 0.08 THOUSANDS/ÂΜL (ref 0–0.1)
BASOPHILS NFR BLD AUTO: 1 % (ref 0–1)
BILIRUB SERPL-MCNC: 0.67 MG/DL (ref 0.2–1)
BILIRUB UR QL STRIP: NEGATIVE
BUN SERPL-MCNC: 18 MG/DL (ref 5–25)
CALCIUM SERPL-MCNC: 8.1 MG/DL (ref 8.4–10.2)
CHLORIDE SERPL-SCNC: 95 MMOL/L (ref 96–108)
CLARITY UR: CLEAR
CO2 SERPL-SCNC: 31 MMOL/L (ref 21–32)
COLOR UR: YELLOW
CREAT SERPL-MCNC: 1.32 MG/DL (ref 0.6–1.3)
CREAT UR-MCNC: 139.3 MG/DL
EOSINOPHIL # BLD AUTO: 0.12 THOUSAND/ÂΜL (ref 0–0.61)
EOSINOPHIL NFR BLD AUTO: 2 % (ref 0–6)
ERYTHROCYTE [DISTWIDTH] IN BLOOD BY AUTOMATED COUNT: 13.2 % (ref 11.6–15.1)
GFR SERPL CREATININE-BSD FRML MDRD: 52 ML/MIN/1.73SQ M
GLUCOSE P FAST SERPL-MCNC: 102 MG/DL (ref 65–99)
GLUCOSE UR STRIP-MCNC: NEGATIVE MG/DL
HCT VFR BLD AUTO: 46 % (ref 36.5–49.3)
HGB BLD-MCNC: 14.9 G/DL (ref 12–17)
HGB UR QL STRIP.AUTO: NEGATIVE
HYALINE CASTS #/AREA URNS LPF: ABNORMAL /LPF
IMM GRANULOCYTES # BLD AUTO: 0.01 THOUSAND/UL (ref 0–0.2)
IMM GRANULOCYTES NFR BLD AUTO: 0 % (ref 0–2)
KETONES UR STRIP-MCNC: NEGATIVE MG/DL
LEUKOCYTE ESTERASE UR QL STRIP: NEGATIVE
LYMPHOCYTES # BLD AUTO: 2.84 THOUSANDS/ÂΜL (ref 0.6–4.47)
LYMPHOCYTES NFR BLD AUTO: 39 % (ref 14–44)
MAGNESIUM SERPL-MCNC: 2 MG/DL (ref 1.9–2.7)
MCH RBC QN AUTO: 29 PG (ref 26.8–34.3)
MCHC RBC AUTO-ENTMCNC: 32.4 G/DL (ref 31.4–37.4)
MCV RBC AUTO: 90 FL (ref 82–98)
MICROALBUMIN UR-MCNC: 59.3 MG/L
MICROALBUMIN/CREAT 24H UR: 43 MG/G CREATININE (ref 0–30)
MONOCYTES # BLD AUTO: 0.58 THOUSAND/ÂΜL (ref 0.17–1.22)
MONOCYTES NFR BLD AUTO: 8 % (ref 4–12)
NEUTROPHILS # BLD AUTO: 3.65 THOUSANDS/ÂΜL (ref 1.85–7.62)
NEUTS SEG NFR BLD AUTO: 50 % (ref 43–75)
NITRITE UR QL STRIP: NEGATIVE
NON-SQ EPI CELLS URNS QL MICRO: ABNORMAL /HPF
NRBC BLD AUTO-RTO: 0 /100 WBCS
PH UR STRIP.AUTO: 6.5 [PH]
PHOSPHATE SERPL-MCNC: 3.8 MG/DL (ref 2.3–4.1)
PLATELET # BLD AUTO: 271 THOUSANDS/UL (ref 149–390)
PMV BLD AUTO: 9.5 FL (ref 8.9–12.7)
POTASSIUM SERPL-SCNC: 4 MMOL/L (ref 3.5–5.3)
PROT SERPL-MCNC: 6.9 G/DL (ref 6.4–8.4)
PROT UR STRIP-MCNC: ABNORMAL MG/DL
PTH-INTACT SERPL-MCNC: 6.6 PG/ML (ref 12–88)
RBC # BLD AUTO: 5.13 MILLION/UL (ref 3.88–5.62)
RBC #/AREA URNS AUTO: ABNORMAL /HPF
SODIUM SERPL-SCNC: 136 MMOL/L (ref 135–147)
SP GR UR STRIP.AUTO: 1.02 (ref 1–1.03)
UROBILINOGEN UR STRIP-ACNC: <2 MG/DL
WBC # BLD AUTO: 7.28 THOUSAND/UL (ref 4.31–10.16)
WBC #/AREA URNS AUTO: ABNORMAL /HPF

## 2024-04-11 PROCEDURE — 80053 COMPREHEN METABOLIC PANEL: CPT

## 2024-04-11 PROCEDURE — 84100 ASSAY OF PHOSPHORUS: CPT

## 2024-04-11 PROCEDURE — 81001 URINALYSIS AUTO W/SCOPE: CPT

## 2024-04-11 PROCEDURE — 82306 VITAMIN D 25 HYDROXY: CPT

## 2024-04-11 PROCEDURE — 36415 COLL VENOUS BLD VENIPUNCTURE: CPT

## 2024-04-11 PROCEDURE — 83970 ASSAY OF PARATHORMONE: CPT

## 2024-04-11 PROCEDURE — 85025 COMPLETE CBC W/AUTO DIFF WBC: CPT

## 2024-04-11 PROCEDURE — 82570 ASSAY OF URINE CREATININE: CPT

## 2024-04-11 PROCEDURE — 82043 UR ALBUMIN QUANTITATIVE: CPT

## 2024-04-11 PROCEDURE — 83735 ASSAY OF MAGNESIUM: CPT

## 2024-04-16 ENCOUNTER — OFFICE VISIT (OUTPATIENT)
Dept: NEPHROLOGY | Facility: CLINIC | Age: 74
End: 2024-04-16
Payer: MEDICARE

## 2024-04-16 VITALS
HEIGHT: 74 IN | SYSTOLIC BLOOD PRESSURE: 120 MMHG | WEIGHT: 199 LBS | HEART RATE: 81 BPM | BODY MASS INDEX: 25.54 KG/M2 | DIASTOLIC BLOOD PRESSURE: 70 MMHG | OXYGEN SATURATION: 96 %

## 2024-04-16 DIAGNOSIS — N18.30 BENIGN HYPERTENSION WITH CKD (CHRONIC KIDNEY DISEASE) STAGE III (HCC): ICD-10-CM

## 2024-04-16 DIAGNOSIS — N28.1 KIDNEY CYST, ACQUIRED: ICD-10-CM

## 2024-04-16 DIAGNOSIS — I12.9 BENIGN HYPERTENSION WITH CKD (CHRONIC KIDNEY DISEASE) STAGE III (HCC): ICD-10-CM

## 2024-04-16 DIAGNOSIS — E20.9 HYPOPARATHYROIDISM, UNSPECIFIED HYPOPARATHYROIDISM TYPE (HCC): ICD-10-CM

## 2024-04-16 DIAGNOSIS — N18.31 STAGE 3A CHRONIC KIDNEY DISEASE (HCC): Primary | ICD-10-CM

## 2024-04-16 PROCEDURE — 99214 OFFICE O/P EST MOD 30 MIN: CPT | Performed by: INTERNAL MEDICINE

## 2024-04-16 PROCEDURE — G2211 COMPLEX E/M VISIT ADD ON: HCPCS | Performed by: INTERNAL MEDICINE

## 2024-04-16 NOTE — ASSESSMENT & PLAN NOTE
Lab Results   Component Value Date    EGFR 52 04/11/2024    EGFR 59 10/31/2023    EGFR 59 10/02/2023    CREATININE 1.32 (H) 04/11/2024    CREATININE 1.19 10/31/2023    CREATININE 1.20 10/02/2023     Kidney function is slightly lower compared to most recent blood work.  Baseline creatinine is around 1.2 mg/dL.  Will reassess labs in about 1 month to confirm kidney function back to typical value.  Of note the patient recently had exposures to NSAIDs due to significant increase in back pain.

## 2024-04-16 NOTE — PATIENT INSTRUCTIONS
You can take Tylenol 500 mg tabs 2 together (1,000 mg) up to 3,000 mg daily (up to 3 times a day)

## 2024-04-16 NOTE — ASSESSMENT & PLAN NOTE
Continue to monitor PTH levels, calcitriol dosing according to endocrinology recommendations.  The patient was initiated on hydrochlorothiazide to assist with calcium reabsorption

## 2024-04-16 NOTE — PROGRESS NOTES
[facility]  Sg Alaniz DO    Name: Qamar Cintron  YOB: 1950      Assessment/Plan:    Stage 3a chronic kidney disease (HCC)  Lab Results   Component Value Date    EGFR 52 04/11/2024    EGFR 59 10/31/2023    EGFR 59 10/02/2023    CREATININE 1.32 (H) 04/11/2024    CREATININE 1.19 10/31/2023    CREATININE 1.20 10/02/2023     Kidney function is slightly lower compared to most recent blood work.  Baseline creatinine is around 1.2 mg/dL.  Will reassess labs in about 1 month to confirm kidney function back to typical value.  Of note the patient recently had exposures to NSAIDs due to significant increase in back pain.    Benign hypertension with CKD (chronic kidney disease) stage III (HCC)  Lab Results   Component Value Date    EGFR 52 04/11/2024    EGFR 59 10/31/2023    EGFR 59 10/02/2023    CREATININE 1.32 (H) 04/11/2024    CREATININE 1.19 10/31/2023    CREATININE 1.20 10/02/2023     Blood pressure is well-controlled at this time, continue with current medications, continue to encourage low-sodium diet.    Hypoparathyroidism (HCC)  Continue to monitor PTH levels, calcitriol dosing according to endocrinology recommendations.  The patient was initiated on hydrochlorothiazide to assist with calcium reabsorption         Problem List Items Addressed This Visit          Cardiovascular and Mediastinum    Benign hypertension with CKD (chronic kidney disease) stage III (HCC)     Lab Results   Component Value Date    EGFR 52 04/11/2024    EGFR 59 10/31/2023    EGFR 59 10/02/2023    CREATININE 1.32 (H) 04/11/2024    CREATININE 1.19 10/31/2023    CREATININE 1.20 10/02/2023     Blood pressure is well-controlled at this time, continue with current medications, continue to encourage low-sodium diet.            Endocrine    Hypoparathyroidism (HCC)     Continue to monitor PTH levels, calcitriol dosing according to endocrinology recommendations.  The patient was initiated on  hydrochlorothiazide to assist with calcium reabsorption            Genitourinary    Kidney cyst, acquired    Stage 3a chronic kidney disease (HCC) - Primary     Lab Results   Component Value Date    EGFR 52 04/11/2024    EGFR 59 10/31/2023    EGFR 59 10/02/2023    CREATININE 1.32 (H) 04/11/2024    CREATININE 1.19 10/31/2023    CREATININE 1.20 10/02/2023     Kidney function is slightly lower compared to most recent blood work.  Baseline creatinine is around 1.2 mg/dL.  Will reassess labs in about 1 month to confirm kidney function back to typical value.  Of note the patient recently had exposures to NSAIDs due to significant increase in back pain.         Relevant Orders    Basic metabolic panel    CBC and differential    Comprehensive metabolic panel    Albumin / creatinine urine ratio    Urinalysis with microscopic    Magnesium    Phosphorus    PTH, intact       Patient is here with a renal standpoint.  However, as noted above, will reassess creatinine to confirm stability.  Most likely etiology of the patient's reduced kidney function is due to the initiation of hydrochlorothiazide.  Will continue to monitor now, and continue with hydrochlorothiazide if this is the case.  Will consider the use of an SGLT-2 inhibitor at next visit due to the patient's persistent proteinuria.  Will see her back for regular appointment in about 6 months.    Subjective:      Patient ID: Qamar Cintron is a 73 y.o. male.    Patient presents for follow up.    We reviewed labs in detail, most recent creatinine noted to be 1.32 mg/dL, with an eGFR 52 ml/min.    There were no significant electrolyte abnormalities noted.  Patient is taking all medications as prescribed with no specific side effects and denies use of nonsteroid anti-inflammatory medications.          Hypertension  This is a chronic problem. The current episode started more than 1 year ago. The problem is unchanged. The problem is controlled. Pertinent negatives include no  chest pain, orthopnea or peripheral edema. There are no associated agents to hypertension. Risk factors for coronary artery disease include male gender. Past treatments include lifestyle changes, calcium channel blockers and diuretics. There are no compliance problems.  Hypertensive end-organ damage includes kidney disease. Identifiable causes of hypertension include chronic renal disease.       The following portions of the patient's history were reviewed and updated as appropriate: allergies, current medications, past family history, past medical history, past social history, past surgical history and problem list.    Review of Systems   Cardiovascular:  Negative for chest pain and orthopnea.   All other systems reviewed and are negative.        Social History     Socioeconomic History    Marital status: /Civil Union     Spouse name: None    Number of children: None    Years of education: None    Highest education level: None   Occupational History    Occupation: Retired     Comment:     Tobacco Use    Smoking status: Never     Passive exposure: Past    Smokeless tobacco: Never   Vaping Use    Vaping status: Never Used   Substance and Sexual Activity    Alcohol use: Yes     Comment: Occasionally drink    Drug use: No    Sexual activity: Yes     Partners: Female   Other Topics Concern    None   Social History Narrative    Consumes on average 1 cup of regular coffee per day      Social Determinants of Health     Financial Resource Strain: Low Risk  (5/18/2023)    Overall Financial Resource Strain (CARDIA)     Difficulty of Paying Living Expenses: Not very hard   Food Insecurity: No Food Insecurity (6/23/2022)    Hunger Vital Sign     Worried About Running Out of Food in the Last Year: Never true     Ran Out of Food in the Last Year: Never true   Transportation Needs: No Transportation Needs (5/18/2023)    PRAPARE - Transportation     Lack of Transportation (Medical): No     Lack of Transportation  (Non-Medical): No   Physical Activity: Not on file   Stress: Not on file   Social Connections: Not on file   Intimate Partner Violence: Not on file   Housing Stability: Low Risk  (6/23/2022)    Housing Stability Vital Sign     Unable to Pay for Housing in the Last Year: No     Number of Places Lived in the Last Year: 1     Unstable Housing in the Last Year: No     Past Medical History:   Diagnosis Date    Abdominal pain, acute, generalized     last assessed - 21Apr2017    Actinic keratosis     last assessed - 12Jun2013    Chest pain     last assessed - 29Nov2012    Chronic kidney disease, stage 3 (HCC)     Colon polyp     Disease of thyroid gland     Dysfunction of both eustachian tubes     suspect due to ETD. Recommend otc allergy meds and if fails then add flonase; last assessed - 06Aug2012    Edema     Fatigue     last assessed - 03Jul2013    Generalized anxiety disorder     GERD (gastroesophageal reflux disease)     Hematuria     last assessed - 02Aug2012    Hypertension     Hypo-osmolality and hyponatremia     Hypocalcemia     Hypoparathyroidism (HCC)     Lightheadedness     last assessed - 19Feb2014    Lump in neck     ?cause. Maybe a localized allergic reaction, dental issue, doubt sialdenitis, ect. Empiric abx and one dose steroids. Continue benadryl. If worsens, to er or ent.    Malaise and fatigue     last assessed - 21Apr2017    Nephropathy     last assessed - [id number]    Nocturia     Proteinuria     Shortness of breath     last assessed - 29Nov2012    Skin lesion     Resolved - 31May2017    Tinea corporis     last assessed - 16May2014     Past Surgical History:   Procedure Laterality Date    COLONOSCOPY W/ POLYPECTOMY  07/19/2019    HEAD & NECK SKIN LESION EXCISIONAL BIOPSY      excision of lesion scalp malignant - [facility]; last assessed - 31May2017    SKIN BIOPSY      last assessed - 09Sep2014    THYROID SURGERY      Biopsy thyroid using percutaneous core needle; last assessed - 09Sep2014    TONSILLECTOMY          Current Outpatient Medications:     amLODIPine (NORVASC) 10 mg tablet, Take 1 tablet (10 mg total) by mouth daily, Disp: 30 tablet, Rfl: 2    aspirin (ECOTRIN LOW STRENGTH) 81 mg EC tablet, Take 1 tablet (81 mg total) by mouth daily, Disp: , Rfl:     calcitriol (ROCALTROL) 0.25 mcg capsule, Take 1 capsule (0.25 mcg total) by mouth every other day, Disp: 90 capsule, Rfl: 1    Calcium Carbonate (CALCIUM 600 PO), Take by mouth Twice per day, Disp: , Rfl:     Cholecalciferol (VITAMIN D3) 1000 units CAPS, Take 1 capsule by mouth in the morning  , Disp: , Rfl:     finasteride (PROSCAR) 5 mg tablet, Take 1 tablet (5 mg total) by mouth daily, Disp: 90 tablet, Rfl: 3    hydrochlorothiazide (HYDRODIURIL) 12.5 mg tablet, Take 1 tablet (12.5 mg total) by mouth daily, Disp: 90 tablet, Rfl: 1    Multiple Vitamin (MULTI-VITAMIN DAILY PO), Take by mouth daily, Disp: , Rfl:     Psyllium (Metamucil) 28.3 % POWD, Take 1 Package by mouth as needed, Disp: , Rfl:     tamsulosin (FLOMAX) 0.4 mg, Take 1 capsule (0.4 mg total) by mouth 2 (two) times a day, Disp: 180 capsule, Rfl: 3    Lab Results   Component Value Date     12/15/2015    SODIUM 136 04/11/2024    K 4.0 04/11/2024    CL 95 (L) 04/11/2024    CO2 31 04/11/2024    ANIONGAP 7 12/15/2015    AGAP 10 04/11/2024    BUN 18 04/11/2024    CREATININE 1.32 (H) 04/11/2024    GLUC 96 10/31/2023    GLUF 102 (H) 04/11/2024    CALCIUM 8.1 (L) 04/11/2024    AST 22 04/11/2024    ALT 13 04/11/2024    ALKPHOS 60 04/11/2024    PROT 7.8 09/16/2015    TP 6.9 04/11/2024    BILITOT 0.51 09/16/2015    TBILI 0.67 04/11/2024    EGFR 52 04/11/2024     Lab Results   Component Value Date    WBC 7.28 04/11/2024    HGB 14.9 04/11/2024    HCT 46.0 04/11/2024    MCV 90 04/11/2024     04/11/2024     Lab Results   Component Value Date    CHOLESTEROL 204 (H) 01/17/2023    CHOLESTEROL 223 (H) 01/05/2022    CHOLESTEROL 218 (H) 09/29/2020     Lab Results   Component Value Date    HDL 52 01/17/2023  "   HDL 54 01/05/2022    HDL 53 09/29/2020     Lab Results   Component Value Date    LDLCALC 113 (H) 01/17/2023    LDLCALC 140 (H) 01/05/2022    LDLCALC 138 (H) 09/29/2020     Lab Results   Component Value Date    TRIG 225 (H) 10/02/2023    TRIG 193 (H) 01/17/2023    TRIG 119 09/08/2022     No results found for: \"CHOLHDL\"  Lab Results   Component Value Date    [id number] 1.170 01/17/2023     Lab Results   Component Value Date    PTH 6.6 (L) 04/11/2024    CALCIUM 8.1 (L) 04/11/2024    CAION 1.06 12/12/2012    PHOS 3.8 04/11/2024     No results found for: \"SPEP\", \"UPEP\"  No results found for: \"MICROALBUR\", \"[id number]\"        Objective:      /70 (BP Location: Right arm, Patient Position: Sitting, Cuff Size: Standard)   Pulse 81   Ht 6' 2\" (1.88 m)   Wt 90.3 kg (199 lb)   SpO2 96%   BMI 25.55 kg/m²          Physical Exam  Vitals reviewed.   Constitutional:       General: He is not in acute distress.     Appearance: Normal appearance.   HENT:      Head: Normocephalic and atraumatic.      Right Ear: External ear normal.      Left Ear: External ear normal.   Eyes:      Conjunctiva/sclera: Conjunctivae normal.   Cardiovascular:      Rate and Rhythm: Normal rate and regular rhythm.      Heart sounds: Normal heart sounds.   Pulmonary:      Effort: No respiratory distress.      Breath sounds: No wheezing.   Abdominal:      Palpations: Abdomen is soft.   Skin:     General: Skin is warm and dry.   Neurological:      General: No focal deficit present.      Mental Status: He is alert and oriented to person, place, and time.   Psychiatric:         Mood and Affect: Mood normal.         Behavior: Behavior normal.         "

## 2024-04-16 NOTE — ASSESSMENT & PLAN NOTE
Lab Results   Component Value Date    EGFR 52 04/11/2024    EGFR 59 10/31/2023    EGFR 59 10/02/2023    CREATININE 1.32 (H) 04/11/2024    CREATININE 1.19 10/31/2023    CREATININE 1.20 10/02/2023     Blood pressure is well-controlled at this time, continue with current medications, continue to encourage low-sodium diet.

## 2024-04-30 DIAGNOSIS — N40.1 BENIGN PROSTATIC HYPERPLASIA WITH NOCTURIA: Primary | ICD-10-CM

## 2024-04-30 DIAGNOSIS — R35.1 BENIGN PROSTATIC HYPERPLASIA WITH NOCTURIA: Primary | ICD-10-CM

## 2024-05-09 DIAGNOSIS — I10 PRIMARY HYPERTENSION: ICD-10-CM

## 2024-05-09 DIAGNOSIS — N40.0 BENIGN PROSTATIC HYPERPLASIA WITHOUT LOWER URINARY TRACT SYMPTOMS: ICD-10-CM

## 2024-05-09 RX ORDER — AMLODIPINE BESYLATE 10 MG/1
10 TABLET ORAL DAILY
Qty: 90 TABLET | Refills: 1 | Status: SHIPPED | OUTPATIENT
Start: 2024-05-09

## 2024-05-09 RX ORDER — FINASTERIDE 5 MG/1
5 TABLET, FILM COATED ORAL DAILY
Qty: 90 TABLET | Refills: 1 | Status: SHIPPED | OUTPATIENT
Start: 2024-05-09

## 2024-05-09 NOTE — TELEPHONE ENCOUNTER
Reason for call: Patients spouse requested finasteride to be refilled by Juan F Dang since Erica Barclay is no longer with [facility] Please advise.  [x] Refill   [] Prior Auth  [] Other:     Office:   [x] PCP/Provider - Juan F Dang / Javy GARCIA  [] Specialty/Provider -         Pharmacy: [facility] [address], [place] - [address]     Does the patient have enough for 3 days?   [x] Yes   [] No - Send as HP to POD

## 2024-05-16 ENCOUNTER — LAB (OUTPATIENT)
Dept: LAB | Facility: CLINIC | Age: 74
End: 2024-05-16
Payer: MEDICARE

## 2024-05-16 DIAGNOSIS — N40.1 BENIGN PROSTATIC HYPERPLASIA WITH NOCTURIA: ICD-10-CM

## 2024-05-16 DIAGNOSIS — R35.1 BENIGN PROSTATIC HYPERPLASIA WITH NOCTURIA: ICD-10-CM

## 2024-05-16 DIAGNOSIS — N18.31 STAGE 3A CHRONIC KIDNEY DISEASE (HCC): ICD-10-CM

## 2024-05-16 LAB
ANION GAP SERPL CALCULATED.3IONS-SCNC: 7 MMOL/L (ref 4–13)
BUN SERPL-MCNC: 18 MG/DL (ref 5–25)
CALCIUM SERPL-MCNC: 8.5 MG/DL (ref 8.4–10.2)
CHLORIDE SERPL-SCNC: 96 MMOL/L (ref 96–108)
CO2 SERPL-SCNC: 31 MMOL/L (ref 21–32)
CREAT SERPL-MCNC: 1.26 MG/DL (ref 0.6–1.3)
GFR SERPL CREATININE-BSD FRML MDRD: 55 ML/MIN/1.73SQ M
GLUCOSE P FAST SERPL-MCNC: 93 MG/DL (ref 65–99)
POTASSIUM SERPL-SCNC: 3.8 MMOL/L (ref 3.5–5.3)
PSA SERPL-MCNC: 1.16 NG/ML (ref 0–4)
SODIUM SERPL-SCNC: 134 MMOL/L (ref 135–147)

## 2024-05-16 PROCEDURE — 36415 COLL VENOUS BLD VENIPUNCTURE: CPT

## 2024-05-16 PROCEDURE — 80048 BASIC METABOLIC PNL TOTAL CA: CPT

## 2024-05-16 PROCEDURE — 84153 ASSAY OF PSA TOTAL: CPT

## 2024-05-17 ENCOUNTER — TELEPHONE (OUTPATIENT)
Dept: NEPHROLOGY | Facility: CLINIC | Age: 74
End: 2024-05-17

## 2024-05-17 ENCOUNTER — RA CDI HCC (OUTPATIENT)
Dept: OTHER | Facility: HOSPITAL | Age: 74
End: 2024-05-17

## 2024-05-17 DIAGNOSIS — N18.31 STAGE 3A CHRONIC KIDNEY DISEASE (HCC): Primary | ICD-10-CM

## 2024-05-17 DIAGNOSIS — R82.994 HYPERCALCIURIA: ICD-10-CM

## 2024-05-17 DIAGNOSIS — E20.9 HYPOPARATHYROIDISM, UNSPECIFIED HYPOPARATHYROIDISM TYPE (HCC): ICD-10-CM

## 2024-05-17 RX ORDER — HYDROCHLOROTHIAZIDE 12.5 MG/1
12.5 TABLET ORAL DAILY
Qty: 90 TABLET | Refills: 1 | Status: SHIPPED | OUTPATIENT
Start: 2024-05-17

## 2024-05-17 NOTE — TELEPHONE ENCOUNTER
Reason for call:   [x] Refill   [] Prior Auth  [] Other:     Office:   [] PCP/Provider -   [x] Specialty/Provider - Cecelia/TRISTIN Phillips     Medication: hydrochlorothiazide (HYDRODIURIL) 12.5 mg tablet      Dose/Frequency:  Take 1 tablet (12.5 mg total) by mouth daily     Quantity: 90    Pharmacy: [facility] [address] [place] [address] [address]     Does the patient have enough for 3 days?   [x] Yes   [] No - Send as HP to POD

## 2024-05-23 ENCOUNTER — OFFICE VISIT (OUTPATIENT)
Dept: INTERNAL MEDICINE CLINIC | Facility: CLINIC | Age: 74
End: 2024-05-23
Payer: MEDICARE

## 2024-05-23 VITALS
WEIGHT: 199.2 LBS | DIASTOLIC BLOOD PRESSURE: 76 MMHG | SYSTOLIC BLOOD PRESSURE: 122 MMHG | OXYGEN SATURATION: 96 % | TEMPERATURE: 98 F | HEIGHT: 74 IN | HEART RATE: 84 BPM | BODY MASS INDEX: 25.57 KG/M2

## 2024-05-23 DIAGNOSIS — R80.9 MICROALBUMINURIA: ICD-10-CM

## 2024-05-23 DIAGNOSIS — N40.1 BENIGN PROSTATIC HYPERPLASIA WITH NOCTURIA: ICD-10-CM

## 2024-05-23 DIAGNOSIS — K21.9 GERD WITHOUT ESOPHAGITIS: ICD-10-CM

## 2024-05-23 DIAGNOSIS — E20.9 HYPOPARATHYROIDISM, UNSPECIFIED HYPOPARATHYROIDISM TYPE (HCC): ICD-10-CM

## 2024-05-23 DIAGNOSIS — R35.1 BENIGN PROSTATIC HYPERPLASIA WITH NOCTURIA: ICD-10-CM

## 2024-05-23 DIAGNOSIS — F41.1 GENERALIZED ANXIETY DISORDER: ICD-10-CM

## 2024-05-23 DIAGNOSIS — Z00.00 MEDICARE ANNUAL WELLNESS VISIT, SUBSEQUENT: ICD-10-CM

## 2024-05-23 DIAGNOSIS — N18.31 STAGE 3A CHRONIC KIDNEY DISEASE (HCC): ICD-10-CM

## 2024-05-23 DIAGNOSIS — Z12.5 SCREENING FOR PROSTATE CANCER: ICD-10-CM

## 2024-05-23 DIAGNOSIS — N18.30 BENIGN HYPERTENSION WITH CKD (CHRONIC KIDNEY DISEASE) STAGE III (HCC): Primary | ICD-10-CM

## 2024-05-23 DIAGNOSIS — E78.2 MIXED HYPERLIPIDEMIA: ICD-10-CM

## 2024-05-23 DIAGNOSIS — G43.909 MIGRAINE WITHOUT STATUS MIGRAINOSUS, NOT INTRACTABLE, UNSPECIFIED MIGRAINE TYPE: ICD-10-CM

## 2024-05-23 DIAGNOSIS — I12.9 BENIGN HYPERTENSION WITH CKD (CHRONIC KIDNEY DISEASE) STAGE III (HCC): Primary | ICD-10-CM

## 2024-05-23 PROCEDURE — 99214 OFFICE O/P EST MOD 30 MIN: CPT | Performed by: INTERNAL MEDICINE

## 2024-05-23 PROCEDURE — G0439 PPPS, SUBSEQ VISIT: HCPCS | Performed by: INTERNAL MEDICINE

## 2024-05-23 NOTE — PROGRESS NOTES
Assessment/Plan:  Problem List Items Addressed This Visit          Cardiovascular and Mediastinum    Migraine, unspecified, not intractable, without status migrainosus    Benign hypertension with CKD (chronic kidney disease) stage III (HCC) - Primary       Digestive    GERD without esophagitis       Endocrine    Hypoparathyroidism (HCC)       Genitourinary    Stage 3a chronic kidney disease (HCC)       Behavioral Health    Generalized anxiety disorder       Other    Microalbuminuria    Hyperlipidemia    Relevant Orders    Lipid Panel with Direct LDL reflex    Benign prostatic hyperplasia with nocturia     Other Visit Diagnoses       Medicare annual wellness visit, subsequent        Screening for prostate cancer        Relevant Orders    PSA, Total Screen             Diagnoses and all orders for this visit:    Benign hypertension with CKD (chronic kidney disease) stage III (HCC)    Migraine without status migrainosus, not intractable, unspecified migraine type    GERD without esophagitis    Hypoparathyroidism, unspecified hypoparathyroidism type (HCC)    Stage 3a chronic kidney disease (HCC)    Generalized anxiety disorder    Benign prostatic hyperplasia with nocturia    Mixed hyperlipidemia  -     Lipid Panel with Direct LDL reflex; Future    Microalbuminuria    Medicare annual wellness visit, subsequent    Screening for prostate cancer  -     PSA, Total Screen; Future        No problem-specific Assessment & Plan notes found for this encounter.    A/P: Doing well and labs are up to date.  Appreciate nephro input. Keep f/u with the specialists, including . Continue current treatment and  RTC four months for routine.     Subjective:      Patient ID: Qamar Cintron is a 74 y.o. male.    WM RTC for f/u HTN, HLD, etc. Doing ok and no new issues. Remains active w/o difficulty and no falls. No reflux. STAR is controlled. Migraines are manageable. Just had labs and renal function elevated slightly more than usual and  felt due to some NSAID use, but f/u testing was good. Labs are up to date. .         The following portions of the patient's history were reviewed and updated as appropriate:   He has a past medical history of Abdominal pain, acute, generalized, Actinic keratosis, Chest pain, Chronic kidney disease, stage 3 (HCC), Colon polyp, Disease of thyroid gland, Dysfunction of both eustachian tubes, Edema, Fatigue, Generalized anxiety disorder, GERD (gastroesophageal reflux disease), Hematuria, Hypertension, Hypo-osmolality and hyponatremia, Hypocalcemia, Hypoparathyroidism (HCC), Lightheadedness, Lump in neck, Malaise and fatigue, Nephropathy, Nocturia, Proteinuria, Shortness of breath, Skin lesion, and Tinea corporis.,  does not have any pertinent problems on file.,   has a past surgical history that includes Skin biopsy; Thyroid surgery; Colonoscopy w/ polypectomy (07/19/2019); Head & neck skin lesion excisional biopsy; and Tonsillectomy.,  family history includes Diabetes in his father; Hypertension in his father and mother; Other in his brother; Stroke in his family.,   reports that he has never smoked. He has been exposed to tobacco smoke. He has never used smokeless tobacco. He reports current alcohol use. He reports that he does not use drugs.,  is allergic to lisinopril and pollen extract..  Current Outpatient Medications   Medication Sig Dispense Refill    amLODIPine (NORVASC) 10 mg tablet Take 1 tablet (10 mg total) by mouth daily 90 tablet 1    aspirin (ECOTRIN LOW STRENGTH) 81 mg EC tablet Take 1 tablet (81 mg total) by mouth daily      calcitriol (ROCALTROL) 0.25 mcg capsule Take 1 capsule (0.25 mcg total) by mouth every other day 90 capsule 1    Calcium Carbonate (CALCIUM 600 PO) Take by mouth Twice per day      Cholecalciferol (VITAMIN D3) 1000 units CAPS Take 1 capsule by mouth in the morning        finasteride (PROSCAR) 5 mg tablet Take 1 tablet (5 mg total) by mouth daily 90 tablet 1    hydroCHLOROthiazide  "12.5 mg tablet Take 1 tablet (12.5 mg total) by mouth daily 90 tablet 1    Multiple Vitamin (MULTI-VITAMIN DAILY PO) Take by mouth daily      Psyllium (Metamucil) 28.3 % POWD Take 1 Package by mouth as needed      tamsulosin (FLOMAX) 0.4 mg Take 1 capsule (0.4 mg total) by mouth 2 (two) times a day 180 capsule 3     No current facility-administered medications for this visit.       Review of Systems   Constitutional:  Negative for activity change, chills, diaphoresis, fatigue and fever.   HENT: Negative.     Eyes:  Negative for visual disturbance.   Respiratory:  Negative for cough, chest tightness, shortness of breath and wheezing.    Cardiovascular:  Negative for chest pain, palpitations and leg swelling.   Gastrointestinal:  Negative for abdominal pain, constipation, diarrhea, nausea and vomiting.   Endocrine: Negative for cold intolerance and heat intolerance.   Genitourinary:  Negative for difficulty urinating, dysuria and frequency.   Musculoskeletal:  Negative for arthralgias, gait problem and myalgias.   Neurological:  Negative for dizziness, seizures, syncope, weakness, light-headedness and headaches.   Psychiatric/Behavioral:  Negative for confusion, dysphoric mood and sleep disturbance. The patient is not nervous/anxious.        PHQ-2/9 Depression Screening    Little interest or pleasure in doing things: 0 - not at all  Feeling down, depressed, or hopeless: 0 - not at all  PHQ-2 Score: 0  PHQ-2 Interpretation: Negative depression screen        Objective:  Vitals:    05/23/24 1026   BP: 122/76   BP Location: Right arm   Patient Position: Sitting   Cuff Size: Adult   Pulse: 84   Temp: 98 °F (36.7 °C)   TempSrc: Tympanic   SpO2: 96%   Weight: 90.4 kg (199 lb 3.2 oz)   Height: 6' 2\" (1.88 m)     Body mass index is 25.58 kg/m².     Physical Exam  Vitals and nursing note reviewed.   Constitutional:       General: He is not in acute distress.     Appearance: Normal appearance. He is not ill-appearing.   HENT:    "   Head: Normocephalic and atraumatic.      Mouth/Throat:      Mouth: Mucous membranes are moist.   Eyes:      Extraocular Movements: Extraocular movements intact.      Conjunctiva/sclera: Conjunctivae normal.      Pupils: Pupils are equal, round, and reactive to light.   Neck:      Vascular: No carotid bruit.   Cardiovascular:      Rate and Rhythm: Normal rate and regular rhythm.      Heart sounds: Normal heart sounds. No murmur heard.  Pulmonary:      Effort: Pulmonary effort is normal. No respiratory distress.      Breath sounds: Normal breath sounds. No wheezing, rhonchi or rales.   Abdominal:      General: Bowel sounds are normal. There is no distension.      Palpations: Abdomen is soft.      Tenderness: There is no abdominal tenderness.   Musculoskeletal:      Cervical back: Neck supple.      Right lower leg: No edema.      Left lower leg: No edema.   Neurological:      General: No focal deficit present.      Mental Status: He is alert and oriented to person, place, and time. Mental status is at baseline.   Psychiatric:         Mood and Affect: Mood normal.         Behavior: Behavior normal.         Thought Content: Thought content normal.         Judgment: Judgment normal.

## 2024-05-23 NOTE — PATIENT INSTRUCTIONS
Chronic Hypertension   AMBULATORY CARE:   Hypertension is considered chronic  when it continues for 3 months or longer. Hypertension that continues causes your heart to work much harder than normal, which may lead to heart damage. Even if you have hypertension for years, lifestyle changes, medicines, or both may help lower your blood pressure.  Call your local emergency number (911 in the [place]) or have someone call if:   You have chest pain.    You have any of the following signs of a heart attack:      Squeezing, pressure, or pain in your chest    You may  also have any of the following:     Discomfort or pain in your back, neck, jaw, stomach, or arm    Shortness of breath    Nausea or vomiting    Lightheadedness or a sudden cold sweat    You become confused or have difficulty speaking.    You suddenly feel lightheaded or have trouble breathing.    Seek care immediately if:   You have a severe headache or vision loss.    You have weakness in an arm or leg.    Call your doctor or cardiologist if:   You feel faint, dizzy, confused, or drowsy.    You have been taking your blood pressure medicine but your pressure is higher than your provider says it should be.    You have questions or concerns about your condition or care.    Treatment for chronic hypertension  may include medicine to lower your blood pressure and cholesterol levels. A low cholesterol level helps prevent heart disease and makes it easier to control your blood pressure. Heart disease can make your blood pressure harder to control. You may also need to make lifestyle changes.  What you need to know about the stages of hypertension:  Your healthcare provider will give you a blood pressure goal based on your age, health, and risk for cardiovascular disease. The following are general guidelines on the stages of hypertension:  Normal blood pressure is 119/79 or lower . Your provider may only check your blood pressure each year if it stays at a normal  level.    Elevated blood pressure is 120/79 to 129/79 . This is sometimes called prehypertension. Your provider may suggest lifestyle changes to help lower your blood pressure to a normal level. He or she may then check it again in 3 to 6 months.    Stage 1 hypertension is 130/80  to 139/89 . Your provider may recommend lifestyle changes, medication, and checks every 3 to 6 months until your blood pressure is controlled.    Stage 2 hypertension is 140/90 or higher . Your provider will recommend lifestyle changes and have you take 2 kinds of hypertension medicines. You will also need to have your blood pressure checked monthly until it is controlled.       Manage chronic hypertension:   Check your blood pressure at home.  Do not smoke, have caffeine, or exercise for at least 30 minutes before you check your blood pressure. Sit and rest for 5 minutes before you check your blood pressure. Extend your arm and support it on a flat surface. Your arm should be at the same level as your heart. Follow the directions that came with your blood pressure monitor. Check your blood pressure 2 times, 1 minute apart, before you take your medicine in the morning. Also check your blood pressure before your evening meal. Keep a record of your readings and bring it to your follow-up visits. Your healthcare provider may use the readings to make changes to your treatment plan.         Manage any other health conditions you have.  Health conditions such as diabetes can increase your risk for hypertension. Follow your provider's instructions and take all your medicines as directed. Talk to your provider about any new health conditions you have recently developed.    Ask about all medicines.  Certain medicines can increase your blood pressure. Examples include oral birth control pills, decongestants, herbal supplements, and NSAIDs, such as ibuprofen. Your provider can tell you which medicines are safe for you to take. This includes  prescription and over-the-counter medicines.    Lifestyle changes you can make to lower your blood pressure:  Your provider may want you to make more lifestyle changes if you are having trouble controlling your blood pressure. This may feel difficult over time, especially if you think you are making good changes but your pressure is still high. It might help to focus on one new change at a time. For example, try to add 1 more day of exercise, or exercise for an extra 10 minutes on 2 days. Small changes can make a big difference. Your healthcare provider can also refer you to specialists such as a dietitian who can help you make small changes. Your family members may be included in helping you learn to create lifestyle changes, such as the following:     Limit sodium (salt) as directed.  Too much sodium can affect your fluid balance. Check labels to find low-sodium or no-salt-added foods. Some low-sodium foods use potassium salts for flavor. Too much potassium can also cause health problems. Your provider will tell you how much sodium and potassium are safe for you to have in a day. He or she may recommend that you limit sodium to 2,300 mg a day.         Follow the meal plan recommended by your provider.  A dietitian or your provider can give you more information on low-sodium plans or the DASH (Dietary Approaches to Stop Hypertension) eating plan. The DASH plan is low in sodium, processed sugar, unhealthy fats, and total fat. It is high in potassium, calcium, and fiber. These can be found in vegetables, fruit, and whole-grain foods.         Be physically active throughout the day.  Physical activity, such as exercise, can help control your blood pressure and your weight. Be physically active for at least 30 minutes per day, on most days of the week. Include aerobic activity, such as walking or riding a bicycle. Also include strength training at least 2 times each week. Your provider can help you create a physical  activity plan.            Decrease stress.  This may help lower your blood pressure. Learn ways to relax, such as deep breathing or listening to music.    Limit alcohol as directed.  Alcohol can increase your blood pressure. A drink of alcohol is 12 ounces of beer, 5 ounces of wine, or 1½ ounces of liquor. Your provider can help you set daily and weekly drink limits. He or she may recommend no alcohol if your blood pressure stays higher than goal even with medicine or other measures. Ask your provider for information if you need help to quit.    Do not smoke.  Nicotine and other chemicals in cigarettes and cigars can increase your blood pressure and also cause lung damage. Ask your provider for information if you currently smoke and need help to quit. E-cigarettes or smokeless tobacco still contain nicotine. Talk to your provider before you use these products.       Follow up with your doctor or cardiologist as directed:  You will need to return to have your blood pressure checked and to have other lab tests done. Write down your questions so you remember to ask them during your visits.  © Copyright [employer] 2023 Information is for End User's use only and may not be sold, redistributed or otherwise used for commercial purposes.  The above information is an  only. It is not intended as medical advice for individual conditions or treatments. Talk to your doctor, nurse or pharmacist before following any medical regimen to see if it is safe and effective for you.    Medicare Preventive Visit Patient Instructions  Thank you for completing your Welcome to Medicare Visit or Medicare Annual Wellness Visit today. Your next wellness visit will be due in one year (5/24/2025).  The screening/preventive services that you may require over the next 5-10 years are detailed below. Some tests may not apply to you based off risk factors and/or age. Screening tests ordered at today's visit but not completed yet may show  as past due. Also, please note that scanned in results may not display below.  Preventive Screenings:  Service Recommendations Previous Testing/Comments   Colorectal Cancer Screening  Colonoscopy    Fecal Occult Blood Test (FOBT)/Fecal Immunochemical Test (FIT)  Fecal DNA/Cologuard Test  Flexible Sigmoidoscopy Age: 45-75 years old   Colonoscopy: every 10 years (May be performed more frequently if at higher risk)  OR  FOBT/FIT: every 1 year  OR  Cologuard: every 3 years  OR  Sigmoidoscopy: every 5 years  Screening may be recommended earlier than age 45 if at higher risk for colorectal cancer. Also, an individualized decision between you and your healthcare provider will decide whether screening between the ages of 76-85 would be appropriate. Colonoscopy: 07/19/2019  FOBT/FIT: Not on file  Cologuard: Not on file  Sigmoidoscopy: Not on file    Screening Current     Prostate Cancer Screening Individualized decision between patient and health care provider in men between ages of 55-69   Medicare will cover every 12 months beginning on the day after your 50th birthday PSA: 1.16 ng/mL     Screening Not Indicated     Hepatitis C Screening Once for adults born between 1945 and 1965  More frequently in patients at high risk for Hepatitis C Hep C Antibody: 06/23/2022    Screening Current   Diabetes Screening 1-2 times per year if you're at risk for diabetes or have pre-diabetes Fasting glucose: 93 mg/dL (5/16/2024)  A1C: 5.3 % (1/17/2023)  Screening Current   Cholesterol Screening Once every 5 years if you don't have a lipid disorder. May order more often based on risk factors. Lipid panel: 01/17/2023  Screening Not Indicated  History Lipid Disorder      Other Preventive Screenings Covered by Medicare:  Abdominal Aortic Aneurysm (AAA) Screening: covered once if your at risk. You're considered to be at risk if you have a family history of AAA or a male between the age of 65-75 who smoking at least 100 cigarettes in your  lifetime.  Lung Cancer Screening: covers low dose CT scan once per year if you meet all of the following conditions: (1) Age 55-77; (2) No signs or symptoms of lung cancer; (3) Current smoker or have quit smoking within the last 15 years; (4) You have a tobacco smoking history of at least 20 pack years (packs per day x number of years you smoked); (5) You get a written order from a healthcare provider.  Glaucoma Screening: covered annually if you're considered high risk: (1) You have diabetes OR (2) Family history of glaucoma OR (3)  aged 50 and older OR (4)  American aged 65 and older  Osteoporosis Screening: covered every 2 years if you meet one of the following conditions: (1) Have a vertebral abnormality; (2) On glucocorticoid therapy for more than 3 months; (3) Have primary hyperparathyroidism; (4) On osteoporosis medications and need to assess response to drug therapy.  HIV Screening: covered annually if you're between the age of 15-65. Also covered annually if you are younger than 15 and older than 65 with risk factors for HIV infection. For pregnant patients, it is covered up to 3 times per pregnancy.    Immunizations:  Immunization Recommendations   Influenza Vaccine Annual influenza vaccination during flu season is recommended for all persons aged >= 6 months who do not have contraindications   Pneumococcal Vaccine   * Pneumococcal conjugate vaccine = PCV13 (Prevnar 13), PCV15 (Vaxneuvance), PCV20 (Prevnar 20)  * Pneumococcal polysaccharide vaccine = PPSV23 (Pneumovax) Adults 19-63 yo with certain risk factors or if 65+ yo  If never received any pneumonia vaccine: recommend Prevnar 20 (PCV20)  Give PCV20 if previously received 1 dose of PCV13 or PPSV23   Hepatitis B Vaccine 3 dose series if at intermediate or high risk (ex: diabetes, end stage renal disease, liver disease)   Respiratory syncytial virus (RSV) Vaccine - COVERED BY MEDICARE PART D  * RSVPreF3 (Arexvy) CDC recommends  that adults 60 years of age and older may receive a single dose of RSV vaccine using shared clinical decision-making (SCDM)   Tetanus (Td) Vaccine - COST NOT COVERED BY MEDICARE PART B Following completion of primary series, a booster dose should be given every 10 years to maintain immunity against tetanus. Td may also be given as tetanus wound prophylaxis.   Tdap Vaccine - COST NOT COVERED BY MEDICARE PART B Recommended at least once for all adults. For pregnant patients, recommended with each pregnancy.   Shingles Vaccine (Shingrix) - COST NOT COVERED BY MEDICARE PART B  2 shot series recommended in those 19 years and older who have or will have weakened immune systems or those 50 years and older     Health Maintenance Due:      Topic Date Due   • Colorectal Cancer Screening  07/19/2029   • Hepatitis C Screening  Completed     Immunizations Due:      Topic Date Due   • COVID-19 Vaccine (3 - 2023-24 season) 09/01/2023     Advance Directives   What are advance directives?  Advance directives are legal documents that state your wishes and plans for medical care. These plans are made ahead of time in case you lose your ability to make decisions for yourself. Advance directives can apply to any medical decision, such as the treatments you want, and if you want to donate organs.   What are the types of advance directives?  There are many types of advance directives, and each state has rules about how to use them. You may choose a combination of any of the following:  Living will:  This is a written record of the treatment you want. You can also choose which treatments you do not want, which to limit, and which to stop at a certain time. This includes surgery, medicine, IV fluid, and tube feedings.   Durable power of  for healthcare (DPAHC):  This is a written record that states who you want to make healthcare choices for you when you are unable to make them for yourself. This person, called a proxy, is usually a  family member or a friend. You may choose more than 1 proxy.  Do not resuscitate (DNR) order:  A DNR order is used in case your heart stops beating or you stop breathing. It is a request not to have certain forms of treatment, such as CPR. A DNR order may be included in other types of advance directives.  Medical directive:  This covers the care that you want if you are in a coma, near death, or unable to make decisions for yourself. You can list the treatments you want for each condition. Treatment may include pain medicine, surgery, blood transfusions, dialysis, IV or tube feedings, and a ventilator (breathing machine).  Values history:  This document has questions about your views, beliefs, and how you feel and think about life. This information can help others choose the care that you would choose.  Why are advance directives important?  An advance directive helps you control your care. Although spoken wishes may be used, it is better to have your wishes written down. Spoken wishes can be misunderstood, or not followed. Treatments may be given even if you do not want them. An advance directive may make it easier for your family to make difficult choices about your care.   Weight Management   Why it is important to manage your weight:  Being overweight increases your risk of health conditions such as heart disease, high blood pressure, type 2 diabetes, and certain types of cancer. It can also increase your risk for osteoarthritis, sleep apnea, and other respiratory problems. Aim for a slow, steady weight loss. Even a small amount of weight loss can lower your risk of health problems.  How to lose weight safely:  A safe and healthy way to lose weight is to eat fewer calories and get regular exercise. You can lose up about 1 pound a week by decreasing the number of calories you eat by 500 calories each day.   Healthy meal plan for weight management:  A healthy meal plan includes a variety of foods, contains fewer  calories, and helps you stay healthy. A healthy meal plan includes the following:  Eat whole-grain foods more often.  A healthy meal plan should contain fiber. Fiber is the part of grains, fruits, and vegetables that is not broken down by your body. Whole-grain foods are healthy and provide extra fiber in your diet. Some examples of whole-grain foods are whole-wheat breads and pastas, oatmeal, brown rice, and bulgur.  Eat a variety of vegetables every day.  Include dark, leafy greens such as spinach, kale, is[place] greens, and mustard greens. Eat yellow and orange vegetables such as carrots, sweet potatoes, and winter squash.   Eat a variety of fruits every day.  Choose fresh or canned fruit (canned in its own juice or light syrup) instead of juice. Fruit juice has very little or no fiber.  Eat low-fat dairy foods.  Drink fat-free (skim) milk or 1% milk. Eat fat-free yogurt and low-fat cottage cheese. Try low-fat cheeses such as mozzarella and other reduced-fat cheeses.  Choose meat and other protein foods that are low in fat.  Choose beans or other legumes such as split peas or lentils. Choose fish, skinless poultry (chicken or turkey), or lean cuts of red meat (beef or pork). Before you cook meat or poultry, cut off any visible fat.   Use less fat and oil.  Try baking foods instead of frying them. Add less fat, such as margarine, sour cream, regular salad dressing and mayonnaise to foods. Eat fewer high-fat foods. Some examples of high-fat foods include french fries, doughnuts, ice cream, and cakes.  Eat fewer sweets.  Limit foods and drinks that are high in sugar. This includes candy, cookies, regular soda, and sweetened drinks.  Exercise:  Exercise at least 30 minutes per day on most days of the week. Some examples of exercise include walking, biking, dancing, and swimming. You can also fit in more physical activity by taking the stairs instead of the elevator or parking farther away from stores. Ask your  healthcare provider about the best exercise plan for you.      © Copyright [employer] 2018 Information is for End User's use only and may not be sold, redistributed or otherwise used for commercial purposes. All illustrations and images included in CareNotes® are the copyrighted property of [employer] or [employer]    Medicare Preventive Visit Patient Instructions  Thank you for completing your Welcome to Medicare Visit or Medicare Annual Wellness Visit today. Your next wellness visit will be due in one year (5/24/2025).  The screening/preventive services that you may require over the next 5-10 years are detailed below. Some tests may not apply to you based off risk factors and/or age. Screening tests ordered at today's visit but not completed yet may show as past due. Also, please note that scanned in results may not display below.  Preventive Screenings:  Service Recommendations Previous Testing/Comments   Colorectal Cancer Screening  Colonoscopy    Fecal Occult Blood Test (FOBT)/Fecal Immunochemical Test (FIT)  Fecal DNA/Cologuard Test  Flexible Sigmoidoscopy Age: 45-75 years old   Colonoscopy: every 10 years (May be performed more frequently if at higher risk)  OR  FOBT/FIT: every 1 year  OR  Cologuard: every 3 years  OR  Sigmoidoscopy: every 5 years  Screening may be recommended earlier than age 45 if at higher risk for colorectal cancer. Also, an individualized decision between you and your healthcare provider will decide whether screening between the ages of 76-85 would be appropriate. Colonoscopy: 07/19/2019  FOBT/FIT: Not on file  Cologuard: Not on file  Sigmoidoscopy: Not on file    Screening Current     Prostate Cancer Screening Individualized decision between patient and health care provider in men between ages of 55-69   Medicare will cover every 12 months beginning on the day after your 50th birthday PSA: 1.16 ng/mL     Screening Not Indicated     Hepatitis C Screening Once for adults  born between 1945 and 1965  More frequently in patients at high risk for Hepatitis C Hep C Antibody: 06/23/2022    Screening Current   Diabetes Screening 1-2 times per year if you're at risk for diabetes or have pre-diabetes Fasting glucose: 93 mg/dL (5/16/2024)  A1C: 5.3 % (1/17/2023)  Screening Current   Cholesterol Screening Once every 5 years if you don't have a lipid disorder. May order more often based on risk factors. Lipid panel: 01/17/2023  Screening Not Indicated  History Lipid Disorder      Other Preventive Screenings Covered by Medicare:  Abdominal Aortic Aneurysm (AAA) Screening: covered once if your at risk. You're considered to be at risk if you have a family history of AAA or a male between the age of 65-75 who smoking at least 100 cigarettes in your lifetime.  Lung Cancer Screening: covers low dose CT scan once per year if you meet all of the following conditions: (1) Age 55-77; (2) No signs or symptoms of lung cancer; (3) Current smoker or have quit smoking within the last 15 years; (4) You have a tobacco smoking history of at least 20 pack years (packs per day x number of years you smoked); (5) You get a written order from a healthcare provider.  Glaucoma Screening: covered annually if you're considered high risk: (1) You have diabetes OR (2) Family history of glaucoma OR (3)  aged 50 and older OR (4)  American aged 65 and older  Osteoporosis Screening: covered every 2 years if you meet one of the following conditions: (1) Have a vertebral abnormality; (2) On glucocorticoid therapy for more than 3 months; (3) Have primary hyperparathyroidism; (4) On osteoporosis medications and need to assess response to drug therapy.  HIV Screening: covered annually if you're between the age of 15-65. Also covered annually if you are younger than 15 and older than 65 with risk factors for HIV infection. For pregnant patients, it is covered up to 3 times per  pregnancy.    Immunizations:  Immunization Recommendations   Influenza Vaccine Annual influenza vaccination during flu season is recommended for all persons aged >= 6 months who do not have contraindications   Pneumococcal Vaccine   * Pneumococcal conjugate vaccine = PCV13 (Prevnar 13), PCV15 (Vaxneuvance), PCV20 (Prevnar 20)  * Pneumococcal polysaccharide vaccine = PPSV23 (Pneumovax) Adults 19-63 yo with certain risk factors or if 65+ yo  If never received any pneumonia vaccine: recommend Prevnar 20 (PCV20)  Give PCV20 if previously received 1 dose of PCV13 or PPSV23   Hepatitis B Vaccine 3 dose series if at intermediate or high risk (ex: diabetes, end stage renal disease, liver disease)   Respiratory syncytial virus (RSV) Vaccine - COVERED BY MEDICARE PART D  * RSVPreF3 (Arexvy) CDC recommends that adults 60 years of age and older may receive a single dose of RSV vaccine using shared clinical decision-making (SCDM)   Tetanus (Td) Vaccine - COST NOT COVERED BY MEDICARE PART B Following completion of primary series, a booster dose should be given every 10 years to maintain immunity against tetanus. Td may also be given as tetanus wound prophylaxis.   Tdap Vaccine - COST NOT COVERED BY MEDICARE PART B Recommended at least once for all adults. For pregnant patients, recommended with each pregnancy.   Shingles Vaccine (Shingrix) - COST NOT COVERED BY MEDICARE PART B  2 shot series recommended in those 19 years and older who have or will have weakened immune systems or those 50 years and older     Health Maintenance Due:      Topic Date Due   • Colorectal Cancer Screening  07/19/2029   • Hepatitis C Screening  Completed     Immunizations Due:      Topic Date Due   • COVID-19 Vaccine (3 - 2023-24 season) 09/01/2023     Advance Directives   What are advance directives?  Advance directives are legal documents that state your wishes and plans for medical care. These plans are made ahead of time in case you lose your ability  to make decisions for yourself. Advance directives can apply to any medical decision, such as the treatments you want, and if you want to donate organs.   What are the types of advance directives?  There are many types of advance directives, and each state has rules about how to use them. You may choose a combination of any of the following:  Living will:  This is a written record of the treatment you want. You can also choose which treatments you do not want, which to limit, and which to stop at a certain time. This includes surgery, medicine, IV fluid, and tube feedings.   Durable power of  for healthcare (DPAHC):  This is a written record that states who you want to make healthcare choices for you when you are unable to make them for yourself. This person, called a proxy, is usually a family member or a friend. You may choose more than 1 proxy.  Do not resuscitate (DNR) order:  A DNR order is used in case your heart stops beating or you stop breathing. It is a request not to have certain forms of treatment, such as CPR. A DNR order may be included in other types of advance directives.  Medical directive:  This covers the care that you want if you are in a coma, near death, or unable to make decisions for yourself. You can list the treatments you want for each condition. Treatment may include pain medicine, surgery, blood transfusions, dialysis, IV or tube feedings, and a ventilator (breathing machine).  Values history:  This document has questions about your views, beliefs, and how you feel and think about life. This information can help others choose the care that you would choose.  Why are advance directives important?  An advance directive helps you control your care. Although spoken wishes may be used, it is better to have your wishes written down. Spoken wishes can be misunderstood, or not followed. Treatments may be given even if you do not want them. An advance directive may make it easier for your  family to make difficult choices about your care.   Weight Management   Why it is important to manage your weight:  Being overweight increases your risk of health conditions such as heart disease, high blood pressure, type 2 diabetes, and certain types of cancer. It can also increase your risk for osteoarthritis, sleep apnea, and other respiratory problems. Aim for a slow, steady weight loss. Even a small amount of weight loss can lower your risk of health problems.  How to lose weight safely:  A safe and healthy way to lose weight is to eat fewer calories and get regular exercise. You can lose up about 1 pound a week by decreasing the number of calories you eat by 500 calories each day.   Healthy meal plan for weight management:  A healthy meal plan includes a variety of foods, contains fewer calories, and helps you stay healthy. A healthy meal plan includes the following:  Eat whole-grain foods more often.  A healthy meal plan should contain fiber. Fiber is the part of grains, fruits, and vegetables that is not broken down by your body. Whole-grain foods are healthy and provide extra fiber in your diet. Some examples of whole-grain foods are whole-wheat breads and pastas, oatmeal, brown rice, and bulgur.  Eat a variety of vegetables every day.  Include dark, leafy greens such as spinach, kale, is[place] greens, and mustard greens. Eat yellow and orange vegetables such as carrots, sweet potatoes, and winter squash.   Eat a variety of fruits every day.  Choose fresh or canned fruit (canned in its own juice or light syrup) instead of juice. Fruit juice has very little or no fiber.  Eat low-fat dairy foods.  Drink fat-free (skim) milk or 1% milk. Eat fat-free yogurt and low-fat cottage cheese. Try low-fat cheeses such as mozzarella and other reduced-fat cheeses.  Choose meat and other protein foods that are low in fat.  Choose beans or other legumes such as split peas or lentils. Choose fish, skinless poultry (chicken  or turkey), or lean cuts of red meat (beef or pork). Before you cook meat or poultry, cut off any visible fat.   Use less fat and oil.  Try baking foods instead of frying them. Add less fat, such as margarine, sour cream, regular salad dressing and mayonnaise to foods. Eat fewer high-fat foods. Some examples of high-fat foods include french fries, doughnuts, ice cream, and cakes.  Eat fewer sweets.  Limit foods and drinks that are high in sugar. This includes candy, cookies, regular soda, and sweetened drinks.  Exercise:  Exercise at least 30 minutes per day on most days of the week. Some examples of exercise include walking, biking, dancing, and swimming. You can also fit in more physical activity by taking the stairs instead of the elevator or parking farther away from stores. Ask your healthcare provider about the best exercise plan for you.      © Copyright [employer] 2018 Information is for End User's use only and may not be sold, redistributed or otherwise used for commercial purposes. All illustrations and images included in CareNotes® are the copyrighted property of [employer] Inc[employer] or [employer]

## 2024-05-23 NOTE — PROGRESS NOTES
Ambulatory Visit  Name: Qamar Cintron      : 1950      MRN: [id number]  Encounter Provider: Juan F Dang DO  Encounter Date: 2024   Encounter department: [facility]    Assessment & Plan   1. Benign hypertension with CKD (chronic kidney disease) stage III (HCC)  2. Migraine without status migrainosus, not intractable, unspecified migraine type  3. GERD without esophagitis  4. Hypoparathyroidism, unspecified hypoparathyroidism type (HCC)  5. Stage 3a chronic kidney disease (HCC)  6. Generalized anxiety disorder  7. Benign prostatic hyperplasia with nocturia  8. Mixed hyperlipidemia  -     Lipid Panel with Direct LDL reflex; Future  9. Microalbuminuria  10. Medicare annual wellness visit, subsequent  11. Screening for prostate cancer  -     PSA, Total Screen; Future       Preventive health issues were discussed with patient, and age appropriate screening tests were ordered as noted in patient's After Visit Summary. Personalized health advice and appropriate referrals for health education or preventive services given if needed, as noted in patient's After Visit Summary.    History of Present Illness     HPI   Patient Care Team:  Juan F Dang DO as PCP - General (Internal Medicine)  TRISTIN Phillips as PCP - Endocrinology (Endocrinology)  MD Erica Segal CRNP as Nurse Practitioner (Urology)  TRISTIN Phillips as Nurse Practitioner (Endocrinology)  Sg Alaniz DO (Nephrology)  Sg Alaniz DO (Nephrology)  TRISTIN Garcia as Nurse Practitioner (Urology)    Review of Systems  Medical History Reviewed by provider this encounter:  Tobacco  Allergies  Meds  Problems  Med Hx  Surg Hx  Fam Hx       Annual Wellness Visit Questionnaire   Last Medicare Wellness visit information reviewed, patient interviewed and updates made to the record today.      Health Risk Assessment:   Patient rates overall health as very good. Patient  feels that their physical health rating is same. Patient is very satisfied with their life. Eyesight was rated as same. Hearing was rated as same. Patient feels that their emotional and mental health rating is same. Patients states they are never, rarely angry. Patient states they are sometimes unusually tired/fatigued. Pain experienced in the last 7 days has been none. Patient states that he has experienced no weight loss or gain in last 6 months.     Depression Screening:   PHQ-2 Score: 0      Fall Risk Screening:   In the past year, patient has experienced: no history of falling in past year      Home Safety:  Patient does not have trouble with stairs inside or outside of their home. Patient has working smoke alarms and has working carbon monoxide detector. Home safety hazards include: none.     Nutrition:   Current diet is Regular.     Medications:   Patient is currently taking over-the-counter supplements. OTC medications include: see medication list. Patient is able to manage medications.     Activities of Daily Living (ADLs)/Instrumental Activities of Daily Living (IADLs):   Walk and transfer into and out of bed and chair?: Yes  Dress and groom yourself?: Yes    Bathe or shower yourself?: Yes    Feed yourself? Yes  Do your laundry/housekeeping?: Yes  Manage your money, pay your bills and track your expenses?: Yes  Make your own meals?: Yes    Do your own shopping?: Yes    Previous Hospitalizations:   Any hospitalizations or ED visits within the last 12 months?: No      Advance Care Planning:   Living will: No    Durable POA for healthcare: No    Advanced directive: No    Advanced directive counseling given: Yes    ACP document given: Yes    Patient declined ACP directive: No    End of Life Decisions reviewed with patient: Yes    Provider agrees with end of life decisions: Yes      Comments: Will review further once pt reviews info given today    Cognitive Screening:   Provider or family/friend/caregiver  concerned regarding cognition?: No    PREVENTIVE SCREENINGS      Cardiovascular Screening:    General: Screening Not Indicated and History Lipid Disorder      Diabetes Screening:     General: Screening Current    Due for: Blood Glucose      Colorectal Cancer Screening:     General: Screening Current      Prostate Cancer Screening:    General: Screening Current      Osteoporosis Screening:    General: Screening Not Indicated      Abdominal Aortic Aneurysm (AAA) Screening:    Risk factors include: age between 65-76 yo        General: Screening Not Indicated      Lung Cancer Screening:     General: Screening Not Indicated      Hepatitis C Screening:    General: Screening Current    Screening, Brief Intervention, and Referral to Treatment (SBIRT)    Screening  Typical number of drinks in a day: 0  Typical number of drinks in a week: 0  Interpretation: Low risk drinking behavior.    Single Item Drug Screening:  How often have you used an illegal drug (including marijuana) or a prescription medication for non-medical reasons in the past year? never    Single Item Drug Screen Score: 0  Interpretation: Negative screen for possible drug use disorder    Other Counseling Topics:   Car/seat belt/driving safety, sunscreen and calcium and vitamin D intake and regular weightbearing exercise.     Social Determinants of Health     Financial Resource Strain: Low Risk  (5/18/2023)    Overall Financial Resource Strain (CARDIA)     Difficulty of Paying Living Expenses: Not very hard   Food Insecurity: No Food Insecurity (6/23/2022)    Hunger Vital Sign     Worried About Running Out of Food in the Last Year: Never true     Ran Out of Food in the Last Year: Never true   Transportation Needs: No Transportation Needs (5/18/2023)    PRAPARE - Transportation     Lack of Transportation (Medical): No     Lack of Transportation (Non-Medical): No   Housing Stability: Low Risk  (6/23/2022)    Housing Stability Vital Sign     Unable to Pay for  "Housing in the Last Year: No     Number of Places Lived in the Last Year: 1     Unstable Housing in the Last Year: No     No results found.    Objective     /76 (BP Location: Right arm, Patient Position: Sitting, Cuff Size: Adult)   Pulse 84   Temp 98 °F (36.7 °C) (Tympanic)   Ht 6' 2\" (1.88 m)   Wt 90.4 kg (199 lb 3.2 oz)   SpO2 96%   BMI 25.58 kg/m²     Physical Exam  Administrative Statements       A/P: Doing well and no falls. Denies depression and feels safe at home. Diverse diet. No problems operating a MV and uses seat belts. No living will or POA. Information give for pt to review. No DME or referrals needed today. RTC in one year for medicare wellness.       "

## 2024-07-01 ENCOUNTER — APPOINTMENT (OUTPATIENT)
Dept: LAB | Facility: CLINIC | Age: 74
End: 2024-07-01
Payer: MEDICARE

## 2024-07-01 DIAGNOSIS — N18.31 STAGE 3A CHRONIC KIDNEY DISEASE (HCC): Primary | ICD-10-CM

## 2024-07-01 DIAGNOSIS — N18.31 STAGE 3A CHRONIC KIDNEY DISEASE (HCC): ICD-10-CM

## 2024-07-01 DIAGNOSIS — E78.2 MIXED HYPERLIPIDEMIA: ICD-10-CM

## 2024-07-01 DIAGNOSIS — Z12.5 SCREENING FOR PROSTATE CANCER: ICD-10-CM

## 2024-07-01 LAB
ANION GAP SERPL CALCULATED.3IONS-SCNC: 11 MMOL/L (ref 4–13)
BUN SERPL-MCNC: 17 MG/DL (ref 5–25)
CALCIUM SERPL-MCNC: 8.3 MG/DL (ref 8.4–10.2)
CHLORIDE SERPL-SCNC: 95 MMOL/L (ref 96–108)
CHOLEST SERPL-MCNC: 189 MG/DL
CO2 SERPL-SCNC: 30 MMOL/L (ref 21–32)
CREAT SERPL-MCNC: 1.2 MG/DL (ref 0.6–1.3)
GFR SERPL CREATININE-BSD FRML MDRD: 58 ML/MIN/1.73SQ M
GLUCOSE P FAST SERPL-MCNC: 95 MG/DL (ref 65–99)
HDLC SERPL-MCNC: 42 MG/DL
LDLC SERPL CALC-MCNC: 105 MG/DL (ref 0–100)
POTASSIUM SERPL-SCNC: 3.4 MMOL/L (ref 3.5–5.3)
PSA SERPL-MCNC: 1.21 NG/ML (ref 0–4)
SODIUM SERPL-SCNC: 136 MMOL/L (ref 135–147)
TRIGL SERPL-MCNC: 208 MG/DL

## 2024-07-01 PROCEDURE — 80061 LIPID PANEL: CPT

## 2024-07-01 PROCEDURE — 36415 COLL VENOUS BLD VENIPUNCTURE: CPT

## 2024-07-01 PROCEDURE — G0103 PSA SCREENING: HCPCS

## 2024-07-01 PROCEDURE — 80048 BASIC METABOLIC PNL TOTAL CA: CPT

## 2024-07-02 ENCOUNTER — TELEPHONE (OUTPATIENT)
Dept: INTERNAL MEDICINE CLINIC | Facility: CLINIC | Age: 74
End: 2024-07-02

## 2024-07-02 NOTE — TELEPHONE ENCOUNTER
----- Message from Juan F Dang DO sent at 7/1/2024  5:00 PM EDT -----  Call pt labs ok except for elevated TG. See if pt wants meds

## 2024-07-12 ENCOUNTER — OFFICE VISIT (OUTPATIENT)
Dept: UROLOGY | Facility: CLINIC | Age: 74
End: 2024-07-12
Payer: MEDICARE

## 2024-07-12 VITALS
SYSTOLIC BLOOD PRESSURE: 122 MMHG | WEIGHT: 193 LBS | HEART RATE: 80 BPM | BODY MASS INDEX: 24.78 KG/M2 | DIASTOLIC BLOOD PRESSURE: 68 MMHG

## 2024-07-12 DIAGNOSIS — N40.1 BENIGN PROSTATIC HYPERPLASIA WITH NOCTURIA: Primary | ICD-10-CM

## 2024-07-12 DIAGNOSIS — Z12.5 PROSTATE CANCER SCREENING: ICD-10-CM

## 2024-07-12 DIAGNOSIS — R35.1 BENIGN PROSTATIC HYPERPLASIA WITH NOCTURIA: Primary | ICD-10-CM

## 2024-07-12 PROCEDURE — 99213 OFFICE O/P EST LOW 20 MIN: CPT

## 2024-07-12 NOTE — PROGRESS NOTES
7/12/2024    No chief complaint on file.      Assessment and Plan    74 y.o. male manage by AP Team    BPH with nocturia  Symptoms are well-managed with Flomax 0.4 mg twice daily and finasteride 5 mg daily which I recommend he continue indefinitely.  He does suffer from nocturia about 3-5 times per night however he is okay with managing this conservatively.  He has been offered further evaluation for possible outlet obstructive surgery in the past as well as OAB medications.  He continues to decline these options today.  He denies any issues with gross hematuria, and urine testing in the past has been negative for microscopic hematuria.  From a urologic standpoint, he is doing fine.  He can follow-up on an as-needed basis.  Should he have any worsening of his lower urinary tract symptoms we will be happy to see him back for further evaluation.    Prostate cancer screening  PSA is grossly stable at 1.20/2.4 when corrected for finasteride as of 7/1/2024  Negative family history of prostate cancer.  Typically at the age of 75 AUA recommendations are to discontinue prostate cancer screening.  As he is in overall good health, I would recommend checking a PSA every couple of years.  This can be done through his PCP.  He can follow-up with urology on an as-needed basis.      Interval HPI:    He presents today with his wife reporting doing well overall since last time we saw him.  He feels his baseline lower urinary tract symptoms are stable with Flomax and finasteride.  He does suffer from nocturia on average 3-5 times per night.  He attributes this to fluid intake.  He is okay managing this conservatively.  He voided prior to arrival today however random bladder scan showed estimated volume of 19 mL.  Otherwise he offers no complaints and denies any abdominal pain, flank pain, or gross hematuria.      History of Present Illness  Qamar Cintron is a 74 y.o. male here for follow-up evaluation of BPH    Established patient  but new to me last seen by TRISTIN Elaine in March 2023 with history of BPH on Flomax 0.4 mg twice daily and finasteride 5 mg daily.  He has ongoing nocturia 4-6 times per night.  He also has intermittent stream, urgency, and frequency.  He is not interested in sleep evaluation or bladder outlet obstruction evaluation for surgical intervention.  He was also not interested in medication for OAB due to side effects.      Prostate cancer screening: PSA 1.20/2.4 corrected for finasteride as of 7/1/2024      Review of Systems   Constitutional:  Negative for chills and fever.   HENT:  Negative for congestion and sore throat.    Respiratory:  Negative for cough and shortness of breath.    Cardiovascular:  Negative for chest pain and leg swelling.   Gastrointestinal:  Negative for abdominal pain, constipation and diarrhea.   Genitourinary:  Negative for difficulty urinating, dysuria, frequency, hematuria and urgency.        Nocturia   Musculoskeletal:  Negative for back pain and gait problem.   Skin:  Negative for wound.   Allergic/Immunologic: Negative for immunocompromised state.   Hematological:  Does not bruise/bleed easily.               Vitals  There were no vitals filed for this visit.    Physical Exam  Vitals reviewed.   Constitutional:       General: He is not in acute distress.     Appearance: Normal appearance. He is not ill-appearing or toxic-appearing.   HENT:      Head: Normocephalic and atraumatic.   Eyes:      General: No scleral icterus.     Conjunctiva/sclera: Conjunctivae normal.   Cardiovascular:      Rate and Rhythm: Normal rate.   Pulmonary:      Effort: Pulmonary effort is normal. No respiratory distress.   Abdominal:      Tenderness: There is no right CVA tenderness or left CVA tenderness.      Hernia: No hernia is present.   Musculoskeletal:      Cervical back: Normal range of motion.      Right lower leg: No edema.      Left lower leg: No edema.   Skin:     General: Skin is warm and dry.       Coloration: Skin is not jaundiced or pale.   Neurological:      General: No focal deficit present.      Mental Status: He is alert and oriented to person, place, and time. Mental status is at baseline.      Gait: Gait normal.   Psychiatric:         Mood and Affect: Mood normal.         Behavior: Behavior normal.         Thought Content: Thought content normal.         Judgment: Judgment normal.         Past History  Past Medical History:   Diagnosis Date    Abdominal pain, acute, generalized     last assessed - 21Apr2017    Actinic keratosis     last assessed - 12Jun2013    Chest pain     last assessed - 29Nov2012    Chronic kidney disease, stage 3 (HCC)     Colon polyp     Disease of thyroid gland     Dysfunction of both eustachian tubes     suspect due to ETD. Recommend otc allergy meds and if fails then add flonase; last assessed - 06Aug2012    Edema     Fatigue     last assessed - [id number]    Generalized anxiety disorder     GERD (gastroesophageal reflux disease)     Hematuria     last assessed - 02Aug2012    Hypertension     Hypo-osmolality and hyponatremia     Hypocalcemia     Hypoparathyroidism ([facility])     Lightheadedness     last assessed - [id number]    Lump in neck     ?cause. Maybe a localized allergic reaction, dental issue, doubt sialdenitis, ect. Empiric abx and one dose steroids. Continue benadryl. If worsens, to er or ent.    Malaise and fatigue     last assessed - 21Apr2017    Nephropathy     last assessed - [id number]    Nocturia     Proteinuria     Shortness of breath     last assessed - 29Nov2012    Skin lesion     Resolved - 31May2017    Tinea corporis     last assessed - [id number]     Social History     Socioeconomic History    Marital status: /Civil Union     Spouse name: Not on file    Number of children: Not on file    Years of education: Not on file    Highest education level: Not on file   Occupational History    Occupation: Retired     Comment:     Tobacco Use    Smoking  status: Never     Passive exposure: Past    Smokeless tobacco: Never   Vaping Use    Vaping status: Never Used   Substance and Sexual Activity    Alcohol use: Yes     Comment: Occasionally drink    Drug use: No    Sexual activity: Yes     Partners: Female   Other Topics Concern    Not on file   Social History Narrative    Consumes on average 1 cup of regular coffee per day      Social Determinants of Health     Financial Resource Strain: Low Risk  (5/18/2023)    Overall Financial Resource Strain (CARDIA)     Difficulty of Paying Living Expenses: Not very hard   Food Insecurity: No Food Insecurity (6/23/2022)    Hunger Vital Sign     Worried About Running Out of Food in the Last Year: Never true     Ran Out of Food in the Last Year: Never true   Transportation Needs: No Transportation Needs (5/18/2023)    PRAPARE - Transportation     Lack of Transportation (Medical): No     Lack of Transportation (Non-Medical): No   Physical Activity: Not on file   Stress: Not on file   Social Connections: Not on file   Intimate Partner Violence: Not on file   Housing Stability: Low Risk  (6/23/2022)    Housing Stability Vital Sign     Unable to Pay for Housing in the Last Year: No     Number of Places Lived in the Last Year: 1     Unstable Housing in the Last Year: No     Social History     Tobacco Use   Smoking Status Never    Passive exposure: Past   Smokeless Tobacco Never     Family History   Problem Relation Age of Onset    Hypertension Mother     Hypertension Father     Diabetes Father     Other Brother         Schwannomatosis    Stroke Family        The following portions of the patient's history were reviewed and updated as appropriate allergies, current medications, past medical history, past social history, past surgical history and problem list    Imaging:    Results  No results found for this or any previous visit (from the past 1 hour(s)).]  Lab Results   Component Value Date    PSA 1.208 07/01/2024    PSA 1.16  05/16/2024    PSA 1.6 05/02/2023    PSA 2.8 10/24/2022     Lab Results   Component Value Date    GLUCOSE 99 12/15/2015    CALCIUM 8.3 (L) 07/01/2024     12/15/2015    K 3.4 (L) 07/01/2024    CO2 30 07/01/2024    CL 95 (L) 07/01/2024    BUN 17 07/01/2024    CREATININE 1.20 07/01/2024     Lab Results   Component Value Date    WBC 7.28 04/11/2024    HGB 14.9 04/11/2024    HCT 46.0 04/11/2024    MCV 90 04/11/2024     04/11/2024       Please Note:  Voice dictation software has been used to create this document. There may be inadvertent transcriptions errors.     TRISTIN Garcia 07/12/24

## 2024-07-16 NOTE — PROGRESS NOTES
Established Patient Progress Note     CC: Endocrinology follow up visit    Impression & Plan:    Problem List Items Addressed This Visit       Hypoparathyroidism (HCC) - Primary     Stable.  CCed on upcoming labs ordered by nephrology.  Recent serum calcium from July 1 is stable at 8.3.  Repeat 24-hour urine collection now the patient is taking 12.5 mg of hydrochlorothiazide daily.  Denies any symptoms of hypocalcemia including circumoral numbness, paresthesias, headache, lightheadedness.  Denies flank pain or hematuria.  Continue calcitriol 0.25 mcg 4 times weekly: Monday, Wednesday, Friday, Sunday.  Continue 600 mg of calcium carbonate daily.  Continue vitamin D 1000 units daily.  Reviewed signs and symptoms that patient should report to me if he should experience.  Follow-up in 6 months.         Multiple thyroid nodules     Denies compressive symptoms.  Thyroid nodule stable.  No need for further surveillance at this time.         Benign hypertension with CKD (chronic kidney disease) stage III ([facility])     Lab Results   Component Value Date    EGFR 58 07/01/2024    EGFR 55 05/16/2024    EGFR 52 04/11/2024    CREATININE 1.20 07/01/2024    CREATININE 1.26 05/16/2024    CREATININE 1.32 (H) 04/11/2024   Well-controlled at 120/66.  Continue 10 mg of amlodipine and 12.5 mg of hydrochlorothiazide daily.         Hypercalciuria     4-hour urine collection.  Continue 12.5 mg of hydrochlorothiazide daily.         Relevant Orders    Calcium, urine, 24 hour    Creatinine, urine, 24 hour       History of Present Illness:     Qamar Cintron is a 74 y.o. male with hypoparathyroidism and hypercalciuria presenting to the office today for routine follow-up.  Patient last evaluated on 11/10/2023.  At that time, low-dose of hydrochlorothiazide was initiated due to hypercalciuria.    For hypocalcemia, patient is taking 0.25 mcg of calcitriol every other day and 600 mg of calcium carbonate daily. He also takes 1,000 iu of Vit D daily.    4x weekly      Serum Ca+ as of 7/1/2024 was at 8.3 mg/dL. PTH was not checked at that time. In April of 2024, PTH was 6.6 pg/mL which was an improvement from prior levels.     For thyroid nodules, surveillance was ended after US in 2022 as nodules did not meet criteria for biopsy or surveillance. Largest nodules was stable for > 5 years.     Patient Active Problem List   Diagnosis    Allergic rhinitis    Basal cell tumor    Benign colon polyp    Generalized anxiety disorder    GERD without esophagitis    Hyperlipidemia    Hypocalcemia    Hypoparathyroidism (HCC)    Migraine, unspecified, not intractable, without status migrainosus    Multiple thyroid nodules    Palpitations    Microalbuminuria    BMI 25.0-25.9,adult    Benign hypertension with CKD (chronic kidney disease) stage III (HCC)    Benign prostatic hyperplasia with nocturia    Allergy to ACE inhibitors    Kidney cyst, acquired    Persistent proteinuria    Hearing loss of left ear    Stage 3a chronic kidney disease (HCC)    Hypercalciuria      Past Medical History:   Diagnosis Date    Abdominal pain, acute, generalized     last assessed - [id number]    Actinic keratosis     last assessed - 12Jun2013    Chest pain     last assessed - 29Nov2012    Chronic kidney disease, stage 3 (HCC)     Colon polyp     Disease of thyroid gland     Dysfunction of both eustachian tubes     suspect due to ETD. Recommend otc allergy meds and if fails then add flonase; last assessed - [id number]    Edema     Fatigue     last assessed - [id number]    Generalized anxiety disorder     GERD (gastroesophageal reflux disease)     Hematuria     last assessed - [id number]    Hypertension     Hypo-osmolality and hyponatremia     Hypocalcemia     Hypoparathyroidism (HCC)     Lightheadedness     last assessed - [id number]    Lump in neck     ?cause. Maybe a localized allergic reaction, dental issue, doubt sialdenitis, ect. Empiric abx and one dose steroids. Continue benadryl. If worsens, to er  or ent.    Malaise and fatigue     last assessed - [id number]    Nephropathy     last assessed - 27Bky8630    Nocturia     Proteinuria     Shortness of breath     last assessed - 29Nov2012    Skin lesion     Resolved - 31May2017    Tinea corporis     last assessed - 16May2014      Past Surgical History:   Procedure Laterality Date    COLONOSCOPY W/ POLYPECTOMY  07/19/2019    HEAD & NECK SKIN LESION EXCISIONAL BIOPSY      excision of lesion scalp malignant - [facility]; last assessed - 31May2017    SKIN BIOPSY      last assessed - 09Sep2014    THYROID SURGERY      Biopsy thyroid using percutaneous core needle; last assessed - 09Sep2014    TONSILLECTOMY        Family History   Problem Relation Age of Onset    Hypertension Mother     Hypertension Father     Diabetes Father     Other Brother         Schwannomatosis    Stroke Family      Social History     Tobacco Use    Smoking status: Never     Passive exposure: Past    Smokeless tobacco: Never   Substance Use Topics    Alcohol use: Yes     Comment: Occasionally drink     Allergies   Allergen Reactions    Lisinopril Anaphylaxis    Pollen Extract Sneezing       Current Outpatient Medications:     amLODIPine (NORVASC) 10 mg tablet, Take 1 tablet (10 mg total) by mouth daily, Disp: 90 tablet, Rfl: 1    aspirin (ECOTRIN LOW STRENGTH) 81 mg EC tablet, Take 1 tablet (81 mg total) by mouth daily, Disp: , Rfl:     calcitriol (ROCALTROL) 0.25 mcg capsule, Take 1 capsule (0.25 mcg total) by mouth every other day, Disp: 90 capsule, Rfl: 1    Calcium Carbonate (CALCIUM 600 PO), Take by mouth Twice per day, Disp: , Rfl:     Cholecalciferol (VITAMIN D3) 1000 units CAPS, Take 1 capsule by mouth in the morning  , Disp: , Rfl:     finasteride (PROSCAR) 5 mg tablet, Take 1 tablet (5 mg total) by mouth daily, Disp: 90 tablet, Rfl: 1    hydroCHLOROthiazide 12.5 mg tablet, Take 1 tablet (12.5 mg total) by mouth daily, Disp: 90 tablet, Rfl: 1    Multiple Vitamin (MULTI-VITAMIN DAILY PO), Take by  "mouth daily, Disp: , Rfl:     Psyllium (Metamucil) 28.3 % POWD, Take 1 Package by mouth as needed, Disp: , Rfl:     tamsulosin (FLOMAX) 0.4 mg, Take 1 capsule (0.4 mg total) by mouth 2 (two) times a day, Disp: 180 capsule, Rfl: 3    Review of Systems   Constitutional:  Negative for activity change, appetite change, fatigue and unexpected weight change.   HENT:  Negative for dental problem, sore throat, trouble swallowing and voice change.    Eyes:  Negative for visual disturbance.   Respiratory:  Negative for cough, chest tightness and shortness of breath.    Cardiovascular:  Negative for chest pain, palpitations and leg swelling.   Gastrointestinal:  Negative for constipation, diarrhea, nausea and vomiting.   Endocrine: Positive for heat intolerance. Negative for polydipsia, polyphagia and polyuria.   Genitourinary:  Negative for frequency.   Musculoskeletal:  Negative for arthralgias, back pain, gait problem and myalgias.   Skin:  Negative for wound.   Allergic/Immunologic: Positive for environmental allergies. Negative for food allergies.   Neurological:  Negative for dizziness, weakness, light-headedness, numbness and headaches.   Psychiatric/Behavioral:  Negative for decreased concentration, dysphoric mood and sleep disturbance. The patient is not nervous/anxious.        Physical Exam:  Body mass index is 25.06 kg/m².  /66 (BP Location: Left arm, Patient Position: Sitting, Cuff Size: Standard)   Pulse 79   Temp 98.3 °F (36.8 °C)   Ht 6' 2\" (1.88 m)   Wt 88.5 kg (195 lb 3.2 oz)   SpO2 98%   BMI 25.06 kg/m²    Wt Readings from Last 3 Encounters:   07/17/24 88.5 kg (195 lb 3.2 oz)   07/12/24 87.5 kg (193 lb)   05/23/24 90.4 kg (199 lb 3.2 oz)       Physical Exam  Vitals reviewed.   Constitutional:       General: He is not in acute distress.     Appearance: He is well-developed. He is not ill-appearing.   HENT:      Head: Normocephalic and atraumatic.   Eyes:      Pupils: Pupils are equal, round, and " reactive to light.   Neck:      Thyroid: No thyromegaly.   Cardiovascular:      Rate and Rhythm: Normal rate.      Pulses: Normal pulses.   Pulmonary:      Effort: Pulmonary effort is normal.   Musculoskeletal:      Cervical back: Normal range of motion and neck supple.      Right lower leg: No edema.      Left lower leg: No edema.   Lymphadenopathy:      Cervical: No cervical adenopathy.   Skin:     General: Skin is warm and dry.      Capillary Refill: Capillary refill takes less than 2 seconds.   Neurological:      Mental Status: He is alert and oriented to person, place, and time.      Gait: Gait normal.   Psychiatric:         Mood and Affect: Mood normal.         Behavior: Behavior normal.         Labs:       Discussed with the patient and all questioned fully answered. He will call me if any problems arise.    Follow-up appointment in 6 months.     Counseled patient on diagnostic results, prognosis, risk and benefit of treatment options, instruction for management, importance of treatment compliance, Risk  factor reduction and impressions    TRISTIN Phillips

## 2024-07-17 ENCOUNTER — OFFICE VISIT (OUTPATIENT)
Dept: ENDOCRINOLOGY | Facility: CLINIC | Age: 74
End: 2024-07-17
Payer: MEDICARE

## 2024-07-17 VITALS
TEMPERATURE: 98.3 F | BODY MASS INDEX: 25.05 KG/M2 | DIASTOLIC BLOOD PRESSURE: 66 MMHG | HEIGHT: 74 IN | WEIGHT: 195.2 LBS | HEART RATE: 79 BPM | OXYGEN SATURATION: 98 % | SYSTOLIC BLOOD PRESSURE: 120 MMHG

## 2024-07-17 DIAGNOSIS — E04.2 MULTIPLE THYROID NODULES: ICD-10-CM

## 2024-07-17 DIAGNOSIS — R82.994 HYPERCALCIURIA: ICD-10-CM

## 2024-07-17 DIAGNOSIS — E20.0 IDIOPATHIC HYPOPARATHYROIDISM (HCC): Primary | ICD-10-CM

## 2024-07-17 DIAGNOSIS — I12.9 BENIGN HYPERTENSION WITH CKD (CHRONIC KIDNEY DISEASE) STAGE III (HCC): ICD-10-CM

## 2024-07-17 DIAGNOSIS — N18.30 BENIGN HYPERTENSION WITH CKD (CHRONIC KIDNEY DISEASE) STAGE III (HCC): ICD-10-CM

## 2024-07-17 PROCEDURE — 99214 OFFICE O/P EST MOD 30 MIN: CPT | Performed by: NURSE PRACTITIONER

## 2024-07-17 NOTE — ASSESSMENT & PLAN NOTE
Lab Results   Component Value Date    EGFR 58 07/01/2024    EGFR 55 05/16/2024    EGFR 52 04/11/2024    CREATININE 1.20 07/01/2024    CREATININE 1.26 05/16/2024    CREATININE 1.32 (H) 04/11/2024   Well-controlled at 120/66.  Continue 10 mg of amlodipine and 12.5 mg of hydrochlorothiazide daily.

## 2024-07-17 NOTE — ASSESSMENT & PLAN NOTE
Denies compressive symptoms.  Thyroid nodule stable.  No need for further surveillance at this time.

## 2024-07-17 NOTE — ASSESSMENT & PLAN NOTE
Stable.  CCed on upcoming labs ordered by nephrology.  Recent serum calcium from July 1 is stable at 8.3.  Repeat 24-hour urine collection now the patient is taking 12.5 mg of hydrochlorothiazide daily.  Denies any symptoms of hypocalcemia including circumoral numbness, paresthesias, headache, lightheadedness.  Denies flank pain or hematuria.  Continue calcitriol 0.25 mcg 4 times weekly: Monday, Wednesday, Friday, Sunday.  Continue 600 mg of calcium carbonate daily.  Continue vitamin D 1000 units daily.  Reviewed signs and symptoms that patient should report to me if he should experience.  Follow-up in 6 months.

## 2024-08-01 ENCOUNTER — TELEPHONE (OUTPATIENT)
Dept: INTERNAL MEDICINE CLINIC | Facility: CLINIC | Age: 74
End: 2024-08-01

## 2024-08-01 DIAGNOSIS — R35.0 BENIGN PROSTATIC HYPERPLASIA WITH URINARY FREQUENCY: ICD-10-CM

## 2024-08-01 DIAGNOSIS — N40.1 BENIGN PROSTATIC HYPERPLASIA WITH URINARY FREQUENCY: ICD-10-CM

## 2024-08-01 RX ORDER — TAMSULOSIN HYDROCHLORIDE 0.4 MG/1
0.4 CAPSULE ORAL 2 TIMES DAILY
Qty: 200 CAPSULE | Refills: 1 | Status: SHIPPED | OUTPATIENT
Start: 2024-08-01

## 2024-08-01 NOTE — TELEPHONE ENCOUNTER
Patient's wife called and requested a refill of tamsulosin (FLOMAX) 0.4 mg to be refilled at [facility] in [place]. However, when I reordered medication, it said it already had orders pending. Patient's wife states they called pharmacy and was told it can't be reordered. Please advise patient.    Thank you.

## 2024-09-24 ENCOUNTER — OFFICE VISIT (OUTPATIENT)
Dept: INTERNAL MEDICINE CLINIC | Facility: CLINIC | Age: 74
End: 2024-09-24
Payer: MEDICARE

## 2024-09-24 ENCOUNTER — APPOINTMENT (OUTPATIENT)
Dept: LAB | Facility: CLINIC | Age: 74
End: 2024-09-24
Payer: MEDICARE

## 2024-09-24 VITALS
DIASTOLIC BLOOD PRESSURE: 72 MMHG | BODY MASS INDEX: 24.77 KG/M2 | WEIGHT: 193 LBS | HEART RATE: 67 BPM | OXYGEN SATURATION: 99 % | HEIGHT: 74 IN | TEMPERATURE: 97 F | SYSTOLIC BLOOD PRESSURE: 112 MMHG

## 2024-09-24 DIAGNOSIS — N18.31 STAGE 3A CHRONIC KIDNEY DISEASE (HCC): ICD-10-CM

## 2024-09-24 DIAGNOSIS — F41.1 GENERALIZED ANXIETY DISORDER: ICD-10-CM

## 2024-09-24 DIAGNOSIS — N40.1 BENIGN PROSTATIC HYPERPLASIA WITH NOCTURIA: ICD-10-CM

## 2024-09-24 DIAGNOSIS — N18.30 BENIGN HYPERTENSION WITH CKD (CHRONIC KIDNEY DISEASE) STAGE III (HCC): Primary | ICD-10-CM

## 2024-09-24 DIAGNOSIS — E20.0 IDIOPATHIC HYPOPARATHYROIDISM (HCC): ICD-10-CM

## 2024-09-24 DIAGNOSIS — K21.9 GERD WITHOUT ESOPHAGITIS: ICD-10-CM

## 2024-09-24 DIAGNOSIS — I12.9 BENIGN HYPERTENSION WITH CKD (CHRONIC KIDNEY DISEASE) STAGE III (HCC): Primary | ICD-10-CM

## 2024-09-24 DIAGNOSIS — G43.909 MIGRAINE WITHOUT STATUS MIGRAINOSUS, NOT INTRACTABLE, UNSPECIFIED MIGRAINE TYPE: ICD-10-CM

## 2024-09-24 DIAGNOSIS — E78.2 MIXED HYPERLIPIDEMIA: ICD-10-CM

## 2024-09-24 DIAGNOSIS — R80.9 MICROALBUMINURIA: ICD-10-CM

## 2024-09-24 DIAGNOSIS — Z13.1 SCREENING FOR DIABETES MELLITUS (DM): ICD-10-CM

## 2024-09-24 DIAGNOSIS — N18.30 BENIGN HYPERTENSION WITH CKD (CHRONIC KIDNEY DISEASE) STAGE III (HCC): ICD-10-CM

## 2024-09-24 DIAGNOSIS — R35.1 BENIGN PROSTATIC HYPERPLASIA WITH NOCTURIA: ICD-10-CM

## 2024-09-24 DIAGNOSIS — I12.9 BENIGN HYPERTENSION WITH CKD (CHRONIC KIDNEY DISEASE) STAGE III (HCC): ICD-10-CM

## 2024-09-24 LAB
ALBUMIN SERPL BCG-MCNC: 4.1 G/DL (ref 3.5–5)
ALP SERPL-CCNC: 61 U/L (ref 34–104)
ALT SERPL W P-5'-P-CCNC: 11 U/L (ref 7–52)
ANION GAP SERPL CALCULATED.3IONS-SCNC: 6 MMOL/L (ref 4–13)
AST SERPL W P-5'-P-CCNC: 17 U/L (ref 13–39)
BACTERIA UR QL AUTO: ABNORMAL /HPF
BASOPHILS # BLD AUTO: 0.1 THOUSANDS/ΜL (ref 0–0.1)
BASOPHILS NFR BLD AUTO: 1 % (ref 0–1)
BILIRUB SERPL-MCNC: 0.65 MG/DL (ref 0.2–1)
BILIRUB UR QL STRIP: NEGATIVE
BUN SERPL-MCNC: 19 MG/DL (ref 5–25)
CALCIUM SERPL-MCNC: 8.6 MG/DL (ref 8.4–10.2)
CHLORIDE SERPL-SCNC: 98 MMOL/L (ref 96–108)
CHOLEST SERPL-MCNC: 181 MG/DL
CLARITY UR: CLEAR
CO2 SERPL-SCNC: 32 MMOL/L (ref 21–32)
COLOR UR: YELLOW
CREAT SERPL-MCNC: 1.24 MG/DL (ref 0.6–1.3)
CREAT UR-MCNC: 127.4 MG/DL
EOSINOPHIL # BLD AUTO: 0.26 THOUSAND/ΜL (ref 0–0.61)
EOSINOPHIL NFR BLD AUTO: 3 % (ref 0–6)
ERYTHROCYTE [DISTWIDTH] IN BLOOD BY AUTOMATED COUNT: 13.2 % (ref 11.6–15.1)
EST. AVERAGE GLUCOSE BLD GHB EST-MCNC: 108 MG/DL
GFR SERPL CREATININE-BSD FRML MDRD: 56 ML/MIN/1.73SQ M
GLUCOSE P FAST SERPL-MCNC: 98 MG/DL (ref 65–99)
GLUCOSE UR STRIP-MCNC: NEGATIVE MG/DL
HBA1C MFR BLD: 5.4 %
HCT VFR BLD AUTO: 48.3 % (ref 36.5–49.3)
HDLC SERPL-MCNC: 39 MG/DL
HGB BLD-MCNC: 16.3 G/DL (ref 12–17)
HGB UR QL STRIP.AUTO: NEGATIVE
IMM GRANULOCYTES # BLD AUTO: 0.01 THOUSAND/UL (ref 0–0.2)
IMM GRANULOCYTES NFR BLD AUTO: 0 % (ref 0–2)
KETONES UR STRIP-MCNC: NEGATIVE MG/DL
LDLC SERPL CALC-MCNC: 100 MG/DL (ref 0–100)
LEUKOCYTE ESTERASE UR QL STRIP: NEGATIVE
LYMPHOCYTES # BLD AUTO: 2.73 THOUSANDS/ΜL (ref 0.6–4.47)
LYMPHOCYTES NFR BLD AUTO: 33 % (ref 14–44)
MAGNESIUM SERPL-MCNC: 2 MG/DL (ref 1.9–2.7)
MCH RBC QN AUTO: 29.9 PG (ref 26.8–34.3)
MCHC RBC AUTO-ENTMCNC: 33.7 G/DL (ref 31.4–37.4)
MCV RBC AUTO: 89 FL (ref 82–98)
MICROALBUMIN UR-MCNC: 51 MG/L
MICROALBUMIN/CREAT 24H UR: 40 MG/G CREATININE (ref 0–30)
MONOCYTES # BLD AUTO: 0.55 THOUSAND/ΜL (ref 0.17–1.22)
MONOCYTES NFR BLD AUTO: 7 % (ref 4–12)
NEUTROPHILS # BLD AUTO: 4.71 THOUSANDS/ΜL (ref 1.85–7.62)
NEUTS SEG NFR BLD AUTO: 56 % (ref 43–75)
NITRITE UR QL STRIP: NEGATIVE
NON-SQ EPI CELLS URNS QL MICRO: ABNORMAL /HPF
NRBC BLD AUTO-RTO: 0 /100 WBCS
PH UR STRIP.AUTO: 6 [PH]
PHOSPHATE SERPL-MCNC: 3.4 MG/DL (ref 2.3–4.1)
PLATELET # BLD AUTO: 329 THOUSANDS/UL (ref 149–390)
PMV BLD AUTO: 9.2 FL (ref 8.9–12.7)
POTASSIUM SERPL-SCNC: 4.2 MMOL/L (ref 3.5–5.3)
PROT SERPL-MCNC: 7.3 G/DL (ref 6.4–8.4)
PROT UR STRIP-MCNC: ABNORMAL MG/DL
PTH-INTACT SERPL-MCNC: 5.6 PG/ML (ref 12–88)
RBC # BLD AUTO: 5.45 MILLION/UL (ref 3.88–5.62)
RBC #/AREA URNS AUTO: ABNORMAL /HPF
SODIUM SERPL-SCNC: 136 MMOL/L (ref 135–147)
SP GR UR STRIP.AUTO: 1.02 (ref 1–1.03)
TRIGL SERPL-MCNC: 211 MG/DL
TSH SERPL DL<=0.05 MIU/L-ACNC: 0.79 UIU/ML (ref 0.45–4.5)
UROBILINOGEN UR STRIP-ACNC: <2 MG/DL
WBC # BLD AUTO: 8.36 THOUSAND/UL (ref 4.31–10.16)
WBC #/AREA URNS AUTO: ABNORMAL /HPF

## 2024-09-24 PROCEDURE — 83036 HEMOGLOBIN GLYCOSYLATED A1C: CPT

## 2024-09-24 PROCEDURE — 84443 ASSAY THYROID STIM HORMONE: CPT

## 2024-09-24 PROCEDURE — 83970 ASSAY OF PARATHORMONE: CPT

## 2024-09-24 PROCEDURE — 83735 ASSAY OF MAGNESIUM: CPT

## 2024-09-24 PROCEDURE — 36415 COLL VENOUS BLD VENIPUNCTURE: CPT

## 2024-09-24 PROCEDURE — 82570 ASSAY OF URINE CREATININE: CPT

## 2024-09-24 PROCEDURE — G2211 COMPLEX E/M VISIT ADD ON: HCPCS | Performed by: INTERNAL MEDICINE

## 2024-09-24 PROCEDURE — 84100 ASSAY OF PHOSPHORUS: CPT

## 2024-09-24 PROCEDURE — 81001 URINALYSIS AUTO W/SCOPE: CPT

## 2024-09-24 PROCEDURE — 82043 UR ALBUMIN QUANTITATIVE: CPT

## 2024-09-24 PROCEDURE — 80061 LIPID PANEL: CPT

## 2024-09-24 PROCEDURE — 80053 COMPREHEN METABOLIC PANEL: CPT

## 2024-09-24 PROCEDURE — 99214 OFFICE O/P EST MOD 30 MIN: CPT | Performed by: INTERNAL MEDICINE

## 2024-09-24 PROCEDURE — 85025 COMPLETE CBC W/AUTO DIFF WBC: CPT

## 2024-09-24 NOTE — PROGRESS NOTES
Ambulatory Visit  Name: Qamar CASTILLO Trainer      : 1950      MRN: [id number]  Encounter Provider: Juan F Dang DO  Encounter Date: 2024   Encounter department: [facility]    Assessment & Plan  Benign hypertension with CKD (chronic kidney disease) stage III (HCC)  Lab Results   Component Value Date    EGFR 58 2024    EGFR 55 2024    EGFR 52 2024    CREATININE 1.20 2024    CREATININE 1.26 2024    CREATININE 1.32 (H) 2024       Orders:    Albumin / creatinine urine ratio; Future    GERD without esophagitis         Migraine without status migrainosus, not intractable, unspecified migraine type         Idiopathic hypoparathyroidism (HCC)         Stage 3a chronic kidney disease (HCC)  Lab Results   Component Value Date    EGFR 58 2024    EGFR 55 2024    EGFR 52 2024    CREATININE 1.20 2024    CREATININE 1.26 2024    CREATININE 1.32 (H) 2024       Orders:    Albumin / creatinine urine ratio; Future    Hemoglobin A1C; Future    TSH, 3rd generation; Future    Lipid Panel with Direct LDL reflex; Future    Generalized anxiety disorder    Orders:    TSH, 3rd generation; Future    Mixed hyperlipidemia    Orders:    Lipid Panel with Direct LDL reflex; Future    Microalbuminuria    Orders:    Albumin / creatinine urine ratio; Future    BMI 25.0-25.9,adult         Benign prostatic hyperplasia with nocturia         Screening for diabetes mellitus (DM)    Orders:    Hemoglobin A1C; Future  A/P; Doing  well and will check labs. Discussed vaccines are up to date except for flu vaccine. Pt to go in Oct.. Appreciate nephro input. Continue current treatment and RTC four months for routine.      History of Present Illness     WM RTC For f/u HTN, HLD, etc. Doing ok and no new issues. Remains active w/o difficulty and no falls. No reflux. STAR is controlled. Denies CP, SOB, edema, palpitations, orthopnea or PND.CHronic pain and migraines are  "manageable. Still sees Nephro. Due for labs and vaccines.           Review of Systems   Constitutional:  Negative for activity change, chills, diaphoresis, fatigue and fever.   HENT:  Positive for hearing loss.    Eyes:  Negative for visual disturbance.   Respiratory:  Negative for cough, chest tightness, shortness of breath and wheezing.    Cardiovascular:  Negative for chest pain, palpitations and leg swelling.   Gastrointestinal:  Negative for abdominal pain, constipation, diarrhea, nausea and vomiting.   Endocrine: Negative for cold intolerance and heat intolerance.   Genitourinary:  Negative for difficulty urinating, dysuria and frequency.   Musculoskeletal:  Negative for arthralgias, gait problem and myalgias.   Neurological:  Negative for dizziness, seizures, syncope, weakness, light-headedness and headaches.   Psychiatric/Behavioral:  Negative for confusion, dysphoric mood and sleep disturbance. The patient is not nervous/anxious.            Objective     /72 (BP Location: Left arm, Patient Position: Sitting)   Pulse 67   Temp (!) 97 °F (36.1 °C) (Tympanic)   Ht 6' 2\" (1.88 m)   Wt 87.5 kg (193 lb)   SpO2 99%   BMI 24.78 kg/m²     Physical Exam  Vitals and nursing note reviewed.   Constitutional:       General: He is not in acute distress.     Appearance: Normal appearance. He is not ill-appearing.   HENT:      Head: Normocephalic and atraumatic.      Mouth/Throat:      Mouth: Mucous membranes are moist.   Eyes:      Extraocular Movements: Extraocular movements intact.      Conjunctiva/sclera: Conjunctivae normal.      Pupils: Pupils are equal, round, and reactive to light.   Neck:      Vascular: No carotid bruit.   Cardiovascular:      Rate and Rhythm: Normal rate and regular rhythm.      Heart sounds: Normal heart sounds. No murmur heard.  Pulmonary:      Effort: Pulmonary effort is normal. No respiratory distress.      Breath sounds: Normal breath sounds. No wheezing, rhonchi or rales. "   Abdominal:      General: Bowel sounds are normal. There is no distension.      Palpations: Abdomen is soft.      Tenderness: There is no abdominal tenderness.   Musculoskeletal:      Cervical back: Neck supple.      Right lower leg: No edema.      Left lower leg: No edema.   Neurological:      General: No focal deficit present.      Mental Status: He is alert and oriented to person, place, and time. Mental status is at baseline.   Psychiatric:         Mood and Affect: Mood normal.         Behavior: Behavior normal.         Thought Content: Thought content normal.         Judgment: Judgment normal.

## 2024-09-24 NOTE — ASSESSMENT & PLAN NOTE
Lab Results   Component Value Date    EGFR 58 07/01/2024    EGFR 55 05/16/2024    EGFR 52 04/11/2024    CREATININE 1.20 07/01/2024    CREATININE 1.26 05/16/2024    CREATININE 1.32 (H) 04/11/2024       Orders:    Albumin / creatinine urine ratio; Future

## 2024-09-24 NOTE — ASSESSMENT & PLAN NOTE
Lab Results   Component Value Date    EGFR 58 07/01/2024    EGFR 55 05/16/2024    EGFR 52 04/11/2024    CREATININE 1.20 07/01/2024    CREATININE 1.26 05/16/2024    CREATININE 1.32 (H) 04/11/2024       Orders:    Albumin / creatinine urine ratio; Future    Hemoglobin A1C; Future    TSH, 3rd generation; Future    Lipid Panel with Direct LDL reflex; Future

## 2024-09-24 NOTE — PATIENT INSTRUCTIONS
"Patient Education     High blood pressure in adults   The Basics   Written by the doctors and editors at [facility]   What is high blood pressure? -- High blood pressure is a condition that puts you at risk for heart attack, stroke, and kidney disease. It does not usually cause symptoms. But it can be serious.  When your doctor or nurse tells you your blood pressure, they say 2 numbers. For instance, your doctor or nurse might say that your blood pressure is \"130 over 80.\" The top number is the pressure inside your arteries when your heart is paulino. The bottom number is the pressure inside your arteries when your heart is relaxed.  \"Elevated blood pressure\" is a term doctors or nurses use as a warning. People with elevated blood pressure do not yet have high blood pressure. But their blood pressure is not as low as it should be for good health.  Many experts define high, elevated, and normal blood pressure as follows:   High - Top number of 130 or above and/or bottom number of 80 or above.   Elevated - Top number between 120 and 129 and bottom number of 79 or below.   Normal - Top number of 119 or below and bottom number of 79 or below.  This information is also in the table (table 1).  How can I lower my blood pressure? -- If your doctor or nurse prescribed blood pressure medicine, the most important thing you can do is to take it. If it causes side effects, do not just stop taking it. Instead, talk to your doctor or nurse about the problems it causes. They might be able to lower your dose or switch you to another medicine. If cost is a problem, mention that, too. They might be able to put you on a less expensive medicine. Taking your blood pressure medicine can keep you from having a heart attack or stroke, and it can save your life!  Can I do anything on my own? -- You have a lot of control over your blood pressure. To lower it:   Lose weight (if you are overweight).   Choose a diet low in fat and rich in " "fruits, vegetables, and low-fat dairy products.   Eat less salt.   Do something active for at least 30 minutes a day on most days of the week.   Drink less alcohol (if you drink more than 2 alcoholic drinks per day).  It's also a good idea to get a home blood pressure meter. People who check their own blood pressure at home do better at keeping it low and can sometimes even reduce the amount of medicine they take.  All topics are updated as new evidence becomes available and our peer review process is complete.  This topic retrieved from [employer] on: Feb 26, 2024.  Topic 70226 Version 23.0  Release: 32.2.4 - C32.56  © 2024 UpToDate, Inc. and/or its affiliates. All rights reserved.  table 1: Definition of normal and high blood pressure  Level  Top number  Bottom number    High 130 or above 80 or above   Elevated 120 to 129 79 or below   Normal 119 or below 79 or below   These definitions are from the American College of Cardiology/American Heart Association. Other expert groups might use slightly different definitions.  \"Elevated blood pressure\" is a term doctor or nurses use as a warning. It means you do not yet have high blood pressure, but your blood pressure is not as low as it should be for good health.  Graphic 30609 Version 6.0  Consumer Information Use and Disclaimer   Disclaimer: This generalized information is a limited summary of diagnosis, treatment, and/or medication information. It is not meant to be comprehensive and should be used as a tool to help the user understand and/or assess potential diagnostic and treatment options. It does NOT include all information about conditions, treatments, medications, side effects, or risks that may apply to a specific patient. It is not intended to be medical advice or a substitute for the medical advice, diagnosis, or treatment of a health care provider based on the health care provider's examination and assessment of a patient's specific and unique circumstances. " Patients must speak with a health care provider for complete information about their health, medical questions, and treatment options, including any risks or benefits regarding use of medications. This information does not endorse any treatments or medications as safe, effective, or approved for treating a specific patient. UpToDate, Inc. and its affiliates disclaim any warranty or liability relating to this information or the use thereof.The use of this information is governed by the Terms of Use, available at https://www.woltersbrotipsuwer.com/en/know/clinical-effectiveness-terms. 2024© UpToDate, Inc. and its affiliates and/or licensors. All rights reserved.  Copyright   © 2024 UpToDate, Inc. and/or its affiliates. All rights reserved.

## 2024-09-25 ENCOUNTER — TELEPHONE (OUTPATIENT)
Dept: INTERNAL MEDICINE CLINIC | Facility: CLINIC | Age: 74
End: 2024-09-25

## 2024-09-25 NOTE — TELEPHONE ENCOUNTER
----- Message from Juan F Dang DO sent at 9/24/2024  3:06 PM EDT -----  Call pt labs ok except for elevated TG. Watch diet and call if you want to try meds.

## 2024-10-11 ENCOUNTER — TELEPHONE (OUTPATIENT)
Dept: NEPHROLOGY | Facility: CLINIC | Age: 74
End: 2024-10-11

## 2024-10-11 DIAGNOSIS — N18.31 STAGE 3A CHRONIC KIDNEY DISEASE (HCC): Primary | ICD-10-CM

## 2024-10-14 ENCOUNTER — APPOINTMENT (OUTPATIENT)
Dept: LAB | Facility: CLINIC | Age: 74
End: 2024-10-14
Payer: MEDICARE

## 2024-10-14 ENCOUNTER — IMMUNIZATIONS (OUTPATIENT)
Dept: INTERNAL MEDICINE CLINIC | Facility: CLINIC | Age: 74
End: 2024-10-14
Payer: MEDICARE

## 2024-10-14 DIAGNOSIS — R82.994 HYPERCALCIURIA: ICD-10-CM

## 2024-10-14 DIAGNOSIS — N18.31 STAGE 3A CHRONIC KIDNEY DISEASE (HCC): ICD-10-CM

## 2024-10-14 DIAGNOSIS — Z23 ENCOUNTER FOR IMMUNIZATION: Primary | ICD-10-CM

## 2024-10-14 LAB
ANION GAP SERPL CALCULATED.3IONS-SCNC: 9 MMOL/L (ref 4–13)
BACTERIA UR QL AUTO: ABNORMAL /HPF
BASOPHILS # BLD AUTO: 0.08 THOUSANDS/ΜL (ref 0–0.1)
BASOPHILS NFR BLD AUTO: 1 % (ref 0–1)
BILIRUB UR QL STRIP: NEGATIVE
BUN SERPL-MCNC: 14 MG/DL (ref 5–25)
CALCIUM 24H UR-MCNC: 175.72 MG/24 HRS (ref 100–300)
CALCIUM SERPL-MCNC: 8.7 MG/DL (ref 8.4–10.2)
CHLORIDE SERPL-SCNC: 96 MMOL/L (ref 96–108)
CLARITY UR: CLEAR
CO2 SERPL-SCNC: 32 MMOL/L (ref 21–32)
COLOR UR: YELLOW
CREAT 24H UR-MRATE: 1.3 G/24HR (ref 0.8–1.8)
CREAT SERPL-MCNC: 1.19 MG/DL (ref 0.6–1.3)
CREAT UR-MCNC: 141.2 MG/DL
EOSINOPHIL # BLD AUTO: 0.15 THOUSAND/ΜL (ref 0–0.61)
EOSINOPHIL NFR BLD AUTO: 2 % (ref 0–6)
ERYTHROCYTE [DISTWIDTH] IN BLOOD BY AUTOMATED COUNT: 13.2 % (ref 11.6–15.1)
GFR SERPL CREATININE-BSD FRML MDRD: 59 ML/MIN/1.73SQ M
GLUCOSE P FAST SERPL-MCNC: 93 MG/DL (ref 65–99)
GLUCOSE UR STRIP-MCNC: NEGATIVE MG/DL
HCT VFR BLD AUTO: 46.1 % (ref 36.5–49.3)
HGB BLD-MCNC: 15.3 G/DL (ref 12–17)
HGB UR QL STRIP.AUTO: NEGATIVE
IMM GRANULOCYTES # BLD AUTO: 0.03 THOUSAND/UL (ref 0–0.2)
IMM GRANULOCYTES NFR BLD AUTO: 0 % (ref 0–2)
KETONES UR STRIP-MCNC: NEGATIVE MG/DL
LEUKOCYTE ESTERASE UR QL STRIP: NEGATIVE
LYMPHOCYTES # BLD AUTO: 3.29 THOUSANDS/ΜL (ref 0.6–4.47)
LYMPHOCYTES NFR BLD AUTO: 38 % (ref 14–44)
MCH RBC QN AUTO: 29.1 PG (ref 26.8–34.3)
MCHC RBC AUTO-ENTMCNC: 33.2 G/DL (ref 31.4–37.4)
MCV RBC AUTO: 88 FL (ref 82–98)
MONOCYTES # BLD AUTO: 0.64 THOUSAND/ΜL (ref 0.17–1.22)
MONOCYTES NFR BLD AUTO: 8 % (ref 4–12)
NEUTROPHILS # BLD AUTO: 4.4 THOUSANDS/ΜL (ref 1.85–7.62)
NEUTS SEG NFR BLD AUTO: 51 % (ref 43–75)
NITRITE UR QL STRIP: NEGATIVE
NON-SQ EPI CELLS URNS QL MICRO: ABNORMAL /HPF
NRBC BLD AUTO-RTO: 0 /100 WBCS
PH UR STRIP.AUTO: 7.5 [PH]
PLATELET # BLD AUTO: 277 THOUSANDS/UL (ref 149–390)
PMV BLD AUTO: 9.2 FL (ref 8.9–12.7)
POTASSIUM SERPL-SCNC: 3.7 MMOL/L (ref 3.5–5.3)
PROT UR STRIP-MCNC: ABNORMAL MG/DL
PROT UR-MCNC: 17.2 MG/DL
PROT/CREAT UR: 0.1 MG/G{CREAT} (ref 0–0.1)
RBC # BLD AUTO: 5.26 MILLION/UL (ref 3.88–5.62)
RBC #/AREA URNS AUTO: ABNORMAL /HPF
SODIUM SERPL-SCNC: 137 MMOL/L (ref 135–147)
SP GR UR STRIP.AUTO: 1.02 (ref 1–1.03)
SPECIMEN VOL UR: 2475 ML
SPECIMEN VOL UR: 2475 ML
UROBILINOGEN UR STRIP-ACNC: <2 MG/DL
WBC # BLD AUTO: 8.59 THOUSAND/UL (ref 4.31–10.16)
WBC #/AREA URNS AUTO: ABNORMAL /HPF

## 2024-10-14 PROCEDURE — G0008 ADMIN INFLUENZA VIRUS VAC: HCPCS

## 2024-10-14 PROCEDURE — 84156 ASSAY OF PROTEIN URINE: CPT

## 2024-10-14 PROCEDURE — 80048 BASIC METABOLIC PNL TOTAL CA: CPT

## 2024-10-14 PROCEDURE — 82570 ASSAY OF URINE CREATININE: CPT

## 2024-10-14 PROCEDURE — 82340 ASSAY OF CALCIUM IN URINE: CPT

## 2024-10-14 PROCEDURE — 81001 URINALYSIS AUTO W/SCOPE: CPT

## 2024-10-14 PROCEDURE — 90662 IIV NO PRSV INCREASED AG IM: CPT

## 2024-10-14 PROCEDURE — 85025 COMPLETE CBC W/AUTO DIFF WBC: CPT

## 2024-10-14 PROCEDURE — 36415 COLL VENOUS BLD VENIPUNCTURE: CPT

## 2024-10-17 ENCOUNTER — OFFICE VISIT (OUTPATIENT)
Dept: NEPHROLOGY | Facility: CLINIC | Age: 74
End: 2024-10-17
Payer: MEDICARE

## 2024-10-17 VITALS
OXYGEN SATURATION: 98 % | DIASTOLIC BLOOD PRESSURE: 80 MMHG | BODY MASS INDEX: 24.95 KG/M2 | WEIGHT: 194.4 LBS | SYSTOLIC BLOOD PRESSURE: 122 MMHG | TEMPERATURE: 97.3 F | HEART RATE: 75 BPM | HEIGHT: 74 IN

## 2024-10-17 DIAGNOSIS — N25.81 SECONDARY HYPERPARATHYROIDISM OF RENAL ORIGIN (HCC): ICD-10-CM

## 2024-10-17 DIAGNOSIS — R82.994 HYPERCALCIURIA: ICD-10-CM

## 2024-10-17 DIAGNOSIS — N18.30 BENIGN HYPERTENSION WITH CKD (CHRONIC KIDNEY DISEASE) STAGE III (HCC): ICD-10-CM

## 2024-10-17 DIAGNOSIS — N18.31 STAGE 3A CHRONIC KIDNEY DISEASE (HCC): Primary | ICD-10-CM

## 2024-10-17 DIAGNOSIS — E55.9 VITAMIN D DEFICIENCY: ICD-10-CM

## 2024-10-17 DIAGNOSIS — I12.9 BENIGN HYPERTENSION WITH CKD (CHRONIC KIDNEY DISEASE) STAGE III (HCC): ICD-10-CM

## 2024-10-17 DIAGNOSIS — E20.0 IDIOPATHIC HYPOPARATHYROIDISM (HCC): ICD-10-CM

## 2024-10-17 DIAGNOSIS — E83.42 HYPOMAGNESEMIA: ICD-10-CM

## 2024-10-17 DIAGNOSIS — R80.1 PERSISTENT PROTEINURIA: ICD-10-CM

## 2024-10-17 PROCEDURE — 99214 OFFICE O/P EST MOD 30 MIN: CPT

## 2024-10-17 NOTE — ASSESSMENT & PLAN NOTE
Lab Results   Component Value Date    EGFR 59 10/14/2024    EGFR 56 09/24/2024    EGFR 58 07/01/2024    CREATININE 1.19 10/14/2024    CREATININE 1.24 09/24/2024    CREATININE 1.20 07/01/2024   Baseline creatinine around 1.2 mg/dL.  Etiology hypertensive nephrosclerosis, previous exposure to NSAIDs due to back pain.  Creatinine 1.19 mg/dl, GFR 59 ml/min, stable, 10/14.   UA 1+ protein, otherwise unremarkable.  US KUB with no hydronephrosis or calculi. Right lower pole cyst measures 11 mm with punctate rim calcification. Left mid to lower portion cyst with partial rim calcification measures 7 mm, 8/22/22.   Continue 2 gm sodium restriction. Continue to avoid NSAIDs.    Orders:    Basic metabolic panel; Future    CBC and differential; Future

## 2024-10-17 NOTE — ASSESSMENT & PLAN NOTE
24-hour urine calcium 175.725 mg/24 hrs, appropriate, 10/14.  Currently on hydrochlorothiazide.  Continue management as per endocrinology.

## 2024-10-17 NOTE — PROGRESS NOTES
Ambulatory Visit  Name: Qamar CASTILLO Trainer      : 1950      MRN: [id number]  Encounter Provider: TRISTIN Yanes  Encounter Date: 10/17/2024   Encounter department: [facility]    Assessment & Plan  Stage 3a chronic kidney disease (HCC)  Lab Results   Component Value Date    EGFR 59 10/14/2024    EGFR 56 2024    EGFR 58 2024    CREATININE 1.19 10/14/2024    CREATININE 1.24 2024    CREATININE 1.20 2024   Baseline creatinine around 1.2 mg/dL.  Etiology hypertensive nephrosclerosis, previous exposure to NSAIDs due to back pain.  Creatinine 1.19 mg/dl, GFR 59 ml/min, stable, 10/14.   UA 1+ protein, otherwise unremarkable.  US KUB with no hydronephrosis or calculi. Right lower pole cyst measures 11 mm with punctate rim calcification. Left mid to lower portion cyst with partial rim calcification measures 7 mm, 22.   Continue 2 gm sodium restriction. Continue to avoid NSAIDs.    Orders:    Basic metabolic panel; Future    CBC and differential; Future    Benign hypertension with CKD (chronic kidney disease) stage III (HCC)  Lab Results   Component Value Date    EGFR 59 10/14/2024    EGFR 56 2024    EGFR 58 2024    CREATININE 1.19 10/14/2024    CREATININE 1.24 2024    CREATININE 1.20 2024   Blood pressure 122/80 mmHg today.  Home blood pressure usually 110-120s/70-80s. Not currently on blood pressure medication. Recommended to check BP occasionally at home.      Hypercalciuria  24-hour urine calcium 175.725 mg/24 hrs, appropriate, 10/14.  Currently on hydrochlorothiazide.  Continue management as per endocrinology.     Persistent proteinuria  UPCR 0.1, appropriate, 10/14.  Previously had tongue swelling suspected due to lisinopril. Orders:    Albumin / creatinine urine ratio; Future    Urinalysis with microscopic; Future    Secondary hyperparathyroidism of renal origin (HCC)  History of hypoparathyroidism  "and thyroid nodules, follows with endocrinology. PTH 5.6 pg/dl  Orders:    Phosphorus; Future    PTH, intact; Future    Vitamin D deficiency    Orders:    Vitamin D 25 hydroxy; Future    Hypomagnesemia    Orders:    Magnesium; Future    Idiopathic hypoparathyroidism (HCC)  History of hypoparathyroidism and thyroid nodules, follows with endocrinology. PTH 5.6 pg/dl  Orders:    Phosphorus; Future    PTH, intact; Future           History of Present Illness     Qamar Cintron is a 74 y.o. male who presents to [place] office for follow-up of chronic kidney disease stage IIIb, benign hypertension, hypercalciuria, persistent proteinuria and hypoparathyroidism.  PMH includes GERD, hypoparathyroidism, basal cell, BPH and allergy to ACE inhibitors.    Patient states overall has been feeling well, denies complaints.      Review of Systems   Constitutional:  Negative for activity change, appetite change, chills, fatigue and fever.   HENT:  Negative for ear pain and sore throat.    Eyes:  Negative for pain and visual disturbance.   Respiratory:  Negative for cough and shortness of breath.    Cardiovascular:  Negative for chest pain, palpitations and leg swelling.   Gastrointestinal:  Negative for abdominal pain, constipation, diarrhea, nausea and vomiting.   Endocrine: Negative.    Genitourinary:  Negative for decreased urine volume, difficulty urinating, dysuria and hematuria.   Musculoskeletal:  Negative for arthralgias and back pain.   Skin:  Negative for color change and rash.   Neurological:  Negative for dizziness, seizures, syncope, weakness, light-headedness and headaches.   Hematological: Negative.    Psychiatric/Behavioral: Negative.     All other systems reviewed and are negative.          Objective     /80 (BP Location: Right arm, Patient Position: Sitting, Cuff Size: Standard)   Pulse 75   Temp (!) 97.3 °F (36.3 °C)   Ht 6' 2\" (1.88 m)   Wt 88.2 kg (194 lb 6.4 oz)   SpO2 98%   BMI 24.96 kg/m² "     Physical Exam  Vitals and nursing note reviewed.   Constitutional:       General: He is not in acute distress.     Appearance: Normal appearance. He is well-developed and normal weight. He is not ill-appearing.   HENT:      Head: Normocephalic and atraumatic.      Right Ear: External ear normal.      Left Ear: External ear normal.      Nose: Nose normal.      Mouth/Throat:      Mouth: Mucous membranes are moist.      Pharynx: Oropharynx is clear.   Eyes:      Extraocular Movements: Extraocular movements intact.      Conjunctiva/sclera: Conjunctivae normal.      Pupils: Pupils are equal, round, and reactive to light.   Cardiovascular:      Rate and Rhythm: Normal rate and regular rhythm.      Heart sounds: Normal heart sounds. No murmur heard.  Pulmonary:      Effort: Pulmonary effort is normal. No respiratory distress.      Breath sounds: Normal breath sounds.   Abdominal:      General: Abdomen is flat.      Palpations: Abdomen is soft.      Tenderness: There is no abdominal tenderness.   Musculoskeletal:         General: No swelling.      Cervical back: Neck supple.      Right lower leg: No edema.      Left lower leg: No edema.   Skin:     General: Skin is warm and dry.      Capillary Refill: Capillary refill takes less than 2 seconds.   Neurological:      General: No focal deficit present.      Mental Status: He is alert and oriented to person, place, and time.   Psychiatric:         Mood and Affect: Mood normal.         Behavior: Behavior normal.

## 2024-10-17 NOTE — ASSESSMENT & PLAN NOTE
UPCR 0.1, appropriate, 10/14.  Previously had tongue swelling suspected due to lisinopril. Orders:    Albumin / creatinine urine ratio; Future    Urinalysis with microscopic; Future

## 2024-10-17 NOTE — ASSESSMENT & PLAN NOTE
Lab Results   Component Value Date    EGFR 59 10/14/2024    EGFR 56 09/24/2024    EGFR 58 07/01/2024    CREATININE 1.19 10/14/2024    CREATININE 1.24 09/24/2024    CREATININE 1.20 07/01/2024   Blood pressure 122/80 mmHg today.  Home blood pressure usually 110-120s/70-80s. Not currently on blood pressure medication. Recommended to check BP occasionally at home.

## 2024-10-17 NOTE — PATIENT INSTRUCTIONS
Pleasure seeing you today.     Your kidney function is stable with a creatinine of 1.19 mg/dL and a GFR of 59 mL/min.  Please continue to avoid NSAIDs such as Advil, Motrin, Aleve, ibuprofen and naproxen.  Please continue to follow 2 g sodium restriction.    Please continue checking blood pressure at home 3-4 times per month and keep a record of readings.  If blood pressure upper number is consistently less than 100 or greater than 150 please contact the office.  Please also call the office if you are experiencing increased headaches, dizziness, lightheadedness, chest pain or blurred vision.  Please continue to maintain a 2 g sodium restriction.    Please continue taking all medications as previously ordered.    Please return for follow-up appointment in 9 months with Dr. Alaniz with lab work completed prior to that visit.

## 2024-10-17 NOTE — ASSESSMENT & PLAN NOTE
History of hypoparathyroidism and thyroid nodules, follows with endocrinology. PTH 5.6 pg/dl  Orders:    Phosphorus; Future    PTH, intact; Future

## 2024-11-15 DIAGNOSIS — E83.51 HYPOCALCEMIA: ICD-10-CM

## 2024-11-15 DIAGNOSIS — E20.9 HYPOPARATHYROIDISM, UNSPECIFIED HYPOPARATHYROIDISM TYPE (HCC): ICD-10-CM

## 2024-11-15 DIAGNOSIS — R82.994 HYPERCALCIURIA: ICD-10-CM

## 2024-11-15 DIAGNOSIS — N40.0 BENIGN PROSTATIC HYPERPLASIA WITHOUT LOWER URINARY TRACT SYMPTOMS: ICD-10-CM

## 2024-11-15 RX ORDER — HYDROCHLOROTHIAZIDE 12.5 MG/1
12.5 TABLET ORAL DAILY
Qty: 90 TABLET | Refills: 1 | Status: SHIPPED | OUTPATIENT
Start: 2024-11-15

## 2024-11-15 RX ORDER — FINASTERIDE 5 MG/1
5 TABLET, FILM COATED ORAL DAILY
Qty: 90 TABLET | Refills: 0 | Status: SHIPPED | OUTPATIENT
Start: 2024-11-15

## 2024-11-15 RX ORDER — CALCITRIOL 0.25 UG/1
0.25 CAPSULE, LIQUID FILLED ORAL EVERY OTHER DAY
Qty: 90 CAPSULE | Refills: 0 | Status: SHIPPED | OUTPATIENT
Start: 2024-11-15

## 2024-11-15 NOTE — TELEPHONE ENCOUNTER
Reason for call:   [x] Refill   [] Prior Auth  [] Other:     Office:   [] PCP/Provider -   [x] Specialty/Provider - shawn/cherrie krishnamurthy    Medication: hydroCHLOROthiazide 12.5 mg tablet     Dose/Frequency:     Take 1 tablet (12.5 mg total) by mouth daily       Quantity: 90      Pharmacy: [facility] [address] [place] [address] CATHERINE [phone]     Does the patient have enough for 3 days?   [x] Yes   [] No - Send as HP to POD

## 2024-11-15 NOTE — TELEPHONE ENCOUNTER
Reason for call:   [x] Refill   [] Prior Auth  [] Other:     Office:   [x] PCP/Provider -  Juan F Dang,    [] Specialty/Provider -     Medication:        calcitriol (ROCALTROL) 0.25 mcg capsule Take 1 capsule (0.25 mcg total) by mouth every other day               90    finasteride (PROSCAR) 5 mg tablet Take 1 tablet (5 mg total) by mouth daily   90        Pharmacy: [facility] [address] [place] [address] 670 CATHERINE [phone]     Does the patient have enough for 3 days?   [x] Yes   [] No - Send as HP to POD

## 2024-11-18 DIAGNOSIS — I10 PRIMARY HYPERTENSION: ICD-10-CM

## 2024-11-18 RX ORDER — AMLODIPINE BESYLATE 10 MG/1
10 TABLET ORAL DAILY
Qty: 90 TABLET | Refills: 1 | Status: SHIPPED | OUTPATIENT
Start: 2024-11-18

## 2024-11-18 NOTE — TELEPHONE ENCOUNTER
Reason for call:   [x] Refill   [] Prior Auth  [] Other:     Office:   [x] PCP/Provider -   [] Specialty/Provider -       amLODIPine (NORVASC) 10 mg tablet 10 mg, Oral, Daily       Quantity: 90    Pharmacy: [facility]    Does the patient have enough for 3 days?   [] Yes   [x] No - Send as HP to POD

## 2024-12-02 DIAGNOSIS — E83.51 HYPOCALCEMIA: ICD-10-CM

## 2024-12-02 NOTE — TELEPHONE ENCOUNTER
Reason for call:   [x] Refill   [] Prior Auth  [] Other:     Office:   [x] PCP/Provider - Juan F Dang   [] Specialty/Provider -     Medication: calcitriol (ROCALTROL) 0.25 mcg capsule     Dose/Frequency: 1 capsule every other day    Quantity: 90    Pharmacy: [facility] [place] [place]     Does the patient have enough for 3 days?   [x] Yes   [] No - Send as HP to POD

## 2024-12-04 ENCOUNTER — TELEPHONE (OUTPATIENT)
Dept: ENDOCRINOLOGY | Facility: CLINIC | Age: 74
End: 2024-12-04

## 2024-12-04 RX ORDER — CALCITRIOL 0.25 UG/1
0.25 CAPSULE, LIQUID FILLED ORAL EVERY OTHER DAY
Qty: 45 CAPSULE | Refills: 1 | Status: SHIPPED | OUTPATIENT
Start: 2024-12-04

## 2024-12-04 NOTE — TELEPHONE ENCOUNTER
Provider will be in another location on Thursdays. needs to be put in the first spot on Marcia's schedule.

## 2025-01-24 ENCOUNTER — OFFICE VISIT (OUTPATIENT)
Dept: INTERNAL MEDICINE CLINIC | Facility: CLINIC | Age: 75
End: 2025-01-24
Payer: MEDICARE

## 2025-01-24 VITALS
SYSTOLIC BLOOD PRESSURE: 130 MMHG | HEART RATE: 63 BPM | BODY MASS INDEX: 25.28 KG/M2 | TEMPERATURE: 96.9 F | WEIGHT: 197 LBS | OXYGEN SATURATION: 98 % | DIASTOLIC BLOOD PRESSURE: 76 MMHG | HEIGHT: 74 IN

## 2025-01-24 DIAGNOSIS — K21.9 GERD WITHOUT ESOPHAGITIS: ICD-10-CM

## 2025-01-24 DIAGNOSIS — N18.30 BENIGN HYPERTENSION WITH CKD (CHRONIC KIDNEY DISEASE) STAGE III (HCC): Primary | ICD-10-CM

## 2025-01-24 DIAGNOSIS — F41.1 GENERALIZED ANXIETY DISORDER: ICD-10-CM

## 2025-01-24 DIAGNOSIS — I12.9 BENIGN HYPERTENSION WITH CKD (CHRONIC KIDNEY DISEASE) STAGE III (HCC): Primary | ICD-10-CM

## 2025-01-24 DIAGNOSIS — R80.9 MICROALBUMINURIA: ICD-10-CM

## 2025-01-24 DIAGNOSIS — G43.909 MIGRAINE WITHOUT STATUS MIGRAINOSUS, NOT INTRACTABLE, UNSPECIFIED MIGRAINE TYPE: ICD-10-CM

## 2025-01-24 DIAGNOSIS — E78.2 MIXED HYPERLIPIDEMIA: ICD-10-CM

## 2025-01-24 DIAGNOSIS — N18.31 STAGE 3A CHRONIC KIDNEY DISEASE (HCC): ICD-10-CM

## 2025-01-24 DIAGNOSIS — E20.0 IDIOPATHIC HYPOPARATHYROIDISM (HCC): ICD-10-CM

## 2025-01-24 PROCEDURE — 99214 OFFICE O/P EST MOD 30 MIN: CPT | Performed by: INTERNAL MEDICINE

## 2025-01-24 PROCEDURE — G2211 COMPLEX E/M VISIT ADD ON: HCPCS | Performed by: INTERNAL MEDICINE

## 2025-01-24 NOTE — PATIENT INSTRUCTIONS
"Patient Education     High blood pressure in adults   The Basics   Written by the doctors and editors at [facility]   What is high blood pressure? -- High blood pressure is a condition that puts you at risk for heart attack, stroke, and kidney disease. It does not usually cause symptoms. But it can be serious.  When your doctor or nurse tells you your blood pressure, they say 2 numbers. For instance, your doctor or nurse might say that your blood pressure is \"130 over 80.\" The top number is the pressure inside your arteries when your heart is paulino. The bottom number is the pressure inside your arteries when your heart is relaxed.  \"Elevated blood pressure\" is a term doctors or nurses use as a warning. People with elevated blood pressure do not yet have high blood pressure. But their blood pressure is not as low as it should be for good health.  Many experts define high, elevated, and normal blood pressure as follows:   High - Top number of 130 or above and/or bottom number of 80 or above.   Elevated - Top number between 120 and 129 and bottom number of 79 or below.   Normal - Top number of 119 or below and bottom number of 79 or below.  This information is also in the table (table 1).  How can I lower my blood pressure? -- If your doctor or nurse prescribed blood pressure medicine, the most important thing you can do is to take it. If it causes side effects, do not just stop taking it. Instead, talk to your doctor or nurse about the problems it causes. They might be able to lower your dose or switch you to another medicine. If cost is a problem, mention that, too. They might be able to put you on a less expensive medicine. Taking your blood pressure medicine can keep you from having a heart attack or stroke, and it can save your life!  Can I do anything on my own? -- You have a lot of control over your blood pressure. To lower it:   Lose weight (if you are overweight).   Choose a diet low in fat and rich in " "fruits, vegetables, and low-fat dairy products.   Eat less salt.   Do something active for at least 30 minutes a day on most days of the week.   Drink less alcohol (if you drink more than 2 alcoholic drinks per day).  It's also a good idea to get a home blood pressure meter. People who check their own blood pressure at home do better at keeping it low and can sometimes even reduce the amount of medicine they take.  All topics are updated as new evidence becomes available and our peer review process is complete.  This topic retrieved from [facility] on: Feb 26, 2024.  Topic 31489 Version 23.0  Release: 32.2.4 - C32.56  © 2024 UpToDate, Inc. and/or its affiliates. All rights reserved.  table 1: Definition of normal and high blood pressure  Level  Top number  Bottom number    High 130 or above 80 or above   Elevated 120 to 129 79 or below   Normal 119 or below 79 or below   These definitions are from the American College of Cardiology/American Heart Association. Other expert groups might use slightly different definitions.  \"Elevated blood pressure\" is a term doctor or nurses use as a warning. It means you do not yet have high blood pressure, but your blood pressure is not as low as it should be for good health.  Graphic 89590 Version 6.0  Consumer Information Use and Disclaimer   Disclaimer: This generalized information is a limited summary of diagnosis, treatment, and/or medication information. It is not meant to be comprehensive and should be used as a tool to help the user understand and/or assess potential diagnostic and treatment options. It does NOT include all information about conditions, treatments, medications, side effects, or risks that may apply to a specific patient. It is not intended to be medical advice or a substitute for the medical advice, diagnosis, or treatment of a health care provider based on the health care provider's examination and assessment of a patient's specific and unique circumstances. " Patients must speak with a health care provider for complete information about their health, medical questions, and treatment options, including any risks or benefits regarding use of medications. This information does not endorse any treatments or medications as safe, effective, or approved for treating a specific patient. UpToDate, Inc. and its affiliates disclaim any warranty or liability relating to this information or the use thereof.The use of this information is governed by the Terms of Use, available at https://www.woltersMeetBalluwer.com/en/know/clinical-effectiveness-terms. 2024© UpToDate, Inc. and its affiliates and/or licensors. All rights reserved.  Copyright   © 2024 UpToDate, Inc. and/or its affiliates. All rights reserved.

## 2025-01-24 NOTE — ASSESSMENT & PLAN NOTE
Lab Results   Component Value Date    EGFR 59 10/14/2024    EGFR 56 09/24/2024    EGFR 58 07/01/2024    CREATININE 1.19 10/14/2024    CREATININE 1.24 09/24/2024    CREATININE 1.20 07/01/2024       Orders:    Comprehensive metabolic panel; Future    LDL cholesterol, direct; Future    Triglycerides; Future

## 2025-01-24 NOTE — PROGRESS NOTES
Name: Qamar CASTILLO Trainer      : 1950      MRN: [id number]  Encounter Provider: Juan F Dang DO  Encounter Date: 2025   Encounter department: [facility]  :  Assessment & Plan  Benign hypertension with CKD (chronic kidney disease) stage III (HCC)  Lab Results   Component Value Date    EGFR 59 10/14/2024    EGFR 56 2024    EGFR 58 2024    CREATININE 1.19 10/14/2024    CREATININE 1.24 2024    CREATININE 1.20 2024       Orders:    Comprehensive metabolic panel; Future    Mixed hyperlipidemia    Orders:    Comprehensive metabolic panel; Future    LDL cholesterol, direct; Future    Triglycerides; Future    Migraine without status migrainosus, not intractable, unspecified migraine type         GERD without esophagitis         Idiopathic hypoparathyroidism (HCC)         Stage 3a chronic kidney disease (HCC)  Lab Results   Component Value Date    EGFR 59 10/14/2024    EGFR 56 2024    EGFR 58 2024    CREATININE 1.19 10/14/2024    CREATININE 1.24 2024    CREATININE 1.20 2024       Orders:    Comprehensive metabolic panel; Future    LDL cholesterol, direct; Future    Triglycerides; Future    Generalized anxiety disorder         BMI 25.0-25.9,adult         Microalbuminuria         A/P: Doing ok and will check labs. Discussed vaccines and already had his flu vaccines. Appreciate nephro input. Continue current treatment and RTC four months for routine.        History of Present Illness   WM RTC for f/u HTN, GERD, etc. Doing ok and no new issues. Remains active w/o difficulty and no falls. STAR/MDD are controlled. Chronic pain and migraines manageable. Due for labs and vaccines.       Review of Systems   Constitutional:  Negative for activity change, chills, diaphoresis, fatigue and fever.   HENT: Negative.     Eyes:  Negative for visual disturbance.   Respiratory:  Negative for cough, chest tightness, shortness of breath and wheezing.    Cardiovascular:   "Negative for chest pain, palpitations and leg swelling.   Gastrointestinal:  Negative for abdominal pain, constipation, diarrhea, nausea and vomiting.   Endocrine: Negative for cold intolerance and heat intolerance.   Genitourinary:  Negative for difficulty urinating, dysuria and frequency.   Musculoskeletal:  Negative for arthralgias, gait problem and myalgias.   Neurological:  Negative for dizziness, seizures, syncope, weakness, light-headedness and headaches.   Psychiatric/Behavioral:  Negative for confusion, dysphoric mood and sleep disturbance. The patient is not nervous/anxious.        Objective   /76   Pulse 63   Temp (!) 96.9 °F (36.1 °C) (Tympanic)   Ht 6' 2\" (1.88 m)   Wt 89.4 kg (197 lb)   SpO2 98%   BMI 25.29 kg/m²      Physical Exam  Vitals and nursing note reviewed.   Constitutional:       General: He is not in acute distress.     Appearance: Normal appearance. He is not ill-appearing.   HENT:      Head: Normocephalic and atraumatic.      Mouth/Throat:      Mouth: Mucous membranes are moist.   Eyes:      Extraocular Movements: Extraocular movements intact.      Conjunctiva/sclera: Conjunctivae normal.      Pupils: Pupils are equal, round, and reactive to light.   Neck:      Vascular: No carotid bruit.   Cardiovascular:      Rate and Rhythm: Normal rate and regular rhythm.      Heart sounds: Normal heart sounds. No murmur heard.  Pulmonary:      Effort: Pulmonary effort is normal. No respiratory distress.      Breath sounds: Normal breath sounds. No wheezing, rhonchi or rales.   Abdominal:      General: Bowel sounds are normal. There is no distension.      Palpations: Abdomen is soft.      Tenderness: There is no abdominal tenderness.   Musculoskeletal:      Cervical back: Neck supple.      Right lower leg: No edema.      Left lower leg: No edema.   Neurological:      General: No focal deficit present.      Mental Status: He is alert and oriented to person, place, and time. Mental status is " at baseline.   Psychiatric:         Mood and Affect: Mood normal.         Behavior: Behavior normal.         Thought Content: Thought content normal.         Judgment: Judgment normal.

## 2025-01-24 NOTE — ASSESSMENT & PLAN NOTE
Lab Results   Component Value Date    EGFR 59 10/14/2024    EGFR 56 09/24/2024    EGFR 58 07/01/2024    CREATININE 1.19 10/14/2024    CREATININE 1.24 09/24/2024    CREATININE 1.20 07/01/2024       Orders:    Comprehensive metabolic panel; Future

## 2025-02-04 ENCOUNTER — APPOINTMENT (OUTPATIENT)
Dept: LAB | Facility: CLINIC | Age: 75
End: 2025-02-04
Payer: MEDICARE

## 2025-02-04 DIAGNOSIS — I12.9 BENIGN HYPERTENSION WITH CKD (CHRONIC KIDNEY DISEASE) STAGE III (HCC): ICD-10-CM

## 2025-02-04 DIAGNOSIS — N18.31 STAGE 3A CHRONIC KIDNEY DISEASE (HCC): ICD-10-CM

## 2025-02-04 DIAGNOSIS — N18.30 BENIGN HYPERTENSION WITH CKD (CHRONIC KIDNEY DISEASE) STAGE III (HCC): ICD-10-CM

## 2025-02-04 DIAGNOSIS — E78.2 MIXED HYPERLIPIDEMIA: ICD-10-CM

## 2025-02-04 LAB
ALBUMIN SERPL BCG-MCNC: 4 G/DL (ref 3.5–5)
ALP SERPL-CCNC: 66 U/L (ref 34–104)
ALT SERPL W P-5'-P-CCNC: 10 U/L (ref 7–52)
ANION GAP SERPL CALCULATED.3IONS-SCNC: 9 MMOL/L (ref 4–13)
AST SERPL W P-5'-P-CCNC: 18 U/L (ref 13–39)
BILIRUB SERPL-MCNC: 0.63 MG/DL (ref 0.2–1)
BUN SERPL-MCNC: 14 MG/DL (ref 5–25)
CALCIUM SERPL-MCNC: 8.5 MG/DL (ref 8.4–10.2)
CHLORIDE SERPL-SCNC: 96 MMOL/L (ref 96–108)
CO2 SERPL-SCNC: 30 MMOL/L (ref 21–32)
CREAT SERPL-MCNC: 1.12 MG/DL (ref 0.6–1.3)
GFR SERPL CREATININE-BSD FRML MDRD: 63 ML/MIN/1.73SQ M
GLUCOSE P FAST SERPL-MCNC: 94 MG/DL (ref 65–99)
LDLC SERPL DIRECT ASSAY-MCNC: 94 MG/DL (ref 0–100)
POTASSIUM SERPL-SCNC: 4.2 MMOL/L (ref 3.5–5.3)
PROT SERPL-MCNC: 7 G/DL (ref 6.4–8.4)
SODIUM SERPL-SCNC: 135 MMOL/L (ref 135–147)
TRIGL SERPL-MCNC: 177 MG/DL (ref ?–150)

## 2025-02-04 PROCEDURE — 36415 COLL VENOUS BLD VENIPUNCTURE: CPT

## 2025-02-04 PROCEDURE — 84478 ASSAY OF TRIGLYCERIDES: CPT

## 2025-02-04 PROCEDURE — 80053 COMPREHEN METABOLIC PANEL: CPT

## 2025-02-04 PROCEDURE — 83721 ASSAY OF BLOOD LIPOPROTEIN: CPT

## 2025-02-05 ENCOUNTER — RESULTS FOLLOW-UP (OUTPATIENT)
Dept: INTERNAL MEDICINE CLINIC | Facility: CLINIC | Age: 75
End: 2025-02-05

## 2025-02-10 DIAGNOSIS — I10 PRIMARY HYPERTENSION: ICD-10-CM

## 2025-02-10 DIAGNOSIS — N40.0 BENIGN PROSTATIC HYPERPLASIA WITHOUT LOWER URINARY TRACT SYMPTOMS: ICD-10-CM

## 2025-02-10 NOTE — TELEPHONE ENCOUNTER
Patient's wife said he has refills on Amlodipine but was asking for more. She was informed once a new prescription was sent it will typically cancel the old prescription. She said just do it so she doesn't have to call the pharmacy also.     Reason for call:   [x] Refill   [] Prior Auth  [] Other:     Office:   [x] PCP/Provider - [place]  [] Specialty/Provider -     Amlodipine 10mg  1 tab daily #90    Finasteride 5mg  1 tab daily #90    Pharmacy: [facility] [address] [place] [address] [place] [address] CATHERINE [phone]     Does the patient have enough for 3 days?   [x] Yes   [] No - Send as HP to POD

## 2025-02-11 RX ORDER — FINASTERIDE 5 MG/1
5 TABLET, FILM COATED ORAL DAILY
Qty: 90 TABLET | Refills: 1 | Status: SHIPPED | OUTPATIENT
Start: 2025-02-11

## 2025-02-11 RX ORDER — AMLODIPINE BESYLATE 10 MG/1
10 TABLET ORAL DAILY
Qty: 90 TABLET | Refills: 1 | Status: SHIPPED | OUTPATIENT
Start: 2025-02-11

## 2025-02-19 ENCOUNTER — TELEPHONE (OUTPATIENT)
Age: 75
End: 2025-02-19

## 2025-02-19 NOTE — TELEPHONE ENCOUNTER
Patient's wife called,    Requesting to mail outstanding balance.  Patient wants to be able to see the physical bill and balance before making any payments.

## 2025-02-24 DIAGNOSIS — N40.1 BENIGN PROSTATIC HYPERPLASIA WITH URINARY FREQUENCY: ICD-10-CM

## 2025-02-24 DIAGNOSIS — R35.0 BENIGN PROSTATIC HYPERPLASIA WITH URINARY FREQUENCY: ICD-10-CM

## 2025-02-24 RX ORDER — TAMSULOSIN HYDROCHLORIDE 0.4 MG/1
0.4 CAPSULE ORAL 2 TIMES DAILY
Qty: 200 CAPSULE | Refills: 1 | Status: SHIPPED | OUTPATIENT
Start: 2025-02-24

## 2025-02-24 NOTE — TELEPHONE ENCOUNTER
Reason for call:   [x] Refill   [] Prior Auth  [] Other:     Office:   [] PCP/Provider -   [] Specialty/Provider -     Medication: Tamsulosin    Dose/Frequency: 0.4 mg    Quantity: 200 capsules    Pharmacy: [facility] [place] [facility] CATHERINE [phone]     Does the patient have enough for 3 days?   [] Yes   [x] No - Send as HP to POD

## 2025-03-17 NOTE — PATIENT INSTRUCTIONS
Check your blood pressure a few times a week and call in readings   Can decrease amlodipine to 5 mg daily if your blood pressure is consistently less than around 125/80  Lab work in November  Schedule kidney ultrasound for August pleural effusion

## 2025-05-01 NOTE — PROGRESS NOTES
Name: Qamar Cintron      : 1950      MRN: [id number]  Encounter Provider: TRISTIN Phillips  Encounter Date: 2025   Encounter department: [facility] [place]  :  Assessment & Plan  Idiopathic hypoparathyroidism (HCC)  Patient is stable at this time. Continue calcitriol 0.25 mcg every other day (Monday, Wednesday, Friday, and .  Continue 600 mg of calcium carbonate twice daily.  Continue vitamin D 1000 units daily.  Continue to remain well-hydrated.  Given stability of patient's condition, will follow-up with me in 1 year.  He knows to notify me with any symptoms of hypocalcemia, which we reviewed.  Orders:    PTH, intact; Future    Vitamin D 25 hydroxy; Future    Comprehensive metabolic panel; Future    hydroCHLOROthiazide 12.5 mg tablet; Take 1 tablet (12.5 mg total) by mouth daily    Hypercalciuria  Improved with addition of HCTZ. Continue 12. 5 mg daily. Will repeat 24 hour urine collection next year.   Orders:    hydroCHLOROthiazide 12.5 mg tablet; Take 1 tablet (12.5 mg total) by mouth daily    Benign hypertension with CKD (chronic kidney disease) stage III ([facility])  Lab Results   Component Value Date    EGFR 63 2025    EGFR 59 10/14/2024    EGFR 56 2024    CREATININE 1.12 2025    CREATININE 1.19 10/14/2024    CREATININE 1.24 2024   BP well-controlled at 124/60.  Continue 10 mg of amlodipine daily.  Following with nephrology.         Multiple thyroid nodules  Denies compressive symptoms.  Thyroid nodule stable.  No need for further surveillance at this time.           Assessment & Plan        History of Present Illness   HPI  Qamar Cintron is a 74 y.o. male with idiopathic hypoparathyroidism/hypercalciuria and thyroid nodules.    For hypocalcemia, he is taking 0.25 mcg of calcitriol 4 times weekly (Monday, Wednesday, Friday, and  (.  In addition, he is taking 600 mg twice daily of calcium carbonate daily and 1000  "units of vitamin D daily.      Serum calcium stable at 8.5 mg/dL.      For hypercalciuria, he is taking 12.5 mg of hydrochlorothiazide daily.  Repeat 24-hour urine collection demonstrated significant improvement.    Component      Latest Ref Rng 10/24/2023 10/14/2024   CALCIUM 24 HOUR URINE      100 - 300 mg/24 hrs 390.6 (H)  175.725       Legend:  (H) High    Last thyroid ultrasound completed 6/9/2022 demonstrated 4 stable nodules-2 on the right, 1 on the isthmus, 1 on the left.  None met criteria for biopsy or follow-up ultrasound.  Patient denies compressive symptoms.    DEXA scan from 11/9/2021 demonstrated normal bone density.  Patient denies any loss of height, falls or fractures.      Review of Systems   Constitutional:  Negative for activity change, appetite change, fatigue and unexpected weight change.   HENT:  Negative for dental problem, trouble swallowing and voice change.    Respiratory:  Negative for chest tightness and shortness of breath.    Cardiovascular:  Negative for palpitations.   Gastrointestinal:  Negative for constipation and diarrhea.   Musculoskeletal:  Negative for arthralgias, back pain and gait problem.   Allergic/Immunologic: Positive for environmental allergies.   Neurological:  Negative for light-headedness, numbness and headaches.   Psychiatric/Behavioral:  Negative for decreased concentration, dysphoric mood and sleep disturbance. The patient is not nervous/anxious.           Objective   /60 (BP Location: Left arm, Patient Position: Sitting, Cuff Size: Standard)   Pulse 76   Temp 98 °F (36.7 °C) (Temporal)   Resp 18   Ht 6' 2\" (1.88 m)   Wt 88.4 kg (194 lb 12.8 oz)   SpO2 96%   BMI 25.01 kg/m²      Physical Exam  Vitals reviewed.   Constitutional:       General: He is not in acute distress.     Appearance: He is well-developed. He is not ill-appearing.   HENT:      Head: Normocephalic and atraumatic.   Eyes:      Conjunctiva/sclera: Conjunctivae normal.   Neck:      " Thyroid: No thyroid mass, thyromegaly or thyroid tenderness.   Cardiovascular:      Rate and Rhythm: Normal rate and regular rhythm.      Pulses: Normal pulses.      Heart sounds: Normal heart sounds. No murmur heard.  Pulmonary:      Effort: Pulmonary effort is normal. No respiratory distress.      Breath sounds: Normal breath sounds.   Abdominal:      Palpations: Abdomen is soft.      Tenderness: There is no abdominal tenderness.   Musculoskeletal:         General: No swelling.      Cervical back: Normal range of motion and neck supple.   Skin:     General: Skin is warm and dry.      Capillary Refill: Capillary refill takes less than 2 seconds.   Neurological:      Mental Status: He is alert.   Psychiatric:         Mood and Affect: Mood normal.

## 2025-05-02 ENCOUNTER — OFFICE VISIT (OUTPATIENT)
Dept: ENDOCRINOLOGY | Facility: CLINIC | Age: 75
End: 2025-05-02
Payer: MEDICARE

## 2025-05-02 VITALS
DIASTOLIC BLOOD PRESSURE: 60 MMHG | HEIGHT: 74 IN | TEMPERATURE: 98 F | RESPIRATION RATE: 18 BRPM | SYSTOLIC BLOOD PRESSURE: 124 MMHG | WEIGHT: 194.8 LBS | OXYGEN SATURATION: 96 % | BODY MASS INDEX: 25 KG/M2 | HEART RATE: 76 BPM

## 2025-05-02 DIAGNOSIS — E04.2 MULTIPLE THYROID NODULES: ICD-10-CM

## 2025-05-02 DIAGNOSIS — I12.9 BENIGN HYPERTENSION WITH CKD (CHRONIC KIDNEY DISEASE) STAGE III (HCC): ICD-10-CM

## 2025-05-02 DIAGNOSIS — N18.30 BENIGN HYPERTENSION WITH CKD (CHRONIC KIDNEY DISEASE) STAGE III (HCC): ICD-10-CM

## 2025-05-02 DIAGNOSIS — E20.0 IDIOPATHIC HYPOPARATHYROIDISM (HCC): Primary | ICD-10-CM

## 2025-05-02 DIAGNOSIS — R82.994 HYPERCALCIURIA: ICD-10-CM

## 2025-05-02 PROCEDURE — 99214 OFFICE O/P EST MOD 30 MIN: CPT | Performed by: NURSE PRACTITIONER

## 2025-05-02 PROCEDURE — G2211 COMPLEX E/M VISIT ADD ON: HCPCS | Performed by: NURSE PRACTITIONER

## 2025-05-02 RX ORDER — HYDROCHLOROTHIAZIDE 12.5 MG/1
12.5 TABLET ORAL DAILY
Qty: 90 TABLET | Refills: 1 | Status: SHIPPED | OUTPATIENT
Start: 2025-05-02

## 2025-05-02 NOTE — ASSESSMENT & PLAN NOTE
Improved with addition of HCTZ. Continue 12. 5 mg daily. Will repeat 24 hour urine collection next year.   Orders:    hydroCHLOROthiazide 12.5 mg tablet; Take 1 tablet (12.5 mg total) by mouth daily

## 2025-05-02 NOTE — ASSESSMENT & PLAN NOTE
Lab Results   Component Value Date    EGFR 63 02/04/2025    EGFR 59 10/14/2024    EGFR 56 09/24/2024    CREATININE 1.12 02/04/2025    CREATININE 1.19 10/14/2024    CREATININE 1.24 09/24/2024   BP well-controlled at 124/60.  Continue 10 mg of amlodipine daily.  Following with nephrology.

## 2025-05-02 NOTE — ASSESSMENT & PLAN NOTE
Patient is stable at this time. Continue calcitriol 0.25 mcg every other day (Monday, Wednesday, Friday, and Sunday.  Continue 600 mg of calcium carbonate twice daily.  Continue vitamin D 1000 units daily.  Continue to remain well-hydrated.  Given stability of patient's condition, will follow-up with me in 1 year.  He knows to notify me with any symptoms of hypocalcemia, which we reviewed.  Orders:    PTH, intact; Future    Vitamin D 25 hydroxy; Future    Comprehensive metabolic panel; Future    hydroCHLOROthiazide 12.5 mg tablet; Take 1 tablet (12.5 mg total) by mouth daily

## 2025-05-14 DIAGNOSIS — E83.51 HYPOCALCEMIA: ICD-10-CM

## 2025-05-14 DIAGNOSIS — E20.0 IDIOPATHIC HYPOPARATHYROIDISM (HCC): ICD-10-CM

## 2025-05-14 DIAGNOSIS — R82.994 HYPERCALCIURIA: ICD-10-CM

## 2025-05-14 NOTE — TELEPHONE ENCOUNTER
Reason for call:   [x] Refill   [] Prior Auth  [] Other:     Office:   [x] PCP/Provider - [facility]  Authorized By: Juan F Dang DO  [] Specialty/Provider -     Medication: calcitriol (ROCALTROL) 0.25 mcg capsule    Dose/Frequency: Take 1 capsule (0.25 mcg total) by mouth every other day,    Quantity: 45    Pharmacy: [facility] [address] [place] [address] CATHERINE LIU    Local Pharmacy   Does the patient have enough for 3 days?   [x] Yes   [] No - Send as HP to POD    Mail Away Pharmacy   Does the patient have enough for 10 days?   [] Yes   [] No - Send as HP to POD

## 2025-05-15 RX ORDER — CALCITRIOL 0.25 UG/1
0.25 CAPSULE, LIQUID FILLED ORAL EVERY OTHER DAY
Qty: 45 CAPSULE | Refills: 0 | Status: SHIPPED | OUTPATIENT
Start: 2025-05-15

## 2025-05-28 ENCOUNTER — OFFICE VISIT (OUTPATIENT)
Dept: INTERNAL MEDICINE CLINIC | Facility: CLINIC | Age: 75
End: 2025-05-28
Payer: MEDICARE

## 2025-05-28 VITALS
BODY MASS INDEX: 24.77 KG/M2 | WEIGHT: 193 LBS | HEIGHT: 74 IN | OXYGEN SATURATION: 97 % | TEMPERATURE: 97.4 F | HEART RATE: 79 BPM | DIASTOLIC BLOOD PRESSURE: 68 MMHG | SYSTOLIC BLOOD PRESSURE: 120 MMHG

## 2025-05-28 DIAGNOSIS — F41.1 GENERALIZED ANXIETY DISORDER: ICD-10-CM

## 2025-05-28 DIAGNOSIS — R80.9 MICROALBUMINURIA: ICD-10-CM

## 2025-05-28 DIAGNOSIS — Z00.00 MEDICARE ANNUAL WELLNESS VISIT, SUBSEQUENT: ICD-10-CM

## 2025-05-28 DIAGNOSIS — K21.9 GERD WITHOUT ESOPHAGITIS: ICD-10-CM

## 2025-05-28 DIAGNOSIS — I12.9 BENIGN HYPERTENSION WITH CKD (CHRONIC KIDNEY DISEASE) STAGE III (HCC): Primary | ICD-10-CM

## 2025-05-28 DIAGNOSIS — N18.30 BENIGN HYPERTENSION WITH CKD (CHRONIC KIDNEY DISEASE) STAGE III (HCC): Primary | ICD-10-CM

## 2025-05-28 DIAGNOSIS — R09.89 BRUIT OF LEFT CAROTID ARTERY: ICD-10-CM

## 2025-05-28 DIAGNOSIS — R35.1 BENIGN PROSTATIC HYPERPLASIA WITH NOCTURIA: ICD-10-CM

## 2025-05-28 DIAGNOSIS — N18.31 STAGE 3A CHRONIC KIDNEY DISEASE (HCC): ICD-10-CM

## 2025-05-28 DIAGNOSIS — N40.1 BENIGN PROSTATIC HYPERPLASIA WITH NOCTURIA: ICD-10-CM

## 2025-05-28 DIAGNOSIS — E20.0 IDIOPATHIC HYPOPARATHYROIDISM (HCC): ICD-10-CM

## 2025-05-28 DIAGNOSIS — G43.909 MIGRAINE WITHOUT STATUS MIGRAINOSUS, NOT INTRACTABLE, UNSPECIFIED MIGRAINE TYPE: ICD-10-CM

## 2025-05-28 DIAGNOSIS — E78.2 MIXED HYPERLIPIDEMIA: ICD-10-CM

## 2025-05-28 PROCEDURE — 99214 OFFICE O/P EST MOD 30 MIN: CPT | Performed by: INTERNAL MEDICINE

## 2025-05-28 PROCEDURE — G0439 PPPS, SUBSEQ VISIT: HCPCS | Performed by: INTERNAL MEDICINE

## 2025-05-28 PROCEDURE — G2211 COMPLEX E/M VISIT ADD ON: HCPCS | Performed by: INTERNAL MEDICINE

## 2025-05-28 NOTE — ASSESSMENT & PLAN NOTE
Lab Results   Component Value Date    EGFR 63 02/04/2025    EGFR 59 10/14/2024    EGFR 56 09/24/2024    CREATININE 1.12 02/04/2025    CREATININE 1.19 10/14/2024    CREATININE 1.24 09/24/2024       Orders:    VAS carotid complete study; Future

## 2025-05-28 NOTE — PROGRESS NOTES
Name: Qamar CASTILLO Trainer      : 1950      MRN: [id number]  Encounter Provider: Juan F Dang DO  Encounter Date: 2025   Encounter department: [facility]  :  Assessment & Plan  Benign hypertension with CKD (chronic kidney disease) stage III (HCC)  Lab Results   Component Value Date    EGFR 63 2025    EGFR 59 10/14/2024    EGFR 56 2024    CREATININE 1.12 2025    CREATININE 1.19 10/14/2024    CREATININE 1.24 2024            GERD without esophagitis         Idiopathic hypoparathyroidism (HCC)         Stage 3a chronic kidney disease (HCC)  Lab Results   Component Value Date    EGFR 63 2025    EGFR 59 10/14/2024    EGFR 56 2024    CREATININE 1.12 2025    CREATININE 1.19 10/14/2024    CREATININE 1.24 2024            Generalized anxiety disorder         Benign prostatic hyperplasia with nocturia         Microalbuminuria         Mixed hyperlipidemia         Medicare annual wellness visit, subsequent            Preventive health issues were discussed with patient, and age appropriate screening tests were ordered as noted in patient's After Visit Summary. Personalized health advice and appropriate referrals for health education or preventive services given if needed, as noted in patient's After Visit Summary.    History of Present Illness     HPI   Patient Care Team:  Juan F Dang DO as PCP - General (Internal Medicine)  TRISTIN Phillips as PCP - Endocrinology (Endocrinology)  MD Erica Segal CRNP as Nurse Practitioner (Urology)  TRISTIN Phillips as Nurse Practitioner (Endocrinology)  Sg Alaniz DO (Nephrology)  Sg Alaniz DO (Nephrology)  TRISTIN Garcia as Nurse Practitioner (Urology)  TRISTIN Yanes as Nurse Practitioner (Nephrology)    Review of Systems  Medical History Reviewed by provider this encounter:  Tobacco  Allergies  Meds  Problems  Med Hx  Surg Hx   [facility]       Annual Wellness Visit Questionnaire   Qamar is here for his Subsequent Wellness visit. Last Medicare Wellness visit information reviewed, patient interviewed and updates made to the record today.      Health Risk Assessment:   Patient rates overall health as very good. Patient feels that their physical health rating is same. Patient is very satisfied with their life. Eyesight was rated as same. Hearing was rated as same. Patient feels that their emotional and mental health rating is same. Patients states they are never, rarely angry. Patient states they are sometimes unusually tired/fatigued. Pain experienced in the last 7 days has been none. Patient states that he has experienced no weight loss or gain in last 6 months.     Depression Screening:   PHQ-2 Score: 0      Fall Risk Screening:   In the past year, patient has experienced: no history of falling in past year      Home Safety:  Patient does not have trouble with stairs inside or outside of their home. Patient has working smoke alarms and has working carbon monoxide detector. Home safety hazards include: none.     Nutrition:   Current diet is Regular.     Medications:   Patient is not currently taking any over-the-counter supplements. Patient is able to manage medications.     Activities of Daily Living (ADLs)/Instrumental Activities of Daily Living (IADLs):   Walk and transfer into and out of bed and chair?: Yes  Dress and groom yourself?: Yes    Bathe or shower yourself?: Yes    Feed yourself? Yes  Do your laundry/housekeeping?: Yes  Manage your money, pay your bills and track your expenses?: Yes  Make your own meals?: Yes    Do your own shopping?: Yes    Advance Care Planning:   Living will: No    Durable POA for healthcare: No    Advanced directive: No    Advanced directive counseling given: Yes    Five wishes given: No    Patient declined ACP directive: No    End of Life Decisions reviewed with patient: Yes    Provider agrees with end of  life decisions: Yes      Comments: Will discuss with pt once he reviews the info he has at home.     Cognitive Screening:   Provider or family/friend/caregiver concerned regarding cognition?: No    Preventive Screenings      Cardiovascular Screening:    General: Screening Not Indicated, History Lipid Disorder and Screening Current      Diabetes Screening:     General: Screening Current      Colorectal Cancer Screening:     General: Screening Current      Prostate Cancer Screening:    General: Screening Not Indicated and Screening Current      Abdominal Aortic Aneurysm (AAA) Screening:        General: Screening Not Indicated      Lung Cancer Screening:     General: Screening Not Indicated      Hepatitis C Screening:    General: Screening Current    Immunizations:  - Immunizations due: Zoster (Shingrix)    Screening, Brief Intervention, and Referral to Treatment (SBIRT)     Screening  Typical number of drinks in a day: 0  Typical number of drinks in a week: 0  Interpretation: Low risk drinking behavior.    Single Item Drug Screening:  How often have you used an illegal drug (including marijuana) or a prescription medication for non-medical reasons in the past year? never    Single Item Drug Screen Score: 0  Interpretation: Negative screen for possible drug use disorder    Other Counseling Topics:   Car/seat belt/driving safety, sunscreen and calcium and vitamin D intake and regular weightbearing exercise.     Social Drivers of Health     Financial Resource Strain: Low Risk  (5/18/2023)    Overall Financial Resource Strain (CARDIA)     Difficulty of Paying Living Expenses: Not very hard   Food Insecurity: No Food Insecurity (5/28/2025)    Nursing - Inadequate Food Risk Classification     Worried About Running Out of Food in the Last Year: Never true     Ran Out of Food in the Last Year: Never true   Transportation Needs: No Transportation Needs (5/28/2025)    PRAPARE - Transportation     Lack of Transportation  "(Medical): No     Lack of Transportation (Non-Medical): No   Housing Stability: Low Risk  (5/28/2025)    Housing Stability Vital Sign     Unable to Pay for Housing in the Last Year: No     Number of Times Moved in the Last Year: 0     Homeless in the Last Year: No   Utilities: Not At Risk (5/28/2025)    [facility] Utilities     Threatened with loss of utilities: No     No results found.    Objective   /68 (BP Location: Left arm, Patient Position: Sitting)   Pulse 79   Temp (!) 97.4 °F (36.3 °C) (Tympanic)   Ht 6' 2\" (1.88 m)   Wt 87.5 kg (193 lb)   SpO2 97%   BMI 24.78 kg/m²     Physical Exam      A/P: Doing well and no falls. Denies depression and feels safe at home. Diverse diet. No problems operating a MV and uses seat belts. No living will or Has info at home for pt to review. No DME or referrals needed today. RTC in one year for medicare wellness.     "

## 2025-05-28 NOTE — PATIENT INSTRUCTIONS
"Patient Education     High blood pressure in adults   The Basics   Written by the doctors and editors at [facility]   What is high blood pressure? -- High blood pressure is a condition that puts you at risk for heart attack, stroke, and kidney disease. It does not usually cause symptoms. But it can be serious.  When your doctor or nurse tells you your blood pressure, they say 2 numbers. For instance, your doctor or nurse might say that your blood pressure is \"130 over 80.\" The top number is the pressure inside your arteries when your heart is paulino. The bottom number is the pressure inside your arteries when your heart is relaxed.  \"Elevated blood pressure\" is a term doctors or nurses use as a warning. People with elevated blood pressure do not yet have high blood pressure. But their blood pressure is not as low as it should be for good health.  Many experts define high, elevated, and normal blood pressure as follows:   High - Top number of 130 or above and/or bottom number of 80 or above.   Elevated - Top number between 120 and 129 and bottom number of 79 or below.   Normal - Top number of 119 or below and bottom number of 79 or below.  This information is also in the table (table 1).  How can I lower my blood pressure? -- If your doctor or nurse prescribed blood pressure medicine, the most important thing you can do is to take it. If it causes side effects, do not just stop taking it. Instead, talk to your doctor or nurse about the problems it causes. They might be able to lower your dose or switch you to another medicine. If cost is a problem, mention that, too. They might be able to put you on a less expensive medicine. Taking your blood pressure medicine can keep you from having a heart attack or stroke, and it can save your life!  Can I do anything on my own? -- You have a lot of control over your blood pressure. To lower it:   Lose weight (if you are overweight).   Choose a diet low in fat and rich in " "fruits, vegetables, and low-fat dairy products.   Eat less salt.   Do something active for at least 30 minutes a day on most days of the week.   Drink less alcohol (if you drink more than 2 alcoholic drinks per day).  It's also a good idea to get a home blood pressure meter. People who check their own blood pressure at home do better at keeping it low and can sometimes even reduce the amount of medicine they take.  All topics are updated as new evidence becomes available and our peer review process is complete.  This topic retrieved from [employer] on: Feb 26, 2024.  Topic 06797 Version 23.0  Release: 32.2.4 - C32.56  © 2024 UpToDate, Inc. and/or its affiliates. All rights reserved.  table 1: Definition of normal and high blood pressure  Level  Top number  Bottom number    High 130 or above 80 or above   Elevated 120 to 129 79 or below   Normal 119 or below 79 or below   These definitions are from the American College of Cardiology/American Heart Association. Other expert groups might use slightly different definitions.  \"Elevated blood pressure\" is a term doctor or nurses use as a warning. It means you do not yet have high blood pressure, but your blood pressure is not as low as it should be for good health.  Graphic 64743 Version 6.0  Consumer Information Use and Disclaimer   Disclaimer: This generalized information is a limited summary of diagnosis, treatment, and/or medication information. It is not meant to be comprehensive and should be used as a tool to help the user understand and/or assess potential diagnostic and treatment options. It does NOT include all information about conditions, treatments, medications, side effects, or risks that may apply to a specific patient. It is not intended to be medical advice or a substitute for the medical advice, diagnosis, or treatment of a health care provider based on the health care provider's examination and assessment of a patient's specific and unique circumstances. " Patients must speak with a health care provider for complete information about their health, medical questions, and treatment options, including any risks or benefits regarding use of medications. This information does not endorse any treatments or medications as safe, effective, or approved for treating a specific patient. UpToDate, Inc. and its affiliates disclaim any warranty or liability relating to this information or the use thereof.The use of this information is governed by the Terms of Use, available at https://www.[employer].com/en/know/clinical-effectiveness-terms. 2024© UpToDate, Inc. and its affiliates and/or licensors. All rights reserved.  Copyright   © 2024 UpToDate, Inc. and/or its affiliates. All rights reserved.    Medicare Preventive Visit Patient Instructions  Thank you for completing your Welcome to Medicare Visit or Medicare Annual Wellness Visit today. Your next wellness visit will be due in one year (5/29/2026).  The screening/preventive services that you may require over the next 5-10 years are detailed below. Some tests may not apply to you based off risk factors and/or age. Screening tests ordered at today's visit but not completed yet may show as past due. Also, please note that scanned in results may not display below.  Preventive Screenings:  Service Recommendations Previous Testing/Comments   Colorectal Cancer Screening  Colonoscopy    Fecal Occult Blood Test (FOBT)/Fecal Immunochemical Test (FIT)  Fecal DNA/Cologuard Test  Flexible Sigmoidoscopy Age: 45-75 years old   Colonoscopy: every 10 years (May be performed more frequently if at higher risk)  OR  FOBT/FIT: every 1 year  OR  Cologuard: every 3 years  OR  Sigmoidoscopy: every 5 years  Screening may be recommended earlier than age 45 if at higher risk for colorectal cancer. Also, an individualized decision between you and your healthcare provider will decide whether screening between the ages of 76-85 would be appropriate.  Colonoscopy: 07/19/2019  FOBT/FIT: Not on file  Cologuard: Not on file  Sigmoidoscopy: Not on file          Prostate Cancer Screening Individualized decision between patient and health care provider in men between ages of 55-69   Medicare will cover every 12 months beginning on the day after your 50th birthday PSA: 1.208 ng/mL     Screening Not Indicated     Hepatitis C Screening Once for adults born between 1945 and 1965  More frequently in patients at high risk for Hepatitis C Hep C Antibody: 06/23/2022    Screening Current   Diabetes Screening 1-2 times per year if you're at risk for diabetes or have pre-diabetes Fasting glucose: 94 mg/dL (2/4/2025)  A1C: 5.4 % (9/24/2024)  Screening Current   Cholesterol Screening Once every 5 years if you don't have a lipid disorder. May order more often based on risk factors. Lipid panel: 09/24/2024  Screening Not Indicated  History Lipid Disorder      Other Preventive Screenings Covered by Medicare:  Abdominal Aortic Aneurysm (AAA) Screening: covered once if your at risk. You're considered to be at risk if you have a family history of AAA or a male between the age of 65-75 who smoking at least 100 cigarettes in your lifetime.  Lung Cancer Screening: covers low dose CT scan once per year if you meet all of the following conditions: (1) Age 55-77; (2) No signs or symptoms of lung cancer; (3) Current smoker or have quit smoking within the last 15 years; (4) You have a tobacco smoking history of at least 20 pack years (packs per day x number of years you smoked); (5) You get a written order from a healthcare provider.  Glaucoma Screening: covered annually if you're considered high risk: (1) You have diabetes OR (2) Family history of glaucoma OR (3)  aged 50 and older OR (4)  American aged 65 and older  Osteoporosis Screening: covered every 2 years if you meet one of the following conditions: (1) Have a vertebral abnormality; (2) On glucocorticoid therapy  for more than 3 months; (3) Have primary hyperparathyroidism; (4) On osteoporosis medications and need to assess response to drug therapy.  HIV Screening: covered annually if you're between the age of 15-65. Also covered annually if you are younger than 15 and older than 65 with risk factors for HIV infection. For pregnant patients, it is covered up to 3 times per pregnancy.    Immunizations:  Immunization Recommendations   Influenza Vaccine Annual influenza vaccination during flu season is recommended for all persons aged >= 6 months who do not have contraindications   Pneumococcal Vaccine   * Pneumococcal conjugate vaccine = PCV13 (Prevnar 13), PCV15 (Vaxneuvance), PCV20 (Prevnar 20)  * Pneumococcal polysaccharide vaccine = PPSV23 (Pneumovax) Adults 19-65 yo with certain risk factors or if 65+ yo  If never received any pneumonia vaccine: recommend Prevnar 20 (PCV20)  Give PCV20 if previously received 1 dose of PCV13 or PPSV23   Hepatitis B Vaccine 3 dose series if at intermediate or high risk (ex: diabetes, end stage renal disease, liver disease)   Respiratory syncytial virus (RSV) Vaccine - COVERED BY MEDICARE PART D  * RSVPreF3 (Arexvy) CDC recommends that adults 60 years of age and older may receive a single dose of RSV vaccine using shared clinical decision-making (SCDM)   Tetanus (Td) Vaccine - COST NOT COVERED BY MEDICARE PART B Following completion of primary series, a booster dose should be given every 10 years to maintain immunity against tetanus. Td may also be given as tetanus wound prophylaxis.   Tdap Vaccine - COST NOT COVERED BY MEDICARE PART B Recommended at least once for all adults. For pregnant patients, recommended with each pregnancy.   Shingles Vaccine (Shingrix) - COST NOT COVERED BY MEDICARE PART B  2 shot series recommended in those 19 years and older who have or will have weakened immune systems or those 50 years and older     Health Maintenance Due:      Topic Date Due   • Hepatitis C  Screening  Completed   • Colorectal Cancer Screening  Discontinued     Immunizations Due:      Topic Date Due   • COVID-19 Vaccine (3 - 2024-25 season) 09/01/2024     Advance Directives   What are advance directives?  Advance directives are legal documents that state your wishes and plans for medical care. These plans are made ahead of time in case you lose your ability to make decisions for yourself. Advance directives can apply to any medical decision, such as the treatments you want, and if you want to donate organs.   What are the types of advance directives?  There are many types of advance directives, and each state has rules about how to use them. You may choose a combination of any of the following:  Living will:  This is a written record of the treatment you want. You can also choose which treatments you do not want, which to limit, and which to stop at a certain time. This includes surgery, medicine, IV fluid, and tube feedings.   Durable power of  for healthcare (DPAHC):  This is a written record that states who you want to make healthcare choices for you when you are unable to make them for yourself. This person, called a proxy, is usually a family member or a friend. You may choose more than 1 proxy.  Do not resuscitate (DNR) order:  A DNR order is used in case your heart stops beating or you stop breathing. It is a request not to have certain forms of treatment, such as CPR. A DNR order may be included in other types of advance directives.  Medical directive:  This covers the care that you want if you are in a coma, near death, or unable to make decisions for yourself. You can list the treatments you want for each condition. Treatment may include pain medicine, surgery, blood transfusions, dialysis, IV or tube feedings, and a ventilator (breathing machine).  Values history:  This document has questions about your views, beliefs, and how you feel and think about life. This information can help  others choose the care that you would choose.  Why are advance directives important?  An advance directive helps you control your care. Although spoken wishes may be used, it is better to have your wishes written down. Spoken wishes can be misunderstood, or not followed. Treatments may be given even if you do not want them. An advance directive may make it easier for your family to make difficult choices about your care.       © Copyright [employer] 2018 Information is for End User's use only and may not be sold, redistributed or otherwise used for commercial purposes. All illustrations and images included in CareNotes® are the copyrighted property of [employer] or [employer]

## 2025-05-28 NOTE — PROGRESS NOTES
Name: Qamar CASTILLO Trainer      : 1950      MRN: [id number]  Encounter Provider: Juan F Dang DO  Encounter Date: 2025   Encounter department: [facility]  :  Assessment & Plan  Benign hypertension with CKD (chronic kidney disease) stage III (HCC)  Lab Results   Component Value Date    EGFR 63 2025    EGFR 59 10/14/2024    EGFR 56 2024    CREATININE 1.12 2025    CREATININE 1.19 10/14/2024    CREATININE 1.24 2024       Orders:    VAS carotid complete study; Future    GERD without esophagitis         Idiopathic hypoparathyroidism (HCC)         Stage 3a chronic kidney disease (HCC)  Lab Results   Component Value Date    EGFR 63 2025    EGFR 59 10/14/2024    EGFR 56 2024    CREATININE 1.12 2025    CREATININE 1.19 10/14/2024    CREATININE 1.24 2024       Orders:    VAS carotid complete study; Future    Generalized anxiety disorder         Benign prostatic hyperplasia with nocturia         Microalbuminuria         Mixed hyperlipidemia    Orders:    VAS carotid complete study; Future    Medicare annual wellness visit, subsequent         Migraine without status migrainosus, not intractable, unspecified migraine type    Orders:    VAS carotid complete study; Future    Bruit of left carotid artery    Orders:    VAS carotid complete study; Future    A/P: doing ok and labs are up to date. ?bruits and will check a duplex.  No focal findings. Has several risks for carotid  Continue current treatment and RTC four months for routine.        History of Present Illness   WM RTC for f/u HTN, GERD, Etc. Doing ok and no new issues.. NO other s/s of stroke. H/o migraines. . Remains active w/o difficulty and no falls. No reflux. STAR is controlled. Migraines and chronic pain are manageable. Labs are up to date.       Review of Systems   Constitutional:  Negative for activity change, chills, diaphoresis, fatigue and fever.   HENT: Negative.     Eyes:  Negative for  "visual disturbance.   Respiratory:  Negative for cough, chest tightness, shortness of breath and wheezing.    Cardiovascular:  Negative for chest pain, palpitations and leg swelling.   Gastrointestinal:  Negative for abdominal pain, constipation, diarrhea, nausea and vomiting.   Endocrine: Negative for cold intolerance and heat intolerance.   Genitourinary:  Negative for difficulty urinating, dysuria and frequency.   Musculoskeletal:  Negative for arthralgias, gait problem and myalgias.   Neurological:  Negative for dizziness, seizures, syncope, weakness, light-headedness and headaches.   Psychiatric/Behavioral:  Negative for confusion, dysphoric mood and sleep disturbance. The patient is not nervous/anxious.        Objective   /68 (BP Location: Left arm, Patient Position: Sitting)   Pulse 79   Temp (!) 97.4 °F (36.3 °C) (Tympanic)   Ht 6' 2\" (1.88 m)   Wt 87.5 kg (193 lb)   SpO2 97%   BMI 24.78 kg/m²      Physical Exam  Vitals and nursing note reviewed.   Constitutional:       General: He is not in acute distress.     Appearance: Normal appearance. He is not ill-appearing.   HENT:      Head: Normocephalic and atraumatic.      Mouth/Throat:      Mouth: Mucous membranes are moist.     Eyes:      Extraocular Movements: Extraocular movements intact.      Conjunctiva/sclera: Conjunctivae normal.      Pupils: Pupils are equal, round, and reactive to light.     Neck:      Vascular: Carotid bruit (?left bruits.) present.     Cardiovascular:      Rate and Rhythm: Normal rate and regular rhythm.      Heart sounds: Normal heart sounds. No murmur heard.  Pulmonary:      Effort: Pulmonary effort is normal. No respiratory distress.      Breath sounds: Normal breath sounds. No wheezing, rhonchi or rales.   Abdominal:      General: There is no distension.      Palpations: Abdomen is soft.      Tenderness: There is no abdominal tenderness.     Musculoskeletal:      Cervical back: Neck supple.      Right lower leg: No " edema.      Left lower leg: No edema.     Neurological:      General: No focal deficit present.      Mental Status: He is alert and oriented to person, place, and time. Mental status is at baseline.      Cranial Nerves: No cranial nerve deficit.      Motor: No weakness.      Coordination: Coordination normal.      Gait: Gait normal.      Deep Tendon Reflexes: Reflexes normal.     Psychiatric:         Mood and Affect: Mood normal.         Behavior: Behavior normal.         Thought Content: Thought content normal.         Judgment: Judgment normal.

## 2025-05-28 NOTE — ASSESSMENT & PLAN NOTE
Lab Results   Component Value Date    EGFR 63 02/04/2025    EGFR 59 10/14/2024    EGFR 56 09/24/2024    CREATININE 1.12 02/04/2025    CREATININE 1.19 10/14/2024    CREATININE 1.24 09/24/2024

## 2025-05-28 NOTE — ASSESSMENT & PLAN NOTE
Lab Results   Component Value Date    EGFR 63 02/04/2025    EGFR 59 10/14/2024    EGFR 56 09/24/2024    CREATININE 1.12 02/04/2025    CREATININE 1.19 10/14/2024    CREATININE 1.24 09/24/2024       Orders:    VAS carotid complete study; Future     Color consistent with ethnicity/race, warm, dry intact, resilient.

## 2025-05-29 ENCOUNTER — HOSPITAL ENCOUNTER (OUTPATIENT)
Dept: NON INVASIVE DIAGNOSTICS | Facility: HOSPITAL | Age: 75
Discharge: HOME | End: 2025-05-29
Attending: INTERNAL MEDICINE
Payer: MEDICARE

## 2025-05-29 DIAGNOSIS — I12.9 BENIGN HYPERTENSION WITH CKD (CHRONIC KIDNEY DISEASE) STAGE III (HCC): ICD-10-CM

## 2025-05-29 DIAGNOSIS — E78.2 MIXED HYPERLIPIDEMIA: ICD-10-CM

## 2025-05-29 DIAGNOSIS — N18.31 STAGE 3A CHRONIC KIDNEY DISEASE (HCC): ICD-10-CM

## 2025-05-29 DIAGNOSIS — R09.89 BRUIT OF LEFT CAROTID ARTERY: ICD-10-CM

## 2025-05-29 DIAGNOSIS — G43.909 MIGRAINE WITHOUT STATUS MIGRAINOSUS, NOT INTRACTABLE, UNSPECIFIED MIGRAINE TYPE: ICD-10-CM

## 2025-05-29 DIAGNOSIS — N18.30 BENIGN HYPERTENSION WITH CKD (CHRONIC KIDNEY DISEASE) STAGE III (HCC): ICD-10-CM

## 2025-05-29 PROCEDURE — 93880 EXTRACRANIAL BILAT STUDY: CPT

## 2025-05-29 PROCEDURE — 93880 EXTRACRANIAL BILAT STUDY: CPT | Performed by: SURGERY

## 2025-05-30 ENCOUNTER — RESULTS FOLLOW-UP (OUTPATIENT)
Dept: INTERNAL MEDICINE CLINIC | Facility: CLINIC | Age: 75
End: 2025-05-30

## 2025-06-30 DIAGNOSIS — E83.51 HYPOCALCEMIA: ICD-10-CM

## 2025-06-30 NOTE — TELEPHONE ENCOUNTER
Reason for call:   [x] Refill   [] Prior Auth  [] Other:     Office:   [x] PCP/Provider - Juan F Dang, DO   [] Specialty/Provider -     Medication:  calcitriol (ROCALTROL) 0.25 mcg capsule    Dose/Frequency: Take 1 capsule (0.25 mcg total) by mouth every other day,     Quantity: 45    Pharmacy: [facility] [address] [place] [address] CATHERINE LIU      Local Pharmacy   Does the patient have enough for 3 days?   [x] Yes   [] No - Send as HP to POD    Mail Away Pharmacy   Does the patient have enough for 10 days?   [] Yes   [] No - Send as HP to POD

## 2025-07-01 RX ORDER — CALCITRIOL 0.25 UG/1
0.25 CAPSULE, LIQUID FILLED ORAL EVERY OTHER DAY
Qty: 45 CAPSULE | Refills: 1 | Status: SHIPPED | OUTPATIENT
Start: 2025-07-01

## 2025-07-08 ENCOUNTER — TELEPHONE (OUTPATIENT)
Dept: GASTROENTEROLOGY | Facility: CLINIC | Age: 75
End: 2025-07-08

## 2025-07-08 NOTE — TELEPHONE ENCOUNTER
Spoke to pt to schedule colon consult due to being due for 5y repeat.  Pt declined to schedule.  Letter sent

## 2025-07-17 ENCOUNTER — OFFICE VISIT (OUTPATIENT)
Age: 75
End: 2025-07-17
Payer: MEDICARE

## 2025-07-17 VITALS
SYSTOLIC BLOOD PRESSURE: 120 MMHG | BODY MASS INDEX: 24.65 KG/M2 | WEIGHT: 192 LBS | DIASTOLIC BLOOD PRESSURE: 68 MMHG | HEART RATE: 71 BPM | TEMPERATURE: 97.6 F | OXYGEN SATURATION: 96 %

## 2025-07-17 DIAGNOSIS — N18.30 BENIGN HYPERTENSION WITH CKD (CHRONIC KIDNEY DISEASE) STAGE III (HCC): ICD-10-CM

## 2025-07-17 DIAGNOSIS — I12.9 BENIGN HYPERTENSION WITH CKD (CHRONIC KIDNEY DISEASE) STAGE III (HCC): ICD-10-CM

## 2025-07-17 DIAGNOSIS — I10 PRIMARY HYPERTENSION: ICD-10-CM

## 2025-07-17 DIAGNOSIS — N18.31 STAGE 3A CHRONIC KIDNEY DISEASE (HCC): Primary | ICD-10-CM

## 2025-07-17 DIAGNOSIS — E20.0 IDIOPATHIC HYPOPARATHYROIDISM (HCC): ICD-10-CM

## 2025-07-17 DIAGNOSIS — R82.994 HYPERCALCIURIA: ICD-10-CM

## 2025-07-17 PROCEDURE — 99214 OFFICE O/P EST MOD 30 MIN: CPT | Performed by: INTERNAL MEDICINE

## 2025-07-17 PROCEDURE — G2211 COMPLEX E/M VISIT ADD ON: HCPCS | Performed by: INTERNAL MEDICINE

## 2025-07-17 RX ORDER — HYDROCHLOROTHIAZIDE 25 MG/1
25 TABLET ORAL DAILY
Start: 2025-07-17

## 2025-07-17 RX ORDER — AMLODIPINE BESYLATE 5 MG/1
5 TABLET ORAL DAILY
Qty: 90 TABLET | Refills: 1
Start: 2025-07-17

## 2025-07-17 NOTE — ASSESSMENT & PLAN NOTE
Continue current dosing of calcitriol, will continue to try to maintain appropriate calcium levels, please refer below.

## 2025-07-17 NOTE — PROGRESS NOTES
[facility] [place]  Sg Alaniz DO    Name: Qamar Cintron  YOB: 1950      Assessment/Plan:    Stage 3a chronic kidney disease (HCC)  Lab Results   Component Value Date    EGFR 63 02/04/2025    EGFR 59 10/14/2024    EGFR 56 09/24/2024    CREATININE 1.12 02/04/2025    CREATININE 1.19 10/14/2024    CREATININE 1.24 09/24/2024     Kidney function stable at this time, baseline creatinine right around 1.1-1.2 mg/dL.  Patient to have repeat labs in the next 2 weeks or so, continue to optimize care in the meantime.    Hypoparathyroidism (HCC)  Continue current dosing of calcitriol, will continue to try to maintain appropriate calcium levels, please refer below.    Hypercalciuria  Continue hydrochlorothiazide, increase dosing to 25 mg daily, reduce amlodipine as noted above, follow 24-hour urines according to our endocrinology colleagues.    Benign hypertension with CKD (chronic kidney disease) stage III (HCC)  Lab Results   Component Value Date    EGFR 63 02/04/2025    EGFR 59 10/14/2024    EGFR 56 09/24/2024    CREATININE 1.12 02/04/2025    CREATININE 1.19 10/14/2024    CREATININE 1.24 09/24/2024     Will reduce amlodipine from 10 mg down to 5 mg daily and increase hydrochlorothiazide from 12.5 up to 25 mg daily.         Problem List Items Addressed This Visit          Cardiovascular and Mediastinum    Benign hypertension with CKD (chronic kidney disease) stage III (HCC)    Lab Results   Component Value Date    EGFR 63 02/04/2025    EGFR 59 10/14/2024    EGFR 56 09/24/2024    CREATININE 1.12 02/04/2025    CREATININE 1.19 10/14/2024    CREATININE 1.24 09/24/2024     Will reduce amlodipine from 10 mg down to 5 mg daily and increase hydrochlorothiazide from 12.5 up to 25 mg daily.         Relevant Medications    amLODIPine (NORVASC) 5 mg tablet    hydroCHLOROthiazide 25 mg tablet       Endocrine    Hypoparathyroidism (HCC)    Continue current dosing of calcitriol, will  continue to try to maintain appropriate calcium levels, please refer below.         Relevant Medications    hydroCHLOROthiazide 25 mg tablet       Genitourinary    Stage 3a chronic kidney disease (HCC) - Primary    Lab Results   Component Value Date    EGFR 63 02/04/2025    EGFR 59 10/14/2024    EGFR 56 09/24/2024    CREATININE 1.12 02/04/2025    CREATININE 1.19 10/14/2024    CREATININE 1.24 09/24/2024     Kidney function stable at this time, baseline creatinine right around 1.1-1.2 mg/dL.  Patient to have repeat labs in the next 2 weeks or so, continue to optimize care in the meantime.         Relevant Medications    hydroCHLOROthiazide 25 mg tablet    Hypercalciuria    Continue hydrochlorothiazide, increase dosing to 25 mg daily, reduce amlodipine as noted above, follow 24-hour urines according to our endocrinology colleagues.         Relevant Medications    hydroCHLOROthiazide 25 mg tablet     Other Visit Diagnoses         Primary hypertension        Relevant Medications    amLODIPine (NORVASC) 5 mg tablet    hydroCHLOROthiazide 25 mg tablet            Medication changes include reduction of amlodipine from 10 mg down to 5 mg daily, and increasing hydrochlorothiazide from 12.5 up to 25 mg daily.  Course of action taken to try to further improve calcium reabsorption from the urine, while trying to balance blood pressures and have them reduce too much with the higher dose of hydrochlorothiazide.    Patient stable for the renal standpoint, he will have labs done in 1-2 weeks, we will continue to monitor the patient yearly for now, may potentially see him in 6 months, but more likely repeat labs at that time and then go from there.    Subjective:      Patient ID: Qamar Cintron is a 75 y.o. male.    Patient presents for follow up.    We reviewed labs in detail, most recent creatinine noted to be 1.12 mg/dL, estimate GFR 63 mL/min.    There were no significant electrolyte abnormalities noted.  Patient is taking all  "medications as prescribed with no specific side effects and denies use of nonsteroid anti-inflammatory medications.              The following portions of the patient's history were reviewed and updated as appropriate: allergies, current medications, past family history, past medical history, past social history, past surgical history and problem list.    Review of Systems   All other systems reviewed and are negative.        Social History[1]  Past Medical History[2]  Past Surgical History[3]  Current Medications[4]    Lab Results   Component Value Date     12/15/2015    SODIUM 135 02/04/2025    K 4.2 02/04/2025    CL 96 02/04/2025    CO2 30 02/04/2025    ANIONGAP 7 12/15/2015    AGAP 9 02/04/2025    BUN 14 02/04/2025    CREATININE 1.12 02/04/2025    GLUC 96 10/31/2023    GLUF 94 02/04/2025    CALCIUM 8.5 02/04/2025    AST 18 02/04/2025    ALT 10 02/04/2025    ALKPHOS 66 02/04/2025    PROT 7.8 09/16/2015    TP 7.0 02/04/2025    BILITOT 0.51 09/16/2015    TBILI 0.63 02/04/2025    EGFR 63 02/04/2025     Lab Results   Component Value Date    WBC 8.59 10/14/2024    HGB 15.3 10/14/2024    HCT 46.1 10/14/2024    MCV 88 10/14/2024     10/14/2024     Lab Results   Component Value Date    CHOLESTEROL 181 09/24/2024    CHOLESTEROL 189 07/01/2024    CHOLESTEROL 204 (H) 01/17/2023     Lab Results   Component Value Date    HDL 39 (L) 09/24/2024    HDL 42 07/01/2024    HDL 52 01/17/2023     Lab Results   Component Value Date    LDLCALC 100 09/24/2024    LDLCALC 105 (H) 07/01/2024    LDLCALC 113 (H) 01/17/2023     Lab Results   Component Value Date    TRIG 177 (H) 02/04/2025    TRIG 211 (H) 09/24/2024    TRIG 208 (H) 07/01/2024     No results found for: \"CHOLHDL\"  Lab Results   Component Value Date    SEO3CKWOKWBU 0.789 09/24/2024     Lab Results   Component Value Date    PTH 5.6 (L) 09/24/2024    CALCIUM 8.5 02/04/2025    CAION 1.06 12/12/2012    PHOS 3.4 09/24/2024     No results found for: \"SPEP\", \"UPEP\"  No " "results found for: \"MICROALBUR\", \"[id number]\"        Objective:      /68 (BP Location: Right arm, Patient Position: Sitting)   Pulse 71   Temp 97.6 °F (36.4 °C)   Wt 87.1 kg (192 lb)   SpO2 96%   BMI 24.65 kg/m²          Physical Exam  Vitals reviewed.   Constitutional:       General: He is not in acute distress.     Appearance: Normal appearance.   HENT:      Head: Normocephalic and atraumatic.      Right Ear: External ear normal.      Left Ear: External ear normal.     Eyes:      Conjunctiva/sclera: Conjunctivae normal.       Cardiovascular:      Rate and Rhythm: Normal rate and regular rhythm.      Heart sounds: Normal heart sounds.   Pulmonary:      Effort: No respiratory distress.      Breath sounds: No wheezing.   Abdominal:      Palpations: Abdomen is soft.     Skin:     General: Skin is warm and dry.     Neurological:      General: No focal deficit present.      Mental Status: He is alert and oriented to person, place, and time.     Psychiatric:         Mood and Affect: Mood normal.         Behavior: Behavior normal.                [1]   Social History  Socioeconomic History    Marital status: /Civil Union   Occupational History    Occupation: Retired     Comment:     Tobacco Use    Smoking status: Never     Passive exposure: Past    Smokeless tobacco: Never   Vaping Use    Vaping status: Never Used   Substance and Sexual Activity    Alcohol use: Yes     Comment: Occasionally drink    Drug use: No    Sexual activity: Yes     Partners: Female   Social History Narrative    Consumes on average 1 cup of regular coffee per day      Social Drivers of Health     Financial Resource Strain: Low Risk  (5/18/2023)    Overall Financial Resource Strain (CARDIA)     Difficulty of Paying Living Expenses: Not very hard   Food Insecurity: No Food Insecurity (5/28/2025)    Nursing - Inadequate Food Risk Classification     Worried About Running Out of Food in the Last Year: Never true     Ran Out " of Food in the Last Year: Never true   Transportation Needs: No Transportation Needs (5/28/2025)    PRAPARE - Transportation     Lack of Transportation (Medical): No     Lack of Transportation (Non-Medical): No   Housing Stability: Low Risk  (5/28/2025)    Housing Stability Vital Sign     Unable to Pay for Housing in the Last Year: No     Number of Times Moved in the Last Year: 0     Homeless in the Last Year: No   [2]   Past Medical History:  Diagnosis Date    Abdominal pain, acute, generalized     last assessed - 21Apr2017    Actinic keratosis     last assessed - 12Jun2013    Chest pain     last assessed - 29Nov2012    Chronic kidney disease, stage 3 (HCC)     Colon polyp     Disease of thyroid gland     Dysfunction of both eustachian tubes     suspect due to ETD. Recommend otc allergy meds and if fails then add flonase; last assessed - 06Aug2012    Edema     Fatigue     last assessed - 03Jul2013    Generalized anxiety disorder     GERD (gastroesophageal reflux disease)     Hematuria     last assessed - 02Aug2012    Hypertension     Hypo-osmolality and hyponatremia     Hypocalcemia     Hypoparathyroidism (HCC)     Lightheadedness     last assessed - 19Feb2014    Lump in neck     ?cause. Maybe a localized allergic reaction, dental issue, doubt sialdenitis, ect. Empiric abx and one dose steroids. Continue benadryl. If worsens, to er or ent.    Malaise and fatigue     last assessed - 21Apr2017    Nephropathy     last assessed - 16Sep2015    Nocturia     Proteinuria     Shortness of breath     last assessed - 29Nov2012    Skin lesion     Resolved - 31May2017    Tinea corporis     last assessed - 16May2014   [3]   Past Surgical History:  Procedure Laterality Date    COLONOSCOPY W/ POLYPECTOMY  07/19/2019    HEAD & NECK SKIN LESION EXCISIONAL BIOPSY      excision of lesion scalp malignant - [facility]; last assessed - 31May2017    SKIN BIOPSY      last assessed - 09Sep2014    THYROID SURGERY      Biopsy thyroid using  percutaneous core needle; last assessed - [id number]    TONSILLECTOMY     [4]   Current Outpatient Medications:     amLODIPine (NORVASC) 5 mg tablet, Take 1 tablet (5 mg total) by mouth daily, Disp: 90 tablet, Rfl: 1    aspirin (ECOTRIN LOW STRENGTH) 81 mg EC tablet, Take 1 tablet (81 mg total) by mouth daily, Disp: , Rfl:     calcitriol (ROCALTROL) 0.25 mcg capsule, Take 1 capsule (0.25 mcg total) by mouth every other day, Disp: 45 capsule, Rfl: 1    Calcium Carbonate (CALCIUM 600 PO), Take by mouth Twice per day, Disp: , Rfl:     Cholecalciferol (VITAMIN D3) 1000 units CAPS, Take 1 capsule by mouth in the morning, Disp: , Rfl:     finasteride (PROSCAR) 5 mg tablet, Take 1 tablet (5 mg total) by mouth daily, Disp: 90 tablet, Rfl: 1    hydroCHLOROthiazide 25 mg tablet, Take 1 tablet (25 mg total) by mouth daily, Disp: , Rfl:     Multiple Vitamin (MULTI-VITAMIN DAILY PO), Take by mouth in the morning., Disp: , Rfl:     Psyllium (Metamucil) 28.3 % POWD, Take 1 Package by mouth as needed, Disp: , Rfl:     tamsulosin (FLOMAX) 0.4 mg, Take 1 capsule (0.4 mg total) by mouth 2 (two) times a day, Disp: 200 capsule, Rfl: 1

## 2025-07-17 NOTE — ASSESSMENT & PLAN NOTE
Lab Results   Component Value Date    EGFR 63 02/04/2025    EGFR 59 10/14/2024    EGFR 56 09/24/2024    CREATININE 1.12 02/04/2025    CREATININE 1.19 10/14/2024    CREATININE 1.24 09/24/2024     Kidney function stable at this time, baseline creatinine right around 1.1-1.2 mg/dL.  Patient to have repeat labs in the next 2 weeks or so, continue to optimize care in the meantime.

## 2025-07-17 NOTE — ASSESSMENT & PLAN NOTE
Lab Results   Component Value Date    EGFR 63 02/04/2025    EGFR 59 10/14/2024    EGFR 56 09/24/2024    CREATININE 1.12 02/04/2025    CREATININE 1.19 10/14/2024    CREATININE 1.24 09/24/2024     Will reduce amlodipine from 10 mg down to 5 mg daily and increase hydrochlorothiazide from 12.5 up to 25 mg daily.

## 2025-07-17 NOTE — PATIENT INSTRUCTIONS
Please increase hydrochlorothiazide from 12.5 mg up to 25 mg once daily.  Please take 2 tablets for now and see how you do.  The main side effect is that as a diuretic it will increase the amount of time to urinate throughout the day, please make sure you are not getting lightheaded or dizzy, check your blood pressures and make sure you are keeping up with your hydration.    Amlodipine will be reduced from 10 mg down to 5 mg daily, if your blood pressure is too low due to the changes of these medications, please stop amlodipine altogether if blood pressures are too low.    Please wait 1 or 2 weeks and get labs at that time.

## 2025-07-17 NOTE — ASSESSMENT & PLAN NOTE
Continue hydrochlorothiazide, increase dosing to 25 mg daily, reduce amlodipine as noted above, follow 24-hour urines according to our endocrinology colleagues.

## 2025-07-21 DIAGNOSIS — E83.51 HYPOCALCEMIA: ICD-10-CM

## 2025-07-21 RX ORDER — CALCITRIOL 0.25 UG/1
0.25 CAPSULE, LIQUID FILLED ORAL EVERY OTHER DAY
Qty: 45 CAPSULE | Refills: 1 | Status: SHIPPED | OUTPATIENT
Start: 2025-07-21

## 2025-08-05 ENCOUNTER — TELEPHONE (OUTPATIENT)
Dept: INTERNAL MEDICINE CLINIC | Facility: CLINIC | Age: 75
End: 2025-08-05

## 2025-08-18 DIAGNOSIS — N40.0 BENIGN PROSTATIC HYPERPLASIA WITHOUT LOWER URINARY TRACT SYMPTOMS: ICD-10-CM

## 2025-08-18 RX ORDER — FINASTERIDE 5 MG/1
5 TABLET, FILM COATED ORAL DAILY
Qty: 90 TABLET | Refills: 1 | Status: SHIPPED | OUTPATIENT
Start: 2025-08-18